# Patient Record
Sex: FEMALE | Race: BLACK OR AFRICAN AMERICAN | Employment: FULL TIME | URBAN - METROPOLITAN AREA
[De-identification: names, ages, dates, MRNs, and addresses within clinical notes are randomized per-mention and may not be internally consistent; named-entity substitution may affect disease eponyms.]

---

## 2018-09-06 ENCOUNTER — OP HISTORICAL/CONVERTED ENCOUNTER (OUTPATIENT)
Dept: OTHER | Age: 43
End: 2018-09-06

## 2018-09-21 ENCOUNTER — OP HISTORICAL/CONVERTED ENCOUNTER (OUTPATIENT)
Dept: OTHER | Age: 43
End: 2018-09-21

## 2018-09-26 ENCOUNTER — OP HISTORICAL/CONVERTED ENCOUNTER (OUTPATIENT)
Dept: OTHER | Age: 43
End: 2018-09-26

## 2019-01-10 ENCOUNTER — ED HISTORICAL/CONVERTED ENCOUNTER (OUTPATIENT)
Dept: OTHER | Age: 44
End: 2019-01-10

## 2019-06-15 ENCOUNTER — ED HISTORICAL/CONVERTED ENCOUNTER (OUTPATIENT)
Dept: OTHER | Age: 44
End: 2019-06-15

## 2019-10-15 ENCOUNTER — ED HISTORICAL/CONVERTED ENCOUNTER (OUTPATIENT)
Dept: OTHER | Age: 44
End: 2019-10-15

## 2019-12-10 ENCOUNTER — ED HISTORICAL/CONVERTED ENCOUNTER (OUTPATIENT)
Dept: OTHER | Age: 44
End: 2019-12-10

## 2020-01-01 ENCOUNTER — OFFICE VISIT (OUTPATIENT)
Dept: SURGERY | Age: 45
End: 2020-01-01
Payer: MEDICAID

## 2020-01-01 VITALS
DIASTOLIC BLOOD PRESSURE: 86 MMHG | WEIGHT: 293 LBS | BODY MASS INDEX: 48.82 KG/M2 | SYSTOLIC BLOOD PRESSURE: 145 MMHG | OXYGEN SATURATION: 96 % | HEIGHT: 65 IN | HEART RATE: 84 BPM | TEMPERATURE: 97.3 F

## 2020-01-01 DIAGNOSIS — I83.899 BLEEDING FROM VARICOSE VEIN: Primary | ICD-10-CM

## 2020-01-01 PROCEDURE — 99213 OFFICE O/P EST LOW 20 MIN: CPT | Performed by: SURGERY

## 2020-01-14 ENCOUNTER — ED HISTORICAL/CONVERTED ENCOUNTER (OUTPATIENT)
Dept: OTHER | Age: 45
End: 2020-01-14

## 2020-02-27 ENCOUNTER — ED HISTORICAL/CONVERTED ENCOUNTER (OUTPATIENT)
Dept: OTHER | Age: 45
End: 2020-02-27

## 2020-03-18 ENCOUNTER — OP HISTORICAL/CONVERTED ENCOUNTER (OUTPATIENT)
Dept: OTHER | Age: 45
End: 2020-03-18

## 2020-09-25 ENCOUNTER — HOSPITAL ENCOUNTER (OUTPATIENT)
Dept: ULTRASOUND IMAGING | Age: 45
Discharge: HOME | End: 2020-09-25
Admitting: FAMILY MEDICINE
Payer: MEDICAID

## 2020-09-25 DIAGNOSIS — E66.8 CONSTITUTIONAL OBESITY: ICD-10-CM

## 2020-09-25 DIAGNOSIS — K70.9 ALCOHOL LIVER DAMAGE (HCC): ICD-10-CM

## 2020-09-25 DIAGNOSIS — R30.0 DYSURIA: ICD-10-CM

## 2020-09-25 PROCEDURE — 76700 US EXAM ABDOM COMPLETE: CPT

## 2020-09-30 ENCOUNTER — TRANSCRIBE ORDER (OUTPATIENT)
Dept: SCHEDULING | Age: 45
End: 2020-09-30

## 2020-09-30 ENCOUNTER — HOSPITAL ENCOUNTER (EMERGENCY)
Age: 45
Discharge: HOME | End: 2020-09-30
Attending: EMERGENCY MEDICINE
Payer: MEDICAID

## 2020-09-30 ENCOUNTER — HOSPITAL ENCOUNTER (OUTPATIENT)
Dept: NON INVASIVE DIAGNOSTICS | Age: 45
Discharge: HOME | End: 2020-09-30
Attending: EMERGENCY MEDICINE
Payer: MEDICAID

## 2020-09-30 VITALS
RESPIRATION RATE: 16 BRPM | HEIGHT: 66 IN | BODY MASS INDEX: 47.09 KG/M2 | DIASTOLIC BLOOD PRESSURE: 86 MMHG | SYSTOLIC BLOOD PRESSURE: 141 MMHG | HEART RATE: 75 BPM | TEMPERATURE: 97.9 F | OXYGEN SATURATION: 100 % | WEIGHT: 293 LBS

## 2020-09-30 DIAGNOSIS — R60.9 SWELLING: ICD-10-CM

## 2020-09-30 DIAGNOSIS — R60.9 SWELLING: Primary | ICD-10-CM

## 2020-09-30 DIAGNOSIS — M79.89 LEG SWELLING: Primary | ICD-10-CM

## 2020-09-30 PROCEDURE — 99282 EMERGENCY DEPT VISIT SF MDM: CPT

## 2020-09-30 PROCEDURE — 93970 EXTREMITY STUDY: CPT

## 2020-09-30 RX ORDER — FUROSEMIDE 20 MG/1
40 TABLET ORAL 2 TIMES DAILY
COMMUNITY

## 2020-09-30 RX ORDER — OMEPRAZOLE 10 MG/1
10 CAPSULE, DELAYED RELEASE ORAL DAILY
COMMUNITY
End: 2020-10-12 | Stop reason: SDUPTHER

## 2020-09-30 RX ORDER — FERROUS SULFATE 325(65) MG
325 TABLET, DELAYED RELEASE (ENTERIC COATED) ORAL
COMMUNITY

## 2020-09-30 NOTE — ED TRIAGE NOTES
Pt states her furosemide is as needed, and that she has not been taking this medication until she noted her bilateral leg swelling 1 week ago. As the furosemide seemed to not be working she went to see NP at [facility] and was referred to ER for MD fernández and US of bilateral legs to r/o DVT.

## 2020-09-30 NOTE — ED PROVIDER NOTES
EMERGENCY DEPARTMENT HISTORY AND PHYSICAL EXAM 
 
 
Date: 9/30/2020 Patient Name: Randa Kelly History of Presenting Illness Chief Complaint Patient presents with  Leg Pain  
  bilateral legs, patient went to [address] [facility] and NP referred her to ER for US to r/o DVTs History Provided By: patient HPI: Randa Kelly, 54 y.o. female presents to the ED with leg swelling bilaterally  Which is chroniic but worse over past few days. Sent by pmd for possible dvt. Denies  Cp or dyspnea. There are no other complaints, changes, or physical findings at this time. PCP: Jose Lorenz MD 
 
Current Outpatient Medications Medication Sig Dispense Refill  omeprazole (PRILOSEC) 10 mg capsule Take 10 mg by mouth daily.  furosemide (LASIX) 20 mg tablet Take  by mouth daily.  cyanocobalamin (VITAMIN B12) 500 mcg tablet Take  by mouth daily.  ferrous sulfate (IRON) 325 mg (65 mg iron) EC tablet Take 325 mg by mouth three (3) times daily (with meals). Past History Past Medical History: 
Past Medical History:  
Diagnosis Date  GERD (gastroesophageal reflux disease) Past Surgical History: 
History reviewed. No pertinent surgical history. Family History: 
History reviewed. No pertinent family history. Social History: 
Social History Tobacco Use  Smoking status: Never Smoker  Smokeless tobacco: Never Used Substance Use Topics  Alcohol use: Yes Comment: occasionally  Drug use: Never Allergies: 
No Known Allergies Review of Systems Review of Systems General: no weakness orsensory deficits Head: no headache Eyes: no visual disturbance Pharynx: no sore throat Neck: no swelling or pain 
Lungs: no shortness of breath or coughing Heart: no chest pain Abdomen: no pain, nausea vomiting or diarrhea Extremities: as  above Neuro: no weakness or deficits Skin: no rash Physical Exam  
 
Physical Exam 
 
Vitals: 09/30/20 1607 BP: (!) 141/86 Pulse: 75 Resp: 16 Temp: 97.9 °F (36.6 °C) SpO2: 100% Weight: 158.8 kg (350 lb) Height: 5' 6\" (1.676 m) General: NAD HEENT: NC/AT, blake, eomi, pharynx clear Neck: supple, no adenopathy LUNGS: non labored respirations, no tachypnea or wheezing Heart:  RRR, no thrill Abdomen: non tender, no rebound, no guarding, no organomegaly EXT: no clubbing, cyanosis , marked bilatr le swelling and varicosities, chronic, no eryytyhema or tenderness Neuro: aaox3, frances, 5/5, sensation  intact to light touch, cn 2-12 wnl Skin: warm,  dry, Vasc: 2+ pulses Psych: no si,hi or psychosis Diagnostic Study Results Medical Decision Making and ED Course I am the first provider for this patient. I reviewed the vital signs, available nursing notes, past medical history, past surgical history, family history and social history. Vital Signs-Reviewed the patient's vital signs. Patient Vitals for the past 12 hrs: 
 Temp Pulse Resp BP SpO2  
09/30/20 1607 97.9 °F (36.6 °C) 75 16 (!) 141/86 100 % Records Reviewed: old records Differentail Diagnosis: DVT/CHF/cellulitis Provider Notes (Medical Decision Making): [facility] Since we do not have ultrasound capabilities patient was sent to main ED for ultrasound of lower extremities. ED Course:  
 
Initial assessment performed. The patients presenting problems have been discussed, and they are in agreement with the care plan formulated and outlined with them. I have encouraged them to ask questions as they arise throughout their visit. Procedures Disposition Discharged DISCHARGE PLAN: 
1. Current Discharge Medication List  
  
CONTINUE these medications which have NOT CHANGED Details  
omeprazole (PRILOSEC) 10 mg capsule Take 10 mg by mouth daily. furosemide (LASIX) 20 mg tablet Take  by mouth daily. cyanocobalamin (VITAMIN B12) 500 mcg tablet Take  by mouth daily. ferrous sulfate (IRON) 325 mg (65 mg iron) EC tablet Take 325 mg by mouth three (3) times daily (with meals). 2.  
Follow-up Information Follow up With Specialties Details Why Contact Info [facility] vascular lab  Today 3. Return to ED if worse Diagnosis Clinical Impression: 1. Leg swelling Attestations: 
 
Shannan Manley MD 
 
Please note that this dictation was completed with Tailwind, the computer voice recognition software. Quite often unanticipated grammatical, syntax, homophones, and other interpretive errors are inadvertently transcribed by the computer software. Please disregard these errors. Please excuse any errors that have escaped final proofreading. Thank you.

## 2020-10-11 ENCOUNTER — HOSPITAL ENCOUNTER (EMERGENCY)
Age: 45
Discharge: HOME | End: 2020-10-11
Attending: EMERGENCY MEDICINE
Payer: MEDICAID

## 2020-10-11 VITALS
SYSTOLIC BLOOD PRESSURE: 137 MMHG | BODY MASS INDEX: 47.09 KG/M2 | WEIGHT: 293 LBS | DIASTOLIC BLOOD PRESSURE: 91 MMHG | TEMPERATURE: 98.8 F | HEART RATE: 83 BPM | RESPIRATION RATE: 16 BRPM | HEIGHT: 66 IN | OXYGEN SATURATION: 100 %

## 2020-10-11 DIAGNOSIS — I83.899 BLEEDING FROM VARICOSE VEIN: Primary | ICD-10-CM

## 2020-10-11 PROCEDURE — 99282 EMERGENCY DEPT VISIT SF MDM: CPT

## 2020-10-11 NOTE — ED PROVIDER NOTES
EMERGENCY DEPARTMENT HISTORY AND PHYSICAL EXAM 
 
 
Date: 10/11/2020 Patient Name: Charla Alegre History of Presenting Illness Chief Complaint Patient presents with  Varicose Veins  
  bleeding upon getting oob this am on the right lateral side History Provided By: Patient HPI: Charla Alegre, 54 y.o. female with a past medical history significant obesity and varicose veins presents to the ED with cc of bleeding from RLE varicosity which started this am but has stopped. She has an appointment with her vascular surgeon in a few days. There are no other complaints, changes, or physical findings at this time. PCP: Gerda, MD Jose 
 
No current facility-administered medications on file prior to encounter. Current Outpatient Medications on File Prior to Encounter Medication Sig Dispense Refill  omeprazole (PRILOSEC) 10 mg capsule Take 10 mg by mouth daily.  furosemide (LASIX) 20 mg tablet Take  by mouth daily.  cyanocobalamin (VITAMIN B12) 500 mcg tablet Take  by mouth daily.  ferrous sulfate (IRON) 325 mg (65 mg iron) EC tablet Take 325 mg by mouth three (3) times daily (with meals). Past History Past Medical History: 
Past Medical History:  
Diagnosis Date  GERD (gastroesophageal reflux disease) Past Surgical History: 
History reviewed. No pertinent surgical history. Family History: 
History reviewed. No pertinent family history. Social History: 
Social History Tobacco Use  Smoking status: Never Smoker  Smokeless tobacco: Never Used Substance Use Topics  Alcohol use: Yes Comment: occasionally  Drug use: Never Allergies: 
No Known Allergies Review of Systems Review of Systems Constitutional: Negative. HENT: Negative. Eyes: Negative. Respiratory: Negative. Cardiovascular: Negative. Gastrointestinal: Negative. Musculoskeletal: Negative. Skin:  
     Varicose veins of legs Neurological: Negative. All other systems reviewed and are negative. Physical Exam  
 
Physical Exam 
Vitals signs and nursing note reviewed. Constitutional:   
   Appearance: Normal appearance. She is obese. HENT:  
   Head: Normocephalic and atraumatic. Eyes:  
   Pupils: Pupils are equal, round, and reactive to light. Neck: Musculoskeletal: Normal range of motion and neck supple. Cardiovascular:  
   Rate and Rhythm: Normal rate and regular rhythm. Pulses: Normal pulses. Heart sounds: Normal heart sounds. Pulmonary:  
   Effort: Pulmonary effort is normal.  
Skin: 
   Comments: Prominent varicosities on legs. Band aid over RLE varicosity with a fresh clot. No active bleeding. Neurological:  
   Mental Status: She is alert. Diagnostic Study Results Labs - No results found for this or any previous visit (from the past 12 hour(s)). Medical Decision Making I am the first provider for this patient. I reviewed the vital signs, available nursing notes, past medical history, past surgical history, family history and social history. Vital Signs-Reviewed the patient's vital signs. Patient Vitals for the past 12 hrs: 
 Temp Pulse Resp BP SpO2  
10/11/20 0649 98.8 °F (37.1 °C) 83 16 (!) 137/91 100 % Records Reviewed: The patient presents with  
 
 
Provider Notes (Medical Decision Making): MDM Number of Diagnoses or Management Options Risk of Complications, Morbidity, and/or Mortality Presenting problems: minimal 
Management options: minimal 
 
  
 
 
 
ED Course:  
Initial assessment performed. The patients presenting problems have been discussed, and they are in agreement with the care plan formulated and outlined with them. I have encouraged them to ask questions as they arise throughout their visit. PROCEDURES Procedures PLAN: 
1. Current Discharge Medication List  
  
 
2. Follow-up Information None Return to ED if worse Diagnosis Clinical Impression: 1. Bleeding from varicose vein

## 2020-10-12 ENCOUNTER — OFFICE VISIT (OUTPATIENT)
Dept: SURGERY | Age: 45
End: 2020-10-12
Payer: MEDICAID

## 2020-10-12 VITALS
HEART RATE: 102 BPM | DIASTOLIC BLOOD PRESSURE: 93 MMHG | WEIGHT: 293 LBS | OXYGEN SATURATION: 98 % | SYSTOLIC BLOOD PRESSURE: 141 MMHG | BODY MASS INDEX: 47.09 KG/M2 | HEIGHT: 66 IN | TEMPERATURE: 97.3 F

## 2020-10-12 DIAGNOSIS — I83.012 VENOUS STASIS ULCER OF RIGHT CALF WITH VARICOSE VEINS, UNSPECIFIED ULCER STAGE (HCC): Primary | ICD-10-CM

## 2020-10-12 DIAGNOSIS — E66.01 OBESITY, MORBID (HCC): ICD-10-CM

## 2020-10-12 DIAGNOSIS — L97.219 VENOUS STASIS ULCER OF RIGHT CALF WITH VARICOSE VEINS, UNSPECIFIED ULCER STAGE (HCC): Primary | ICD-10-CM

## 2020-10-12 PROCEDURE — 29581 APPL MULTLAYER CMPRN SYS LEG: CPT | Performed by: SURGERY

## 2020-10-12 PROCEDURE — 99213 OFFICE O/P EST LOW 20 MIN: CPT | Performed by: SURGERY

## 2020-10-12 RX ORDER — OMEPRAZOLE 20 MG/1
20 CAPSULE, DELAYED RELEASE ORAL DAILY
COMMUNITY
Start: 2020-09-30

## 2020-10-12 RX ORDER — POTASSIUM CHLORIDE 750 MG/1
20 TABLET, FILM COATED, EXTENDED RELEASE ORAL AS NEEDED
COMMUNITY
Start: 2020-09-30

## 2020-10-13 ENCOUNTER — TELEPHONE (OUTPATIENT)
Dept: SURGERY | Age: 45
End: 2020-10-13

## 2020-10-13 PROBLEM — I83.009 VENOUS STASIS ULCER (HCC): Status: ACTIVE | Noted: 2020-10-13

## 2020-10-13 PROBLEM — E66.01 OBESITY, MORBID (HCC): Status: ACTIVE | Noted: 2020-10-13

## 2020-10-13 PROBLEM — L97.909 VENOUS STASIS ULCER (HCC): Status: ACTIVE | Noted: 2020-10-13

## 2020-10-13 NOTE — PROGRESS NOTES
VASCULAR FOLLOW UP Subjective: CHIEF COMPLAINTS: 
Bleeding from the varicose vein. PRESENTATION OF ILLNESS: 
 
 has known history of extensive varicose vein bilaterally. Patient is currently awaiting for custom. Compression stocking to right. Apparently yesterday or the varicose vein on the right popliteal fossa has ruptured has a significant bleeding went to the emergency room and pressure dressing was applied patient sent to my office for further evaluation. Patient still has a pressure wraps around right knee. Past Medical History:  
Diagnosis Date  GERD (gastroesophageal reflux disease) Past Surgical History:  
Procedure Laterality Date  HX GASTRIC BYPASS Family History Problem Relation Age of Onset  Diabetes Mother  Diabetes Father  Heart Attack Father Social History Tobacco Use  Smoking status: Never Smoker  Smokeless tobacco: Never Used Substance Use Topics  Alcohol use: Not Currently Comment: Occasionally Prior to Admission medications Medication Sig Start Date End Date Taking? Authorizing Provider  
potassium chloride SR (KLOR-CON 10) 10 mEq tablet  9/30/20  Yes Provider, Historical  
omeprazole (PRILOSEC) 20 mg capsule  9/30/20  Yes Provider, Historical  
furosemide (LASIX) 20 mg tablet Take  by mouth daily. Yes Other, MD Jose  
cyanocobalamin (VITAMIN B12) 500 mcg tablet Take  by mouth daily. Yes Other, MD Jose  
ferrous sulfate (IRON) 325 mg (65 mg iron) EC tablet Take 325 mg by mouth three (3) times daily (with meals). Yes Other, MD Jose  
 
No Known Allergies Review of Systems: I reviewed the rest of organ systems personally and they were negative signed by Dr. Luis F White Objective:  
 
Visit Vitals BP (!) 141/93 (BP 1 Location: Right arm, BP Patient Position: Sitting) Pulse (!) 102 Temp 97.3 °F (36.3 °C) (Temporal) Ht 5' 6\" (1.676 m) Wt 327 lb (148.3 kg) SpO2 98% BMI 52.78 kg/m² VITAL SIGNS REVIEWED. Physical Exam: 
Patient is well-nourished pleasant in conversation is appropriate. Head and neck examination atraumatic, normocephalic. Gaze appropriate. Conversation appropriate. Neck examination shows supple. No mass. No obvious carotid bruit. Chest examination shows lungs are clear bilaterally well-expanded, no crackles or wheezes. Cardiovascular system regular rate, no obvious murmur. Skin warm to touch  and moist, no skin lesions. Abdomen is soft ,not tender or distended bowel sounds present. No palpable mass. Neurological examinations, no focal neuro deficits moving all 4 extremities. Cranial nerves intact. Sensation is intact as well. Hematologic: No obvious bruise or swelling or obvious lymphadenopathy. Psychosocial: Appropriate. Has good effect. Musculoskeletal system: No muscle wasting, appropriate movements upper and lower extremity. Vascular examination: I removed the dressing from the right leg. Patient has a large varicose vein behind the popliteal fossa and has open wounds with hemorrhage. I placed a 3-0 Vicryl sutures for hemostasis. This procedure is done under sterile technique. In the right leg was wrapped with a multilayer compression wraps. Data Review:  
No visits with results within 1 Month(s) from this visit. Latest known visit with results is: No results found for any previous visit. Assessment:  
 
Problem List Items Addressed This Visit Integumentary Venous stasis ulcer ([address] - Primary Other Obesity, morbid ([address] Plan:  
 
Patient will need a close follow-up on this wound. I instructed patient to remove the compression wraps at this time I will reevaluate this morning again for 5 days later. Meanwhile I will put prophylactic oral antibiotics including clindamycin. Patient will need eventual compression stocking for varicose vein and age related complication.   I will reevaluate her again next week.  
 
 
 
Beata Alonso MD

## 2020-10-14 RX ORDER — CEPHALEXIN 500 MG/1
500 CAPSULE ORAL 4 TIMES DAILY
Qty: 40 CAP | Refills: 0 | Status: SHIPPED | OUTPATIENT
Start: 2020-10-14 | End: 2020-10-24

## 2020-10-14 NOTE — TELEPHONE ENCOUNTER
PATIENT CALLED AGAIN [facility] HAS NOT RECEIVED A PRESCRIPTION FOR ANTIBIOTICS.  [address] [phone]

## 2020-10-15 ENCOUNTER — TELEPHONE (OUTPATIENT)
Dept: SURGERY | Age: 45
End: 2020-10-15

## 2020-10-15 NOTE — TELEPHONE ENCOUNTER
Spoke with patient she states she's not bleeding she thinks there is old blood on the dressing, she wants to come in to see Dr. Fabio De Anda before Monday, appointment made for 09:00 tomorrow. Instructed her to hold pressure if she is actively bleeding if it didn't stop she would have to go to ED.

## 2020-10-15 NOTE — TELEPHONE ENCOUNTER
Spoke with Dr. Cheryl Mcfarland, patient may remove dressing, cover with dry dressing if she thinks it is necessary, I told her about RX written for Keflex she stated the pharmacy received the one Dr. Cheryl Mcfarland sent in so she didn't need to . She also stated she was going to try to leave dressing on until Monday.

## 2020-10-15 NOTE — TELEPHONE ENCOUNTER
Patient called again stated she is bleeding thru bandage. Would like you to call her and let her know what she should do.

## 2020-10-15 NOTE — TELEPHONE ENCOUNTER
Pt has an appt on Monday 10-19. She wants to know can she unwrap her leg because its too tight.  Please call pt with instructions

## 2020-10-19 ENCOUNTER — OFFICE VISIT (OUTPATIENT)
Dept: SURGERY | Age: 45
End: 2020-10-19
Payer: MEDICAID

## 2020-10-19 VITALS
HEART RATE: 82 BPM | BODY MASS INDEX: 47.09 KG/M2 | TEMPERATURE: 97.7 F | WEIGHT: 293 LBS | DIASTOLIC BLOOD PRESSURE: 89 MMHG | OXYGEN SATURATION: 98 % | SYSTOLIC BLOOD PRESSURE: 128 MMHG | HEIGHT: 66 IN

## 2020-10-19 DIAGNOSIS — L97.219 VENOUS STASIS ULCER OF RIGHT CALF WITH VARICOSE VEINS, UNSPECIFIED ULCER STAGE (HCC): Primary | ICD-10-CM

## 2020-10-19 DIAGNOSIS — I83.012 VENOUS STASIS ULCER OF RIGHT CALF WITH VARICOSE VEINS, UNSPECIFIED ULCER STAGE (HCC): Primary | ICD-10-CM

## 2020-10-19 PROCEDURE — 99213 OFFICE O/P EST LOW 20 MIN: CPT | Performed by: SURGERY

## 2020-10-19 NOTE — PROGRESS NOTES
VASCULAR FOLLOW UP Subjective: CHIEF COMPLAINTS: 
Bleeding varicose vein. PRESENTATION OF ILLNESS: 
Ms. Tapia is here today follow-up. She had bleeding varicose vein at the right popliteal fossa that was suture-ligated last week. She has currently a compression wrap around it. She is also started on antibiotics. Past Medical History:  
Diagnosis Date  GERD (gastroesophageal reflux disease) Past Surgical History:  
Procedure Laterality Date  HX GASTRIC BYPASS Family History Problem Relation Age of Onset  Diabetes Mother  Diabetes Father  Heart Attack Father Social History Tobacco Use  Smoking status: Never Smoker  Smokeless tobacco: Never Used Substance Use Topics  Alcohol use: Not Currently Comment: Occasionally Prior to Admission medications Medication Sig Start Date End Date Taking? Authorizing Provider  
cephALEXin (KEFLEX) 500 mg capsule Take 1 Cap by mouth four (4) times daily for 10 days. 10/14/20 10/24/20 Yes Twin Spencer MD  
potassium chloride SR (KLOR-CON 10) 10 mEq tablet  9/30/20  Yes Provider, Historical  
omeprazole (PRILOSEC) 20 mg capsule  9/30/20  Yes Provider, Historical  
furosemide (LASIX) 20 mg tablet Take  by mouth daily. Yes Gerda, MD Jose  
cyanocobalamin (VITAMIN B12) 500 mcg tablet Take  by mouth daily. Yes Gerda, MD Jose  
ferrous sulfate (IRON) 325 mg (65 mg iron) EC tablet Take 325 mg by mouth three (3) times daily (with meals). Yes Other, MD Jose  
 
No Known Allergies Review of Systems: I reviewed the rest of organ systems personally and they were negative signed by Dr. Zain Chand Objective:  
 
Visit Vitals /89 (BP 1 Location: Right arm, BP Patient Position: Sitting) Pulse 82 Temp 97.7 °F (36.5 °C) (Temporal) Ht 5' 6\" (1.676 m) Wt 332 lb (150.6 kg) SpO2 98% BMI 53.59 kg/m² VITAL SIGNS REVIEWED. Physical Exam: Patient is well-nourished pleasant in conversation is appropriate. Head and neck examination atraumatic, normocephalic. Gaze appropriate. Conversation appropriate. Neck examination shows supple. No mass. No obvious carotid bruit. Chest examination shows lungs are clear bilaterally well-expanded, no crackles or wheezes. Cardiovascular system regular rate, no obvious murmur. Skin warm to touch  and moist, no skin lesions. Abdomen is soft ,not tender or distended bowel sounds present. No palpable mass. Neurological examinations, no focal neuro deficits moving all 4 extremities. Cranial nerves intact. Sensation is intact as well. Hematologic: No obvious bruise or swelling or obvious lymphadenopathy. Psychosocial: Appropriate. Has good effect. Musculoskeletal system: No muscle wasting, appropriate movements upper and lower extremity. Vascular examination: When I remove the dressing artery there is still quite a bit of blood clots in oozing so I redressed with a compression dressing. Data Review:  
No visits with results within 1 Month(s) from this visit. Latest known visit with results is: No results found for any previous visit. Assessment:  
 
Problem List Items Addressed This Visit Integumentary Venous stasis ulcer ([address] - Primary Plan:  
 
Patient will need excision of this bleeding varicose vein. So I advised her to go to the emergency room tomorrow have her admitted to the hospital to undergo urgent varicose vein removal bleeding site in the OR.  
 
 
 
Jose Doyle MD

## 2020-10-20 ENCOUNTER — HOSPITAL ENCOUNTER (OUTPATIENT)
Age: 45
Setting detail: OBSERVATION
Discharge: HOME | End: 2020-10-21
Attending: EMERGENCY MEDICINE | Admitting: RADIOLOGY
Payer: MEDICAID

## 2020-10-20 DIAGNOSIS — I83.899 BLEEDING FROM VARICOSE VEIN: ICD-10-CM

## 2020-10-20 DIAGNOSIS — I83.891 BLEEDING FROM VARICOSE VEINS OF LOWER EXTREMITY, RIGHT: ICD-10-CM

## 2020-10-20 DIAGNOSIS — M79.604 PAIN OF RIGHT LOWER EXTREMITY: Primary | ICD-10-CM

## 2020-10-20 DIAGNOSIS — L97.219 VENOUS STASIS ULCER OF RIGHT CALF WITH VARICOSE VEINS, UNSPECIFIED ULCER STAGE (HCC): ICD-10-CM

## 2020-10-20 DIAGNOSIS — I83.012 VENOUS STASIS ULCER OF RIGHT CALF WITH VARICOSE VEINS, UNSPECIFIED ULCER STAGE (HCC): ICD-10-CM

## 2020-10-20 DIAGNOSIS — E66.01 OBESITY, MORBID (HCC): ICD-10-CM

## 2020-10-20 LAB
ANION GAP SERPL CALC-SCNC: 8 MMOL/L (ref 5–15)
BASOPHILS # BLD: 0 K/UL (ref 0–0.1)
BASOPHILS NFR BLD: 0 % (ref 0–1)
BUN SERPL-MCNC: 6 MG/DL (ref 6–20)
BUN/CREAT SERPL: 12 (ref 12–20)
CA-I BLD-MCNC: 9.6 MG/DL (ref 8.5–10.1)
CHLORIDE SERPL-SCNC: 104 MMOL/L (ref 97–108)
CO2 SERPL-SCNC: 28 MMOL/L (ref 21–32)
CREAT SERPL-MCNC: 0.51 MG/DL (ref 0.55–1.02)
DIFFERENTIAL METHOD BLD: ABNORMAL
EOSINOPHIL # BLD: 0.1 K/UL (ref 0–0.4)
EOSINOPHIL NFR BLD: 1 % (ref 0–7)
ERYTHROCYTE [DISTWIDTH] IN BLOOD BY AUTOMATED COUNT: 20.3 % (ref 11.5–14.5)
GLUCOSE SERPL-MCNC: 89 MG/DL (ref 65–100)
HCT VFR BLD AUTO: 34.4 % (ref 35–47)
HGB BLD-MCNC: 11.2 G/DL (ref 11.5–16)
IMM GRANULOCYTES # BLD AUTO: 0 K/UL (ref 0–0.04)
IMM GRANULOCYTES NFR BLD AUTO: 0 % (ref 0–0.5)
INR PPP: 1 (ref 0.9–1.1)
LYMPHOCYTES # BLD: 1.5 K/UL (ref 0.8–3.5)
LYMPHOCYTES NFR BLD: 24 % (ref 12–49)
MCH RBC QN AUTO: 30.4 PG (ref 26–34)
MCHC RBC AUTO-ENTMCNC: 32.6 G/DL (ref 30–36.5)
MCV RBC AUTO: 93.5 FL (ref 80–99)
MONOCYTES # BLD: 0.4 K/UL (ref 0–1)
MONOCYTES NFR BLD: 7 % (ref 5–13)
MRSA DNA SPEC QL NAA+PROBE: NOT DETECTED
NEUTS SEG # BLD: 4.3 K/UL (ref 1.8–8)
NEUTS SEG NFR BLD: 68 % (ref 32–75)
PLATELET # BLD AUTO: 221 K/UL (ref 150–400)
PMV BLD AUTO: 10.7 FL (ref 8.9–12.9)
POTASSIUM SERPL-SCNC: 3.6 MMOL/L (ref 3.5–5.1)
PROTHROMBIN TIME: 13.7 SEC (ref 11.9–14.7)
RBC # BLD AUTO: 3.68 M/UL (ref 3.8–5.2)
SODIUM SERPL-SCNC: 140 MMOL/L (ref 136–145)
WBC # BLD AUTO: 6.3 K/UL (ref 3.6–11)

## 2020-10-20 PROCEDURE — 74011000258 HC RX REV CODE- 258: Performed by: SURGERY

## 2020-10-20 PROCEDURE — 96376 TX/PRO/DX INJ SAME DRUG ADON: CPT

## 2020-10-20 PROCEDURE — 85025 COMPLETE CBC W/AUTO DIFF WBC: CPT

## 2020-10-20 PROCEDURE — 74011250636 HC RX REV CODE- 250/636: Performed by: SURGERY

## 2020-10-20 PROCEDURE — 99282 EMERGENCY DEPT VISIT SF MDM: CPT

## 2020-10-20 PROCEDURE — 80048 BASIC METABOLIC PNL TOTAL CA: CPT

## 2020-10-20 PROCEDURE — 74011250637 HC RX REV CODE- 250/637: Performed by: SURGERY

## 2020-10-20 PROCEDURE — 87641 MR-STAPH DNA AMP PROBE: CPT

## 2020-10-20 PROCEDURE — 99218 HC RM OBSERVATION: CPT

## 2020-10-20 PROCEDURE — 85610 PROTHROMBIN TIME: CPT

## 2020-10-20 PROCEDURE — 96374 THER/PROPH/DIAG INJ IV PUSH: CPT

## 2020-10-20 RX ORDER — SODIUM CHLORIDE 0.9 % (FLUSH) 0.9 %
5-40 SYRINGE (ML) INJECTION AS NEEDED
Status: DISCONTINUED | OUTPATIENT
Start: 2020-10-20 | End: 2020-10-21 | Stop reason: HOSPADM

## 2020-10-20 RX ORDER — PANTOPRAZOLE SODIUM 20 MG/1
20 TABLET, DELAYED RELEASE ORAL DAILY
Status: DISCONTINUED | OUTPATIENT
Start: 2020-10-21 | End: 2020-10-21 | Stop reason: HOSPADM

## 2020-10-20 RX ORDER — SODIUM CHLORIDE 0.9 % (FLUSH) 0.9 %
5-40 SYRINGE (ML) INJECTION EVERY 8 HOURS
Status: DISCONTINUED | OUTPATIENT
Start: 2020-10-20 | End: 2020-10-21 | Stop reason: HOSPADM

## 2020-10-20 RX ORDER — POTASSIUM CHLORIDE 750 MG/1
10 TABLET, FILM COATED, EXTENDED RELEASE ORAL DAILY
Status: DISCONTINUED | OUTPATIENT
Start: 2020-10-21 | End: 2020-10-21 | Stop reason: HOSPADM

## 2020-10-20 RX ORDER — OXYCODONE AND ACETAMINOPHEN 10; 325 MG/1; MG/1
1 TABLET ORAL
Status: DISCONTINUED | OUTPATIENT
Start: 2020-10-20 | End: 2020-10-21 | Stop reason: HOSPADM

## 2020-10-20 RX ADMIN — Medication 10 ML: at 16:40

## 2020-10-20 RX ADMIN — PIPERACILLIN AND TAZOBACTAM 3.38 G: 3; .375 INJECTION, POWDER, LYOPHILIZED, FOR SOLUTION INTRAVENOUS at 22:56

## 2020-10-20 RX ADMIN — Medication 10 ML: at 23:00

## 2020-10-20 RX ADMIN — OXYCODONE HYDROCHLORIDE AND ACETAMINOPHEN 1 TABLET: 10; 325 TABLET ORAL at 16:39

## 2020-10-20 RX ADMIN — PIPERACILLIN AND TAZOBACTAM 3.38 G: 3; .375 INJECTION, POWDER, LYOPHILIZED, FOR SOLUTION INTRAVENOUS at 14:33

## 2020-10-20 RX ADMIN — OXYCODONE HYDROCHLORIDE AND ACETAMINOPHEN 1 TABLET: 10; 325 TABLET ORAL at 22:56

## 2020-10-20 NOTE — PROGRESS NOTES
Initial skin assessment complete. Skin dry and intact with exception to the right lower lateral leg where the burst vericose vein is present. Pressure dressing in place which was placed by Dr Florin Caputo.

## 2020-10-20 NOTE — ED NOTES
TRANSFER - OUT REPORT: 
 
Verbal report given to jose r on Patt Riis  being transferred to  for routine progression of care Report consisted of patients Situation, Background, Assessment and  
Recommendations(SBAR). Information from the following report(s) SBAR was reviewed with the receiving nurse. Lines:  
Peripheral IV 10/20/20 Right Antecubital (Active) Site Assessment Clean, dry, & intact 10/20/20 1431 Phlebitis Assessment 0 10/20/20 1431 Infiltration Assessment 0 10/20/20 1431 Dressing Status Clean, dry, & intact 10/20/20 1431 Dressing Type Transparent 10/20/20 1431 Alcohol Cap Used Yes 10/20/20 1431 Opportunity for questions and clarification was provided. Patient transported with: 
 [place]

## 2020-10-20 NOTE — ED TRIAGE NOTES
Pt sent by Dr. Karyn Gallagher due to ruptured varicose vein. Pt denies pain. Leg is bandaged. Bleeding is controlled.

## 2020-10-20 NOTE — PROGRESS NOTES
Bedside and Verbal shift change report given to Tia Simmonds, rn (oncoming nurse) by Jeffrey Parks RN (offgoing nurse). Report included the following information SBAR.

## 2020-10-20 NOTE — ED PROVIDER NOTES
EMERGENCY DEPARTMENT HISTORY AND PHYSICAL EXAM 
 
 
Date: 10/20/2020 Patient Name: Pierre Bolivar History of Presenting Illness Chief Complaint Patient presents with  Varicose Veins  
  vericose veins burst in right leg sent by dr. Karyn Gallagher History Provided By: Patient HPI: Pierre Bolivar, 54 y.o. female presents to the ED with cc of Chief Complaint Patient presents with  Varicose Veins  
  vericose veins burst in right leg sent by dr. Karyn Gallagher Signed Pt sent by Dr. Karyn Gallagher due to ruptured varicose vein. Pt denies pain. Leg is bandaged. Bleeding is controlled. Patient complaining of pain in the right leg until was seen by Dr. Charli Carvajal advised to come to the hospital for admission as she need surgical phlebectomy There are no other complaints, changes, or physical findings at this time. PCP: Glendy Henry No current facility-administered medications on file prior to encounter. Current Outpatient Medications on File Prior to Encounter Medication Sig Dispense Refill  cephALEXin (KEFLEX) 500 mg capsule Take 1 Cap by mouth four (4) times daily for 10 days. 40 Cap 0  
 potassium chloride SR (KLOR-CON 10) 10 mEq tablet  omeprazole (PRILOSEC) 20 mg capsule  furosemide (LASIX) 20 mg tablet Take  by mouth daily.  cyanocobalamin (VITAMIN B12) 500 mcg tablet Take  by mouth daily.  ferrous sulfate (IRON) 325 mg (65 mg iron) EC tablet Take 325 mg by mouth three (3) times daily (with meals). Past History Past Medical History: 
Past Medical History:  
Diagnosis Date  GERD (gastroesophageal reflux disease) Past Surgical History: 
Past Surgical History:  
Procedure Laterality Date  HX GASTRIC BYPASS Family History: 
Family History Problem Relation Age of Onset  Diabetes Mother  Diabetes Father  Heart Attack Father Social History: 
Social History Tobacco Use  Smoking status: Never Smoker  Smokeless tobacco: Never Used Substance Use Topics  Alcohol use: Not Currently Comment: Occasionally  Drug use: Never Allergies: 
No Known Allergies Review of Systems Review of Systems Constitutional: Negative. HENT: Negative for congestion, facial swelling, rhinorrhea and sore throat. Eyes: Negative. Negative for photophobia and pain. Respiratory: Negative for cough, shortness of breath and wheezing. Cardiovascular: Negative. Negative for chest pain. Gastrointestinal: Negative for abdominal distention and abdominal pain. Genitourinary: Negative. Musculoskeletal: Negative. Allergic/Immunologic: Negative for immunocompromised state. Neurological: Negative. Negative for syncope and weakness. Hematological: Negative. Psychiatric/Behavioral: Negative. Physical Exam  
Physical Exam 
Vitals signs and nursing note reviewed. Constitutional:   
   Appearance: Normal appearance. HENT:  
   Head: Normocephalic and atraumatic. Mouth/Throat:  
   Pharynx: Oropharynx is clear. Eyes:  
   Extraocular Movements: Extraocular movements intact. Pupils: Pupils are equal, round, and reactive to light. Neck: Musculoskeletal: Normal range of motion and neck supple. Cardiovascular:  
   Rate and Rhythm: Normal rate and regular rhythm. Pulses: Normal pulses. Heart sounds: Normal heart sounds. Comments: Chest wall tenderness mild Pulmonary:  
   Breath sounds: Normal breath sounds. Abdominal:  
   General: Abdomen is flat. Bowel sounds are normal.  
   Palpations: Abdomen is soft. Musculoskeletal: Normal range of motion. Right hip: She exhibits tenderness and swelling. Skin: 
   General: Skin is warm and dry. Comments: Angie Sic in the right thigh area bleeding is controlled Neurological:  
   General: No focal deficit present. Mental Status: She is alert and oriented to person, place, and time.   
Psychiatric:     
 Mood and Affect: Mood is anxious. Behavior: Behavior normal.  
 
 
 
Diagnostic Study Results Labs - Recent Results (from the past 12 hour(s)) MRSA SCREEN - PCR (NASAL) Collection Time: 10/20/20  1:30 PM  
Result Value Ref Range MRSA by PCR, Nasal Not Detected Not Detected CBC WITH AUTOMATED DIFF Collection Time: 10/20/20  2:20 PM  
Result Value Ref Range WBC 6.3 3.6 - 11.0 K/uL  
 RBC 3.68 (L) 3.80 - 5.20 M/uL  
 HGB 11.2 (L) 11.5 - 16.0 g/dL HCT 34.4 (L) 35.0 - 47.0 % MCV 93.5 80.0 - 99.0 FL  
 MCH 30.4 26.0 - 34.0 PG  
 MCHC 32.6 30.0 - 36.5 g/dL RDW 20.3 (H) 11.5 - 14.5 % PLATELET 183 896 - 856 K/uL MPV 10.7 8.9 - 12.9 FL  
 NEUTROPHILS 68 32 - 75 % LYMPHOCYTES 24 12 - 49 % MONOCYTES 7 5 - 13 % EOSINOPHILS 1 0 - 7 % BASOPHILS 0 0 - 1 % IMMATURE GRANULOCYTES 0 0.0 - 0.5 % ABS. NEUTROPHILS 4.3 1.8 - 8.0 K/UL  
 ABS. LYMPHOCYTES 1.5 0.8 - 3.5 K/UL  
 ABS. MONOCYTES 0.4 0.0 - 1.0 K/UL  
 ABS. EOSINOPHILS 0.1 0.0 - 0.4 K/UL  
 ABS. BASOPHILS 0.0 0.0 - 0.1 K/UL  
 ABS. IMM. GRANS. 0.0 0.00 - 0.04 K/UL  
 DF AUTOMATED METABOLIC PANEL, BASIC Collection Time: 10/20/20  2:20 PM  
Result Value Ref Range Sodium 140 136 - 145 mmol/L Potassium 3.6 3.5 - 5.1 mmol/L Chloride 104 97 - 108 mmol/L  
 CO2 28 21 - 32 mmol/L Anion gap 8 5 - 15 mmol/L Glucose 89 65 - 100 mg/dL BUN 6 6 - 20 mg/dL Creatinine 0.51 (L) 0.55 - 1.02 mg/dL BUN/Creatinine ratio 12 12 - 20 GFR est AA >60 >60 ml/min/1.73m2 GFR est non-AA >60 >60 ml/min/1.73m2 Calcium 9.6 8.5 - 10.1 mg/dL PROTHROMBIN TIME + INR Collection Time: 10/20/20  2:20 PM  
Result Value Ref Range Prothrombin time 13.7 11.9 - 14.7 sec INR 1.0 0.9 - 1.1 Labs reviewed by me Radiologic Studies - No orders to display CT Results  (Last 48 hours) None CXR Results  (Last 48 hours) None Medical Decision Making I am the first provider for this patient. I reviewed the vital signs, available nursing notes, past medical history, past surgical history, family history and social history. RADIOLOGY report and LABS reviewed by me Vital Signs-Reviewed the patient's vital signs. Patient Vitals for the past 12 hrs: 
 Temp Pulse Resp BP SpO2  
10/20/20 1242 98.2 °F (36.8 °C) 85 17 112/77 100 % EKG interpretation: (Preliminary) Records Reviewed: Nurse's note. Provider Notes (Medical Decision Making): 
 
Patient presents with DIFF DX : Varicose vein bleeding, venous stasis, DVT 
 
 
 
ED Course:  
Initial assessment performed. The patients presenting problems have been discussed, and they are in agreement with the care plan formulated and outlined with them. I have encouraged them to ask questions as they arise throughout their visit. TREATMENT RESPONSE -Francine Vail MD 
 
 
Disposition: 
Admitted Diagnostic tests were reviewed and questions answered. Diagnosis, care plan and treatment options were discussed. The patient understand instructions and will follow up as directed. Condition stable Admitting Provider: 
Madeline Anderson MD  
 
Consulting Provider: No ref. provider found DISCHARGE PLAN: 
1. Current Discharge Medication List  
  
 
2. Follow-up Information None 3. Return to ED if worse Diagnosis Clinical Impression: ICD-10-CM ICD-9-CM 1. Pain of right lower extremity  M79.604 729.5 2. Bleeding from varicose veins of lower extremity, right  I83.891 454.8   
  459.0 Attestations: 
 
Darshana Vail MD 
 
Please note that this dictation was completed with [employer], the computer voice recognition software. Quite often unanticipated grammatical, syntax, homophones, and other interpretive errors are inadvertently transcribed by the computer software. Please disregard these errors. Please excuse any errors that have escaped final proofreading. Thank you.

## 2020-10-21 ENCOUNTER — ANESTHESIA (OUTPATIENT)
Dept: SURGERY | Age: 45
End: 2020-10-21
Payer: MEDICAID

## 2020-10-21 ENCOUNTER — ANESTHESIA EVENT (OUTPATIENT)
Dept: SURGERY | Age: 45
End: 2020-10-21
Payer: MEDICAID

## 2020-10-21 VITALS
SYSTOLIC BLOOD PRESSURE: 128 MMHG | RESPIRATION RATE: 18 BRPM | HEART RATE: 72 BPM | HEIGHT: 65 IN | BODY MASS INDEX: 48.82 KG/M2 | WEIGHT: 293 LBS | TEMPERATURE: 98.6 F | OXYGEN SATURATION: 98 % | DIASTOLIC BLOOD PRESSURE: 92 MMHG

## 2020-10-21 DIAGNOSIS — M79.606 PAIN OF LOWER EXTREMITY, UNSPECIFIED LATERALITY: Primary | ICD-10-CM

## 2020-10-21 PROCEDURE — 77030008684 HC TU ET CUF COVD -B: Performed by: NURSE ANESTHETIST, CERTIFIED REGISTERED

## 2020-10-21 PROCEDURE — 74011000250 HC RX REV CODE- 250: Performed by: SURGERY

## 2020-10-21 PROCEDURE — 76210000063 HC OR PH I REC FIRST 0.5 HR: Performed by: SURGERY

## 2020-10-21 PROCEDURE — 74011250636 HC RX REV CODE- 250/636: Performed by: NURSE ANESTHETIST, CERTIFIED REGISTERED

## 2020-10-21 PROCEDURE — 74011250636 HC RX REV CODE- 250/636: Performed by: SURGERY

## 2020-10-21 PROCEDURE — 77030026438 HC STYL ET INTUB CARD -A: Performed by: NURSE ANESTHETIST, CERTIFIED REGISTERED

## 2020-10-21 PROCEDURE — 88304 TISSUE EXAM BY PATHOLOGIST: CPT

## 2020-10-21 PROCEDURE — 2709999900 HC NON-CHARGEABLE SUPPLY: Performed by: SURGERY

## 2020-10-21 PROCEDURE — 77030031139 HC SUT VCRL2 J&J -A: Performed by: SURGERY

## 2020-10-21 PROCEDURE — 99218 HC RM OBSERVATION: CPT

## 2020-10-21 PROCEDURE — 74011000258 HC RX REV CODE- 258: Performed by: SURGERY

## 2020-10-21 PROCEDURE — 77030002935 HC SUT MCRYL J&J -C: Performed by: SURGERY

## 2020-10-21 PROCEDURE — 74011000250 HC RX REV CODE- 250: Performed by: NURSE ANESTHETIST, CERTIFIED REGISTERED

## 2020-10-21 PROCEDURE — 76010000149 HC OR TIME 1 TO 1.5 HR: Performed by: SURGERY

## 2020-10-21 PROCEDURE — 88305 TISSUE EXAM BY PATHOLOGIST: CPT

## 2020-10-21 PROCEDURE — 96376 TX/PRO/DX INJ SAME DRUG ADON: CPT

## 2020-10-21 PROCEDURE — 74011250637 HC RX REV CODE- 250/637: Performed by: SURGERY

## 2020-10-21 PROCEDURE — 77030019908 HC STETH ESOPH SIMS -A: Performed by: NURSE ANESTHETIST, CERTIFIED REGISTERED

## 2020-10-21 PROCEDURE — 76060000033 HC ANESTHESIA 1 TO 1.5 HR: Performed by: SURGERY

## 2020-10-21 PROCEDURE — 74011250636 HC RX REV CODE- 250/636: Performed by: ANESTHESIOLOGY

## 2020-10-21 RX ORDER — SODIUM CHLORIDE 0.9 % (FLUSH) 0.9 %
5-40 SYRINGE (ML) INJECTION EVERY 8 HOURS
Status: CANCELLED | OUTPATIENT
Start: 2020-10-21

## 2020-10-21 RX ORDER — DEXMEDETOMIDINE HYDROCHLORIDE 100 UG/ML
INJECTION, SOLUTION INTRAVENOUS AS NEEDED
Status: DISCONTINUED | OUTPATIENT
Start: 2020-10-21 | End: 2020-10-21 | Stop reason: HOSPADM

## 2020-10-21 RX ORDER — SODIUM CHLORIDE 0.9 % (FLUSH) 0.9 %
5-40 SYRINGE (ML) INJECTION AS NEEDED
Status: CANCELLED | OUTPATIENT
Start: 2020-10-21

## 2020-10-21 RX ORDER — MIDAZOLAM HYDROCHLORIDE 1 MG/ML
0.5 INJECTION, SOLUTION INTRAMUSCULAR; INTRAVENOUS
Status: DISCONTINUED | OUTPATIENT
Start: 2020-10-21 | End: 2020-10-21 | Stop reason: HOSPADM

## 2020-10-21 RX ORDER — FENTANYL CITRATE 50 UG/ML
INJECTION, SOLUTION INTRAMUSCULAR; INTRAVENOUS AS NEEDED
Status: DISCONTINUED | OUTPATIENT
Start: 2020-10-21 | End: 2020-10-21 | Stop reason: HOSPADM

## 2020-10-21 RX ORDER — LIDOCAINE HYDROCHLORIDE 10 MG/ML
INJECTION INFILTRATION; PERINEURAL AS NEEDED
Status: DISCONTINUED | OUTPATIENT
Start: 2020-10-21 | End: 2020-10-21 | Stop reason: HOSPADM

## 2020-10-21 RX ORDER — SODIUM CHLORIDE, SODIUM LACTATE, POTASSIUM CHLORIDE, CALCIUM CHLORIDE 600; 310; 30; 20 MG/100ML; MG/100ML; MG/100ML; MG/100ML
INJECTION, SOLUTION INTRAVENOUS
Status: DISCONTINUED | OUTPATIENT
Start: 2020-10-21 | End: 2020-10-21

## 2020-10-21 RX ORDER — PROPOFOL 10 MG/ML
INJECTION, EMULSION INTRAVENOUS AS NEEDED
Status: DISCONTINUED | OUTPATIENT
Start: 2020-10-21 | End: 2020-10-21 | Stop reason: HOSPADM

## 2020-10-21 RX ORDER — LIDOCAINE HYDROCHLORIDE 10 MG/ML
0.1 INJECTION, SOLUTION EPIDURAL; INFILTRATION; INTRACAUDAL; PERINEURAL AS NEEDED
Status: CANCELLED | OUTPATIENT
Start: 2020-10-21

## 2020-10-21 RX ORDER — OXYCODONE AND ACETAMINOPHEN 10; 325 MG/1; MG/1
1 TABLET ORAL
Qty: 28 TAB | Refills: 0 | Status: SHIPPED | OUTPATIENT
Start: 2020-10-21 | End: 2020-10-28

## 2020-10-21 RX ORDER — SODIUM CHLORIDE 0.9 % (FLUSH) 0.9 %
5-40 SYRINGE (ML) INJECTION EVERY 8 HOURS
Status: DISCONTINUED | OUTPATIENT
Start: 2020-10-21 | End: 2020-10-21 | Stop reason: HOSPADM

## 2020-10-21 RX ORDER — SODIUM CHLORIDE 0.9 % (FLUSH) 0.9 %
5-40 SYRINGE (ML) INJECTION AS NEEDED
Status: DISCONTINUED | OUTPATIENT
Start: 2020-10-21 | End: 2020-10-21 | Stop reason: HOSPADM

## 2020-10-21 RX ORDER — ONDANSETRON 2 MG/ML
4 INJECTION INTRAMUSCULAR; INTRAVENOUS AS NEEDED
Status: DISCONTINUED | OUTPATIENT
Start: 2020-10-21 | End: 2020-10-21 | Stop reason: HOSPADM

## 2020-10-21 RX ORDER — DIPHENHYDRAMINE HYDROCHLORIDE 50 MG/ML
12.5 INJECTION, SOLUTION INTRAMUSCULAR; INTRAVENOUS AS NEEDED
Status: DISCONTINUED | OUTPATIENT
Start: 2020-10-21 | End: 2020-10-21 | Stop reason: HOSPADM

## 2020-10-21 RX ORDER — FENTANYL CITRATE 50 UG/ML
50 INJECTION, SOLUTION INTRAMUSCULAR; INTRAVENOUS AS NEEDED
Status: CANCELLED | OUTPATIENT
Start: 2020-10-21

## 2020-10-21 RX ORDER — MORPHINE SULFATE 10 MG/ML
2 INJECTION, SOLUTION INTRAMUSCULAR; INTRAVENOUS
Status: DISCONTINUED | OUTPATIENT
Start: 2020-10-21 | End: 2020-10-21 | Stop reason: HOSPADM

## 2020-10-21 RX ORDER — SODIUM CHLORIDE, SODIUM LACTATE, POTASSIUM CHLORIDE, CALCIUM CHLORIDE 600; 310; 30; 20 MG/100ML; MG/100ML; MG/100ML; MG/100ML
INJECTION, SOLUTION INTRAVENOUS
Status: DISCONTINUED | OUTPATIENT
Start: 2020-10-21 | End: 2020-10-21 | Stop reason: HOSPADM

## 2020-10-21 RX ORDER — NORETHINDRONE AND ETHINYL ESTRADIOL 0.5-0.035
5 KIT ORAL AS NEEDED
Status: DISCONTINUED | OUTPATIENT
Start: 2020-10-21 | End: 2020-10-21 | Stop reason: HOSPADM

## 2020-10-21 RX ORDER — OXYCODONE AND ACETAMINOPHEN 10; 325 MG/1; MG/1
1 TABLET ORAL
Qty: 28 TAB | Refills: 0 | OUTPATIENT
Start: 2020-10-21 | End: 2020-10-26 | Stop reason: SDUPTHER

## 2020-10-21 RX ORDER — ENOXAPARIN SODIUM 100 MG/ML
40 INJECTION SUBCUTANEOUS DAILY
Status: CANCELLED | OUTPATIENT
Start: 2020-10-21

## 2020-10-21 RX ORDER — MIDAZOLAM HYDROCHLORIDE 1 MG/ML
1 INJECTION, SOLUTION INTRAMUSCULAR; INTRAVENOUS AS NEEDED
Status: CANCELLED | OUTPATIENT
Start: 2020-10-21

## 2020-10-21 RX ORDER — ONDANSETRON 2 MG/ML
INJECTION INTRAMUSCULAR; INTRAVENOUS AS NEEDED
Status: DISCONTINUED | OUTPATIENT
Start: 2020-10-21 | End: 2020-10-21 | Stop reason: HOSPADM

## 2020-10-21 RX ORDER — PROPOFOL 10 MG/ML
INJECTION, EMULSION INTRAVENOUS
Status: DISCONTINUED | OUTPATIENT
Start: 2020-10-21 | End: 2020-10-21 | Stop reason: HOSPADM

## 2020-10-21 RX ORDER — FENTANYL CITRATE 50 UG/ML
50 INJECTION, SOLUTION INTRAMUSCULAR; INTRAVENOUS
Status: DISCONTINUED | OUTPATIENT
Start: 2020-10-21 | End: 2020-10-21 | Stop reason: HOSPADM

## 2020-10-21 RX ORDER — MIDAZOLAM HYDROCHLORIDE 1 MG/ML
INJECTION, SOLUTION INTRAMUSCULAR; INTRAVENOUS AS NEEDED
Status: DISCONTINUED | OUTPATIENT
Start: 2020-10-21 | End: 2020-10-21 | Stop reason: HOSPADM

## 2020-10-21 RX ORDER — ONDANSETRON 2 MG/ML
4 INJECTION INTRAMUSCULAR; INTRAVENOUS
Status: CANCELLED | OUTPATIENT
Start: 2020-10-21

## 2020-10-21 RX ORDER — SODIUM CHLORIDE 9 MG/ML
INJECTION, SOLUTION INTRAVENOUS
Status: DISCONTINUED | OUTPATIENT
Start: 2020-10-21 | End: 2020-10-21 | Stop reason: HOSPADM

## 2020-10-21 RX ADMIN — DEXMEDETOMIDINE HYDROCHLORIDE 10 MCG: 100 INJECTION, SOLUTION INTRAVENOUS at 08:32

## 2020-10-21 RX ADMIN — POTASSIUM CHLORIDE 10 MEQ: 750 TABLET, FILM COATED, EXTENDED RELEASE ORAL at 11:28

## 2020-10-21 RX ADMIN — FENTANYL CITRATE 50 MCG: 50 INJECTION, SOLUTION INTRAMUSCULAR; INTRAVENOUS at 08:44

## 2020-10-21 RX ADMIN — PIPERACILLIN AND TAZOBACTAM 3.38 G: 3; .375 INJECTION, POWDER, LYOPHILIZED, FOR SOLUTION INTRAVENOUS at 06:59

## 2020-10-21 RX ADMIN — DEXMEDETOMIDINE HYDROCHLORIDE 10 MCG: 100 INJECTION, SOLUTION INTRAVENOUS at 08:26

## 2020-10-21 RX ADMIN — DEXMEDETOMIDINE HYDROCHLORIDE 10 MCG: 100 INJECTION, SOLUTION INTRAVENOUS at 08:45

## 2020-10-21 RX ADMIN — PANTOPRAZOLE SODIUM 20 MG: 20 TABLET, DELAYED RELEASE ORAL at 11:28

## 2020-10-21 RX ADMIN — FENTANYL CITRATE 50 MCG: 50 INJECTION, SOLUTION INTRAMUSCULAR; INTRAVENOUS at 08:51

## 2020-10-21 RX ADMIN — PROPOFOL 100 MCG/KG/MIN: 10 INJECTION, EMULSION INTRAVENOUS at 08:43

## 2020-10-21 RX ADMIN — MIDAZOLAM HYDROCHLORIDE 2 MG: 2 INJECTION, SOLUTION INTRAMUSCULAR; INTRAVENOUS at 08:40

## 2020-10-21 RX ADMIN — PROPOFOL 50 MG: 10 INJECTION, EMULSION INTRAVENOUS at 08:50

## 2020-10-21 RX ADMIN — FENTANYL CITRATE 50 MCG: 50 INJECTION INTRAMUSCULAR; INTRAVENOUS at 09:47

## 2020-10-21 RX ADMIN — SODIUM CHLORIDE, POTASSIUM CHLORIDE, SODIUM LACTATE AND CALCIUM CHLORIDE: 600; 310; 30; 20 INJECTION, SOLUTION INTRAVENOUS at 08:26

## 2020-10-21 RX ADMIN — FENTANYL CITRATE 50 MCG: 50 INJECTION INTRAMUSCULAR; INTRAVENOUS at 09:42

## 2020-10-21 RX ADMIN — ONDANSETRON 4 MG: 2 INJECTION INTRAMUSCULAR; INTRAVENOUS at 08:52

## 2020-10-21 RX ADMIN — MIDAZOLAM HYDROCHLORIDE 2 MG: 2 INJECTION, SOLUTION INTRAMUSCULAR; INTRAVENOUS at 08:26

## 2020-10-21 RX ADMIN — Medication 10 ML: at 06:59

## 2020-10-21 RX ADMIN — Medication 10 ML: at 10:00

## 2020-10-21 NOTE — H&P
History and Physical 
 
Patient: Charla Alegre  MRN: [id number]  SSN: [id number] YOB: 1975  Age: 54 y.o. Sex: female Chief complaint: Bleeding from the right leg. History of present illness: Ms. Surya De Los Santos is a very pleasant 50-year-old woman with a bleeding episodes from the right popliteal fossa about 3 weeks ago. She went to the emergency room. She had a pressure dressing was applied and sent to me for evaluation. Patient had traumatized with a fall and had a skin tear along with a varicose vein. Since then she has been bleeding. This was suture-ligated at the office with a pressure dressing. I will patient still having problem with bleeding when the pressure dressings off. Patient has a larger size varicose vein behind the popliteal fossa and bilateral extremity. Varicose veins are quite extensive. Patient was approved prescribed with compression stocking and she is not able to get it yet. Past Medical History:  
Diagnosis Date  GERD (gastroesophageal reflux disease)  Morbid obesity ([address] Past Surgical History:  
Procedure Laterality Date  HX GASTRIC BYPASS No Known Allergies Current Facility-Administered Medications Medication Dose Route Frequency Provider Last Rate Last Dose  sodium chloride (NS) flush 5-40 mL  5-40 mL IntraVENous Q8H Chano Spencer MD   10 mL at 10/20/20 2300  
 sodium chloride (NS) flush 5-40 mL  5-40 mL IntraVENous PRN Chano Spencer MD   10 mL at 10/20/20 1640  
 oxyCODONE-acetaminophen (PERCOCET 10)  mg per tablet 1 Tab  1 Tab Oral Q6H PRN Chano Spencer MD   1 Tab at 10/20/20 2256  potassium chloride SR (KLOR-CON 10) tablet 10 mEq  10 mEq Oral DAILY Chano Spencer MD      
 pantoprazole (PROTONIX) tablet 20 mg  20 mg Oral DAILY Chano Spencer MD      
 piperacillin-tazobactam (ZOSYN) 3.375 g in 0.9% sodium chloride (MBP/ADV) 100 mL MBP  3.375 g IntraVENous Q8H Chano Spencer MD 25 mL/hr at 10/20/20 2256 3.375 g at 10/20/20 2256 Physical Examination Visit Vitals /74 Pulse 93 Temp 97.7 °F (36.5 °C) Resp 18 Ht 5' 5\" (1.651 m) Wt 148.3 kg (327 lb) SpO2 100% Breastfeeding No  
BMI 54.42 kg/m² Gen: Well developed, well nourished 54 y.o. female in no acute distress Head: normocephalic, atraumatic Mouth: Clear, no overt lesions, oral mucosa pink and moist 
Neck: supple, no masses, no adenopathy or carotid bruits, trachea midline Resp: clear to auscultation bilaterally, no wheeze, rhonchi or rales, excursions normal and symmetrical 
Cardio: Regular rate and rhythm, no murmurs, clicks, gallops or rubs, no edema or varicosities Breasts: Examined in both the supine and upright positions. There was no supraclavicular, infraclavicular, or axillary lympadenopathy. There were no dominant masses, no skin changes, no asymmetry identified. Abdomen: soft, nontender, nondistended, normoactive bowel sounds, no hernias, no hepatosplenomegaly Extremeties: warm, well-perfused, no tenderness or swelling, normal gait/station Neuro: sensation and strength grossly intact and symmetrical 
Psych: alert and oriented to person, place and time Labs: Reviewed. Imagings: None available. Impression: Bleeding large varicose vein right leg. Plan: Patient was directed to emergency room admission, patient will require surgical excision and phlebectomy of this bleeding varicose vein. Patient was discussed about the surgical plan for bleeding varicose vein. Surgical risk benefits complication explained to the patient as well.

## 2020-10-21 NOTE — PROGRESS NOTES
Post op skin assessment performed by this Rn with Digna Dunlap Rn. Surgical incision noted on the right popliteal fossa covered with pressure dressing, kerlix and ace wrap. Multiple  visible varicose veins on the left lower leg noted. No other open areas seen.

## 2020-10-21 NOTE — ROUTINE PROCESS
Bedside shift change report given to Robert Dixon (oncoming nurse) by Vera Chamberlain (offgoing nurse). Report included the following information SBAR, Kardex and Intake/Output.

## 2020-10-21 NOTE — ANESTHESIA PREPROCEDURE EVALUATION
Relevant Problems No relevant active problems Anesthetic History Review of Systems / Medical History Patient summary reviewed, nursing notes reviewed and pertinent labs reviewed Pulmonary Within defined limits Neuro/Psych Within defined limits Cardiovascular Within defined limits GI/Hepatic/Renal 
  
GERD Endo/Other Morbid obesity and anemia (Hb/Hct 11.2/34. 4) Other Findings Comments: \"Bleeding from varicose vain\" Hx of difficult I/v. \"sometimes feet get swollen. Physical Exam 
 
Airway Mallampati: III 
TM Distance: 4 - 6 cm Neck ROM: normal range of motion Cardiovascular Rhythm: regular Rate: normal 
 
 
 
 Dental 
 
 
  
Pulmonary Decreased breath sounds Abdominal 
 
 
 
 Other Findings Anesthetic Plan ASA: 3 Anesthesia type: MAC and general - backup Induction: Intravenous Anesthetic plan and risks discussed with: Patient 
 
 
GUILHERME MILLER REQUESTED MAC/MODERATE SEDATION.

## 2020-10-21 NOTE — ANESTHESIA POSTPROCEDURE EVALUATION
Procedure(s): 
Right Leg Phlebectomy. general 
 
Anesthesia Post Evaluation Patient location during evaluation: PACU Patient participation: complete - patient participated Level of consciousness: awake and awake and alert Pain management: adequate Airway patency: patent Anesthetic complications: no 
Cardiovascular status: hemodynamically stable Respiratory status: spontaneous ventilation and unassisted Hydration status: euvolemic Post anesthesia nausea and vomiting:  controlled Final Post Anesthesia Temperature Assessment:  Normothermia (36.0-37.5 degrees C) INITIAL Post-op Vital signs:  
Vitals Value Taken Time /60 10/21/2020  9:45 AM  
Temp 35.6 °C (96.1 °F) 10/21/2020  9:43 AM  
Pulse 65 10/21/2020  9:45 AM  
Resp 18 10/21/2020  9:45 AM  
SpO2 96 % 10/21/2020  9:45 AM

## 2020-10-21 NOTE — PROGRESS NOTES
Reminded Dr. Jeremy Griggs not to forget prescription for this patient via tiger text-patient information and pharmacy provided for reference later when he is able.

## 2020-10-21 NOTE — PROGRESS NOTES
Called patient pharmacy to double check patient prescription. As per pharmacist on duty-prescription not yet showing in their system. Called Dr. Ace Mishra informing him that patient prescription didn't go through the pharmacy. He informed this nurse that he will take care of it later when he is done in the OR. Communicated to the patient.

## 2020-10-21 NOTE — PROGRESS NOTES
Removed patient IV access x 2 at the right upper extremity-no complications. No telemetry noted, no gates catheter. Discharge instructions given to patient-questions addressed properly. Discharge to home self care. Patient tolerated regular diet with no nausea or vomiting, urinated without problem, able to ambulate in the room without difficulty. Wheeled down to ground entrance safely, transported via 
private car.

## 2020-10-22 NOTE — OP NOTES
This is operative report on Porter Watt, patient's YOB: 1975. Date of surgery: October 22, 2020. Surgeon: Dr. Reggie Jameson. Preop diagnosis: Bleeding large varicose vein right popliteal fossa area. Post op diagnosis: Same as above. Procedure: 
Phlebectomy with excision and  stripping of the bleeding varicose vein right popliteal fossa. Anesthesia: Moderate sedation. EBL: 75 cc. Complication: None. Assistant: None Indication for surgery: Ms. Rasta Velásquez currently hospitalized for hemorrhage from the large varicose vein behind the right popliteal fossa. Patient is scheduled for surgical exploration of this area, and removing bleeding varicose vein. We talked about rationale for the surgery, risk benefits complication. Description of procedure: Patient was brought to the operating table comfortable supine position, followed by patient is appropriate positioned left lateral decubitus position, anesthesia was initiated, followed by right popliteal fossa area was prepped using Betadine solutions and sterile drapes applied usual fashion. Patient has a open wound about 1 cm there was a bleeding from the varicose vein. Skin was partially closed nearby bleeding varicose vein with open area of the vein with hemorrhage. Manual compression was applied for hemostasis skin was a partially excised around the exposed varicose vein and skin was elevated underneath varicose vein was dissected out and stripped end of the vein was ligated with 3-0 Vicryl sutures. A good segment of varicose vein was removed. Once hemostasis obtained wound was irrigated closed with a 3-0 Vicryl sutures and the skin was closed with 4-0 Monocryl sutures. Appropriate sterile dressing was applied. Patient tolerated procedure well. Patient was transferred to recovery area in stable conditions.

## 2020-10-26 ENCOUNTER — OFFICE VISIT (OUTPATIENT)
Dept: SURGERY | Age: 45
End: 2020-10-26
Payer: MEDICAID

## 2020-10-26 VITALS
SYSTOLIC BLOOD PRESSURE: 128 MMHG | HEART RATE: 81 BPM | WEIGHT: 293 LBS | BODY MASS INDEX: 48.82 KG/M2 | DIASTOLIC BLOOD PRESSURE: 80 MMHG | HEIGHT: 65 IN | OXYGEN SATURATION: 94 % | TEMPERATURE: 96.8 F

## 2020-10-26 DIAGNOSIS — Z09 POSTOP CHECK: Primary | ICD-10-CM

## 2020-10-26 PROCEDURE — 99024 POSTOP FOLLOW-UP VISIT: CPT | Performed by: SURGERY

## 2020-10-26 NOTE — PROGRESS NOTES
VASCULAR FOLLOW UP Subjective: CHIEF COMPLAINTS: 
Postop follow-up. PRESENTATION OF ILLNESS: 
 
Ms. Laurita Rinaldi is here today she had right popliteal fossa bleeding varicose vein phlebectomy. She is doing well. She has a pressure wrap applied. She has not remove the dressing. Past Medical History:  
Diagnosis Date  GERD (gastroesophageal reflux disease)  Morbid obesity ([address] Past Surgical History:  
Procedure Laterality Date  HX GASTRIC BYPASS Family History Problem Relation Age of Onset  Diabetes Mother  Diabetes Father  Heart Attack Father Social History Tobacco Use  Smoking status: Never Smoker  Smokeless tobacco: Never Used Substance Use Topics  Alcohol use: Not Currently Comment: Occasionally Prior to Admission medications Medication Sig Start Date End Date Taking? Authorizing Provider  
oxyCODONE-acetaminophen (PERCOCET 10)  mg per tablet Take 1 Tab by mouth every six (6) hours as needed for Pain for up to 7 days. Max Daily Amount: 4 Tabs. 10/21/20 10/28/20  Tj, Paddy Moralez MD  
potassium chloride SR (KLOR-CON 10) 10 mEq tablet  9/30/20   Provider, Historical  
omeprazole (PRILOSEC) 20 mg capsule  9/30/20   Provider, Historical  
furosemide (LASIX) 20 mg tablet Take  by mouth daily. Jose Lorenz MD  
cyanocobalamin (VITAMIN B12) 500 mcg tablet Take  by mouth daily. Jose Lorenz MD  
ferrous sulfate (IRON) 325 mg (65 mg iron) EC tablet Take 325 mg by mouth three (3) times daily (with meals). Jose Lorenz MD  
 
No Known Allergies Review of Systems: I reviewed the rest of organ systems personally and they were negative signed by Dr. Jasper Larsen Objective:  
 
Visit Vitals /80 (BP 1 Location: Right arm, BP Patient Position: Sitting) Pulse 81 Temp 96.8 °F (36 °C) (Temporal) Ht 5' 5\" (1.651 m) Wt 328 lb (148.8 kg) SpO2 94% BMI 54.58 kg/m² VITAL SIGNS REVIEWED. Physical Exam: Patient is well-nourished pleasant in conversation is appropriate. Head and neck examination atraumatic, normocephalic. Gaze appropriate. Conversation appropriate. Neck examination shows supple. No mass. No obvious carotid bruit. Chest examination shows lungs are clear bilaterally well-expanded, no crackles or wheezes. Cardiovascular system regular rate, no obvious murmur. Skin warm to touch  and moist, no skin lesions. Abdomen is soft ,not tender or distended bowel sounds present. No palpable mass. Neurological examinations, no focal neuro deficits moving all 4 extremities. Cranial nerves intact. Sensation is intact as well. Hematologic: No obvious bruise or swelling or obvious lymphadenopathy. Psychosocial: Appropriate. Has good effect. Musculoskeletal system: No muscle wasting, appropriate movements upper and lower extremity. Vascular examination: Remove the dressing wounds are very clean and dry. I covered area 2 x 2 gauze and tape. Data Review:  
Admission on 10/20/2020, Discharged on 10/21/2020 Component Date Value Ref Range Status  MRSA by PCR, Nasal 10/20/2020 Not Detected  Not Detected   Final  
 WBC 10/20/2020 6.3  3.6 - 11.0 K/uL Final  
 RBC 10/20/2020 3.68* 3.80 - 5.20 M/uL Final  
 HGB 10/20/2020 11.2* 11.5 - 16.0 g/dL Final  
 HCT 10/20/2020 34.4* 35.0 - 47.0 % Final  
 MCV 10/20/2020 93.5  80.0 - 99.0 FL Final  
 MCH 10/20/2020 30.4  26.0 - 34.0 PG Final  
 MCHC 10/20/2020 32.6  30.0 - 36.5 g/dL Final  
 RDW 10/20/2020 20.3* 11.5 - 14.5 % Final  
 PLATELET 48/42/6143 830  150 - 400 K/uL Final  
 MPV 10/20/2020 10.7  8.9 - 12.9 FL Final  
 NEUTROPHILS 10/20/2020 68  32 - 75 % Final  
 LYMPHOCYTES 10/20/2020 24  12 - 49 % Final  
 MONOCYTES 10/20/2020 7  5 - 13 % Final  
 EOSINOPHILS 10/20/2020 1  0 - 7 % Final  
 BASOPHILS 10/20/2020 0  0 - 1 % Final  
 IMMATURE GRANULOCYTES 10/20/2020 0  0.0 - 0.5 % Final  
  ABS. NEUTROPHILS 10/20/2020 4.3  1.8 - 8.0 K/UL Final  
 ABS. LYMPHOCYTES 10/20/2020 1.5  0.8 - 3.5 K/UL Final  
 ABS. MONOCYTES 10/20/2020 0.4  0.0 - 1.0 K/UL Final  
 ABS. EOSINOPHILS 10/20/2020 0.1  0.0 - 0.4 K/UL Final  
 ABS. BASOPHILS 10/20/2020 0.0  0.0 - 0.1 K/UL Final  
 ABS. IMM. GRANS. 10/20/2020 0.0  0.00 - 0.04 K/UL Final  
 DF 10/20/2020 AUTOMATED    Final  
 Sodium 10/20/2020 140  136 - 145 mmol/L Final  
 Potassium 10/20/2020 3.6  3.5 - 5.1 mmol/L Final  
 Chloride 10/20/2020 104  97 - 108 mmol/L Final  
 CO2 10/20/2020 28  21 - 32 mmol/L Final  
 Anion gap 10/20/2020 8  5 - 15 mmol/L Final  
 Glucose 10/20/2020 89  65 - 100 mg/dL Final  
 BUN 10/20/2020 6  6 - 20 mg/dL Final  
 Creatinine 10/20/2020 0.51* 0.55 - 1.02 mg/dL Final  
 BUN/Creatinine ratio 10/20/2020 12  12 - 20   Final  
 GFR est AA 10/20/2020 >60  >60 ml/min/1.73m2 Final  
 GFR est non-AA 10/20/2020 >60  >60 ml/min/1.73m2 Final  
 Calcium 10/20/2020 9.6  8.5 - 10.1 mg/dL Final  
 Prothrombin time 10/20/2020 13.7  11.9 - 14.7 sec Final  
 INR 10/20/2020 1.0  0.9 - 1.1   Final  
  
 
Assessment:  
 
Problem List Items Addressed This Visit Other Postop check - Primary Plan:  
 
Patient supposed to get a custom-made compression stocking tomorrow. I did tell her that we will do stocking every day. I will recheck her wound again in 1 month follow-up.  
 
 
 
Peggy Cordero MD

## 2020-11-23 NOTE — PROGRESS NOTES
VASCULAR FOLLOW UP Subjective: CHIEF COMPLAINTS: 
Bleeding varicose vein. PRESENTATION OF ILLNESS: 
 
Ms. Anika Garcia had ligation of the bleeding varicose vein at the right popliteal fossa. She also got new compression stocking from the [facility]. Patient is otherwise doing well. No more bleeding episode. Past Medical History:  
Diagnosis Date  GERD (gastroesophageal reflux disease)  Morbid obesity ([address] Past Surgical History:  
Procedure Laterality Date  HX GASTRIC BYPASS Family History Problem Relation Age of Onset  Diabetes Mother  Diabetes Father  Heart Attack Father Social History Tobacco Use  Smoking status: Never Smoker  Smokeless tobacco: Never Used Substance Use Topics  Alcohol use: Not Currently Comment: Occasionally Prior to Admission medications Medication Sig Start Date End Date Taking? Authorizing Provider  
potassium chloride SR (KLOR-CON 10) 10 mEq tablet  9/30/20   Provider, Historical  
omeprazole (PRILOSEC) 20 mg capsule  9/30/20   Provider, Historical  
furosemide (LASIX) 20 mg tablet Take  by mouth daily. Other, MD Jose  
cyanocobalamin (VITAMIN B12) 500 mcg tablet Take  by mouth daily. Jose Lorenz MD  
ferrous sulfate (IRON) 325 mg (65 mg iron) EC tablet Take 325 mg by mouth three (3) times daily (with meals). Jose Lorenz MD  
 
No Known Allergies Review of Systems: I reviewed the rest of organ systems personally and they were negative signed by Dr. Jovan Talavera Objective:  
 
Visit Vitals BP (!) 145/86 (BP 1 Location: Left arm, BP Patient Position: Sitting) Pulse 84 Temp 97.3 °F (36.3 °C) (Temporal) Ht 5' 5\" (1.651 m) Wt 342 lb (155.1 kg) SpO2 96% BMI 56.91 kg/m² VITAL SIGNS REVIEWED. Physical Exam: 
Patient is well-nourished pleasant in conversation is appropriate. Head and neck examination atraumatic, normocephalic. Gaze appropriate. Conversation appropriate. Neck examination shows supple. No mass. No obvious carotid bruit. Chest examination shows lungs are clear bilaterally well-expanded, no crackles or wheezes. Cardiovascular system regular rate, no obvious murmur. Skin warm to touch  and moist, no skin lesions. Abdomen is soft ,not tender or distended bowel sounds present. No palpable mass. Neurological examinations, no focal neuro deficits moving all 4 extremities. Cranial nerves intact. Sensation is intact as well. Hematologic: No obvious bruise or swelling or obvious lymphadenopathy. Psychosocial: Appropriate. Has good effect. Musculoskeletal system: No muscle wasting, appropriate movements upper and lower extremity. Vascular examination: I examined the right popliteal fossa area wounds are completely healed and closed. Patient has currently CEAP C3 classification. Data Review:  
No visits with results within 1 Month(s) from this visit. Latest known visit with results is:  
Admission on 10/20/2020, Discharged on 10/21/2020 Component Date Value Ref Range Status  MRSA by PCR, Nasal 10/20/2020 Not Detected  Not Detected   Final  
 WBC 10/20/2020 6.3  3.6 - 11.0 K/uL Final  
 RBC 10/20/2020 3.68* 3.80 - 5.20 M/uL Final  
 HGB 10/20/2020 11.2* 11.5 - 16.0 g/dL Final  
 HCT 10/20/2020 34.4* 35.0 - 47.0 % Final  
 MCV 10/20/2020 93.5  80.0 - 99.0 FL Final  
 MCH 10/20/2020 30.4  26.0 - 34.0 PG Final  
 MCHC 10/20/2020 32.6  30.0 - 36.5 g/dL Final  
 RDW 10/20/2020 20.3* 11.5 - 14.5 % Final  
 PLATELET 41/28/1169 617  150 - 400 K/uL Final  
 MPV 10/20/2020 10.7  8.9 - 12.9 FL Final  
 NEUTROPHILS 10/20/2020 68  32 - 75 % Final  
 LYMPHOCYTES 10/20/2020 24  12 - 49 % Final  
 MONOCYTES 10/20/2020 7  5 - 13 % Final  
 EOSINOPHILS 10/20/2020 1  0 - 7 % Final  
 BASOPHILS 10/20/2020 0  0 - 1 % Final  
 IMMATURE GRANULOCYTES 10/20/2020 0  0.0 - 0.5 % Final  
 ABS.  NEUTROPHILS 10/20/2020 4.3  1.8 - 8.0 K/UL Final  
  ABS. LYMPHOCYTES 10/20/2020 1.5  0.8 - 3.5 K/UL Final  
 ABS. MONOCYTES 10/20/2020 0.4  0.0 - 1.0 K/UL Final  
 ABS. EOSINOPHILS 10/20/2020 0.1  0.0 - 0.4 K/UL Final  
 ABS. BASOPHILS 10/20/2020 0.0  0.0 - 0.1 K/UL Final  
 ABS. IMM. GRANS. 10/20/2020 0.0  0.00 - 0.04 K/UL Final  
 DF 10/20/2020 AUTOMATED    Final  
 Sodium 10/20/2020 140  136 - 145 mmol/L Final  
 Potassium 10/20/2020 3.6  3.5 - 5.1 mmol/L Final  
 Chloride 10/20/2020 104  97 - 108 mmol/L Final  
 CO2 10/20/2020 28  21 - 32 mmol/L Final  
 Anion gap 10/20/2020 8  5 - 15 mmol/L Final  
 Glucose 10/20/2020 89  65 - 100 mg/dL Final  
 BUN 10/20/2020 6  6 - 20 mg/dL Final  
 Creatinine 10/20/2020 0.51* 0.55 - 1.02 mg/dL Final  
 BUN/Creatinine ratio 10/20/2020 12  12 - 20   Final  
 GFR est AA 10/20/2020 >60  >60 ml/min/1.73m2 Final  
 GFR est non-AA 10/20/2020 >60  >60 ml/min/1.73m2 Final  
 Calcium 10/20/2020 9.6  8.5 - 10.1 mg/dL Final  
 Prothrombin time 10/20/2020 13.7  11.9 - 14.7 sec Final  
 INR 10/20/2020 1.0  0.9 - 1.1   Final  
  
 
Assessment:  
 
Problem List Items Addressed This Visit Circulatory Bleeding from varicose vein - Primary Plan:  
 
I have advised the patient that she needs to have compression stocking we measured current stocking is a bit short from the popliteal fossa area she needs to have probably 2 inches longer size. Otherwise I would like to see her again in another follow-up for venous vascular examination in 6 months.  
 
 
 
Erlin Dia MD

## 2021-01-01 ENCOUNTER — ANESTHESIA EVENT (OUTPATIENT)
Dept: ENDOSCOPY | Age: 46
DRG: 280 | End: 2021-01-01
Payer: MEDICAID

## 2021-01-01 ENCOUNTER — APPOINTMENT (OUTPATIENT)
Dept: GENERAL RADIOLOGY | Age: 46
DRG: 280 | End: 2021-01-01
Attending: INTERNAL MEDICINE
Payer: MEDICAID

## 2021-01-01 ENCOUNTER — ANESTHESIA (OUTPATIENT)
Dept: ENDOSCOPY | Age: 46
DRG: 280 | End: 2021-01-01
Payer: MEDICAID

## 2021-01-01 ENCOUNTER — APPOINTMENT (OUTPATIENT)
Dept: ENDOSCOPY | Age: 46
DRG: 280 | End: 2021-01-01
Attending: INTERNAL MEDICINE
Payer: MEDICAID

## 2021-01-01 ENCOUNTER — HOSPITAL ENCOUNTER (INPATIENT)
Age: 46
LOS: 14 days | DRG: 280 | End: 2021-11-08
Attending: STUDENT IN AN ORGANIZED HEALTH CARE EDUCATION/TRAINING PROGRAM | Admitting: HOSPITALIST
Payer: MEDICAID

## 2021-01-01 ENCOUNTER — APPOINTMENT (OUTPATIENT)
Dept: GENERAL RADIOLOGY | Age: 46
DRG: 280 | End: 2021-01-01
Attending: RADIOLOGY
Payer: MEDICAID

## 2021-01-01 ENCOUNTER — APPOINTMENT (OUTPATIENT)
Dept: INTERVENTIONAL RADIOLOGY/VASCULAR | Age: 46
DRG: 280 | End: 2021-01-01
Attending: INTERNAL MEDICINE
Payer: MEDICAID

## 2021-01-01 ENCOUNTER — APPOINTMENT (OUTPATIENT)
Dept: CT IMAGING | Age: 46
DRG: 280 | End: 2021-01-01
Attending: HOSPITALIST
Payer: MEDICAID

## 2021-01-01 ENCOUNTER — APPOINTMENT (OUTPATIENT)
Dept: ULTRASOUND IMAGING | Age: 46
DRG: 280 | End: 2021-01-01
Attending: NURSE PRACTITIONER
Payer: MEDICAID

## 2021-01-01 ENCOUNTER — ANESTHESIA (OUTPATIENT)
Dept: CARDIOLOGY UNIT | Age: 46
DRG: 280 | End: 2021-01-01
Payer: MEDICAID

## 2021-01-01 ENCOUNTER — HOSPITAL ENCOUNTER (EMERGENCY)
Age: 46
Discharge: HOME | DRG: 280 | End: 2021-10-22
Payer: MEDICAID

## 2021-01-01 ENCOUNTER — OFFICE VISIT (OUTPATIENT)
Dept: SURGERY | Age: 46
End: 2021-01-01
Payer: MEDICAID

## 2021-01-01 ENCOUNTER — APPOINTMENT (OUTPATIENT)
Dept: GENERAL RADIOLOGY | Age: 46
DRG: 280 | End: 2021-01-01
Attending: STUDENT IN AN ORGANIZED HEALTH CARE EDUCATION/TRAINING PROGRAM
Payer: MEDICAID

## 2021-01-01 ENCOUNTER — HOSPITAL ENCOUNTER (EMERGENCY)
Age: 46
Discharge: HOME | End: 2021-08-19
Attending: EMERGENCY MEDICINE
Payer: MEDICAID

## 2021-01-01 ENCOUNTER — ANESTHESIA EVENT (OUTPATIENT)
Dept: CARDIOLOGY UNIT | Age: 46
DRG: 280 | End: 2021-01-01
Payer: MEDICAID

## 2021-01-01 VITALS
BODY MASS INDEX: 47.09 KG/M2 | TEMPERATURE: 97.6 F | HEIGHT: 66 IN | OXYGEN SATURATION: 99 % | RESPIRATION RATE: 18 BRPM | WEIGHT: 293 LBS | DIASTOLIC BLOOD PRESSURE: 55 MMHG | SYSTOLIC BLOOD PRESSURE: 101 MMHG | HEART RATE: 84 BPM

## 2021-01-01 VITALS
TEMPERATURE: 97.7 F | DIASTOLIC BLOOD PRESSURE: 71 MMHG | RESPIRATION RATE: 18 BRPM | OXYGEN SATURATION: 98 % | HEIGHT: 66 IN | WEIGHT: 293 LBS | BODY MASS INDEX: 47.09 KG/M2 | HEART RATE: 92 BPM | SYSTOLIC BLOOD PRESSURE: 120 MMHG

## 2021-01-01 VITALS
SYSTOLIC BLOOD PRESSURE: 56 MMHG | OXYGEN SATURATION: 92 % | WEIGHT: 293 LBS | DIASTOLIC BLOOD PRESSURE: 32 MMHG | HEART RATE: 82 BPM | HEIGHT: 66 IN | RESPIRATION RATE: 32 BRPM | BODY MASS INDEX: 47.09 KG/M2 | TEMPERATURE: 101.1 F

## 2021-01-01 VITALS
BODY MASS INDEX: 54.33 KG/M2 | WEIGHT: 293 LBS | DIASTOLIC BLOOD PRESSURE: 88 MMHG | HEART RATE: 83 BPM | TEMPERATURE: 96.4 F | OXYGEN SATURATION: 96 % | RESPIRATION RATE: 18 BRPM | SYSTOLIC BLOOD PRESSURE: 125 MMHG

## 2021-01-01 DIAGNOSIS — R17 SERUM TOTAL BILIRUBIN ELEVATED: ICD-10-CM

## 2021-01-01 DIAGNOSIS — K70.30 ALCOHOLIC CIRRHOSIS OF LIVER WITHOUT ASCITES (HCC): ICD-10-CM

## 2021-01-01 DIAGNOSIS — K70.31 ALCOHOLIC CIRRHOSIS OF LIVER WITH ASCITES (HCC): Primary | ICD-10-CM

## 2021-01-01 DIAGNOSIS — E80.6 HYPERBILIRUBINEMIA: Primary | ICD-10-CM

## 2021-01-01 DIAGNOSIS — R17 JAUNDICE: ICD-10-CM

## 2021-01-01 DIAGNOSIS — I83.892 BLEEDING FROM VARICOSE VEINS OF LEFT LOWER EXTREMITY: Primary | ICD-10-CM

## 2021-01-01 DIAGNOSIS — I83.899 BLEEDING FROM VARICOSE VEIN: Primary | ICD-10-CM

## 2021-01-01 LAB
ABO + RH BLD: NORMAL
ALBUMIN SERPL-MCNC: 0.8 G/DL (ref 3.5–5)
ALBUMIN SERPL-MCNC: 0.8 G/DL (ref 3.5–5)
ALBUMIN SERPL-MCNC: 1 G/DL (ref 3.5–5)
ALBUMIN SERPL-MCNC: 1.1 G/DL (ref 3.5–5)
ALBUMIN SERPL-MCNC: 1.1 G/DL (ref 3.5–5)
ALBUMIN SERPL-MCNC: 1.3 G/DL (ref 3.5–5)
ALBUMIN SERPL-MCNC: 1.3 G/DL (ref 3.5–5)
ALBUMIN SERPL-MCNC: 1.4 G/DL (ref 3.5–5)
ALBUMIN SERPL-MCNC: 1.4 G/DL (ref 3.5–5)
ALBUMIN SERPL-MCNC: 1.6 G/DL (ref 3.5–5)
ALBUMIN SERPL-MCNC: 1.7 G/DL (ref 3.5–5)
ALBUMIN SERPL-MCNC: 1.8 G/DL (ref 3.5–5)
ALBUMIN SERPL-MCNC: 2 G/DL (ref 3.5–5)
ALBUMIN SERPL-MCNC: 2.3 G/DL (ref 3.5–5)
ALBUMIN SERPL-MCNC: 2.5 G/DL (ref 3.5–5)
ALBUMIN/GLOB SERPL: 0.3 {RATIO} (ref 1.1–2.2)
ALBUMIN/GLOB SERPL: 0.4 {RATIO} (ref 1.1–2.2)
ALBUMIN/GLOB SERPL: 0.4 {RATIO} (ref 1.1–2.2)
ALBUMIN/GLOB SERPL: 0.5 {RATIO} (ref 1.1–2.2)
ALBUMIN/GLOB SERPL: 0.6 {RATIO} (ref 1.1–2.2)
ALP SERPL-CCNC: 102 U/L (ref 45–117)
ALP SERPL-CCNC: 53 U/L (ref 45–117)
ALP SERPL-CCNC: 60 U/L (ref 45–117)
ALP SERPL-CCNC: 76 U/L (ref 45–117)
ALP SERPL-CCNC: 77 U/L (ref 45–117)
ALP SERPL-CCNC: 77 U/L (ref 45–117)
ALP SERPL-CCNC: 80 U/L (ref 45–117)
ALP SERPL-CCNC: 81 U/L (ref 45–117)
ALP SERPL-CCNC: 82 U/L (ref 45–117)
ALP SERPL-CCNC: 83 U/L (ref 45–117)
ALP SERPL-CCNC: 85 U/L (ref 45–117)
ALP SERPL-CCNC: 89 U/L (ref 45–117)
ALT SERPL-CCNC: 117 U/L (ref 12–78)
ALT SERPL-CCNC: 118 U/L (ref 12–78)
ALT SERPL-CCNC: 120 U/L (ref 12–78)
ALT SERPL-CCNC: 138 U/L (ref 12–78)
ALT SERPL-CCNC: 138 U/L (ref 12–78)
ALT SERPL-CCNC: 53 U/L (ref 12–78)
ALT SERPL-CCNC: 55 U/L (ref 12–78)
ALT SERPL-CCNC: 61 U/L (ref 12–78)
ALT SERPL-CCNC: 63 U/L (ref 12–78)
ALT SERPL-CCNC: 64 U/L (ref 12–78)
ALT SERPL-CCNC: 66 U/L (ref 12–78)
ALT SERPL-CCNC: 76 U/L (ref 12–78)
AMMONIA PLAS-SCNC: 100 UMOL/L
AMMONIA PLAS-SCNC: 118 UMOL/L
AMMONIA PLAS-SCNC: 43 UMOL/L
AMMONIA PLAS-SCNC: 59 UMOL/L
AMMONIA PLAS-SCNC: 60 UMOL/L
AMMONIA PLAS-SCNC: 65 UMOL/L
AMMONIA PLAS-SCNC: 73 UMOL/L
AMMONIA PLAS-SCNC: 73 UMOL/L
AMMONIA PLAS-SCNC: 90 UMOL/L
AMMONIA PLAS-SCNC: 94 UMOL/L
ANION GAP SERPL CALC-SCNC: 10 MMOL/L (ref 5–15)
ANION GAP SERPL CALC-SCNC: 10 MMOL/L (ref 5–15)
ANION GAP SERPL CALC-SCNC: 11 MMOL/L (ref 5–15)
ANION GAP SERPL CALC-SCNC: 11 MMOL/L (ref 5–15)
ANION GAP SERPL CALC-SCNC: 12 MMOL/L (ref 5–15)
ANION GAP SERPL CALC-SCNC: 13 MMOL/L (ref 5–15)
ANION GAP SERPL CALC-SCNC: 14 MMOL/L (ref 5–15)
ANION GAP SERPL CALC-SCNC: 15 MMOL/L (ref 5–15)
ANION GAP SERPL CALC-SCNC: 16 MMOL/L (ref 5–15)
ANION GAP SERPL CALC-SCNC: 17 MMOL/L (ref 5–15)
ANION GAP SERPL CALC-SCNC: 9 MMOL/L (ref 5–15)
ANION GAP SERPL CALC-SCNC: 9 MMOL/L (ref 5–15)
APPEARANCE UR: ABNORMAL
APPEARANCE UR: ABNORMAL
APTT PPP: 74 SEC (ref 21.2–34.1)
ARTERIAL PATENCY WRIST A: ABNORMAL
ARTERIAL PATENCY WRIST A: POSITIVE
AST SERPL W P-5'-P-CCNC: 120 U/L (ref 15–37)
AST SERPL W P-5'-P-CCNC: 123 U/L (ref 15–37)
AST SERPL W P-5'-P-CCNC: 125 U/L (ref 15–37)
AST SERPL W P-5'-P-CCNC: 151 U/L (ref 15–37)
AST SERPL W P-5'-P-CCNC: 152 U/L (ref 15–37)
AST SERPL W P-5'-P-CCNC: 176 U/L (ref 15–37)
AST SERPL W P-5'-P-CCNC: 191 U/L (ref 15–37)
AST SERPL W P-5'-P-CCNC: 250 U/L (ref 15–37)
AST SERPL W P-5'-P-CCNC: 383 U/L (ref 15–37)
AST SERPL W P-5'-P-CCNC: 386 U/L (ref 15–37)
AST SERPL W P-5'-P-CCNC: 454 U/L (ref 15–37)
AST SERPL W P-5'-P-CCNC: 468 U/L (ref 15–37)
ATRIAL RATE: 87 BPM
BACTERIA SPEC CULT: ABNORMAL
BACTERIA SPEC CULT: NORMAL
BACTERIA URNS QL MICRO: ABNORMAL /HPF
BACTERIA URNS QL MICRO: NEGATIVE /HPF
BASE DEFICIT BLDA-SCNC: 10 MMOL/L (ref 0–2)
BASE DEFICIT BLDA-SCNC: 10.7 MMOL/L (ref 0–2)
BASE DEFICIT BLDA-SCNC: 12.4 MMOL/L (ref 0–2)
BASE DEFICIT BLDA-SCNC: 13.2 MMOL/L (ref 0–2)
BASE DEFICIT BLDA-SCNC: 7.6 MMOL/L (ref 0–2)
BASE DEFICIT BLDA-SCNC: 7.8 MMOL/L (ref 0–2)
BASE DEFICIT BLDA-SCNC: 8.2 MMOL/L (ref 0–2)
BASE DEFICIT BLDA-SCNC: 9.4 MMOL/L (ref 0–2)
BASOPHILS # BLD: 0 K/UL (ref 0–0.1)
BASOPHILS # BLD: 0.1 K/UL (ref 0–0.1)
BASOPHILS NFR BLD: 0 % (ref 0–1)
BASOPHILS NFR BLD: 1 % (ref 0–1)
BDY SITE: ABNORMAL
BILIRUB DIRECT SERPL-MCNC: 14.9 MG/DL (ref 0–0.2)
BILIRUB DIRECT SERPL-MCNC: 14.9 MG/DL (ref 0–0.2)
BILIRUB DIRECT SERPL-MCNC: 17.9 MG/DL (ref 0–0.2)
BILIRUB DIRECT SERPL-MCNC: 18.4 MG/DL (ref 0–0.2)
BILIRUB DIRECT SERPL-MCNC: 19.2 MG/DL (ref 0–0.2)
BILIRUB DIRECT SERPL-MCNC: 21.4 MG/DL (ref 0–0.2)
BILIRUB DIRECT SERPL-MCNC: 22.7 MG/DL (ref 0–0.2)
BILIRUB DIRECT SERPL-MCNC: 24.9 MG/DL (ref 0–0.2)
BILIRUB DIRECT SERPL-MCNC: 26.2 MG/DL (ref 0–0.2)
BILIRUB DIRECT SERPL-MCNC: >16 MG/DL (ref 0–0.2)
BILIRUB SERPL-MCNC: 16.7 MG/DL (ref 0.2–1)
BILIRUB SERPL-MCNC: 17.6 MG/DL (ref 0.2–1)
BILIRUB SERPL-MCNC: 18.7 MG/DL (ref 0.2–1)
BILIRUB SERPL-MCNC: 21.4 MG/DL (ref 0.2–1)
BILIRUB SERPL-MCNC: 22.7 MG/DL (ref 0.2–1)
BILIRUB SERPL-MCNC: 23.1 MG/DL (ref 0.2–1)
BILIRUB SERPL-MCNC: 23.6 MG/DL (ref 0.2–1)
BILIRUB SERPL-MCNC: 26.1 MG/DL (ref 0.2–1)
BILIRUB SERPL-MCNC: 27.8 MG/DL (ref 0.2–1)
BILIRUB SERPL-MCNC: 28.2 MG/DL (ref 0.2–1)
BILIRUB SERPL-MCNC: 29.3 MG/DL (ref 0.2–1)
BILIRUB SERPL-MCNC: 30.3 MG/DL (ref 0.2–1)
BILIRUB UR QL: ABNORMAL
BILIRUB UR QL: ABNORMAL
BLD PROD TYP BPU: NORMAL
BLOOD GROUP ANTIBODIES SERPL: NEGATIVE
BPU ID: NORMAL
BUN SERPL-MCNC: 10 MG/DL (ref 6–20)
BUN SERPL-MCNC: 22 MG/DL (ref 6–20)
BUN SERPL-MCNC: 30 MG/DL (ref 6–20)
BUN SERPL-MCNC: 31 MG/DL (ref 6–20)
BUN SERPL-MCNC: 34 MG/DL (ref 6–20)
BUN SERPL-MCNC: 36 MG/DL (ref 6–20)
BUN SERPL-MCNC: 44 MG/DL (ref 6–20)
BUN SERPL-MCNC: 46 MG/DL (ref 6–20)
BUN SERPL-MCNC: 59 MG/DL (ref 6–20)
BUN SERPL-MCNC: 64 MG/DL (ref 6–20)
BUN SERPL-MCNC: 65 MG/DL (ref 6–20)
BUN SERPL-MCNC: 66 MG/DL (ref 6–20)
BUN SERPL-MCNC: 67 MG/DL (ref 6–20)
BUN SERPL-MCNC: 68 MG/DL (ref 6–20)
BUN SERPL-MCNC: 74 MG/DL (ref 6–20)
BUN/CREAT SERPL: 11 (ref 12–20)
BUN/CREAT SERPL: 12 (ref 12–20)
BUN/CREAT SERPL: 13 (ref 12–20)
BUN/CREAT SERPL: 14 (ref 12–20)
BUN/CREAT SERPL: 14 (ref 12–20)
BUN/CREAT SERPL: 15 (ref 12–20)
BUN/CREAT SERPL: 17 (ref 12–20)
BUN/CREAT SERPL: 17 (ref 12–20)
BUN/CREAT SERPL: 18 (ref 12–20)
BUN/CREAT SERPL: 23 (ref 12–20)
BUN/CREAT SERPL: 24 (ref 12–20)
BUN/CREAT SERPL: 26 (ref 12–20)
CA-I BLD-MCNC: 5 MG/DL (ref 8.5–10.1)
CA-I BLD-MCNC: 5.3 MG/DL (ref 8.5–10.1)
CA-I BLD-MCNC: 5.4 MG/DL (ref 8.5–10.1)
CA-I BLD-MCNC: 6 MG/DL (ref 8.5–10.1)
CA-I BLD-MCNC: 6.3 MG/DL (ref 8.5–10.1)
CA-I BLD-MCNC: 6.7 MG/DL (ref 8.5–10.1)
CA-I BLD-MCNC: 7.6 MG/DL (ref 8.5–10.1)
CA-I BLD-MCNC: 7.8 MG/DL (ref 8.5–10.1)
CA-I BLD-MCNC: 8.4 MG/DL (ref 8.5–10.1)
CA-I BLD-MCNC: 8.8 MG/DL (ref 8.5–10.1)
CA-I BLD-MCNC: 9 MG/DL (ref 8.5–10.1)
CA-I BLD-MCNC: 9 MG/DL (ref 8.5–10.1)
CA-I BLD-MCNC: 9.1 MG/DL (ref 8.5–10.1)
CA-I BLD-MCNC: 9.1 MG/DL (ref 8.5–10.1)
CA-I BLD-MCNC: <5 MG/DL (ref 8.5–10.1)
CALCULATED P AXIS, ECG09: 17 DEGREES
CALCULATED R AXIS, ECG10: 2 DEGREES
CALCULATED T AXIS, ECG11: 24 DEGREES
CHLORIDE SERPL-SCNC: 100 MMOL/L (ref 97–108)
CHLORIDE SERPL-SCNC: 101 MMOL/L (ref 97–108)
CHLORIDE SERPL-SCNC: 101 MMOL/L (ref 97–108)
CHLORIDE SERPL-SCNC: 103 MMOL/L (ref 97–108)
CHLORIDE SERPL-SCNC: 104 MMOL/L (ref 97–108)
CHLORIDE SERPL-SCNC: 106 MMOL/L (ref 97–108)
CHLORIDE SERPL-SCNC: 106 MMOL/L (ref 97–108)
CHLORIDE SERPL-SCNC: 108 MMOL/L (ref 97–108)
CHLORIDE SERPL-SCNC: 109 MMOL/L (ref 97–108)
CHLORIDE SERPL-SCNC: 110 MMOL/L (ref 97–108)
CHLORIDE SERPL-SCNC: 111 MMOL/L (ref 97–108)
CHLORIDE SERPL-SCNC: 111 MMOL/L (ref 97–108)
CHLORIDE SERPL-SCNC: 99 MMOL/L (ref 97–108)
CHLORIDE SERPL-SCNC: 99 MMOL/L (ref 97–108)
CHLORIDE UR-SCNC: 25 MMOL/L
CK SERPL-CCNC: 37 NG/ML (ref 26–192)
CK SERPL-CCNC: 45 U/L (ref 26–192)
CO2 SERPL-SCNC: 13 MMOL/L (ref 21–32)
CO2 SERPL-SCNC: 14 MMOL/L (ref 21–32)
CO2 SERPL-SCNC: 14 MMOL/L (ref 21–32)
CO2 SERPL-SCNC: 15 MMOL/L (ref 21–32)
CO2 SERPL-SCNC: 16 MMOL/L (ref 21–32)
CO2 SERPL-SCNC: 17 MMOL/L (ref 21–32)
CO2 SERPL-SCNC: 18 MMOL/L (ref 21–32)
CO2 SERPL-SCNC: 18 MMOL/L (ref 21–32)
CO2 SERPL-SCNC: 19 MMOL/L (ref 21–32)
CO2 SERPL-SCNC: 21 MMOL/L (ref 21–32)
CO2 SERPL-SCNC: 21 MMOL/L (ref 21–32)
CO2 SERPL-SCNC: 22 MMOL/L (ref 21–32)
CO2 SERPL-SCNC: 23 MMOL/L (ref 21–32)
CO2 SERPL-SCNC: 25 MMOL/L (ref 21–32)
CO2 SERPL-SCNC: 28 MMOL/L (ref 21–32)
COLONY COUNT,CNT: ABNORMAL
COLOR UR: ABNORMAL
COLOR UR: ABNORMAL
COVID-19 RAPID TEST, COVR: NOT DETECTED
CREAT SERPL-MCNC: 0.71 MG/DL (ref 0.55–1.02)
CREAT SERPL-MCNC: 1.3 MG/DL (ref 0.55–1.02)
CREAT SERPL-MCNC: 1.32 MG/DL (ref 0.55–1.02)
CREAT SERPL-MCNC: 1.33 MG/DL (ref 0.55–1.02)
CREAT SERPL-MCNC: 1.49 MG/DL (ref 0.55–1.02)
CREAT SERPL-MCNC: 1.7 MG/DL (ref 0.55–1.02)
CREAT SERPL-MCNC: 2.69 MG/DL (ref 0.55–1.02)
CREAT SERPL-MCNC: 3.83 MG/DL (ref 0.55–1.02)
CREAT SERPL-MCNC: 4.05 MG/DL (ref 0.55–1.02)
CREAT SERPL-MCNC: 4.72 MG/DL (ref 0.55–1.02)
CREAT SERPL-MCNC: 5.06 MG/DL (ref 0.55–1.02)
CREAT SERPL-MCNC: 5.37 MG/DL (ref 0.55–1.02)
CREAT SERPL-MCNC: 5.43 MG/DL (ref 0.55–1.02)
CREAT SERPL-MCNC: 5.58 MG/DL (ref 0.55–1.02)
CREAT SERPL-MCNC: 5.67 MG/DL (ref 0.55–1.02)
CREAT SERPL-MCNC: 5.7 MG/DL (ref 0.55–1.02)
CREAT SERPL-MCNC: 6.23 MG/DL (ref 0.55–1.02)
CREAT UR-MCNC: 177 MG/DL
CREAT UR-MCNC: 179 MG/DL
CROSSMATCH RESULT,%XM: NORMAL
DIAGNOSIS, 93000: NORMAL
DIFFERENTIAL METHOD BLD: ABNORMAL
EOSINOPHIL # BLD: 0 K/UL (ref 0–0.4)
EOSINOPHIL # BLD: 0.1 K/UL (ref 0–0.4)
EOSINOPHIL # BLD: 0.2 K/UL (ref 0–0.4)
EOSINOPHIL # BLD: 0.3 K/UL (ref 0–0.4)
EOSINOPHIL # BLD: 0.3 K/UL (ref 0–0.4)
EOSINOPHIL # BLD: 0.4 K/UL (ref 0–0.4)
EOSINOPHIL # BLD: 0.4 K/UL (ref 0–0.4)
EOSINOPHIL NFR BLD: 0 % (ref 0–7)
EOSINOPHIL NFR BLD: 1 % (ref 0–7)
EOSINOPHIL NFR BLD: 2 % (ref 0–7)
EPAP/CPAP/PEEP, PAPEEP: 5
ERYTHROCYTE [DISTWIDTH] IN BLOOD BY AUTOMATED COUNT: 19.5 % (ref 11.5–14.5)
ERYTHROCYTE [DISTWIDTH] IN BLOOD BY AUTOMATED COUNT: 19.6 % (ref 11.5–14.5)
ERYTHROCYTE [DISTWIDTH] IN BLOOD BY AUTOMATED COUNT: 20.1 % (ref 11.5–14.5)
ERYTHROCYTE [DISTWIDTH] IN BLOOD BY AUTOMATED COUNT: 20.1 % (ref 11.5–14.5)
ERYTHROCYTE [DISTWIDTH] IN BLOOD BY AUTOMATED COUNT: 20.3 % (ref 11.5–14.5)
ERYTHROCYTE [DISTWIDTH] IN BLOOD BY AUTOMATED COUNT: 20.3 % (ref 11.5–14.5)
ERYTHROCYTE [DISTWIDTH] IN BLOOD BY AUTOMATED COUNT: 20.5 % (ref 11.5–14.5)
ERYTHROCYTE [DISTWIDTH] IN BLOOD BY AUTOMATED COUNT: 20.6 % (ref 11.5–14.5)
ERYTHROCYTE [DISTWIDTH] IN BLOOD BY AUTOMATED COUNT: 20.9 % (ref 11.5–14.5)
ERYTHROCYTE [DISTWIDTH] IN BLOOD BY AUTOMATED COUNT: 21.3 % (ref 11.5–14.5)
ERYTHROCYTE [DISTWIDTH] IN BLOOD BY AUTOMATED COUNT: 21.3 % (ref 11.5–14.5)
ERYTHROCYTE [DISTWIDTH] IN BLOOD BY AUTOMATED COUNT: 21.5 % (ref 11.5–14.5)
ERYTHROCYTE [DISTWIDTH] IN BLOOD BY AUTOMATED COUNT: 21.8 % (ref 11.5–14.5)
ERYTHROCYTE [DISTWIDTH] IN BLOOD BY AUTOMATED COUNT: 21.8 % (ref 11.5–14.5)
ERYTHROCYTE [DISTWIDTH] IN BLOOD BY AUTOMATED COUNT: 21.9 % (ref 11.5–14.5)
ERYTHROCYTE [DISTWIDTH] IN BLOOD BY AUTOMATED COUNT: 22.1 % (ref 11.5–14.5)
ERYTHROCYTE [DISTWIDTH] IN BLOOD BY AUTOMATED COUNT: 22.5 % (ref 11.5–14.5)
FIO2 ON VENT: 30 %
FIO2 ON VENT: 35 %
FIO2 ON VENT: 40 %
FIO2 ON VENT: 50 %
GAS FLOW.O2 SETTING OXYMISER: 12 L/MIN
GLOBULIN SER CALC-MCNC: 2.3 G/DL (ref 2–4)
GLOBULIN SER CALC-MCNC: 2.8 G/DL (ref 2–4)
GLOBULIN SER CALC-MCNC: 2.9 G/DL (ref 2–4)
GLOBULIN SER CALC-MCNC: 3 G/DL (ref 2–4)
GLOBULIN SER CALC-MCNC: 3.2 G/DL (ref 2–4)
GLOBULIN SER CALC-MCNC: 3.2 G/DL (ref 2–4)
GLOBULIN SER CALC-MCNC: 3.5 G/DL (ref 2–4)
GLOBULIN SER CALC-MCNC: 3.7 G/DL (ref 2–4)
GLOBULIN SER CALC-MCNC: 3.8 G/DL (ref 2–4)
GLOBULIN SER CALC-MCNC: 3.9 G/DL (ref 2–4)
GLOBULIN SER CALC-MCNC: 4 G/DL (ref 2–4)
GLOBULIN SER CALC-MCNC: 4.4 G/DL (ref 2–4)
GLUCOSE BLD STRIP.AUTO-MCNC: 103 MG/DL (ref 65–117)
GLUCOSE BLD STRIP.AUTO-MCNC: 104 MG/DL (ref 65–117)
GLUCOSE BLD STRIP.AUTO-MCNC: 110 MG/DL (ref 65–117)
GLUCOSE BLD STRIP.AUTO-MCNC: 113 MG/DL (ref 65–117)
GLUCOSE BLD STRIP.AUTO-MCNC: 114 MG/DL (ref 65–117)
GLUCOSE BLD STRIP.AUTO-MCNC: 86 MG/DL (ref 65–117)
GLUCOSE BLD STRIP.AUTO-MCNC: 86 MG/DL (ref 65–117)
GLUCOSE BLD STRIP.AUTO-MCNC: 96 MG/DL (ref 65–117)
GLUCOSE SERPL-MCNC: 101 MG/DL (ref 65–100)
GLUCOSE SERPL-MCNC: 101 MG/DL (ref 65–100)
GLUCOSE SERPL-MCNC: 108 MG/DL (ref 65–100)
GLUCOSE SERPL-MCNC: 112 MG/DL (ref 65–100)
GLUCOSE SERPL-MCNC: 112 MG/DL (ref 65–100)
GLUCOSE SERPL-MCNC: 116 MG/DL (ref 65–100)
GLUCOSE SERPL-MCNC: 134 MG/DL (ref 65–100)
GLUCOSE SERPL-MCNC: 135 MG/DL (ref 65–100)
GLUCOSE SERPL-MCNC: 55 MG/DL (ref 65–100)
GLUCOSE SERPL-MCNC: 76 MG/DL (ref 65–100)
GLUCOSE SERPL-MCNC: 79 MG/DL (ref 65–100)
GLUCOSE SERPL-MCNC: 82 MG/DL (ref 65–100)
GLUCOSE SERPL-MCNC: 85 MG/DL (ref 65–100)
GLUCOSE SERPL-MCNC: 88 MG/DL (ref 65–100)
GLUCOSE SERPL-MCNC: 90 MG/DL (ref 65–100)
GLUCOSE UR STRIP.AUTO-MCNC: 50 MG/DL
GLUCOSE UR STRIP.AUTO-MCNC: NEGATIVE MG/DL
GRAM STN SPEC: NORMAL
HCO3 BLDA-SCNC: 14 MMOL/L (ref 22–26)
HCO3 BLDA-SCNC: 15 MMOL/L (ref 22–26)
HCO3 BLDA-SCNC: 16 MMOL/L (ref 22–26)
HCO3 BLDA-SCNC: 17 MMOL/L (ref 22–26)
HCO3 BLDA-SCNC: 17 MMOL/L (ref 22–26)
HCO3 BLDA-SCNC: 18 MMOL/L (ref 22–26)
HCT VFR BLD AUTO: 20.5 % (ref 35–47)
HCT VFR BLD AUTO: 20.8 % (ref 35–47)
HCT VFR BLD AUTO: 28.1 % (ref 35–47)
HCT VFR BLD AUTO: 28.3 % (ref 35–47)
HCT VFR BLD AUTO: 28.7 % (ref 35–47)
HCT VFR BLD AUTO: 28.7 % (ref 35–47)
HCT VFR BLD AUTO: 29 % (ref 35–47)
HCT VFR BLD AUTO: 29.2 % (ref 35–47)
HCT VFR BLD AUTO: 29.3 % (ref 35–47)
HCT VFR BLD AUTO: 29.5 % (ref 35–47)
HCT VFR BLD AUTO: 30 % (ref 35–47)
HCT VFR BLD AUTO: 30.2 % (ref 35–47)
HCT VFR BLD AUTO: 30.3 % (ref 35–47)
HCT VFR BLD AUTO: 30.4 % (ref 35–47)
HCT VFR BLD AUTO: 31.2 % (ref 35–47)
HCT VFR BLD AUTO: 31.5 % (ref 35–47)
HCT VFR BLD AUTO: 33.3 % (ref 35–47)
HCT VFR BLD AUTO: 33.3 % (ref 35–47)
HGB BLD-MCNC: 10 G/DL (ref 11.5–16)
HGB BLD-MCNC: 10.1 G/DL (ref 11.5–16)
HGB BLD-MCNC: 10.1 G/DL (ref 11.5–16)
HGB BLD-MCNC: 10.2 G/DL (ref 11.5–16)
HGB BLD-MCNC: 10.3 G/DL (ref 11.5–16)
HGB BLD-MCNC: 10.3 G/DL (ref 11.5–16)
HGB BLD-MCNC: 10.5 G/DL (ref 11.5–16)
HGB BLD-MCNC: 10.6 G/DL (ref 11.5–16)
HGB BLD-MCNC: 10.6 G/DL (ref 11.5–16)
HGB BLD-MCNC: 10.7 G/DL (ref 11.5–16)
HGB BLD-MCNC: 10.8 G/DL (ref 11.5–16)
HGB BLD-MCNC: 11.2 G/DL (ref 11.5–16)
HGB BLD-MCNC: 11.5 G/DL (ref 11.5–16)
HGB BLD-MCNC: 11.8 G/DL (ref 11.5–16)
HGB BLD-MCNC: 7.1 G/DL (ref 11.5–16)
HGB BLD-MCNC: 7.4 G/DL (ref 11.5–16)
HGB BLD-MCNC: 9.7 G/DL (ref 11.5–16)
HGB BLD-MCNC: 9.9 G/DL (ref 11.5–16)
HGB UR QL STRIP: ABNORMAL
HGB UR QL STRIP: NEGATIVE
HYALINE CASTS URNS QL MICRO: ABNORMAL /LPF (ref 0–5)
IMM GRANULOCYTES # BLD AUTO: 0 K/UL
IMM GRANULOCYTES # BLD AUTO: 0.1 K/UL (ref 0–0.04)
IMM GRANULOCYTES # BLD AUTO: 0.2 K/UL (ref 0–0.04)
IMM GRANULOCYTES NFR BLD AUTO: 0 %
IMM GRANULOCYTES NFR BLD AUTO: 1 % (ref 0–0.5)
IMM GRANULOCYTES NFR BLD AUTO: 2 % (ref 0–0.5)
INR PPP: 1.3 (ref 0.9–1.1)
INR PPP: 1.4 (ref 0.9–1.1)
INR PPP: 1.5 (ref 0.9–1.1)
INR PPP: 1.8 (ref 0.9–1.1)
INR PPP: 2.1 (ref 0.9–1.1)
INR PPP: 2.3 (ref 0.9–1.1)
KETONES UR QL STRIP.AUTO: 5 MG/DL
KETONES UR QL STRIP.AUTO: NEGATIVE MG/DL
LACTATE SERPL-SCNC: 2.8 MMOL/L (ref 0.4–2)
LDH SERPL L TO P-CCNC: 284 U/L (ref 81–246)
LEUKOCYTE ESTERASE UR QL STRIP.AUTO: ABNORMAL
LEUKOCYTE ESTERASE UR QL STRIP.AUTO: NEGATIVE
LYMPHOCYTES # BLD: 0.8 K/UL (ref 0.8–3.5)
LYMPHOCYTES # BLD: 0.9 K/UL (ref 0.8–3.5)
LYMPHOCYTES # BLD: 1 K/UL (ref 0.8–3.5)
LYMPHOCYTES # BLD: 1 K/UL (ref 0.8–3.5)
LYMPHOCYTES # BLD: 1.1 K/UL (ref 0.8–3.5)
LYMPHOCYTES # BLD: 1.1 K/UL (ref 0.8–3.5)
LYMPHOCYTES # BLD: 1.2 K/UL (ref 0.8–3.5)
LYMPHOCYTES # BLD: 2.1 K/UL (ref 0.8–3.5)
LYMPHOCYTES # BLD: 2.2 K/UL (ref 0.8–3.5)
LYMPHOCYTES # BLD: 2.7 K/UL (ref 0.8–3.5)
LYMPHOCYTES # BLD: 3.1 K/UL (ref 0.8–3.5)
LYMPHOCYTES # BLD: 3.3 K/UL (ref 0.8–3.5)
LYMPHOCYTES # BLD: 3.7 K/UL (ref 0.8–3.5)
LYMPHOCYTES NFR BLD: 10 % (ref 12–49)
LYMPHOCYTES NFR BLD: 11 % (ref 12–49)
LYMPHOCYTES NFR BLD: 13 % (ref 12–49)
LYMPHOCYTES NFR BLD: 14 % (ref 12–49)
LYMPHOCYTES NFR BLD: 3 % (ref 12–49)
LYMPHOCYTES NFR BLD: 5 % (ref 12–49)
LYMPHOCYTES NFR BLD: 6 % (ref 12–49)
LYMPHOCYTES NFR BLD: 7 % (ref 12–49)
LYMPHOCYTES NFR BLD: 8 % (ref 12–49)
LYMPHOCYTES NFR BLD: 9 % (ref 12–49)
MAGNESIUM SERPL-MCNC: 1.5 MG/DL (ref 1.6–2.4)
MAGNESIUM SERPL-MCNC: 1.6 MG/DL (ref 1.6–2.4)
MAGNESIUM SERPL-MCNC: 1.7 MG/DL (ref 1.6–2.4)
MAGNESIUM SERPL-MCNC: 2.2 MG/DL (ref 1.6–2.4)
MCH RBC QN AUTO: 31.2 PG (ref 26–34)
MCH RBC QN AUTO: 31.2 PG (ref 26–34)
MCH RBC QN AUTO: 31.3 PG (ref 26–34)
MCH RBC QN AUTO: 31.3 PG (ref 26–34)
MCH RBC QN AUTO: 31.4 PG (ref 26–34)
MCH RBC QN AUTO: 31.5 PG (ref 26–34)
MCH RBC QN AUTO: 31.7 PG (ref 26–34)
MCH RBC QN AUTO: 31.8 PG (ref 26–34)
MCH RBC QN AUTO: 32.4 PG (ref 26–34)
MCH RBC QN AUTO: 32.9 PG (ref 26–34)
MCH RBC QN AUTO: 35.3 PG (ref 26–34)
MCH RBC QN AUTO: 35.9 PG (ref 26–34)
MCH RBC QN AUTO: 36.1 PG (ref 26–34)
MCH RBC QN AUTO: 36.1 PG (ref 26–34)
MCH RBC QN AUTO: 36.6 PG (ref 26–34)
MCH RBC QN AUTO: 36.7 PG (ref 26–34)
MCH RBC QN AUTO: 36.7 PG (ref 26–34)
MCHC RBC AUTO-ENTMCNC: 34 G/DL (ref 30–36.5)
MCHC RBC AUTO-ENTMCNC: 34.5 G/DL (ref 30–36.5)
MCHC RBC AUTO-ENTMCNC: 34.6 G/DL (ref 30–36.5)
MCHC RBC AUTO-ENTMCNC: 34.6 G/DL (ref 30–36.5)
MCHC RBC AUTO-ENTMCNC: 34.8 G/DL (ref 30–36.5)
MCHC RBC AUTO-ENTMCNC: 34.9 G/DL (ref 30–36.5)
MCHC RBC AUTO-ENTMCNC: 34.9 G/DL (ref 30–36.5)
MCHC RBC AUTO-ENTMCNC: 35.2 G/DL (ref 30–36.5)
MCHC RBC AUTO-ENTMCNC: 35.3 G/DL (ref 30–36.5)
MCHC RBC AUTO-ENTMCNC: 35.4 G/DL (ref 30–36.5)
MCHC RBC AUTO-ENTMCNC: 35.6 G/DL (ref 30–36.5)
MCHC RBC AUTO-ENTMCNC: 35.6 G/DL (ref 30–36.5)
MCHC RBC AUTO-ENTMCNC: 35.7 G/DL (ref 30–36.5)
MCV RBC AUTO: 101.3 FL (ref 80–99)
MCV RBC AUTO: 102.1 FL (ref 80–99)
MCV RBC AUTO: 102.2 FL (ref 80–99)
MCV RBC AUTO: 103.3 FL (ref 80–99)
MCV RBC AUTO: 103.9 FL (ref 80–99)
MCV RBC AUTO: 104.4 FL (ref 80–99)
MCV RBC AUTO: 105.8 FL (ref 80–99)
MCV RBC AUTO: 88.8 FL (ref 80–99)
MCV RBC AUTO: 89.1 FL (ref 80–99)
MCV RBC AUTO: 89.4 FL (ref 80–99)
MCV RBC AUTO: 89.9 FL (ref 80–99)
MCV RBC AUTO: 90.2 FL (ref 80–99)
MCV RBC AUTO: 90.9 FL (ref 80–99)
MCV RBC AUTO: 90.9 FL (ref 80–99)
MCV RBC AUTO: 91.3 FL (ref 80–99)
MCV RBC AUTO: 92 FL (ref 80–99)
MCV RBC AUTO: 94.9 FL (ref 80–99)
METAMYELOCYTES NFR BLD MANUAL: 1 %
MONOCYTES # BLD: 0.6 K/UL (ref 0–1)
MONOCYTES # BLD: 0.8 K/UL (ref 0–1)
MONOCYTES # BLD: 0.8 K/UL (ref 0–1)
MONOCYTES # BLD: 1.1 K/UL (ref 0–1)
MONOCYTES # BLD: 1.1 K/UL (ref 0–1)
MONOCYTES # BLD: 1.3 K/UL (ref 0–1)
MONOCYTES # BLD: 1.3 K/UL (ref 0–1)
MONOCYTES # BLD: 1.4 K/UL (ref 0–1)
MONOCYTES # BLD: 1.5 K/UL (ref 0–1)
MONOCYTES # BLD: 1.5 K/UL (ref 0–1)
MONOCYTES # BLD: 1.8 K/UL (ref 0–1)
MONOCYTES # BLD: 1.8 K/UL (ref 0–1)
MONOCYTES # BLD: 2 K/UL (ref 0–1)
MONOCYTES # BLD: 2.9 K/UL (ref 0–1)
MONOCYTES NFR BLD: 12 % (ref 5–13)
MONOCYTES NFR BLD: 13 % (ref 5–13)
MONOCYTES NFR BLD: 15 % (ref 5–13)
MONOCYTES NFR BLD: 16 % (ref 5–13)
MONOCYTES NFR BLD: 18 % (ref 5–13)
MONOCYTES NFR BLD: 18 % (ref 5–13)
MONOCYTES NFR BLD: 4 % (ref 5–13)
MONOCYTES NFR BLD: 5 % (ref 5–13)
MONOCYTES NFR BLD: 6 % (ref 5–13)
MONOCYTES NFR BLD: 7 % (ref 5–13)
MONOCYTES NFR BLD: 8 % (ref 5–13)
MRSA DNA SPEC QL NAA+PROBE: NOT DETECTED
MUCOUS THREADS URNS QL MICRO: ABNORMAL /LPF
MYELOCYTES NFR BLD MANUAL: 1 %
NEUTS BAND NFR BLD MANUAL: 2 % (ref 0–6)
NEUTS BAND NFR BLD MANUAL: 7 % (ref 0–6)
NEUTS SEG # BLD: 10.7 K/UL (ref 1.8–8)
NEUTS SEG # BLD: 16.5 K/UL (ref 1.8–8)
NEUTS SEG # BLD: 20 K/UL (ref 1.8–8)
NEUTS SEG # BLD: 28 K/UL (ref 1.8–8)
NEUTS SEG # BLD: 28.2 K/UL (ref 1.8–8)
NEUTS SEG # BLD: 32.1 K/UL (ref 1.8–8)
NEUTS SEG # BLD: 37 K/UL (ref 1.8–8)
NEUTS SEG # BLD: 37.9 K/UL (ref 1.8–8)
NEUTS SEG # BLD: 39.7 K/UL (ref 1.8–8)
NEUTS SEG # BLD: 4.5 K/UL (ref 1.8–8)
NEUTS SEG # BLD: 5.2 K/UL (ref 1.8–8)
NEUTS SEG # BLD: 5.8 K/UL (ref 1.8–8)
NEUTS SEG # BLD: 5.8 K/UL (ref 1.8–8)
NEUTS SEG # BLD: 6.1 K/UL (ref 1.8–8)
NEUTS SEG # BLD: 7.2 K/UL (ref 1.8–8)
NEUTS SEG # BLD: 8.4 K/UL (ref 1.8–8)
NEUTS SEG NFR BLD: 67 % (ref 32–75)
NEUTS SEG NFR BLD: 69 % (ref 32–75)
NEUTS SEG NFR BLD: 70 % (ref 32–75)
NEUTS SEG NFR BLD: 71 % (ref 32–75)
NEUTS SEG NFR BLD: 72 % (ref 32–75)
NEUTS SEG NFR BLD: 75 % (ref 32–75)
NEUTS SEG NFR BLD: 77 % (ref 32–75)
NEUTS SEG NFR BLD: 79 % (ref 32–75)
NEUTS SEG NFR BLD: 84 % (ref 32–75)
NEUTS SEG NFR BLD: 84 % (ref 32–75)
NEUTS SEG NFR BLD: 85 % (ref 32–75)
NEUTS SEG NFR BLD: 85 % (ref 32–75)
NEUTS SEG NFR BLD: 86 % (ref 32–75)
NEUTS SEG NFR BLD: 88 % (ref 32–75)
NEUTS SEG NFR BLD: 88 % (ref 32–75)
NEUTS SEG NFR BLD: 90 % (ref 32–75)
NITRITE UR QL STRIP.AUTO: NEGATIVE
NITRITE UR QL STRIP.AUTO: NEGATIVE
NRBC # BLD: 0 K/UL (ref 0–0.01)
NRBC # BLD: 0.02 K/UL (ref 0–0.01)
NRBC # BLD: 0.03 K/UL (ref 0–0.01)
NRBC # BLD: 0.04 K/UL (ref 0–0.01)
NRBC # BLD: 0.04 K/UL (ref 0–0.01)
NRBC # BLD: 0.05 K/UL (ref 0–0.01)
NRBC # BLD: 0.06 K/UL (ref 0–0.01)
NRBC # BLD: 0.07 K/UL (ref 0–0.01)
NRBC # BLD: 0.08 K/UL (ref 0–0.01)
NRBC # BLD: 0.15 K/UL (ref 0–0.01)
NRBC # BLD: 0.44 K/UL (ref 0–0.01)
NRBC BLD-RTO: 0 PER 100 WBC
NRBC BLD-RTO: 0.1 PER 100 WBC
NRBC BLD-RTO: 0.2 PER 100 WBC
NRBC BLD-RTO: 0.4 PER 100 WBC
NRBC BLD-RTO: 0.5 PER 100 WBC
NRBC BLD-RTO: 1.2 PER 100 WBC
P-R INTERVAL, ECG05: 158 MS
PCO2 BLDA: 23 MMHG (ref 35–45)
PCO2 BLDA: 23 MMHG (ref 35–45)
PCO2 BLDA: 24 MMHG (ref 35–45)
PCO2 BLDA: 24 MMHG (ref 35–45)
PCO2 BLDA: 26 MMHG (ref 35–45)
PCO2 BLDA: 26 MMHG (ref 35–45)
PCO2 BLDA: 27 MMHG (ref 35–45)
PCO2 BLDA: 28 MMHG (ref 35–45)
PERFORMED BY, TECHID: NORMAL
PH BLDA: 7.28 [PH] (ref 7.35–7.45)
PH BLDA: 7.31 [PH] (ref 7.35–7.45)
PH BLDA: 7.32 [PH] (ref 7.35–7.45)
PH BLDA: 7.36 [PH] (ref 7.35–7.45)
PH BLDA: 7.37 [PH] (ref 7.35–7.45)
PH BLDA: 7.38 [PH] (ref 7.35–7.45)
PH BLDA: 7.39 [PH] (ref 7.35–7.45)
PH BLDA: 7.42 [PH] (ref 7.35–7.45)
PH UR STRIP: 5 [PH] (ref 5–8)
PH UR STRIP: 5 [PH] (ref 5–8)
PHOSPHATE SERPL-MCNC: 4.2 MG/DL (ref 2.6–4.7)
PHOSPHATE SERPL-MCNC: 5.1 MG/DL (ref 2.6–4.7)
PHOSPHATE SERPL-MCNC: 5.3 MG/DL (ref 2.6–4.7)
PHOSPHATE SERPL-MCNC: 5.7 MG/DL (ref 2.6–4.7)
PHOSPHATE SERPL-MCNC: 5.8 MG/DL (ref 2.6–4.7)
PHOSPHATE SERPL-MCNC: 6.3 MG/DL (ref 2.6–4.7)
PHOSPHATE SERPL-MCNC: 6.6 MG/DL (ref 2.6–4.7)
PHOSPHATE SERPL-MCNC: 6.9 MG/DL (ref 2.6–4.7)
PHOSPHATE SERPL-MCNC: 7.7 MG/DL (ref 2.6–4.7)
PHOSPHATE SERPL-MCNC: 8.4 MG/DL (ref 2.6–4.7)
PLATELET # BLD AUTO: 105 K/UL (ref 150–400)
PLATELET # BLD AUTO: 110 K/UL (ref 150–400)
PLATELET # BLD AUTO: 119 K/UL (ref 150–400)
PLATELET # BLD AUTO: 133 K/UL (ref 150–400)
PLATELET # BLD AUTO: 145 K/UL (ref 150–400)
PLATELET # BLD AUTO: 147 K/UL (ref 150–400)
PLATELET # BLD AUTO: 157 K/UL (ref 150–400)
PLATELET # BLD AUTO: 189 K/UL (ref 150–400)
PLATELET # BLD AUTO: 19 K/UL (ref 150–400)
PLATELET # BLD AUTO: 191 K/UL (ref 150–400)
PLATELET # BLD AUTO: 21 K/UL (ref 150–400)
PLATELET # BLD AUTO: 21 K/UL (ref 150–400)
PLATELET # BLD AUTO: 26 K/UL (ref 150–400)
PLATELET # BLD AUTO: 29 K/UL (ref 150–400)
PLATELET # BLD AUTO: 32 K/UL (ref 150–400)
PLATELET # BLD AUTO: 77 K/UL (ref 150–400)
PLATELET # BLD AUTO: 95 K/UL (ref 150–400)
PLATELET COMMENTS,PCOM: ABNORMAL
PMV BLD AUTO: 11.3 FL (ref 8.9–12.9)
PMV BLD AUTO: 11.5 FL (ref 8.9–12.9)
PMV BLD AUTO: 11.6 FL (ref 8.9–12.9)
PMV BLD AUTO: 11.8 FL (ref 8.9–12.9)
PMV BLD AUTO: 11.8 FL (ref 8.9–12.9)
PMV BLD AUTO: 11.9 FL (ref 8.9–12.9)
PMV BLD AUTO: 12.2 FL (ref 8.9–12.9)
PMV BLD AUTO: 12.3 FL (ref 8.9–12.9)
PMV BLD AUTO: 12.6 FL (ref 8.9–12.9)
PMV BLD AUTO: 12.6 FL (ref 8.9–12.9)
PMV BLD AUTO: 13.4 FL (ref 8.9–12.9)
PO2 BLDA: 121 MMHG (ref 75–100)
PO2 BLDA: 167 MMHG (ref 75–100)
PO2 BLDA: 64 MMHG (ref 75–100)
PO2 BLDA: 70 MMHG (ref 75–100)
PO2 BLDA: 77 MMHG (ref 75–100)
PO2 BLDA: 78 MMHG (ref 75–100)
PO2 BLDA: 86 MMHG (ref 75–100)
PO2 BLDA: 96 MMHG (ref 75–100)
POTASSIUM SERPL-SCNC: 3.1 MMOL/L (ref 3.5–5.1)
POTASSIUM SERPL-SCNC: 3.4 MMOL/L (ref 3.5–5.1)
POTASSIUM SERPL-SCNC: 3.5 MMOL/L (ref 3.5–5.1)
POTASSIUM SERPL-SCNC: 3.5 MMOL/L (ref 3.5–5.1)
POTASSIUM SERPL-SCNC: 3.6 MMOL/L (ref 3.5–5.1)
POTASSIUM SERPL-SCNC: 3.6 MMOL/L (ref 3.5–5.1)
POTASSIUM SERPL-SCNC: 3.7 MMOL/L (ref 3.5–5.1)
POTASSIUM SERPL-SCNC: 3.7 MMOL/L (ref 3.5–5.1)
POTASSIUM SERPL-SCNC: 3.8 MMOL/L (ref 3.5–5.1)
POTASSIUM SERPL-SCNC: 3.9 MMOL/L (ref 3.5–5.1)
POTASSIUM SERPL-SCNC: 4.4 MMOL/L (ref 3.5–5.1)
POTASSIUM SERPL-SCNC: 4.7 MMOL/L (ref 3.5–5.1)
POTASSIUM SERPL-SCNC: 4.7 MMOL/L (ref 3.5–5.1)
POTASSIUM SERPL-SCNC: 6 MMOL/L (ref 3.5–5.1)
PRESSURE SUPPORT SETTING VENT: 15 CM[H2O]
PRESSURE SUPPORT SETTING VENT: 45 CM[H2O]
PROCALCITONIN SERPL-MCNC: 23.86 NG/ML
PROCALCITONIN SERPL-MCNC: 8.9 NG/ML
PROMYELOCYTES NFR BLD MANUAL: 3 %
PROT SERPL-MCNC: 3.6 G/DL (ref 6.4–8.2)
PROT SERPL-MCNC: 3.9 G/DL (ref 6.4–8.2)
PROT SERPL-MCNC: 4.1 G/DL (ref 6.4–8.2)
PROT SERPL-MCNC: 4.5 G/DL (ref 6.4–8.2)
PROT SERPL-MCNC: 4.6 G/DL (ref 6.4–8.2)
PROT SERPL-MCNC: 4.8 G/DL (ref 6.4–8.2)
PROT SERPL-MCNC: 5.5 G/DL (ref 6.4–8.2)
PROT SERPL-MCNC: 5.8 G/DL (ref 6.4–8.2)
PROT SERPL-MCNC: 5.8 G/DL (ref 6.4–8.2)
PROT SERPL-MCNC: 6 G/DL (ref 6.4–8.2)
PROT SERPL-MCNC: 6.4 G/DL (ref 6.4–8.2)
PROT SERPL-MCNC: 6.4 G/DL (ref 6.4–8.2)
PROT UR STRIP-MCNC: 100 MG/DL
PROT UR STRIP-MCNC: 30 MG/DL
PROT UR-MCNC: 146 MG/DL (ref 0–11.9)
PROT/CREAT UR-RTO: 0.8
PROTHROMBIN TIME: 15.6 SEC (ref 11.9–14.7)
PROTHROMBIN TIME: 15.9 SEC (ref 11.9–14.7)
PROTHROMBIN TIME: 16.3 SEC (ref 11.9–14.7)
PROTHROMBIN TIME: 16.5 SEC (ref 11.9–14.7)
PROTHROMBIN TIME: 17.5 SEC (ref 11.9–14.7)
PROTHROMBIN TIME: 20.5 SEC (ref 11.9–14.7)
PROTHROMBIN TIME: 22.8 SEC (ref 11.9–14.7)
PROTHROMBIN TIME: 24.4 SEC (ref 11.9–14.7)
Q-T INTERVAL, ECG07: 384 MS
QRS DURATION, ECG06: 82 MS
QTC CALCULATION (BEZET), ECG08: 462 MS
RBC # BLD AUTO: 2.16 M/UL (ref 3.8–5.2)
RBC # BLD AUTO: 2.75 M/UL (ref 3.8–5.2)
RBC # BLD AUTO: 2.77 M/UL (ref 3.8–5.2)
RBC # BLD AUTO: 2.79 M/UL (ref 3.8–5.2)
RBC # BLD AUTO: 2.86 M/UL (ref 3.8–5.2)
RBC # BLD AUTO: 2.95 M/UL (ref 3.8–5.2)
RBC # BLD AUTO: 2.99 M/UL (ref 3.8–5.2)
RBC # BLD AUTO: 3.05 M/UL (ref 3.8–5.2)
RBC # BLD AUTO: 3.09 M/UL (ref 3.8–5.2)
RBC # BLD AUTO: 3.12 M/UL (ref 3.8–5.2)
RBC # BLD AUTO: 3.29 M/UL (ref 3.8–5.2)
RBC # BLD AUTO: 3.3 M/UL (ref 3.8–5.2)
RBC # BLD AUTO: 3.3 M/UL (ref 3.8–5.2)
RBC # BLD AUTO: 3.33 M/UL (ref 3.8–5.2)
RBC # BLD AUTO: 3.36 M/UL (ref 3.8–5.2)
RBC # BLD AUTO: 3.69 M/UL (ref 3.8–5.2)
RBC # BLD AUTO: 3.75 M/UL (ref 3.8–5.2)
RBC #/AREA URNS HPF: ABNORMAL /HPF (ref 0–5)
RBC #/AREA URNS HPF: ABNORMAL /HPF (ref 0–5)
RBC MORPH BLD: ABNORMAL
SAO2 % BLD: 100 %
SAO2 % BLD: 90 %
SAO2 % BLD: 92 %
SAO2 % BLD: 95 %
SAO2 % BLD: 95 %
SAO2 % BLD: 96 %
SAO2 % BLD: 98 %
SAO2 % BLD: 99 %
SAO2% DEVICE SAO2% SENSOR NAME: ABNORMAL
SERVICE CMNT-IMP: ABNORMAL
SODIUM SERPL-SCNC: 133 MMOL/L (ref 136–145)
SODIUM SERPL-SCNC: 134 MMOL/L (ref 136–145)
SODIUM SERPL-SCNC: 135 MMOL/L (ref 136–145)
SODIUM SERPL-SCNC: 136 MMOL/L (ref 136–145)
SODIUM SERPL-SCNC: 137 MMOL/L (ref 136–145)
SODIUM SERPL-SCNC: 137 MMOL/L (ref 136–145)
SODIUM SERPL-SCNC: 139 MMOL/L (ref 136–145)
SODIUM SERPL-SCNC: 139 MMOL/L (ref 136–145)
SODIUM SERPL-SCNC: 140 MMOL/L (ref 136–145)
SODIUM SERPL-SCNC: 140 MMOL/L (ref 136–145)
SODIUM UR-SCNC: 21 MMOL/L
SP GR UR REFRACTOMETRY: 1.02 (ref 1–1.03)
SP GR UR REFRACTOMETRY: >1.03 (ref 1–1.03)
SPECIAL REQUESTS,SREQ: ABNORMAL
SPECIAL REQUESTS,SREQ: NORMAL
SPECIMEN EXP DATE BLD: NORMAL
SPECIMEN SITE: ABNORMAL
SPECIMEN SOURCE: NORMAL
STATUS OF UNIT,%ST: NORMAL
THERAPEUTIC RANGE,PTTT: ABNORMAL SEC (ref 82–109)
TRANSFUSION STATUS PATIENT QL: NORMAL
TROPONIN-HIGH SENSITIVITY: 7 NG/L (ref 0–51)
UA: UC IF INDICATED,UAUC: ABNORMAL
UA: UC IF INDICATED,UAUC: ABNORMAL
UNIT DIVISION, %UDIV: 0
UNIT TAG COMMENT,%CM: NORMAL
UROBILINOGEN UR QL STRIP.AUTO: 4 EU/DL (ref 0.1–1)
UROBILINOGEN UR QL STRIP.AUTO: 4 EU/DL (ref 0.1–1)
VENTILATION MODE VENT: ABNORMAL
VENTRICULAR RATE, ECG03: 87 BPM
VT SETTING VENT: 550 ML
WBC # BLD AUTO: 10.5 K/UL (ref 3.6–11)
WBC # BLD AUTO: 12.7 K/UL (ref 3.6–11)
WBC # BLD AUTO: 18.7 K/UL (ref 3.6–11)
WBC # BLD AUTO: 23.7 K/UL (ref 3.6–11)
WBC # BLD AUTO: 33.2 K/UL (ref 3.6–11)
WBC # BLD AUTO: 33.3 K/UL (ref 3.6–11)
WBC # BLD AUTO: 37.3 K/UL (ref 3.6–11)
WBC # BLD AUTO: 38.1 K/UL (ref 3.6–11)
WBC # BLD AUTO: 41.1 K/UL (ref 3.6–11)
WBC # BLD AUTO: 44.1 K/UL (ref 3.6–11)
WBC # BLD AUTO: 44.6 K/UL (ref 3.6–11)
WBC # BLD AUTO: 6.2 K/UL (ref 3.6–11)
WBC # BLD AUTO: 7.8 K/UL (ref 3.6–11)
WBC # BLD AUTO: 8.4 K/UL (ref 3.6–11)
WBC # BLD AUTO: 8.4 K/UL (ref 3.6–11)
WBC # BLD AUTO: 8.5 K/UL (ref 3.6–11)
WBC # BLD AUTO: 9.4 K/UL (ref 3.6–11)
WBC URNS QL MICRO: >100 /HPF (ref 0–4)
WBC URNS QL MICRO: ABNORMAL /HPF (ref 0–4)

## 2021-01-01 PROCEDURE — P9016 RBC LEUKOCYTES REDUCED: HCPCS

## 2021-01-01 PROCEDURE — 85025 COMPLETE CBC W/AUTO DIFF WBC: CPT

## 2021-01-01 PROCEDURE — 74011250636 HC RX REV CODE- 250/636: Performed by: PHYSICIAN ASSISTANT

## 2021-01-01 PROCEDURE — 74011250636 HC RX REV CODE- 250/636: Performed by: HOSPITALIST

## 2021-01-01 PROCEDURE — 74011000250 HC RX REV CODE- 250: Performed by: INTERNAL MEDICINE

## 2021-01-01 PROCEDURE — 74011000258 HC RX REV CODE- 258: Performed by: HOSPITALIST

## 2021-01-01 PROCEDURE — 36415 COLL VENOUS BLD VENIPUNCTURE: CPT

## 2021-01-01 PROCEDURE — 82803 BLOOD GASES ANY COMBINATION: CPT

## 2021-01-01 PROCEDURE — 80069 RENAL FUNCTION PANEL: CPT

## 2021-01-01 PROCEDURE — 74011250636 HC RX REV CODE- 250/636: Performed by: INTERNAL MEDICINE

## 2021-01-01 PROCEDURE — 74011000250 HC RX REV CODE- 250: Performed by: PHYSICIAN ASSISTANT

## 2021-01-01 PROCEDURE — 36600 WITHDRAWAL OF ARTERIAL BLOOD: CPT

## 2021-01-01 PROCEDURE — 74011250637 HC RX REV CODE- 250/637: Performed by: HOSPITALIST

## 2021-01-01 PROCEDURE — P9059 PLASMA, FRZ BETWEEN 8-24HOUR: HCPCS

## 2021-01-01 PROCEDURE — 30233R1 TRANSFUSION OF NONAUTOLOGOUS PLATELETS INTO PERIPHERAL VEIN, PERCUTANEOUS APPROACH: ICD-10-PCS | Performed by: INTERNAL MEDICINE

## 2021-01-01 PROCEDURE — P9073 PLATELETS PHERESIS PATH REDU: HCPCS

## 2021-01-01 PROCEDURE — 71045 X-RAY EXAM CHEST 1 VIEW: CPT

## 2021-01-01 PROCEDURE — 94003 VENT MGMT INPAT SUBQ DAY: CPT

## 2021-01-01 PROCEDURE — 0W3P8ZZ CONTROL BLEEDING IN GASTROINTESTINAL TRACT, VIA NATURAL OR ARTIFICIAL OPENING ENDOSCOPIC: ICD-10-PCS | Performed by: INTERNAL MEDICINE

## 2021-01-01 PROCEDURE — 99213 OFFICE O/P EST LOW 20 MIN: CPT | Performed by: SURGERY

## 2021-01-01 PROCEDURE — 82140 ASSAY OF AMMONIA: CPT

## 2021-01-01 PROCEDURE — 87186 SC STD MICRODIL/AGAR DIL: CPT

## 2021-01-01 PROCEDURE — 65270000029 HC RM PRIVATE

## 2021-01-01 PROCEDURE — 82436 ASSAY OF URINE CHLORIDE: CPT

## 2021-01-01 PROCEDURE — 76705 ECHO EXAM OF ABDOMEN: CPT

## 2021-01-01 PROCEDURE — 76060000033 HC ANESTHESIA 1 TO 1.5 HR: Performed by: INTERNAL MEDICINE

## 2021-01-01 PROCEDURE — 77010033678 HC OXYGEN DAILY

## 2021-01-01 PROCEDURE — 80048 BASIC METABOLIC PNL TOTAL CA: CPT

## 2021-01-01 PROCEDURE — 30233N1 TRANSFUSION OF NONAUTOLOGOUS RED BLOOD CELLS INTO PERIPHERAL VEIN, PERCUTANEOUS APPROACH: ICD-10-PCS | Performed by: INTERNAL MEDICINE

## 2021-01-01 PROCEDURE — 74011250637 HC RX REV CODE- 250/637: Performed by: INTERNAL MEDICINE

## 2021-01-01 PROCEDURE — 85018 HEMOGLOBIN: CPT

## 2021-01-01 PROCEDURE — 85610 PROTHROMBIN TIME: CPT

## 2021-01-01 PROCEDURE — 36430 TRANSFUSION BLD/BLD COMPNT: CPT

## 2021-01-01 PROCEDURE — P9047 ALBUMIN (HUMAN), 25%, 50ML: HCPCS | Performed by: INTERNAL MEDICINE

## 2021-01-01 PROCEDURE — 76040000007: Performed by: INTERNAL MEDICINE

## 2021-01-01 PROCEDURE — 65610000006 HC RM INTENSIVE CARE

## 2021-01-01 PROCEDURE — 74011250637 HC RX REV CODE- 250/637: Performed by: NURSE PRACTITIONER

## 2021-01-01 PROCEDURE — 87641 MR-STAPH DNA AMP PROBE: CPT

## 2021-01-01 PROCEDURE — 74011250636 HC RX REV CODE- 250/636: Performed by: ANESTHESIOLOGY

## 2021-01-01 PROCEDURE — 86900 BLOOD TYPING SEROLOGIC ABO: CPT

## 2021-01-01 PROCEDURE — 97530 THERAPEUTIC ACTIVITIES: CPT

## 2021-01-01 PROCEDURE — 74011000258 HC RX REV CODE- 258: Performed by: INTERNAL MEDICINE

## 2021-01-01 PROCEDURE — 84145 PROCALCITONIN (PCT): CPT

## 2021-01-01 PROCEDURE — 80076 HEPATIC FUNCTION PANEL: CPT

## 2021-01-01 PROCEDURE — 83615 LACTATE (LD) (LDH) ENZYME: CPT

## 2021-01-01 PROCEDURE — 87086 URINE CULTURE/COLONY COUNT: CPT

## 2021-01-01 PROCEDURE — 85027 COMPLETE CBC AUTOMATED: CPT

## 2021-01-01 PROCEDURE — 74011250636 HC RX REV CODE- 250/636: Performed by: NURSE ANESTHETIST, CERTIFIED REGISTERED

## 2021-01-01 PROCEDURE — 83605 ASSAY OF LACTIC ACID: CPT

## 2021-01-01 PROCEDURE — 77030014179 HC APPL CLP RESOL BSC -C: Performed by: INTERNAL MEDICINE

## 2021-01-01 PROCEDURE — 82570 ASSAY OF URINE CREATININE: CPT

## 2021-01-01 PROCEDURE — 93005 ELECTROCARDIOGRAM TRACING: CPT

## 2021-01-01 PROCEDURE — 94002 VENT MGMT INPAT INIT DAY: CPT

## 2021-01-01 PROCEDURE — 82962 GLUCOSE BLOOD TEST: CPT

## 2021-01-01 PROCEDURE — 76060000032 HC ANESTHESIA 0.5 TO 1 HR: Performed by: INTERNAL MEDICINE

## 2021-01-01 PROCEDURE — 83735 ASSAY OF MAGNESIUM: CPT

## 2021-01-01 PROCEDURE — 80053 COMPREHEN METABOLIC PANEL: CPT

## 2021-01-01 PROCEDURE — 74011000250 HC RX REV CODE- 250

## 2021-01-01 PROCEDURE — 02H633Z INSERTION OF INFUSION DEVICE INTO RIGHT ATRIUM, PERCUTANEOUS APPROACH: ICD-10-PCS | Performed by: ANESTHESIOLOGY

## 2021-01-01 PROCEDURE — 5A09357 ASSISTANCE WITH RESPIRATORY VENTILATION, LESS THAN 24 CONSECUTIVE HOURS, CONTINUOUS POSITIVE AIRWAY PRESSURE: ICD-10-PCS | Performed by: INTERNAL MEDICINE

## 2021-01-01 PROCEDURE — P9047 ALBUMIN (HUMAN), 25%, 50ML: HCPCS | Performed by: STUDENT IN AN ORGANIZED HEALTH CARE EDUCATION/TRAINING PROGRAM

## 2021-01-01 PROCEDURE — 74011000258 HC RX REV CODE- 258: Performed by: NURSE ANESTHETIST, CERTIFIED REGISTERED

## 2021-01-01 PROCEDURE — 74011636637 HC RX REV CODE- 636/637: Performed by: PHYSICIAN ASSISTANT

## 2021-01-01 PROCEDURE — 74011000250 HC RX REV CODE- 250: Performed by: NURSE ANESTHETIST, CERTIFIED REGISTERED

## 2021-01-01 PROCEDURE — 77030013992 HC SNR POLYP ENDOSC BSC -B: Performed by: INTERNAL MEDICINE

## 2021-01-01 PROCEDURE — 74176 CT ABD & PELVIS W/O CONTRAST: CPT

## 2021-01-01 PROCEDURE — 81001 URINALYSIS AUTO W/SCOPE: CPT

## 2021-01-01 PROCEDURE — 99282 EMERGENCY DEPT VISIT SF MDM: CPT

## 2021-01-01 PROCEDURE — 87635 SARS-COV-2 COVID-19 AMP PRB: CPT

## 2021-01-01 PROCEDURE — 85730 THROMBOPLASTIN TIME PARTIAL: CPT

## 2021-01-01 PROCEDURE — 96365 THER/PROPH/DIAG IV INF INIT: CPT

## 2021-01-01 PROCEDURE — 87070 CULTURE OTHR SPECIMN AEROBIC: CPT

## 2021-01-01 PROCEDURE — 76937 US GUIDE VASCULAR ACCESS: CPT

## 2021-01-01 PROCEDURE — 84156 ASSAY OF PROTEIN URINE: CPT

## 2021-01-01 PROCEDURE — 82248 BILIRUBIN DIRECT: CPT

## 2021-01-01 PROCEDURE — 30233K1 TRANSFUSION OF NONAUTOLOGOUS FROZEN PLASMA INTO PERIPHERAL VEIN, PERCUTANEOUS APPROACH: ICD-10-PCS | Performed by: INTERNAL MEDICINE

## 2021-01-01 PROCEDURE — 99285 EMERGENCY DEPT VISIT HI MDM: CPT

## 2021-01-01 PROCEDURE — 0DJ08ZZ INSPECTION OF UPPER INTESTINAL TRACT, VIA NATURAL OR ARTIFICIAL OPENING ENDOSCOPIC: ICD-10-PCS | Performed by: INTERNAL MEDICINE

## 2021-01-01 PROCEDURE — 99284 EMERGENCY DEPT VISIT MOD MDM: CPT

## 2021-01-01 PROCEDURE — 76040000008: Performed by: INTERNAL MEDICINE

## 2021-01-01 PROCEDURE — 77030041709 HC NDL SCLER BSC -C: Performed by: INTERNAL MEDICINE

## 2021-01-01 PROCEDURE — 74011000250 HC RX REV CODE- 250: Performed by: ANESTHESIOLOGY

## 2021-01-01 PROCEDURE — C9113 INJ PANTOPRAZOLE SODIUM, VIA: HCPCS | Performed by: PHYSICIAN ASSISTANT

## 2021-01-01 PROCEDURE — 82550 ASSAY OF CK (CPK): CPT

## 2021-01-01 PROCEDURE — 84300 ASSAY OF URINE SODIUM: CPT

## 2021-01-01 PROCEDURE — 74011250636 HC RX REV CODE- 250/636

## 2021-01-01 PROCEDURE — 77030010936 HC CLP LIG BSC -C: Performed by: INTERNAL MEDICINE

## 2021-01-01 PROCEDURE — 86923 COMPATIBILITY TEST ELECTRIC: CPT

## 2021-01-01 PROCEDURE — C1769 GUIDE WIRE: HCPCS

## 2021-01-01 PROCEDURE — 74011250636 HC RX REV CODE- 250/636: Performed by: STUDENT IN AN ORGANIZED HEALTH CARE EDUCATION/TRAINING PROGRAM

## 2021-01-01 PROCEDURE — 90945 DIALYSIS ONE EVALUATION: CPT

## 2021-01-01 PROCEDURE — 96361 HYDRATE IV INFUSION ADD-ON: CPT

## 2021-01-01 PROCEDURE — 2709999900 HC NON-CHARGEABLE SUPPLY: Performed by: INTERNAL MEDICINE

## 2021-01-01 PROCEDURE — 97161 PT EVAL LOW COMPLEX 20 MIN: CPT

## 2021-01-01 PROCEDURE — 87077 CULTURE AEROBIC IDENTIFY: CPT

## 2021-01-01 PROCEDURE — 5A1D90Z PERFORMANCE OF URINARY FILTRATION, CONTINUOUS, GREATER THAN 18 HOURS PER DAY: ICD-10-PCS | Performed by: INTERNAL MEDICINE

## 2021-01-01 PROCEDURE — 84484 ASSAY OF TROPONIN QUANT: CPT

## 2021-01-01 PROCEDURE — 87040 BLOOD CULTURE FOR BACTERIA: CPT

## 2021-01-01 PROCEDURE — 96366 THER/PROPH/DIAG IV INF ADDON: CPT

## 2021-01-01 RX ORDER — SODIUM CHLORIDE 9 MG/ML
250 INJECTION, SOLUTION INTRAVENOUS AS NEEDED
Status: DISCONTINUED | OUTPATIENT
Start: 2021-01-01 | End: 2021-11-09 | Stop reason: HOSPADM

## 2021-01-01 RX ORDER — DEXTROMETHORPHAN/PSEUDOEPHED 2.5-7.5/.8
DROPS ORAL AS NEEDED
Status: DISCONTINUED | OUTPATIENT
Start: 2021-01-01 | End: 2021-11-09 | Stop reason: HOSPADM

## 2021-01-01 RX ORDER — CALCIUM GLUCONATE 20 MG/ML
2 INJECTION, SOLUTION INTRAVENOUS ONCE
Status: COMPLETED | OUTPATIENT
Start: 2021-01-01 | End: 2021-01-01

## 2021-01-01 RX ORDER — SODIUM BICARBONATE 1 MEQ/ML
SYRINGE (ML) INTRAVENOUS
Status: COMPLETED
Start: 2021-01-01 | End: 2021-01-01

## 2021-01-01 RX ORDER — SODIUM CHLORIDE 9 MG/ML
50 INJECTION, SOLUTION INTRAVENOUS CONTINUOUS
Status: DISCONTINUED | OUTPATIENT
Start: 2021-01-01 | End: 2021-01-01

## 2021-01-01 RX ORDER — POTASSIUM CHLORIDE 7.45 MG/ML
10 INJECTION INTRAVENOUS
Status: DISCONTINUED | OUTPATIENT
Start: 2021-01-01 | End: 2021-01-01

## 2021-01-01 RX ORDER — SODIUM BICARBONATE 1 MEQ/ML
100 SYRINGE (ML) INTRAVENOUS ONCE
Status: COMPLETED | OUTPATIENT
Start: 2021-01-01 | End: 2021-01-01

## 2021-01-01 RX ORDER — ROCURONIUM BROMIDE 10 MG/ML
INJECTION, SOLUTION INTRAVENOUS AS NEEDED
Status: DISCONTINUED | OUTPATIENT
Start: 2021-01-01 | End: 2021-01-01 | Stop reason: HOSPADM

## 2021-01-01 RX ORDER — MIDAZOLAM IN 0.9 % SOD.CHLORID 1 MG/ML
0-10 PLASTIC BAG, INJECTION (ML) INTRAVENOUS
Status: DISCONTINUED | OUTPATIENT
Start: 2021-01-01 | End: 2021-11-09 | Stop reason: HOSPADM

## 2021-01-01 RX ORDER — HYDROXYZINE PAMOATE 25 MG/1
25 CAPSULE ORAL 2 TIMES DAILY
Status: DISCONTINUED | OUTPATIENT
Start: 2021-01-01 | End: 2021-11-09 | Stop reason: HOSPADM

## 2021-01-01 RX ORDER — ROCURONIUM BROMIDE 10 MG/ML
INJECTION, SOLUTION INTRAVENOUS
Status: COMPLETED
Start: 2021-01-01 | End: 2021-01-01

## 2021-01-01 RX ORDER — MAGNESIUM SULFATE HEPTAHYDRATE 40 MG/ML
2 INJECTION, SOLUTION INTRAVENOUS ONCE
Status: COMPLETED | OUTPATIENT
Start: 2021-01-01 | End: 2021-01-01

## 2021-01-01 RX ORDER — ETOMIDATE 2 MG/ML
INJECTION INTRAVENOUS
Status: DISCONTINUED
Start: 2021-01-01 | End: 2021-01-01 | Stop reason: WASHOUT

## 2021-01-01 RX ORDER — SODIUM CHLORIDE 9 MG/ML
75 INJECTION, SOLUTION INTRAVENOUS AS NEEDED
Status: DISCONTINUED | OUTPATIENT
Start: 2021-01-01 | End: 2021-11-09 | Stop reason: HOSPADM

## 2021-01-01 RX ORDER — ACETAMINOPHEN 650 MG/1
650 SUPPOSITORY RECTAL
Status: DISCONTINUED | OUTPATIENT
Start: 2021-01-01 | End: 2021-11-09 | Stop reason: HOSPADM

## 2021-01-01 RX ORDER — ALBUMIN HUMAN 250 G/1000ML
50 SOLUTION INTRAVENOUS 3 TIMES DAILY
Status: DISCONTINUED | OUTPATIENT
Start: 2021-01-01 | End: 2021-11-09 | Stop reason: HOSPADM

## 2021-01-01 RX ORDER — PROPOFOL 10 MG/ML
INJECTION, EMULSION INTRAVENOUS AS NEEDED
Status: DISCONTINUED | OUTPATIENT
Start: 2021-01-01 | End: 2021-01-01 | Stop reason: HOSPADM

## 2021-01-01 RX ORDER — FUROSEMIDE 10 MG/ML
40 INJECTION INTRAMUSCULAR; INTRAVENOUS ONCE
Status: COMPLETED | OUTPATIENT
Start: 2021-01-01 | End: 2021-01-01

## 2021-01-01 RX ORDER — SODIUM CHLORIDE 9 MG/ML
250 INJECTION, SOLUTION INTRAVENOUS AS NEEDED
Status: DISCONTINUED | OUTPATIENT
Start: 2021-01-01 | End: 2021-01-01 | Stop reason: SDUPTHER

## 2021-01-01 RX ORDER — FENTANYL CITRATE 50 UG/ML
INJECTION, SOLUTION INTRAMUSCULAR; INTRAVENOUS AS NEEDED
Status: DISCONTINUED | OUTPATIENT
Start: 2021-01-01 | End: 2021-01-01 | Stop reason: HOSPADM

## 2021-01-01 RX ORDER — SODIUM CHLORIDE 9 MG/ML
INJECTION, SOLUTION INTRAVENOUS
Status: DISCONTINUED | OUTPATIENT
Start: 2021-01-01 | End: 2021-01-01 | Stop reason: HOSPADM

## 2021-01-01 RX ORDER — ASPIRIN 325 MG/1
100 TABLET, FILM COATED ORAL DAILY
Status: DISCONTINUED | OUTPATIENT
Start: 2021-01-01 | End: 2021-11-09 | Stop reason: HOSPADM

## 2021-01-01 RX ORDER — SODIUM CHLORIDE 9 MG/ML
100 INJECTION, SOLUTION INTRAVENOUS CONTINUOUS
Status: DISCONTINUED | OUTPATIENT
Start: 2021-01-01 | End: 2021-01-01

## 2021-01-01 RX ORDER — POTASSIUM CHLORIDE 7.45 MG/ML
10 INJECTION INTRAVENOUS
Status: COMPLETED | OUTPATIENT
Start: 2021-01-01 | End: 2021-01-01

## 2021-01-01 RX ORDER — SODIUM CHLORIDE 0.9 % (FLUSH) 0.9 %
5-40 SYRINGE (ML) INJECTION AS NEEDED
Status: DISCONTINUED | OUTPATIENT
Start: 2021-01-01 | End: 2021-11-09 | Stop reason: HOSPADM

## 2021-01-01 RX ORDER — LACTULOSE 10 G/15ML
20 SOLUTION ORAL; RECTAL 2 TIMES DAILY
Qty: 480 ML | Refills: 0 | Status: SHIPPED | OUTPATIENT
Start: 2021-01-01 | End: 2021-01-01

## 2021-01-01 RX ORDER — PROPOFOL 10 MG/ML
INJECTION, EMULSION INTRAVENOUS
Status: COMPLETED
Start: 2021-01-01 | End: 2021-01-01

## 2021-01-01 RX ORDER — ALBUMIN HUMAN 250 G/1000ML
12.5 SOLUTION INTRAVENOUS ONCE
Status: COMPLETED | OUTPATIENT
Start: 2021-01-01 | End: 2021-01-01

## 2021-01-01 RX ORDER — SODIUM CHLORIDE 9 MG/ML
250 INJECTION, SOLUTION INTRAVENOUS AS NEEDED
Status: DISCONTINUED | OUTPATIENT
Start: 2021-01-01 | End: 2021-01-01

## 2021-01-01 RX ORDER — SUCCINYLCHOLINE CHLORIDE 20 MG/ML
INJECTION INTRAMUSCULAR; INTRAVENOUS
Status: COMPLETED
Start: 2021-01-01 | End: 2021-01-01

## 2021-01-01 RX ORDER — POTASSIUM CHLORIDE 750 MG/1
40 TABLET, FILM COATED, EXTENDED RELEASE ORAL
Status: COMPLETED | OUTPATIENT
Start: 2021-01-01 | End: 2021-01-01

## 2021-01-01 RX ORDER — SPIRONOLACTONE 25 MG/1
25 TABLET ORAL 2 TIMES DAILY
Status: DISCONTINUED | OUTPATIENT
Start: 2021-01-01 | End: 2021-11-09 | Stop reason: HOSPADM

## 2021-01-01 RX ORDER — CALCIUM GLUCONATE 20 MG/ML
2 INJECTION, SOLUTION INTRAVENOUS ONCE
Status: DISCONTINUED | OUTPATIENT
Start: 2021-01-01 | End: 2021-01-01

## 2021-01-01 RX ORDER — EPINEPHRINE 0.1 MG/ML
INJECTION INTRACARDIAC; INTRAVENOUS AS NEEDED
Status: DISCONTINUED | OUTPATIENT
Start: 2021-01-01 | End: 2021-11-09 | Stop reason: HOSPADM

## 2021-01-01 RX ORDER — SODIUM BICARBONATE 1 MEQ/ML
200 SYRINGE (ML) INTRAVENOUS ONCE
Status: COMPLETED | OUTPATIENT
Start: 2021-01-01 | End: 2021-01-01

## 2021-01-01 RX ORDER — VECURONIUM BROMIDE FOR INJECTION 1 MG/ML
INJECTION, POWDER, LYOPHILIZED, FOR SOLUTION INTRAVENOUS
Status: COMPLETED
Start: 2021-01-01 | End: 2021-01-01

## 2021-01-01 RX ORDER — SODIUM BICARBONATE 1 MEQ/ML
SYRINGE (ML) INTRAVENOUS
Status: DISCONTINUED
Start: 2021-01-01 | End: 2021-11-09 | Stop reason: HOSPADM

## 2021-01-01 RX ORDER — ACETAMINOPHEN 325 MG/1
650 TABLET ORAL
Status: DISCONTINUED | OUTPATIENT
Start: 2021-01-01 | End: 2021-11-09 | Stop reason: HOSPADM

## 2021-01-01 RX ORDER — SODIUM BICARBONATE 1 MEQ/ML
100 SYRINGE (ML) INTRAVENOUS
Status: COMPLETED | OUTPATIENT
Start: 2021-01-01 | End: 2021-01-01

## 2021-01-01 RX ORDER — SODIUM POLYSTYRENE SULFONATE 15 G/60ML
30 SUSPENSION ORAL; RECTAL
Status: DISCONTINUED | OUTPATIENT
Start: 2021-01-01 | End: 2021-01-01

## 2021-01-01 RX ORDER — MAGNESIUM SULFATE 1 G/100ML
1 INJECTION INTRAVENOUS ONCE
Status: COMPLETED | OUTPATIENT
Start: 2021-01-01 | End: 2021-01-01

## 2021-01-01 RX ORDER — ONDANSETRON 4 MG/1
4 TABLET, ORALLY DISINTEGRATING ORAL
Status: DISCONTINUED | OUTPATIENT
Start: 2021-01-01 | End: 2021-11-09 | Stop reason: HOSPADM

## 2021-01-01 RX ORDER — NOREPINEPHRINE BIT/0.9 % NACL 8 MG/250ML
.5-12 INFUSION BOTTLE (ML) INTRAVENOUS
Status: DISCONTINUED | OUTPATIENT
Start: 2021-01-01 | End: 2021-01-01

## 2021-01-01 RX ORDER — CALCIUM GLUCONATE 20 MG/ML
1 INJECTION, SOLUTION INTRAVENOUS ONCE
Status: COMPLETED | OUTPATIENT
Start: 2021-01-01 | End: 2021-01-01

## 2021-01-01 RX ORDER — ALBUMIN HUMAN 250 G/1000ML
25 SOLUTION INTRAVENOUS ONCE
Status: COMPLETED | OUTPATIENT
Start: 2021-01-01 | End: 2021-01-01

## 2021-01-01 RX ORDER — DEXTROSE MONOHYDRATE AND SODIUM CHLORIDE 5; .9 G/100ML; G/100ML
125 INJECTION, SOLUTION INTRAVENOUS CONTINUOUS
Status: DISPENSED | OUTPATIENT
Start: 2021-01-01 | End: 2021-01-01

## 2021-01-01 RX ORDER — LANOLIN ALCOHOL/MO/W.PET/CERES
1000 CREAM (GRAM) TOPICAL DAILY
Status: DISCONTINUED | OUTPATIENT
Start: 2021-01-01 | End: 2021-01-01

## 2021-01-01 RX ORDER — TRAZODONE HYDROCHLORIDE 50 MG/1
50 TABLET ORAL
Status: DISCONTINUED | OUTPATIENT
Start: 2021-01-01 | End: 2021-11-09 | Stop reason: HOSPADM

## 2021-01-01 RX ORDER — ONDANSETRON 2 MG/ML
4 INJECTION INTRAMUSCULAR; INTRAVENOUS
Status: DISCONTINUED | OUTPATIENT
Start: 2021-01-01 | End: 2021-11-09 | Stop reason: HOSPADM

## 2021-01-01 RX ORDER — SODIUM BICARBONATE 650 MG/1
1300 TABLET ORAL 3 TIMES DAILY
Status: DISCONTINUED | OUTPATIENT
Start: 2021-01-01 | End: 2021-11-09 | Stop reason: HOSPADM

## 2021-01-01 RX ORDER — SODIUM CHLORIDE 0.9 % (FLUSH) 0.9 %
5-40 SYRINGE (ML) INJECTION EVERY 8 HOURS
Status: DISCONTINUED | OUTPATIENT
Start: 2021-01-01 | End: 2021-11-09 | Stop reason: HOSPADM

## 2021-01-01 RX ORDER — EPINEPHRINE 0.1 MG/ML
INJECTION INTRACARDIAC; INTRAVENOUS
Status: DISPENSED
Start: 2021-01-01 | End: 2021-01-01

## 2021-01-01 RX ORDER — MIDAZOLAM HYDROCHLORIDE 1 MG/ML
INJECTION, SOLUTION INTRAMUSCULAR; INTRAVENOUS
Status: DISCONTINUED
Start: 2021-01-01 | End: 2021-01-01 | Stop reason: WASHOUT

## 2021-01-01 RX ORDER — FAMOTIDINE 10 MG/1
10 TABLET ORAL 2 TIMES DAILY
Status: DISCONTINUED | OUTPATIENT
Start: 2021-01-01 | End: 2021-01-01

## 2021-01-01 RX ORDER — SODIUM CHLORIDE, SODIUM LACTATE, POTASSIUM CHLORIDE, CALCIUM CHLORIDE 600; 310; 30; 20 MG/100ML; MG/100ML; MG/100ML; MG/100ML
INJECTION, SOLUTION INTRAVENOUS
Status: DISCONTINUED | OUTPATIENT
Start: 2021-01-01 | End: 2021-01-01 | Stop reason: HOSPADM

## 2021-01-01 RX ORDER — DEXTROSE MONOHYDRATE AND SODIUM CHLORIDE 5; .9 G/100ML; G/100ML
125 INJECTION, SOLUTION INTRAVENOUS CONTINUOUS
Status: DISCONTINUED | OUTPATIENT
Start: 2021-01-01 | End: 2021-01-01

## 2021-01-01 RX ORDER — MIDODRINE HYDROCHLORIDE 5 MG/1
5 TABLET ORAL 2 TIMES DAILY WITH MEALS
Status: DISCONTINUED | OUTPATIENT
Start: 2021-01-01 | End: 2021-01-01

## 2021-01-01 RX ORDER — ZOLPIDEM TARTRATE 5 MG/1
5 TABLET ORAL ONCE
Status: COMPLETED | OUTPATIENT
Start: 2021-01-01 | End: 2021-01-01

## 2021-01-01 RX ORDER — ZOLPIDEM TARTRATE 5 MG/1
5 TABLET ORAL
Status: COMPLETED | OUTPATIENT
Start: 2021-01-01 | End: 2021-01-01

## 2021-01-01 RX ORDER — NOREPINEPHRINE BITARTRATE/D5W 8 MG/250ML
.5-12 PLASTIC BAG, INJECTION (ML) INTRAVENOUS
Status: DISCONTINUED | OUTPATIENT
Start: 2021-01-01 | End: 2021-01-01 | Stop reason: SDUPTHER

## 2021-01-01 RX ORDER — MIDODRINE HYDROCHLORIDE 5 MG/1
10 TABLET ORAL 3 TIMES DAILY
Status: DISCONTINUED | OUTPATIENT
Start: 2021-01-01 | End: 2021-11-09 | Stop reason: HOSPADM

## 2021-01-01 RX ORDER — SODIUM BICARBONATE 1 MEQ/ML
100 SYRINGE (ML) INTRAVENOUS ONCE
Status: DISCONTINUED | OUTPATIENT
Start: 2021-01-01 | End: 2021-01-01

## 2021-01-01 RX ORDER — PROPOFOL 10 MG/ML
0-50 INJECTION, EMULSION INTRAVENOUS
Status: DISCONTINUED | OUTPATIENT
Start: 2021-01-01 | End: 2021-11-09 | Stop reason: HOSPADM

## 2021-01-01 RX ADMIN — Medication 10 ML: at 06:50

## 2021-01-01 RX ADMIN — Medication 70 MCG/MIN: at 22:05

## 2021-01-01 RX ADMIN — PROPOFOL 25 MCG/KG/MIN: 10 INJECTION, EMULSION INTRAVENOUS at 17:43

## 2021-01-01 RX ADMIN — PROPOFOL 35 MCG/KG/MIN: 10 INJECTION, EMULSION INTRAVENOUS at 13:41

## 2021-01-01 RX ADMIN — PROPOFOL 60 MG: 10 INJECTION, EMULSION INTRAVENOUS at 14:19

## 2021-01-01 RX ADMIN — VASOPRESSIN 0.01 UNITS/MIN: 20 INJECTION INTRAVENOUS at 16:28

## 2021-01-01 RX ADMIN — Medication 4 MCG/MIN: at 14:00

## 2021-01-01 RX ADMIN — DEXMEDETOMIDINE HYDROCHLORIDE 0.4 MCG/KG/HR: 100 INJECTION, SOLUTION, CONCENTRATE INTRAVENOUS at 16:15

## 2021-01-01 RX ADMIN — Medication 10 ML: at 13:12

## 2021-01-01 RX ADMIN — DEXMEDETOMIDINE HYDROCHLORIDE 1.4 MCG/KG/HR: 100 INJECTION, SOLUTION, CONCENTRATE INTRAVENOUS at 20:28

## 2021-01-01 RX ADMIN — DEXMEDETOMIDINE HYDROCHLORIDE 0.4 MCG/KG/HR: 100 INJECTION, SOLUTION, CONCENTRATE INTRAVENOUS at 23:53

## 2021-01-01 RX ADMIN — Medication 70 MCG/MIN: at 11:51

## 2021-01-01 RX ADMIN — SODIUM BICARBONATE: 84 INJECTION, SOLUTION INTRAVENOUS at 11:35

## 2021-01-01 RX ADMIN — VASOPRESSIN 0.01 UNITS/MIN: 20 INJECTION INTRAVENOUS at 15:08

## 2021-01-01 RX ADMIN — PROPOFOL 30 MG: 10 INJECTION, EMULSION INTRAVENOUS at 14:38

## 2021-01-01 RX ADMIN — Medication 10 ML: at 14:00

## 2021-01-01 RX ADMIN — PROPOFOL 35 MCG/KG/MIN: 10 INJECTION, EMULSION INTRAVENOUS at 08:33

## 2021-01-01 RX ADMIN — DEXMEDETOMIDINE HYDROCHLORIDE 1.4 MCG/KG/HR: 100 INJECTION, SOLUTION, CONCENTRATE INTRAVENOUS at 06:10

## 2021-01-01 RX ADMIN — ALBUMIN (HUMAN) 12.5 G: 0.25 INJECTION, SOLUTION INTRAVENOUS at 19:46

## 2021-01-01 RX ADMIN — FENTANYL CITRATE 50 MCG: 50 INJECTION, SOLUTION INTRAMUSCULAR; INTRAVENOUS at 14:11

## 2021-01-01 RX ADMIN — MINERAL OIL, PETROLATUM: 425; 568 OINTMENT OPHTHALMIC at 10:06

## 2021-01-01 RX ADMIN — SODIUM BICARBONATE: 84 INJECTION, SOLUTION INTRAVENOUS at 08:03

## 2021-01-01 RX ADMIN — FAMOTIDINE 10 MG: 10 TABLET ORAL at 10:12

## 2021-01-01 RX ADMIN — Medication 60 MCG/MIN: at 12:38

## 2021-01-01 RX ADMIN — Medication 10 ML: at 21:32

## 2021-01-01 RX ADMIN — LACTULOSE 30 ML: 20 SOLUTION ORAL at 21:35

## 2021-01-01 RX ADMIN — NOREPINEPHRINE BITARTRATE 55 MCG/MIN: 1 INJECTION, SOLUTION, CONCENTRATE INTRAVENOUS at 23:22

## 2021-01-01 RX ADMIN — Medication 10 ML: at 21:21

## 2021-01-01 RX ADMIN — PHENYLEPHRINE HYDROCHLORIDE 300 MCG/MIN: 10 INJECTION INTRAVENOUS at 21:00

## 2021-01-01 RX ADMIN — PROPOFOL 30 MG: 10 INJECTION, EMULSION INTRAVENOUS at 14:27

## 2021-01-01 RX ADMIN — ONDANSETRON 4 MG: 2 INJECTION INTRAMUSCULAR; INTRAVENOUS at 22:10

## 2021-01-01 RX ADMIN — DEXTROSE AND SODIUM CHLORIDE 125 ML/HR: 5; 900 INJECTION, SOLUTION INTRAVENOUS at 07:33

## 2021-01-01 RX ADMIN — SODIUM BICARBONATE 100 MEQ: 84 INJECTION, SOLUTION INTRAVENOUS at 13:55

## 2021-01-01 RX ADMIN — PROPOFOL 50 MCG/KG/MIN: 10 INJECTION, EMULSION INTRAVENOUS at 00:50

## 2021-01-01 RX ADMIN — Medication 10 ML: at 06:43

## 2021-01-01 RX ADMIN — PROPOFOL 30 MG: 10 INJECTION, EMULSION INTRAVENOUS at 14:23

## 2021-01-01 RX ADMIN — SODIUM CHLORIDE 40 MG: 9 INJECTION INTRAMUSCULAR; INTRAVENOUS; SUBCUTANEOUS at 00:29

## 2021-01-01 RX ADMIN — SODIUM CHLORIDE 25 MCG/HR: 9 INJECTION, SOLUTION INTRAVENOUS at 13:43

## 2021-01-01 RX ADMIN — Medication 1 TABLET: at 10:48

## 2021-01-01 RX ADMIN — PROPOFOL 25 MCG/KG/MIN: 10 INJECTION, EMULSION INTRAVENOUS at 09:15

## 2021-01-01 RX ADMIN — PROPOFOL 30 MCG/KG/MIN: 10 INJECTION, EMULSION INTRAVENOUS at 09:45

## 2021-01-01 RX ADMIN — LACTULOSE 30 ML: 20 SOLUTION ORAL at 11:07

## 2021-01-01 RX ADMIN — SODIUM CHLORIDE 25 MCG/HR: 9 INJECTION, SOLUTION INTRAVENOUS at 03:57

## 2021-01-01 RX ADMIN — PROPOFOL 25 MCG/KG/MIN: 10 INJECTION, EMULSION INTRAVENOUS at 23:54

## 2021-01-01 RX ADMIN — PIPERACILLIN AND TAZOBACTAM 3.38 G: 3; .375 INJECTION, POWDER, LYOPHILIZED, FOR SOLUTION INTRAVENOUS at 16:37

## 2021-01-01 RX ADMIN — Medication 1 TABLET: at 08:07

## 2021-01-01 RX ADMIN — SODIUM CHLORIDE 500 ML: 9 INJECTION, SOLUTION INTRAVENOUS at 16:50

## 2021-01-01 RX ADMIN — PIPERACILLIN AND TAZOBACTAM 3.38 G: 3; .375 INJECTION, POWDER, LYOPHILIZED, FOR SOLUTION INTRAVENOUS at 03:57

## 2021-01-01 RX ADMIN — SODIUM BICARBONATE 100 MEQ: 84 INJECTION, SOLUTION INTRAVENOUS at 13:25

## 2021-01-01 RX ADMIN — FAMOTIDINE 10 MG: 10 TABLET ORAL at 11:08

## 2021-01-01 RX ADMIN — Medication 60 MCG/MIN: at 15:04

## 2021-01-01 RX ADMIN — Medication 75 MCG/MIN: at 18:24

## 2021-01-01 RX ADMIN — SODIUM BICARBONATE: 84 INJECTION, SOLUTION INTRAVENOUS at 10:54

## 2021-01-01 RX ADMIN — NOREPINEPHRINE BITARTRATE 60 MCG/MIN: 1 INJECTION, SOLUTION, CONCENTRATE INTRAVENOUS at 05:02

## 2021-01-01 RX ADMIN — Medication 70 MCG/MIN: at 02:00

## 2021-01-01 RX ADMIN — Medication 10 ML: at 22:00

## 2021-01-01 RX ADMIN — Medication 60 MCG/MIN: at 16:41

## 2021-01-01 RX ADMIN — ALBUMIN (HUMAN) 12.5 G: 0.25 INJECTION, SOLUTION INTRAVENOUS at 15:44

## 2021-01-01 RX ADMIN — Medication 10 ML: at 10:12

## 2021-01-01 RX ADMIN — NOREPINEPHRINE BITARTRATE 65 MCG/MIN: 1 INJECTION, SOLUTION, CONCENTRATE INTRAVENOUS at 15:14

## 2021-01-01 RX ADMIN — Medication 60 MCG/MIN: at 10:27

## 2021-01-01 RX ADMIN — CALCIUM GLUCONATE 1000 MG: 20 INJECTION, SOLUTION INTRAVENOUS at 13:24

## 2021-01-01 RX ADMIN — Medication 24 MCG/MIN: at 08:33

## 2021-01-01 RX ADMIN — Medication 5 ML: at 06:30

## 2021-01-01 RX ADMIN — HYDROXYZINE PAMOATE 25 MG: 25 CAPSULE ORAL at 22:58

## 2021-01-01 RX ADMIN — Medication 60 MCG/MIN: at 21:00

## 2021-01-01 RX ADMIN — Medication 10 ML: at 06:11

## 2021-01-01 RX ADMIN — DEXMEDETOMIDINE HYDROCHLORIDE 1.4 MCG/KG/HR: 100 INJECTION, SOLUTION, CONCENTRATE INTRAVENOUS at 01:22

## 2021-01-01 RX ADMIN — NOREPINEPHRINE BITARTRATE 50 MCG/MIN: 1 INJECTION, SOLUTION, CONCENTRATE INTRAVENOUS at 05:25

## 2021-01-01 RX ADMIN — SODIUM CHLORIDE 25 MCG/HR: 9 INJECTION, SOLUTION INTRAVENOUS at 01:41

## 2021-01-01 RX ADMIN — PROPOFOL 25 MCG/KG/MIN: 10 INJECTION, EMULSION INTRAVENOUS at 21:34

## 2021-01-01 RX ADMIN — Medication 10 ML: at 05:41

## 2021-01-01 RX ADMIN — VASOPRESSIN 0.02 UNITS/MIN: 20 INJECTION INTRAVENOUS at 09:13

## 2021-01-01 RX ADMIN — PHENYLEPHRINE HYDROCHLORIDE 20 MCG/MIN: 10 INJECTION INTRAVENOUS at 08:19

## 2021-01-01 RX ADMIN — PHENYLEPHRINE HYDROCHLORIDE 80 MCG/MIN: 10 INJECTION INTRAVENOUS at 16:30

## 2021-01-01 RX ADMIN — NOREPINEPHRINE BITARTRATE 65 MCG/MIN: 1 INJECTION, SOLUTION, CONCENTRATE INTRAVENOUS at 13:15

## 2021-01-01 RX ADMIN — ALBUMIN (HUMAN) 25 G: 0.25 INJECTION, SOLUTION INTRAVENOUS at 23:00

## 2021-01-01 RX ADMIN — SODIUM CHLORIDE 40 MG: 9 INJECTION INTRAMUSCULAR; INTRAVENOUS; SUBCUTANEOUS at 21:19

## 2021-01-01 RX ADMIN — CALCIUM GLUCONATE 2 G: 20 INJECTION, SOLUTION INTRAVENOUS at 09:16

## 2021-01-01 RX ADMIN — PROPOFOL 20 MCG/KG/MIN: 10 INJECTION, EMULSION INTRAVENOUS at 10:13

## 2021-01-01 RX ADMIN — ALBUMIN (HUMAN) 25 G: 0.25 INJECTION, SOLUTION INTRAVENOUS at 11:31

## 2021-01-01 RX ADMIN — PROPOFOL 25 MCG/KG/MIN: 10 INJECTION, EMULSION INTRAVENOUS at 16:40

## 2021-01-01 RX ADMIN — PIPERACILLIN AND TAZOBACTAM 3.38 G: 3; .375 INJECTION, POWDER, LYOPHILIZED, FOR SOLUTION INTRAVENOUS at 03:43

## 2021-01-01 RX ADMIN — FUROSEMIDE 40 MG: 10 INJECTION INTRAMUSCULAR; INTRAVENOUS at 15:47

## 2021-01-01 RX ADMIN — DEXMEDETOMIDINE HYDROCHLORIDE 0.8 MCG/KG/HR: 100 INJECTION, SOLUTION, CONCENTRATE INTRAVENOUS at 20:05

## 2021-01-01 RX ADMIN — SODIUM CHLORIDE 40 MG: 9 INJECTION INTRAMUSCULAR; INTRAVENOUS; SUBCUTANEOUS at 10:13

## 2021-01-01 RX ADMIN — PIPERACILLIN AND TAZOBACTAM 3.38 G: 3; .375 INJECTION, POWDER, LYOPHILIZED, FOR SOLUTION INTRAVENOUS at 03:49

## 2021-01-01 RX ADMIN — DEXMEDETOMIDINE HYDROCHLORIDE 0.4 MCG/KG/HR: 100 INJECTION, SOLUTION, CONCENTRATE INTRAVENOUS at 22:45

## 2021-01-01 RX ADMIN — SODIUM BICARBONATE: 84 INJECTION, SOLUTION INTRAVENOUS at 11:30

## 2021-01-01 RX ADMIN — VASOPRESSIN 0.04 UNITS/MIN: 20 INJECTION INTRAVENOUS at 03:54

## 2021-01-01 RX ADMIN — CALCIUM GLUCONATE 2 G: 20 INJECTION, SOLUTION INTRAVENOUS at 16:14

## 2021-01-01 RX ADMIN — VECURONIUM BROMIDE 10 MG: 1 INJECTION, POWDER, LYOPHILIZED, FOR SOLUTION INTRAVENOUS at 18:00

## 2021-01-01 RX ADMIN — SODIUM CHLORIDE 25 MCG/HR: 9 INJECTION, SOLUTION INTRAVENOUS at 09:48

## 2021-01-01 RX ADMIN — TRAZODONE HYDROCHLORIDE 50 MG: 50 TABLET ORAL at 00:21

## 2021-01-01 RX ADMIN — SODIUM CHLORIDE 1000 ML/HR: 9 INJECTION, SOLUTION INTRAVENOUS at 22:35

## 2021-01-01 RX ADMIN — Medication 10 ML: at 22:11

## 2021-01-01 RX ADMIN — PIPERACILLIN AND TAZOBACTAM 3.38 G: 3; .375 INJECTION, POWDER, LYOPHILIZED, FOR SOLUTION INTRAVENOUS at 17:43

## 2021-01-01 RX ADMIN — Medication 100 MEQ: at 12:51

## 2021-01-01 RX ADMIN — PHENYLEPHRINE HYDROCHLORIDE 100 MCG/MIN: 10 INJECTION INTRAVENOUS at 09:52

## 2021-01-01 RX ADMIN — VASOPRESSIN 0.04 UNITS/MIN: 20 INJECTION INTRAVENOUS at 21:00

## 2021-01-01 RX ADMIN — CALCIUM GLUCONATE 2 G: 20 INJECTION, SOLUTION INTRAVENOUS at 11:58

## 2021-01-01 RX ADMIN — Medication 10 ML: at 01:50

## 2021-01-01 RX ADMIN — PHENYLEPHRINE HYDROCHLORIDE 200 MCG/MIN: 10 INJECTION INTRAVENOUS at 20:15

## 2021-01-01 RX ADMIN — THIAMINE HCL TAB 100 MG 100 MG: 100 TAB at 10:12

## 2021-01-01 RX ADMIN — SODIUM CHLORIDE 25 MCG/HR: 9 INJECTION, SOLUTION INTRAVENOUS at 00:07

## 2021-01-01 RX ADMIN — PROPOFOL 25 MCG/KG/MIN: 10 INJECTION, EMULSION INTRAVENOUS at 20:32

## 2021-01-01 RX ADMIN — SODIUM CHLORIDE 25 MCG/HR: 9 INJECTION, SOLUTION INTRAVENOUS at 17:34

## 2021-01-01 RX ADMIN — Medication 10 ML: at 15:15

## 2021-01-01 RX ADMIN — NOREPINEPHRINE BITARTRATE 45 MCG/MIN: 1 INJECTION, SOLUTION, CONCENTRATE INTRAVENOUS at 12:15

## 2021-01-01 RX ADMIN — SODIUM CHLORIDE 250 ML: 9 INJECTION, SOLUTION INTRAVENOUS at 10:13

## 2021-01-01 RX ADMIN — SUCCINYLCHOLINE CHLORIDE: 20 INJECTION, SOLUTION INTRAMUSCULAR; INTRAVENOUS at 15:00

## 2021-01-01 RX ADMIN — PROPOFOL 30 MG: 10 INJECTION, EMULSION INTRAVENOUS at 14:15

## 2021-01-01 RX ADMIN — LACTULOSE 30 ML: 20 SOLUTION ORAL at 23:05

## 2021-01-01 RX ADMIN — PIPERACILLIN AND TAZOBACTAM 3.38 G: 3; .375 INJECTION, POWDER, LYOPHILIZED, FOR SOLUTION INTRAVENOUS at 15:05

## 2021-01-01 RX ADMIN — LACTULOSE 30 ML: 20 SOLUTION ORAL at 10:48

## 2021-01-01 RX ADMIN — PROPOFOL 30 MCG/KG/MIN: 10 INJECTION, EMULSION INTRAVENOUS at 01:46

## 2021-01-01 RX ADMIN — Medication 75 MCG/MIN: at 16:25

## 2021-01-01 RX ADMIN — PROPOFOL 20 MCG/KG/MIN: 10 INJECTION, EMULSION INTRAVENOUS at 23:52

## 2021-01-01 RX ADMIN — LACTULOSE 30 ML: 20 SOLUTION ORAL at 21:45

## 2021-01-01 RX ADMIN — Medication 10 ML: at 07:08

## 2021-01-01 RX ADMIN — SODIUM CHLORIDE, POTASSIUM CHLORIDE, SODIUM LACTATE AND CALCIUM CHLORIDE: 600; 310; 30; 20 INJECTION, SOLUTION INTRAVENOUS at 13:28

## 2021-01-01 RX ADMIN — PROPOFOL 50 MG: 10 INJECTION, EMULSION INTRAVENOUS at 14:06

## 2021-01-01 RX ADMIN — Medication 1 TABLET: at 09:00

## 2021-01-01 RX ADMIN — Medication 60 MCG/MIN: at 03:59

## 2021-01-01 RX ADMIN — DEXMEDETOMIDINE HYDROCHLORIDE 0.8 MCG/KG/HR: 100 INJECTION, SOLUTION, CONCENTRATE INTRAVENOUS at 16:53

## 2021-01-01 RX ADMIN — SODIUM CHLORIDE 25 MCG/HR: 9 INJECTION, SOLUTION INTRAVENOUS at 07:26

## 2021-01-01 RX ADMIN — Medication 10 ML: at 16:38

## 2021-01-01 RX ADMIN — SPIRONOLACTONE 25 MG: 25 TABLET ORAL at 21:45

## 2021-01-01 RX ADMIN — DEXMEDETOMIDINE HYDROCHLORIDE 1.4 MCG/KG/HR: 100 INJECTION, SOLUTION, CONCENTRATE INTRAVENOUS at 22:38

## 2021-01-01 RX ADMIN — NOREPINEPHRINE BITARTRATE 55 MCG/MIN: 1 INJECTION, SOLUTION, CONCENTRATE INTRAVENOUS at 08:26

## 2021-01-01 RX ADMIN — SODIUM CHLORIDE 25 MCG/HR: 9 INJECTION, SOLUTION INTRAVENOUS at 13:14

## 2021-01-01 RX ADMIN — ZOLPIDEM TARTRATE 5 MG: 5 TABLET ORAL at 00:25

## 2021-01-01 RX ADMIN — Medication 10 ML: at 21:29

## 2021-01-01 RX ADMIN — PIPERACILLIN AND TAZOBACTAM 3.38 G: 3; .375 INJECTION, POWDER, LYOPHILIZED, FOR SOLUTION INTRAVENOUS at 17:09

## 2021-01-01 RX ADMIN — PROPOFOL 100 MG: 10 INJECTION, EMULSION INTRAVENOUS at 14:11

## 2021-01-01 RX ADMIN — MIDODRINE HYDROCHLORIDE 10 MG: 5 TABLET ORAL at 10:12

## 2021-01-01 RX ADMIN — LACTULOSE 30 ML: 20 SOLUTION ORAL at 08:07

## 2021-01-01 RX ADMIN — PROPOFOL 25 MCG/KG/MIN: 10 INJECTION, EMULSION INTRAVENOUS at 19:45

## 2021-01-01 RX ADMIN — PIPERACILLIN AND TAZOBACTAM 3.38 G: 3; .375 INJECTION, POWDER, LYOPHILIZED, FOR SOLUTION INTRAVENOUS at 16:26

## 2021-01-01 RX ADMIN — PROPOFOL 40 MCG/KG/MIN: 10 INJECTION, EMULSION INTRAVENOUS at 03:48

## 2021-01-01 RX ADMIN — DEXMEDETOMIDINE HYDROCHLORIDE 0.8 MCG/KG/HR: 100 INJECTION, SOLUTION, CONCENTRATE INTRAVENOUS at 02:54

## 2021-01-01 RX ADMIN — Medication 1 TABLET: at 11:08

## 2021-01-01 RX ADMIN — SODIUM CHLORIDE: 9 INJECTION, SOLUTION INTRAVENOUS at 13:59

## 2021-01-01 RX ADMIN — THIAMINE HCL TAB 100 MG 100 MG: 100 TAB at 11:08

## 2021-01-01 RX ADMIN — VASOPRESSIN 0.03 UNITS/MIN: 20 INJECTION INTRAVENOUS at 18:31

## 2021-01-01 RX ADMIN — DEXMEDETOMIDINE HYDROCHLORIDE 0.4 MCG/KG/HR: 100 INJECTION, SOLUTION, CONCENTRATE INTRAVENOUS at 05:55

## 2021-01-01 RX ADMIN — PROPOFOL 25 MCG/KG/MIN: 10 INJECTION, EMULSION INTRAVENOUS at 21:28

## 2021-01-01 RX ADMIN — TRAZODONE HYDROCHLORIDE 50 MG: 50 TABLET ORAL at 22:57

## 2021-01-01 RX ADMIN — LACTULOSE 30 ML: 20 SOLUTION ORAL at 10:50

## 2021-01-01 RX ADMIN — PIPERACILLIN AND TAZOBACTAM 3.38 G: 3; .375 INJECTION, POWDER, LYOPHILIZED, FOR SOLUTION INTRAVENOUS at 17:00

## 2021-01-01 RX ADMIN — PROPOFOL 50 MG: 10 INJECTION, EMULSION INTRAVENOUS at 14:05

## 2021-01-01 RX ADMIN — VASOPRESSIN 0.01 UNITS/MIN: 20 INJECTION INTRAVENOUS at 03:45

## 2021-01-01 RX ADMIN — Medication 10 ML: at 05:55

## 2021-01-01 RX ADMIN — MAGNESIUM SULFATE HEPTAHYDRATE 1 G: 1 INJECTION, SOLUTION INTRAVENOUS at 13:24

## 2021-01-01 RX ADMIN — HYDROXYZINE PAMOATE 25 MG: 25 CAPSULE ORAL at 21:35

## 2021-01-01 RX ADMIN — Medication 10 ML: at 15:55

## 2021-01-01 RX ADMIN — HYDROXYZINE PAMOATE 25 MG: 25 CAPSULE ORAL at 22:57

## 2021-01-01 RX ADMIN — PROPOFOL 30 MCG/KG/MIN: 10 INJECTION, EMULSION INTRAVENOUS at 16:45

## 2021-01-01 RX ADMIN — SODIUM BICARBONATE 100 MEQ: 84 INJECTION, SOLUTION INTRAVENOUS at 15:55

## 2021-01-01 RX ADMIN — MIDODRINE HYDROCHLORIDE 5 MG: 5 TABLET ORAL at 11:33

## 2021-01-01 RX ADMIN — PHENYLEPHRINE HYDROCHLORIDE 100 MCG/MIN: 10 INJECTION INTRAVENOUS at 05:02

## 2021-01-01 RX ADMIN — Medication 10 ML: at 23:50

## 2021-01-01 RX ADMIN — NOREPINEPHRINE BITARTRATE 65 MCG/MIN: 1 INJECTION, SOLUTION, CONCENTRATE INTRAVENOUS at 08:47

## 2021-01-01 RX ADMIN — PROPOFOL 35 MCG/KG/MIN: 10 INJECTION, EMULSION INTRAVENOUS at 02:09

## 2021-01-01 RX ADMIN — PHENYLEPHRINE HYDROCHLORIDE 100 MCG/MIN: 10 INJECTION INTRAVENOUS at 14:00

## 2021-01-01 RX ADMIN — PHENYLEPHRINE HYDROCHLORIDE 200 MCG/MIN: 10 INJECTION INTRAVENOUS at 11:55

## 2021-01-01 RX ADMIN — SODIUM BICARBONATE: 84 INJECTION, SOLUTION INTRAVENOUS at 15:53

## 2021-01-01 RX ADMIN — DEXTROSE AND SODIUM CHLORIDE 125 ML/HR: 5; 900 INJECTION, SOLUTION INTRAVENOUS at 23:22

## 2021-01-01 RX ADMIN — PHENYLEPHRINE HYDROCHLORIDE 300 MCG: 10 INJECTION INTRAVENOUS at 14:05

## 2021-01-01 RX ADMIN — NOREPINEPHRINE BITARTRATE 65 MCG/MIN: 1 INJECTION, SOLUTION, CONCENTRATE INTRAVENOUS at 23:32

## 2021-01-01 RX ADMIN — THIAMINE HCL TAB 100 MG 100 MG: 100 TAB at 08:46

## 2021-01-01 RX ADMIN — DEXMEDETOMIDINE HYDROCHLORIDE 0.8 MCG/KG/HR: 100 INJECTION, SOLUTION, CONCENTRATE INTRAVENOUS at 05:37

## 2021-01-01 RX ADMIN — DEXMEDETOMIDINE HYDROCHLORIDE 0.7 MCG/KG/HR: 100 INJECTION, SOLUTION, CONCENTRATE INTRAVENOUS at 10:06

## 2021-01-01 RX ADMIN — FAMOTIDINE 10 MG: 10 TABLET ORAL at 21:34

## 2021-01-01 RX ADMIN — PROPOFOL 50 MCG/KG/MIN: 10 INJECTION, EMULSION INTRAVENOUS at 22:13

## 2021-01-01 RX ADMIN — PROPOFOL 30 MG: 10 INJECTION, EMULSION INTRAVENOUS at 14:45

## 2021-01-01 RX ADMIN — SODIUM BICARBONATE: 84 INJECTION, SOLUTION INTRAVENOUS at 07:16

## 2021-01-01 RX ADMIN — PROPOFOL 30 MG: 10 INJECTION, EMULSION INTRAVENOUS at 14:42

## 2021-01-01 RX ADMIN — Medication 10 ML: at 13:34

## 2021-01-01 RX ADMIN — HYDROXYZINE PAMOATE 25 MG: 25 CAPSULE ORAL at 11:08

## 2021-01-01 RX ADMIN — PROPOFOL 35 MCG/KG/MIN: 10 INJECTION, EMULSION INTRAVENOUS at 21:47

## 2021-01-01 RX ADMIN — ACETAMINOPHEN 650 MG: 650 SUPPOSITORY RECTAL at 00:03

## 2021-01-01 RX ADMIN — Medication 10 ML: at 13:16

## 2021-01-01 RX ADMIN — TRAZODONE HYDROCHLORIDE 50 MG: 50 TABLET ORAL at 22:58

## 2021-01-01 RX ADMIN — POTASSIUM CHLORIDE 10 MEQ: 7.46 INJECTION, SOLUTION INTRAVENOUS at 13:15

## 2021-01-01 RX ADMIN — NOREPINEPHRINE BITARTRATE 55 MCG/MIN: 1 INJECTION, SOLUTION, CONCENTRATE INTRAVENOUS at 00:09

## 2021-01-01 RX ADMIN — DEXMEDETOMIDINE HYDROCHLORIDE 0.8 MCG/KG/HR: 100 INJECTION, SOLUTION, CONCENTRATE INTRAVENOUS at 02:13

## 2021-01-01 RX ADMIN — PIPERACILLIN AND TAZOBACTAM 3.38 G: 3; .375 INJECTION, POWDER, LYOPHILIZED, FOR SOLUTION INTRAVENOUS at 16:31

## 2021-01-01 RX ADMIN — SODIUM CHLORIDE 25 MCG/HR: 9 INJECTION, SOLUTION INTRAVENOUS at 18:00

## 2021-01-01 RX ADMIN — SODIUM BICARBONATE 100 MEQ: 84 INJECTION, SOLUTION INTRAVENOUS at 12:51

## 2021-01-01 RX ADMIN — DEXMEDETOMIDINE HYDROCHLORIDE 0.4 MCG/KG/HR: 100 INJECTION, SOLUTION, CONCENTRATE INTRAVENOUS at 07:34

## 2021-01-01 RX ADMIN — PROPOFOL 35 MCG/KG/MIN: 10 INJECTION, EMULSION INTRAVENOUS at 13:15

## 2021-01-01 RX ADMIN — THIAMINE HYDROCHLORIDE: 100 INJECTION, SOLUTION INTRAMUSCULAR; INTRAVENOUS at 20:57

## 2021-01-01 RX ADMIN — PROPOFOL 20 MCG/KG/MIN: 10 INJECTION, EMULSION INTRAVENOUS at 13:14

## 2021-01-01 RX ADMIN — SODIUM CHLORIDE 1000 ML/HR: 9 INJECTION, SOLUTION INTRAVENOUS at 05:34

## 2021-01-01 RX ADMIN — MIDODRINE HYDROCHLORIDE 5 MG: 5 TABLET ORAL at 16:38

## 2021-01-01 RX ADMIN — Medication 70 MCG/MIN: at 21:34

## 2021-01-01 RX ADMIN — Medication 10 ML: at 05:10

## 2021-01-01 RX ADMIN — DEXMEDETOMIDINE HYDROCHLORIDE 0.8 MCG/KG/HR: 100 INJECTION, SOLUTION, CONCENTRATE INTRAVENOUS at 00:09

## 2021-01-01 RX ADMIN — DEXMEDETOMIDINE HYDROCHLORIDE 0.8 MCG/KG/HR: 100 INJECTION, SOLUTION, CONCENTRATE INTRAVENOUS at 09:35

## 2021-01-01 RX ADMIN — Medication 10 ML: at 06:23

## 2021-01-01 RX ADMIN — MAGNESIUM SULFATE HEPTAHYDRATE 2 G: 40 INJECTION, SOLUTION INTRAVENOUS at 10:35

## 2021-01-01 RX ADMIN — NOREPINEPHRINE BITARTRATE 55 MCG/MIN: 1 INJECTION, SOLUTION, CONCENTRATE INTRAVENOUS at 18:09

## 2021-01-01 RX ADMIN — PROPOFOL 30 MG: 10 INJECTION, EMULSION INTRAVENOUS at 14:19

## 2021-01-01 RX ADMIN — ACETAMINOPHEN 650 MG: 650 SUPPOSITORY RECTAL at 10:38

## 2021-01-01 RX ADMIN — PIPERACILLIN AND TAZOBACTAM 3.38 G: 3; .375 INJECTION, POWDER, LYOPHILIZED, FOR SOLUTION INTRAVENOUS at 05:02

## 2021-01-01 RX ADMIN — Medication 60 MCG/MIN: at 08:32

## 2021-01-01 RX ADMIN — MIDODRINE HYDROCHLORIDE 10 MG: 5 TABLET ORAL at 11:10

## 2021-01-01 RX ADMIN — HYDROXYZINE PAMOATE 25 MG: 25 CAPSULE ORAL at 16:22

## 2021-01-01 RX ADMIN — LACTULOSE 30 ML: 20 SOLUTION ORAL at 22:58

## 2021-01-01 RX ADMIN — SODIUM CHLORIDE 40 MG: 9 INJECTION INTRAMUSCULAR; INTRAVENOUS; SUBCUTANEOUS at 09:34

## 2021-01-01 RX ADMIN — PROPOFOL 30 MG: 10 INJECTION, EMULSION INTRAVENOUS at 14:34

## 2021-01-01 RX ADMIN — NOREPINEPHRINE BITARTRATE 60 MCG/MIN: 1 INJECTION, SOLUTION, CONCENTRATE INTRAVENOUS at 13:26

## 2021-01-01 RX ADMIN — PROPOFOL 15 MCG/KG/MIN: 10 INJECTION, EMULSION INTRAVENOUS at 13:03

## 2021-01-01 RX ADMIN — DEXMEDETOMIDINE HYDROCHLORIDE 0.4 MCG/KG/HR: 100 INJECTION, SOLUTION, CONCENTRATE INTRAVENOUS at 17:33

## 2021-01-01 RX ADMIN — LACTULOSE 30 ML: 20 SOLUTION ORAL at 10:12

## 2021-01-01 RX ADMIN — PHENYLEPHRINE HYDROCHLORIDE 200 MCG/MIN: 10 INJECTION INTRAVENOUS at 14:33

## 2021-01-01 RX ADMIN — NOREPINEPHRINE BITARTRATE 45 MCG/MIN: 1 INJECTION, SOLUTION, CONCENTRATE INTRAVENOUS at 06:40

## 2021-01-01 RX ADMIN — LACTULOSE 30 ML: 20 SOLUTION ORAL at 22:57

## 2021-01-01 RX ADMIN — Medication 10 ML: at 21:49

## 2021-01-01 RX ADMIN — SODIUM CHLORIDE 1000 ML/HR: 9 INJECTION, SOLUTION INTRAVENOUS at 00:30

## 2021-01-01 RX ADMIN — NOREPINEPHRINE BITARTRATE 4 MCG/MIN: 1 INJECTION, SOLUTION, CONCENTRATE INTRAVENOUS at 14:11

## 2021-01-01 RX ADMIN — SODIUM BICARBONATE: 84 INJECTION, SOLUTION INTRAVENOUS at 02:25

## 2021-01-01 RX ADMIN — DEXMEDETOMIDINE HYDROCHLORIDE 0.8 MCG/KG/HR: 100 INJECTION, SOLUTION, CONCENTRATE INTRAVENOUS at 13:02

## 2021-01-01 RX ADMIN — NOREPINEPHRINE BITARTRATE 65 MCG/MIN: 1 INJECTION, SOLUTION, CONCENTRATE INTRAVENOUS at 10:10

## 2021-01-01 RX ADMIN — NOREPINEPHRINE BITARTRATE 65 MCG/MIN: 1 INJECTION, SOLUTION, CONCENTRATE INTRAVENOUS at 04:22

## 2021-01-01 RX ADMIN — DEXTROSE AND SODIUM CHLORIDE 125 ML/HR: 5; 900 INJECTION, SOLUTION INTRAVENOUS at 23:46

## 2021-01-01 RX ADMIN — LACTULOSE 30 ML: 20 SOLUTION ORAL at 21:15

## 2021-01-01 RX ADMIN — SPIRONOLACTONE 25 MG: 25 TABLET ORAL at 10:48

## 2021-01-01 RX ADMIN — SODIUM CHLORIDE 1000 ML/HR: 9 INJECTION, SOLUTION INTRAVENOUS at 21:00

## 2021-01-01 RX ADMIN — DEXMEDETOMIDINE HYDROCHLORIDE 1.4 MCG/KG/HR: 100 INJECTION, SOLUTION, CONCENTRATE INTRAVENOUS at 03:48

## 2021-01-01 RX ADMIN — SODIUM BICARBONATE: 84 INJECTION, SOLUTION INTRAVENOUS at 13:07

## 2021-01-01 RX ADMIN — NOREPINEPHRINE BITARTRATE 60 MCG/MIN: 1 INJECTION, SOLUTION, CONCENTRATE INTRAVENOUS at 14:18

## 2021-01-01 RX ADMIN — SODIUM CHLORIDE 1000 ML/HR: 9 INJECTION, SOLUTION INTRAVENOUS at 04:33

## 2021-01-01 RX ADMIN — NOREPINEPHRINE BITARTRATE 60 MCG/MIN: 1 INJECTION, SOLUTION, CONCENTRATE INTRAVENOUS at 08:36

## 2021-01-01 RX ADMIN — DEXMEDETOMIDINE HYDROCHLORIDE 0.4 MCG/KG/HR: 100 INJECTION, SOLUTION, CONCENTRATE INTRAVENOUS at 07:16

## 2021-01-01 RX ADMIN — PROPOFOL 50 MCG/KG/MIN: 10 INJECTION, EMULSION INTRAVENOUS at 16:37

## 2021-01-01 RX ADMIN — PIPERACILLIN AND TAZOBACTAM 3.38 G: 3; .375 INJECTION, POWDER, LYOPHILIZED, FOR SOLUTION INTRAVENOUS at 03:48

## 2021-01-01 RX ADMIN — NOREPINEPHRINE BITARTRATE 60 MCG/MIN: 1 INJECTION, SOLUTION, CONCENTRATE INTRAVENOUS at 23:52

## 2021-01-01 RX ADMIN — SODIUM BICARBONATE: 84 INJECTION, SOLUTION INTRAVENOUS at 21:14

## 2021-01-01 RX ADMIN — PROPOFOL 25 MCG/KG/MIN: 10 INJECTION, EMULSION INTRAVENOUS at 14:45

## 2021-01-01 RX ADMIN — SODIUM CHLORIDE 100 ML/HR: 9 INJECTION, SOLUTION INTRAVENOUS at 17:00

## 2021-01-01 RX ADMIN — POTASSIUM CHLORIDE 40 MEQ: 750 TABLET, FILM COATED, EXTENDED RELEASE ORAL at 15:18

## 2021-01-01 RX ADMIN — ROCURONIUM BROMIDE 50 MG: 50 INJECTION, SOLUTION INTRAVENOUS at 14:11

## 2021-01-01 RX ADMIN — SODIUM CHLORIDE 1000 ML/HR: 9 INJECTION, SOLUTION INTRAVENOUS at 02:31

## 2021-01-01 RX ADMIN — SODIUM CHLORIDE 1000 ML/HR: 9 INJECTION, SOLUTION INTRAVENOUS at 23:32

## 2021-01-01 RX ADMIN — SPIRONOLACTONE 25 MG: 25 TABLET ORAL at 08:46

## 2021-01-01 RX ADMIN — THIAMINE HYDROCHLORIDE: 100 INJECTION, SOLUTION INTRAMUSCULAR; INTRAVENOUS at 21:12

## 2021-01-01 RX ADMIN — Medication 75 MCG/MIN: at 15:45

## 2021-01-01 RX ADMIN — PROPOFOL 25 MCG/KG/MIN: 10 INJECTION, EMULSION INTRAVENOUS at 13:58

## 2021-01-01 RX ADMIN — PHENYLEPHRINE HYDROCHLORIDE 200 MCG: 10 INJECTION INTRAVENOUS at 14:07

## 2021-01-01 RX ADMIN — FAMOTIDINE 10 MG: 10 TABLET ORAL at 22:57

## 2021-01-01 RX ADMIN — THIAMINE HCL TAB 100 MG 100 MG: 100 TAB at 09:00

## 2021-01-01 RX ADMIN — Medication 10 ML: at 21:16

## 2021-01-01 RX ADMIN — SODIUM CHLORIDE 500 ML: 9 INJECTION, SOLUTION INTRAVENOUS at 09:30

## 2021-01-01 RX ADMIN — SODIUM CHLORIDE 40 MG: 9 INJECTION INTRAMUSCULAR; INTRAVENOUS; SUBCUTANEOUS at 09:21

## 2021-01-01 RX ADMIN — THIAMINE HCL TAB 100 MG 100 MG: 100 TAB at 10:48

## 2021-01-01 RX ADMIN — PHENYLEPHRINE HYDROCHLORIDE 200 MCG: 10 INJECTION INTRAVENOUS at 14:10

## 2021-01-01 RX ADMIN — Medication 10 ML: at 23:09

## 2021-01-01 RX ADMIN — PROPOFOL 35 MCG/KG/MIN: 10 INJECTION, EMULSION INTRAVENOUS at 00:26

## 2021-01-01 RX ADMIN — TRAZODONE HYDROCHLORIDE 50 MG: 50 TABLET ORAL at 21:15

## 2021-01-01 RX ADMIN — DEXMEDETOMIDINE HYDROCHLORIDE 0.4 MCG/KG/HR: 100 INJECTION, SOLUTION, CONCENTRATE INTRAVENOUS at 08:32

## 2021-01-01 RX ADMIN — ZOLPIDEM TARTRATE 5 MG: 5 TABLET ORAL at 23:05

## 2021-01-01 RX ADMIN — THIAMINE HYDROCHLORIDE: 100 INJECTION, SOLUTION INTRAMUSCULAR; INTRAVENOUS at 02:45

## 2021-01-01 RX ADMIN — SODIUM BICARBONATE: 84 INJECTION, SOLUTION INTRAVENOUS at 14:36

## 2021-01-01 RX ADMIN — PHENYLEPHRINE HYDROCHLORIDE 300 MCG/MIN: 10 INJECTION INTRAVENOUS at 18:38

## 2021-01-01 RX ADMIN — SODIUM CHLORIDE 1000 ML/HR: 9 INJECTION, SOLUTION INTRAVENOUS at 03:32

## 2021-01-01 RX ADMIN — NOREPINEPHRINE BITARTRATE 60 MCG/MIN: 1 INJECTION, SOLUTION, CONCENTRATE INTRAVENOUS at 19:21

## 2021-01-01 RX ADMIN — PROPOFOL 25 MCG/KG/MIN: 10 INJECTION, EMULSION INTRAVENOUS at 07:34

## 2021-01-01 RX ADMIN — POTASSIUM CHLORIDE 40 MEQ: 750 TABLET, FILM COATED, EXTENDED RELEASE ORAL at 13:14

## 2021-01-01 RX ADMIN — DEXMEDETOMIDINE HYDROCHLORIDE 0.8 MCG/KG/HR: 100 INJECTION, SOLUTION, CONCENTRATE INTRAVENOUS at 06:49

## 2021-01-01 RX ADMIN — Medication 50 MCG/MIN: at 06:11

## 2021-01-01 RX ADMIN — Medication 1 TABLET: at 08:45

## 2021-01-01 RX ADMIN — Medication 10 ML: at 15:05

## 2021-01-01 RX ADMIN — Medication 75 MCG/MIN: at 20:07

## 2021-01-01 RX ADMIN — PROPOFOL 90 MG: 10 INJECTION, EMULSION INTRAVENOUS at 14:25

## 2021-01-01 RX ADMIN — PROPOFOL 30 MG: 10 INJECTION, EMULSION INTRAVENOUS at 14:12

## 2021-01-01 RX ADMIN — Medication 70 MCG/MIN: at 13:41

## 2021-01-01 RX ADMIN — Medication 2 MG/HR: at 10:07

## 2021-01-01 RX ADMIN — PHENYLEPHRINE HYDROCHLORIDE 90 MCG/MIN: 10 INJECTION INTRAVENOUS at 09:38

## 2021-01-01 RX ADMIN — PROPOFOL 20 MCG/KG/MIN: 10 INJECTION, EMULSION INTRAVENOUS at 05:02

## 2021-01-01 RX ADMIN — PIPERACILLIN AND TAZOBACTAM 3.38 G: 3; .375 INJECTION, POWDER, LYOPHILIZED, FOR SOLUTION INTRAVENOUS at 03:54

## 2021-01-01 RX ADMIN — NOREPINEPHRINE BITARTRATE 65 MCG/MIN: 1 INJECTION, SOLUTION, CONCENTRATE INTRAVENOUS at 20:15

## 2021-01-01 RX ADMIN — SPIRONOLACTONE 25 MG: 25 TABLET ORAL at 09:00

## 2021-01-01 RX ADMIN — SPIRONOLACTONE 25 MG: 25 TABLET ORAL at 23:21

## 2021-01-01 RX ADMIN — SPIRONOLACTONE 25 MG: 25 TABLET ORAL at 08:08

## 2021-01-01 RX ADMIN — Medication 60 MCG/MIN: at 18:26

## 2021-01-01 RX ADMIN — Medication 10 ML: at 14:54

## 2021-01-01 RX ADMIN — PROPOFOL 30 MCG/KG/MIN: 10 INJECTION, EMULSION INTRAVENOUS at 16:25

## 2021-01-01 RX ADMIN — PROPOFOL 90 MCG/KG/MIN: 10 INJECTION, EMULSION INTRAVENOUS at 14:26

## 2021-01-01 RX ADMIN — SODIUM CHLORIDE 40 MG: 9 INJECTION INTRAMUSCULAR; INTRAVENOUS; SUBCUTANEOUS at 21:31

## 2021-01-01 RX ADMIN — ACETAMINOPHEN 650 MG: 650 SUPPOSITORY RECTAL at 03:19

## 2021-01-01 RX ADMIN — DEXMEDETOMIDINE HYDROCHLORIDE 0.4 MCG/KG/HR: 100 INJECTION, SOLUTION, CONCENTRATE INTRAVENOUS at 18:11

## 2021-01-01 RX ADMIN — PROPOFOL 25 MCG/KG/MIN: 10 INJECTION, EMULSION INTRAVENOUS at 06:10

## 2021-01-01 RX ADMIN — Medication 10 ML: at 06:27

## 2021-01-01 RX ADMIN — DEXMEDETOMIDINE HYDROCHLORIDE 0.8 MCG/KG/HR: 100 INJECTION, SOLUTION, CONCENTRATE INTRAVENOUS at 22:25

## 2021-01-01 RX ADMIN — PROPOFOL 40 MCG/KG/MIN: 10 INJECTION, EMULSION INTRAVENOUS at 02:13

## 2021-01-01 RX ADMIN — SODIUM BICARBONATE 200 MEQ: 84 INJECTION, SOLUTION INTRAVENOUS at 09:44

## 2021-01-01 RX ADMIN — Medication 15 MCG/MIN: at 00:53

## 2021-01-01 RX ADMIN — POTASSIUM CHLORIDE 10 MEQ: 7.46 INJECTION, SOLUTION INTRAVENOUS at 11:50

## 2021-01-01 RX ADMIN — PHENYLEPHRINE HYDROCHLORIDE 125 MCG/MIN: 10 INJECTION INTRAVENOUS at 23:51

## 2021-01-01 RX ADMIN — FAMOTIDINE 10 MG: 10 TABLET ORAL at 08:32

## 2021-01-01 RX ADMIN — SODIUM CHLORIDE 1000 ML/HR: 9 INJECTION, SOLUTION INTRAVENOUS at 01:31

## 2021-01-01 RX ADMIN — PROPOFOL 10 MCG/KG/MIN: 10 INJECTION, EMULSION INTRAVENOUS at 01:39

## 2021-01-01 RX ADMIN — PIPERACILLIN AND TAZOBACTAM 3.38 G: 3; .375 INJECTION, POWDER, LYOPHILIZED, FOR SOLUTION INTRAVENOUS at 04:03

## 2021-01-01 RX ADMIN — MIDODRINE HYDROCHLORIDE 10 MG: 5 TABLET ORAL at 21:34

## 2021-01-01 RX ADMIN — NOREPINEPHRINE BITARTRATE 30 MCG/MIN: 1 INJECTION, SOLUTION, CONCENTRATE INTRAVENOUS at 22:58

## 2021-01-01 RX ADMIN — SODIUM CHLORIDE 1000 ML/HR: 9 INJECTION, SOLUTION INTRAVENOUS at 20:00

## 2021-01-01 RX ADMIN — PROPOFOL 40 MCG/KG/MIN: 10 INJECTION, EMULSION INTRAVENOUS at 06:10

## 2021-01-01 RX ADMIN — Medication 10 ML: at 05:42

## 2021-01-01 RX ADMIN — HYDROXYZINE PAMOATE 25 MG: 25 CAPSULE ORAL at 10:12

## 2021-01-01 RX ADMIN — SODIUM BICARBONATE: 84 INJECTION, SOLUTION INTRAVENOUS at 02:28

## 2021-01-01 RX ADMIN — PIPERACILLIN AND TAZOBACTAM 3.38 G: 3; .375 INJECTION, POWDER, LYOPHILIZED, FOR SOLUTION INTRAVENOUS at 16:25

## 2021-01-01 RX ADMIN — NOREPINEPHRINE BITARTRATE 65 MCG/MIN: 1 INJECTION, SOLUTION, CONCENTRATE INTRAVENOUS at 21:00

## 2021-01-01 RX ADMIN — Medication 10 ML: at 13:59

## 2021-01-01 RX ADMIN — LACTULOSE 30 ML: 20 SOLUTION ORAL at 08:45

## 2021-01-01 RX ADMIN — VASOPRESSIN 0.03 UNITS/MIN: 20 INJECTION INTRAVENOUS at 13:48

## 2021-01-01 RX ADMIN — PROPOFOL 25 MCG/KG/MIN: 10 INJECTION, EMULSION INTRAVENOUS at 11:34

## 2021-01-01 RX ADMIN — THIAMINE HCL TAB 100 MG 100 MG: 100 TAB at 08:08

## 2021-01-01 RX ADMIN — SPIRONOLACTONE 25 MG: 25 TABLET ORAL at 21:17

## 2021-01-01 RX ADMIN — SODIUM CHLORIDE 500 ML: 9 INJECTION, SOLUTION INTRAVENOUS at 16:07

## 2021-01-01 RX ADMIN — Medication 70 MCG/MIN: at 23:56

## 2021-01-01 RX ADMIN — SODIUM BICARBONATE 100 MEQ: 84 INJECTION, SOLUTION INTRAVENOUS at 12:06

## 2021-01-01 RX ADMIN — PROPOFOL 25 MCG/KG/MIN: 10 INJECTION, EMULSION INTRAVENOUS at 05:36

## 2021-01-01 RX ADMIN — PROPOFOL 30 MG: 10 INJECTION, EMULSION INTRAVENOUS at 14:30

## 2021-01-01 RX ADMIN — Medication 10 ML: at 21:47

## 2021-01-01 RX ADMIN — PROPOFOL 35 MCG/KG/MIN: 10 INJECTION, EMULSION INTRAVENOUS at 13:30

## 2021-01-01 RX ADMIN — HYDROXYZINE PAMOATE 25 MG: 25 CAPSULE ORAL at 10:48

## 2021-01-01 RX ADMIN — PHENYLEPHRINE HYDROCHLORIDE 200 MCG/MIN: 10 INJECTION INTRAVENOUS at 17:37

## 2021-01-01 RX ADMIN — SODIUM BICARBONATE: 84 INJECTION, SOLUTION INTRAVENOUS at 21:13

## 2021-01-01 RX ADMIN — SPIRONOLACTONE 25 MG: 25 TABLET ORAL at 23:05

## 2021-01-01 RX ADMIN — ALBUMIN (HUMAN) 50 G: 0.25 INJECTION, SOLUTION INTRAVENOUS at 16:31

## 2021-01-01 RX ADMIN — SPIRONOLACTONE 25 MG: 25 TABLET ORAL at 22:58

## 2021-01-01 RX ADMIN — SODIUM CHLORIDE 1000 ML/HR: 9 INJECTION, SOLUTION INTRAVENOUS at 19:00

## 2021-01-01 RX ADMIN — POTASSIUM CHLORIDE 10 MEQ: 7.46 INJECTION, SOLUTION INTRAVENOUS at 14:30

## 2021-01-01 RX ADMIN — DEXMEDETOMIDINE HYDROCHLORIDE 1.2 MCG/KG/HR: 100 INJECTION, SOLUTION, CONCENTRATE INTRAVENOUS at 08:33

## 2021-01-01 RX ADMIN — HYDROXYZINE PAMOATE 25 MG: 25 CAPSULE ORAL at 21:15

## 2021-01-01 RX ADMIN — PROPOFOL 25 MCG/KG/MIN: 10 INJECTION, EMULSION INTRAVENOUS at 02:45

## 2021-01-01 RX ADMIN — Medication 10 ML: at 21:51

## 2021-01-01 RX ADMIN — NOREPINEPHRINE BITARTRATE 65 MCG/MIN: 1 INJECTION, SOLUTION, CONCENTRATE INTRAVENOUS at 18:48

## 2021-01-01 RX ADMIN — NOREPINEPHRINE BITARTRATE 55 MCG/MIN: 1 INJECTION, SOLUTION, CONCENTRATE INTRAVENOUS at 03:44

## 2021-08-19 NOTE — ED TRIAGE NOTES
When pt got out of the shower about 20 minutes ago, L leg began to bleed. Pt has varicose veins. Pt states she wears stockings. Dressing in place upon arrival.  Denies pain.

## 2021-08-20 NOTE — ED PROVIDER NOTES
EMERGENCY DEPARTMENT HISTORY AND PHYSICAL EXAM      Date: 8/19/2021  Patient Name: Herminio Hernandez    History of Presenting Illness     Chief Complaint   Patient presents with    Varicose Veins    Leg Injury       History Provided By: Patient    HPI: Herminio Hernandez, 51 y.o. female with a past medical history significant obesity and GERD, and varicose veins presents to the ED with cc of bleeding from a varicose vein of her left lower leg. States had a scab on her lower leg which broke off after getting out of the shower 20 minutes PTA. Dressing in place on arrival. Denies blood thinners. Has a vascular surgeon. There are no other complaints, changes, or physical findings at this time. PCP: Dane Florentino    No current facility-administered medications on file prior to encounter. Current Outpatient Medications on File Prior to Encounter   Medication Sig Dispense Refill    potassium chloride SR (KLOR-CON 10) 10 mEq tablet       omeprazole (PRILOSEC) 20 mg capsule       furosemide (LASIX) 20 mg tablet Take  by mouth daily.  cyanocobalamin (VITAMIN B12) 500 mcg tablet Take  by mouth daily.  ferrous sulfate (IRON) 325 mg (65 mg iron) EC tablet Take 325 mg by mouth three (3) times daily (with meals).          Past History     Past Medical History:  Past Medical History:   Diagnosis Date    GERD (gastroesophageal reflux disease)     Morbid obesity ([address]        Past Surgical History:  Past Surgical History:   Procedure Laterality Date    HX GASTRIC BYPASS         Family History:  Family History   Problem Relation Age of Onset    Diabetes Mother     Diabetes Father     Heart Attack Father        Social History:  Social History     Tobacco Use    Smoking status: Never Smoker    Smokeless tobacco: Never Used   Substance Use Topics    Alcohol use: Not Currently     Comment: Occasionally    Drug use: Never       Allergies:  No Known Allergies      Review of Systems     Review of Systems Constitutional: Negative for chills and fever. HENT: Negative for congestion and sore throat. Eyes: Negative for pain and redness. Respiratory: Negative for cough and shortness of breath. Cardiovascular: Negative for chest pain and leg swelling. Gastrointestinal: Negative for abdominal pain, diarrhea, nausea and vomiting. Genitourinary: Negative for dysuria, frequency, hematuria and urgency. Musculoskeletal: Negative for arthralgias and myalgias. Skin: Negative for pallor and rash. Bleeding from varicose vein left lower leg   Neurological: Negative for dizziness, numbness and headaches. Psychiatric/Behavioral: Negative for agitation and dysphoric mood. Physical Exam     Physical Exam  Vitals and nursing note reviewed. Constitutional:       General: She is not in acute distress. Appearance: Normal appearance. She is obese. She is not ill-appearing or toxic-appearing. HENT:      Head: Normocephalic and atraumatic. Nose: Nose normal.      Mouth/Throat:      Mouth: Mucous membranes are moist.      Pharynx: Oropharynx is clear. Eyes:      Extraocular Movements: Extraocular movements intact. Conjunctiva/sclera: Conjunctivae normal.      Pupils: Pupils are equal, round, and reactive to light. Cardiovascular:      Rate and Rhythm: Normal rate. Pulmonary:      Effort: Pulmonary effort is normal.   Musculoskeletal:         General: Normal range of motion. Skin:     General: Skin is warm and dry. Comments: Multiple varicosities bilateral lower legs  Left lower leg with bleeding from varicose vein   Neurological:      General: No focal deficit present. Mental Status: She is alert and oriented to person, place, and time. Psychiatric:         Mood and Affect: Mood normal.         Behavior: Behavior normal.         Diagnostic Study Results     Labs -   No results found for this or any previous visit (from the past 12 hour(s)).     Radiologic Studies - @lastxrresult@  CT Results  (Last 48 hours)    None        CXR Results  (Last 48 hours)    None            Medical Decision Making   I am the first provider for this patient. I reviewed the vital signs, available nursing notes, past medical history, past surgical history, family history and social history. Vital Signs-Reviewed the patient's vital signs. Patient Vitals for the past 12 hrs:   Temp Pulse Resp BP SpO2   08/19/21 1829 97.7 °F (36.5 °C) 92 18 120/71 98 %       Records Reviewed: Nursing Notes        Provider Notes (Medical Decision Making): Quick clot, followed by surgicel and pressure dressing placed on the patients left lower leg with control of bleeding. Patient instructed to keep dressing on until tomorrow, to hold constant pressure if starts to bleed again, and instructed to follow-up with her vascular surgeon. Patient voices understanding and is agreeable to plan. [facility]         ED Course:   Initial assessment performed. The patients presenting problems have been discussed, and they are in agreement with the care plan formulated and outlined with them. I have encouraged them to ask questions as they arise throughout their visit. PROCEDURES  Procedures         PLAN:  1. Discharge Medication List as of 8/19/2021  9:19 PM        2. Follow-up Information     Follow up With Specialties Details Why Contact Info    Your Vascular Doctor  Schedule an appointment as soon as possible for a visit           Return to ED if worse     Diagnosis     Clinical Impression:   1.  Bleeding from varicose veins of left lower extremity

## 2021-08-20 NOTE — DISCHARGE INSTRUCTIONS
Your bandage on until tomorrow. If you start to bleed again, hold constant pressure for 20 to 30 minutes without looking. Return to the ER at anytime with any worsening, new, or worrisome symptoms.

## 2021-08-20 NOTE — PROGRESS NOTES
Identified pt with two pt identifiers(name and ). Reviewed record in preparation for visit and have obtained necessary documentation. Chief Complaint   Patient presents with    Follow-up     ED Visit for ruptured vein      Vitals:    21 1113   BP: 125/88   Pulse: 83   Resp: 18   Temp: (!) 96.4 °F (35.8 °C)   TempSrc: Temporal   SpO2: 96%   Weight: 336 lb 9.6 oz (152.7 kg)   PainSc:   0 - No pain       Health Maintenance Review: Patient reminded of \"due or due soon\" health maintenance. I have asked the patient to contact his/her primary care provider (PCP) for follow-up on his/her health maintenance. Coordination of Care Questionnaire:  :   1) Have you been to an emergency room, urgent care, or hospitalized since your last visit? If yes, where when, and reason for visit? yes [facility] for ruptured vein in [place]      2. Have seen or consulted any other health care provider since your last visit? If yes, where when, and reason for visit? NO      Patient is accompanied by  I have received verbal consent from Jud Tom to discuss any/all medical information while they are present in the room. ADL Assessment 2021   Feeding yourself No Help Needed   Getting from bed to chair No Help Needed   Getting dressed No Help Needed   Bathing or showering No Help Needed   Walk across the room (includes cane/walker) No Help Needed   Using the telphone No Help Needed   Taking your medications No Help Needed   Preparing meals No Help Needed   Managing money (expenses/bills) No Help Needed   Moderately strenuous housework (laundry) No Help Needed   Shopping for personal items (toiletries/medicines) No Help Needed   Shopping for groceries No Help Needed   Driving No Help Needed   Climbing a flight of stairs No Help Needed   Getting to places beyond walking distances No Help Needed     Abuse Screening Questionnaire 2021   Do you ever feel afraid of your partner?  N   Are you in a relationship with someone who physically or mentally threatens you? N   Is it safe for you to go home? Y     Who is the primary learner? Patient    What is the preferred language for health care of the primary learner? ENGLISH    How does the primary learner prefer to learn new concepts?  LISTENING    Answered By patient    Relationship to Learner SELF

## 2021-08-23 NOTE — PROGRESS NOTES
VASCULAR FOLLOW UP      Subjective:   CHIEF COMPLAINTS:  History of bleeding varicose vein. PRESENTATION OF ILLNESS:    Ms. Rey Lugo is a here today follow-up. She had a microphlebectomy down the right leg. She also has extensive varicose vein bilateral extremity. Past Medical History:   Diagnosis Date    GERD (gastroesophageal reflux disease)     Morbid obesity ([address]       Past Surgical History:   Procedure Laterality Date    HX GASTRIC BYPASS       Family History   Problem Relation Age of Onset    Diabetes Mother     Diabetes Father     Heart Attack Father       Social History     Tobacco Use    Smoking status: Never Smoker    Smokeless tobacco: Never Used   Substance Use Topics    Alcohol use: Not Currently     Comment: Occasionally       Prior to Admission medications    Medication Sig Start Date End Date Taking? Authorizing Provider   potassium chloride SR (KLOR-CON 10) 10 mEq tablet Take 20 mEq by mouth as needed. 9/30/20  Yes Provider, Historical   omeprazole (PRILOSEC) 20 mg capsule Take 20 mg by mouth daily. 9/30/20  Yes Provider, Historical   furosemide (LASIX) 20 mg tablet Take  by mouth daily. Yes Other, MD Jose   cyanocobalamin (VITAMIN B12) 500 mcg tablet Take  by mouth daily. Yes Other, MD Jose   ferrous sulfate (IRON) 325 mg (65 mg iron) EC tablet Take 325 mg by mouth three (3) times daily (with meals). Yes Other, MD Jose     No Known Allergies     Review of Systems:  I reviewed the rest of organ systems personally and they were negative signed by Dr. Loni Palencia    Objective:     Visit Vitals  /88 (BP 1 Location: Left upper arm, BP Patient Position: Sitting, BP Cuff Size: Adult)   Pulse 83   Temp (!) 96.4 °F (35.8 °C) (Temporal)   Resp 18   Wt 336 lb 9.6 oz (152.7 kg)   SpO2 96%   BMI 54.33 kg/m²     VITAL SIGNS REVIEWED. Physical Exam:  Patient is well-nourished pleasant in conversation is appropriate. Head and neck examination atraumatic, normocephalic. Gaze appropriate. Conversation appropriate. Neck examination shows supple. No mass. No obvious carotid bruit. Chest examination shows lungs are clear bilaterally well-expanded, no crackles or wheezes. Cardiovascular system regular rate, no obvious murmur. Skin warm to touch  and moist, no skin lesions. Abdomen is soft ,not tender or distended bowel sounds present. No palpable mass. Neurological examinations, no focal neuro deficits moving all 4 extremities. Cranial nerves intact. Sensation is intact as well. Hematologic: No obvious bruise or swelling or obvious lymphadenopathy. Psychosocial: Appropriate. Has good effect. Musculoskeletal system: No muscle wasting, appropriate movements upper and lower extremity. Vascular examination: Patient is a CEAP C4 classification. With extensive bilateral leg varicose vein. Data Review:   No visits with results within 1 Month(s) from this visit. Latest known visit with results is:   Admission on 10/20/2020, Discharged on 10/21/2020   Component Date Value Ref Range Status    MRSA by PCR, Nasal 10/20/2020 Not Detected  Not Detected   Final    WBC 10/20/2020 6.3  3.6 - 11.0 K/uL Final    RBC 10/20/2020 3.68* 3.80 - 5.20 M/uL Final    HGB 10/20/2020 11.2* 11.5 - 16.0 g/dL Final    HCT 10/20/2020 34.4* 35.0 - 47.0 % Final    MCV 10/20/2020 93.5  80.0 - 99.0 FL Final    MCH 10/20/2020 30.4  26.0 - 34.0 PG Final    MCHC 10/20/2020 32.6  30.0 - 36.5 g/dL Final    RDW 10/20/2020 20.3* 11.5 - 14.5 % Final    PLATELET 52/93/0763 515  150 - 400 K/uL Final    MPV 10/20/2020 10.7  8.9 - 12.9 FL Final    NEUTROPHILS 10/20/2020 68  32 - 75 % Final    LYMPHOCYTES 10/20/2020 24  12 - 49 % Final    MONOCYTES 10/20/2020 7  5 - 13 % Final    EOSINOPHILS 10/20/2020 1  0 - 7 % Final    BASOPHILS 10/20/2020 0  0 - 1 % Final    IMMATURE GRANULOCYTES 10/20/2020 0  0.0 - 0.5 % Final    ABS. NEUTROPHILS 10/20/2020 4.3  1.8 - 8.0 K/UL Final    ABS.  LYMPHOCYTES 10/20/2020 1.5  0.8 - 3.5 K/UL Final    ABS. MONOCYTES 10/20/2020 0.4  0.0 - 1.0 K/UL Final    ABS. EOSINOPHILS 10/20/2020 0.1  0.0 - 0.4 K/UL Final    ABS. BASOPHILS 10/20/2020 0.0  0.0 - 0.1 K/UL Final    ABS. IMM. GRANS. 10/20/2020 0.0  0.00 - 0.04 K/UL Final    DF 10/20/2020 AUTOMATED    Final    Sodium 10/20/2020 140  136 - 145 mmol/L Final    Potassium 10/20/2020 3.6  3.5 - 5.1 mmol/L Final    Chloride 10/20/2020 104  97 - 108 mmol/L Final    CO2 10/20/2020 28  21 - 32 mmol/L Final    Anion gap 10/20/2020 8  5 - 15 mmol/L Final    Glucose 10/20/2020 89  65 - 100 mg/dL Final    BUN 10/20/2020 6  6 - 20 mg/dL Final    Creatinine 10/20/2020 0.51* 0.55 - 1.02 mg/dL Final    BUN/Creatinine ratio 10/20/2020 12  12 - 20   Final    GFR est AA 10/20/2020 >60  >60 ml/min/1.73m2 Final    GFR est non-AA 10/20/2020 >60  >60 ml/min/1.73m2 Final    Calcium 10/20/2020 9.6  8.5 - 10.1 mg/dL Final    Prothrombin time 10/20/2020 13.7  11.9 - 14.7 sec Final    INR 10/20/2020 1.0  0.9 - 1.1   Final        Assessment:     Problem List Items Addressed This Visit        Circulatory    Bleeding from varicose vein - Primary    Relevant Orders    REFERRAL TO CARDIOLOGY              Plan:     Patient will refer to Dr. [facility] for comprehensive venous treatment including EVLT therapy. For symptomatic varicose vein with a history of bleeding varicose vein.         Kristin Crenshaw MD

## 2021-10-22 NOTE — ED TRIAGE NOTES
Reports she has been feeling weak the past 3-4 days. Also c/o R hip pain , states she fell OOB last week.

## 2021-10-22 NOTE — ED PROVIDER NOTES
EMERGENCY DEPARTMENT HISTORY AND PHYSICAL EXAM      Date: 10/22/2021  Patient Name: Lisa Cross    History of Presenting Illness     Chief Complaint   Patient presents with    Fatigue    Hip Pain     right       History Provided By: Patient    HPI: Lisa Cross, 51 y.o. female with a past medical history significant hypertension, hyperlipidemia, obesity and Alcoholic cirrhosis presents to the ED with cc of fatigue and hip pain. Patient has been feeling bad for the last few days. Patient admits to heavy alcohol use. Patient is jaundiced on examination. Patient states she does not follow hepatologist for her cirrhosis. Patient does not remember started. Moderate severity, no known exacerbating or relieving factors, no other associated signs and symptoms    There are no other complaints, changes, or physical findings at this time. PCP: Carlos Alberto John    No current facility-administered medications on file prior to encounter. Current Outpatient Medications on File Prior to Encounter   Medication Sig Dispense Refill    potassium chloride SR (KLOR-CON 10) 10 mEq tablet Take 20 mEq by mouth as needed.  omeprazole (PRILOSEC) 20 mg capsule Take 20 mg by mouth daily.  furosemide (LASIX) 20 mg tablet Take  by mouth daily.  cyanocobalamin (VITAMIN B12) 500 mcg tablet Take  by mouth daily.  ferrous sulfate (IRON) 325 mg (65 mg iron) EC tablet Take 325 mg by mouth three (3) times daily (with meals).          Past History     Past Medical History:  Past Medical History:   Diagnosis Date    GERD (gastroesophageal reflux disease)     Morbid obesity ([address]        Past Surgical History:  Past Surgical History:   Procedure Laterality Date    HX GASTRIC BYPASS         Family History:  Family History   Problem Relation Age of Onset    Diabetes Mother     Diabetes Father     Heart Attack Father        Social History:  Social History     Tobacco Use    Smoking status: Never Smoker    Smokeless tobacco: Never Used   Vaping Use    Vaping Use: Never used   Substance Use Topics    Alcohol use: Not Currently     Comment: Occasionally    Drug use: Never       Allergies:  No Known Allergies      Review of Systems     Review of Systems   Constitutional: Positive for activity change, appetite change and fatigue. Negative for chills and fever. HENT: Negative for congestion, sinus pressure and trouble swallowing. Eyes: Negative for photophobia and pain. Respiratory: Negative for cough and shortness of breath. Cardiovascular: Negative for chest pain and leg swelling. Gastrointestinal: Negative for abdominal pain, diarrhea, nausea and vomiting. Endocrine: Negative for polydipsia, polyphagia and polyuria. Genitourinary: Negative for decreased urine volume, difficulty urinating, dysuria, hematuria and urgency. Musculoskeletal: Negative for back pain, gait problem, myalgias and neck pain. Skin: Positive for color change. Negative for pallor and rash. Allergic/Immunologic: Negative for environmental allergies and food allergies. Neurological: Negative for dizziness, facial asymmetry, speech difficulty, numbness and headaches. Hematological: Negative for adenopathy. Does not bruise/bleed easily. Psychiatric/Behavioral: Negative for agitation, self-injury and suicidal ideas. The patient is not nervous/anxious. Physical Exam   Course she has a blood pressure  Physical Exam  Vitals and nursing note reviewed. Constitutional:       Appearance: Normal appearance. HENT:      Head: Atraumatic. Right Ear: Tympanic membrane and external ear normal.      Left Ear: Tympanic membrane and external ear normal.      Nose: Nose normal.      Mouth/Throat:      Mouth: Mucous membranes are moist.   Eyes:      General: Scleral icterus present. Extraocular Movements: Extraocular movements intact. Pupils: Pupils are equal, round, and reactive to light.    Cardiovascular:      Rate and Rhythm: Normal rate and regular rhythm. Pulses: Normal pulses. Heart sounds: Normal heart sounds. Pulmonary:      Breath sounds: Normal breath sounds. Abdominal:      General: Abdomen is flat. Palpations: Abdomen is soft. Musculoskeletal:         General: Normal range of motion. Cervical back: Normal range of motion and neck supple. Skin:     General: Skin is warm and dry. Capillary Refill: Capillary refill takes less than 2 seconds. Coloration: Skin is jaundiced. Neurological:      General: No focal deficit present. Mental Status: She is alert and oriented to person, place, and time. Mental status is at baseline. Psychiatric:         Mood and Affect: Mood normal.         Behavior: Behavior normal.         Lab and Diagnostic Study Results     Labs -     No results found for this or any previous visit (from the past 12 hour(s)). Radiologic Studies -   @lastxrresult@  CT Results  (Last 48 hours)    None        CXR Results  (Last 48 hours)    None            Medical Decision Making   - I am the first provider for this patient. - I reviewed the vital signs, available nursing notes, past medical history, past surgical history, family history and social history. - Initial assessment performed. The patients presenting problems have been discussed, and they are in agreement with the care plan formulated and outlined with them. I have encouraged them to ask questions as they arise throughout their visit. Vital Signs-Reviewed the patient's vital signs. No data found. Records Reviewed: Nursing Notes and Old Medical Records          ED Course:          Provider Notes (Medical Decision Making):   Patient presents with hip pain and fatigue. Differential diagnosis include cholecystitis liver failure, cirrhosis pancreatic cancer.   Case discussed with GI DrLavinia As long as Patient clotting factors normal patient can be discharged home with follow-up with hepatitis clinic  MDM       Procedures   Medical Decision Makingedical Decision Making  Performed by: Jelani Larios NP  PROCEDURES:  Procedures       Disposition   Disposition: DC- Adult Discharges: All of the diagnostic tests were reviewed and questions answered. Diagnosis, care plan and treatment options were discussed. The patient understands the instructions and will follow up as directed. The patients results have been reviewed with them. They have been counseled regarding their diagnosis. The patient verbally convey understanding and agreement of the signs, symptoms, diagnosis, treatment and prognosis and additionally agrees to follow up as recommended with their PCP in 24 - 48 hours. They also agree with the care-plan and convey that all of their questions have been answered. I have also put together some discharge instructions for them that include: 1) educational information regarding their diagnosis, 2) how to care for their diagnosis at home, as well a 3) list of reasons why they would want to return to the ED prior to their follow-up appointment, should their condition change. Discharged    DISCHARGE PLAN:  1. Current Discharge Medication List      CONTINUE these medications which have NOT CHANGED    Details   potassium chloride SR (KLOR-CON 10) 10 mEq tablet Take 20 mEq by mouth as needed. omeprazole (PRILOSEC) 20 mg capsule Take 20 mg by mouth daily. furosemide (LASIX) 20 mg tablet Take  by mouth daily. cyanocobalamin (VITAMIN B12) 500 mcg tablet Take  by mouth daily. ferrous sulfate (IRON) 325 mg (65 mg iron) EC tablet Take 325 mg by mouth three (3) times daily (with meals).            2.   Follow-up Information     Follow up With Specialties Details Why [address], [facility] [place]   [address] [place] [address]  [phone]      [facility] [place] [address] [place] [id number]  [phone]        3. Return to ED if worse   4. Discharge Medication List as of 10/22/2021  6:24 PM      START taking these medications    Details   lactulose (CHRONULAC) 10 gram/15 mL solution Take 30 mL by mouth two (2) times a day for 8 days. , Normal, Disp-480 mL, R-0         CONTINUE these medications which have NOT CHANGED    Details   potassium chloride SR (KLOR-CON 10) 10 mEq tablet Take 20 mEq by mouth as needed., Historical Med      omeprazole (PRILOSEC) 20 mg capsule Take 20 mg by mouth daily. , Historical Med      furosemide (LASIX) 20 mg tablet Take  by mouth daily. , Historical Med      cyanocobalamin (VITAMIN B12) 500 mcg tablet Take  by mouth daily. , Historical Med      ferrous sulfate (IRON) 325 mg (65 mg iron) EC tablet Take 325 mg by mouth three (3) times daily (with meals). , Historical Med               Diagnosis     Clinical Impression:   1. Alcoholic cirrhosis of liver with ascites ([address]    2. Serum total bilirubin elevated    3. Jaundice        Attestations:    Leola Lopez NP    Please note that this dictation was completed with [employer], the [employer] voice recognition software. Quite often unanticipated grammatical, syntax, homophones, and other interpretive errors are inadvertently transcribed by the computer software. Please disregard these errors. Please excuse any errors that have escaped final proofreading. Thank you.

## 2021-10-25 PROBLEM — E80.6 HYPERBILIRUBINEMIA: Status: ACTIVE | Noted: 2021-01-01

## 2021-10-25 PROBLEM — K74.60 CIRRHOSIS OF LIVER (HCC): Status: ACTIVE | Noted: 2021-01-01

## 2021-10-25 NOTE — ED NOTES
Unable to pull labs off ultrasound IV placed by provider. Arterial stick for labs; unable to obtain purple or blue top. Provider notified BP 89/43. Order obtained for 500 mL bolus. Pending albumin infusion from pharmacy for low blood pressure. Once BP higher, will attempt to obtain rest of labs that are ordered. 1825  Purple and blue top obtained and sent to lab. 1934  Lab called regarding hematology and clotting labs.  mL bolus finishing at this time then albumin infusion to be given to help with lower BP. Bedside and Verbal shift change report given to Denisse Whitley (oncoming nurse) by Jv Joshi (offgoing nurse). Report included the following information SBAR, Kardex, ED Summary, MAR, Recent Results and Cardiac Rhythm NSR.

## 2021-10-25 NOTE — ED PROVIDER NOTES
EMERGENCY DEPARTMENT HISTORY AND PHYSICAL EXAM      Date: 10/25/2021  Patient Name: Deya Wu    History of Presenting Illness     Chief Complaint   Patient presents with    Fatigue    Jaundice       HPI: Deya Wu, 51 y.o. female with history of alcoholic cirrhosis and pancreatic cancer presenting for worsening shortness of breath on exertion. She was seen here 2 days ago and was found to have a bilirubin of 16. At that time, alcoholic hepatitis was noted and ultrasound was confirmatory. Ammonia was 43, the patient was given lactulose. INR was 1.3. She was discharged home for follow-up after discussion with GI, they recommended discharge. For the past few days, the patient has had worsening shortness of breath on exertion and has persistent jaundice. Denies any fevers or chills. No chest pain. No palpitations. No lower extremity swelling. No orthopnea. PCP: Nneka Pathak    Current Facility-Administered Medications   Medication Dose Route Frequency Provider Last Rate Last Admin    albumin human 25% (BUMINATE) solution 12.5 g  12.5 g IntraVENous Rony Dupont MD         Current Outpatient Medications   Medication Sig Dispense Refill    lactulose (CHRONULAC) 10 gram/15 mL solution Take 30 mL by mouth two (2) times a day for 8 days. 480 mL 0    potassium chloride SR (KLOR-CON 10) 10 mEq tablet Take 20 mEq by mouth as needed.  omeprazole (PRILOSEC) 20 mg capsule Take 20 mg by mouth daily.  furosemide (LASIX) 20 mg tablet Take  by mouth daily.  cyanocobalamin (VITAMIN B12) 500 mcg tablet Take  by mouth daily.  ferrous sulfate (IRON) 325 mg (65 mg iron) EC tablet Take 325 mg by mouth three (3) times daily (with meals).          Medical History   I reviewed the medical, surgical, family, and social history, as well as allergies:    Past Medical History:  Past Medical History:   Diagnosis Date    [address]     GERD (gastroesophageal reflux disease)     Morbid obesity ([address]        Past Surgical History:  Past Surgical History:   Procedure Laterality Date    HX GASTRIC BYPASS         Family History:  Family History   Problem Relation Age of Onset    Diabetes Mother     Diabetes Father     Heart Attack Father        Social History:  Social History     Tobacco Use    Smoking status: Never Smoker    Smokeless tobacco: Never Used   Vaping Use    Vaping Use: Never used   Substance Use Topics    Alcohol use: Not Currently     Comment: Occasionally    Drug use: Never       Allergies:  No Known Allergies    Review of Systems     Review of Systems   Constitutional: Positive for fatigue. Negative for chills and fever. HENT: Negative for congestion, rhinorrhea and sore throat. Eyes: Icteric sclera   Respiratory: Positive for shortness of breath. Negative for cough. Cardiovascular: Negative for chest pain and leg swelling. Gastrointestinal: Negative for abdominal pain and vomiting. Endocrine: Negative. Genitourinary: Negative for dysuria and hematuria. Musculoskeletal: Negative for back pain and myalgias. Skin: Negative for rash and wound. Allergic/Immunologic: Negative. Neurological: Negative for light-headedness and numbness. Hematological: Negative. Psychiatric/Behavioral: Negative for agitation and confusion. Physical Exam and Vital Signs   Vital Signs - Reviewed the patient's vital signs.     Patient Vitals for the past 12 hrs:   Temp Pulse Resp BP SpO2   10/25/21 1901  93 20 90/76 98 %   10/25/21 1525  88 18 103/64 100 %   10/25/21 1306 98 °F (36.7 °C) 91 18 117/70 100 %       Physical Exam:    GENERAL: awake, alert, cooperative, not in distress  HEENT:  * Pupils equal, EOMI  * Head atraumatic  * Icteric sclera  CV:  * regular rhythm  * warm and perfused extremities bilaterally  PULMONARY: Good air movement, no wheezes or crackles  ABDOMEN: soft, not distended, no guarding, not tenderness to palpation  : No suprapubic tenderness  EXTREMITIES/BACK: warm and perfused, no tenderness, no edema  SKIN: no rashes or signs of trauma  NEURO:  * Speech clear  * Moves U&LE to command      Medical Decision Making and ED Course   - I am the first and primary provider for this patient and am the primary provider of record. - I reviewed the vital signs, available nursing notes, past medical history, past surgical history, family history and social history. - Initial assessment performed. The patients presenting problems have been discussed, and the staff are in agreement with the care plan formulated and outlined with them. I have encouraged them to ask questions as they arise throughout their visit. - Available medical records, nursing notes, old EKGs, and EMS run sheets (if patient was EMS transported) were reviewed    MDM:   Patient is a 51 y.o. female presenting for shortness of breath and jaundice. Vitals reveal no abnormalities and physical exam reveals jaundice. Based on the history, physical exam, risk factors, and vitals signs, differential includes: Hepatitis, hyperammonemia, liver failure, elevated INR, CHF, fluid overload, pulmonary edema, pleural effusion, pneumonia.       Results     Labs:  Recent Results (from the past 12 hour(s))   TROPONIN-HIGH SENSITIVITY    Collection Time: 10/25/21  4:45 PM   Result Value Ref Range    Troponin-High Sensitivity 7 0 - 51 ng/L   METABOLIC PANEL, COMPREHENSIVE    Collection Time: 10/25/21  5:09 PM   Result Value Ref Range    Sodium 133 (L) 136 - 145 mmol/L    Potassium 3.6 3.5 - 5.1 mmol/L    Chloride 99 97 - 108 mmol/L    CO2 25 21 - 32 mmol/L    Anion gap 9 5 - 15 mmol/L    Glucose 90 65 - 100 mg/dL    BUN 22 (H) 6 - 20 mg/dL    Creatinine 1.33 (H) 0.55 - 1.02 mg/dL    BUN/Creatinine ratio 17 12 - 20      GFR est AA 52 (L) >60 ml/min/1.73m2    GFR est non-AA 43 (L) >60 ml/min/1.73m2    Calcium 9.0 8.5 - 10.1 mg/dL    Bilirubin, total 26.1 (H) 0.2 - 1.0 mg/dL    AST (SGOT) 120 (H) 15 - 37 U/L    ALT (SGPT) 63 12 - 78 U/L    Alk. phosphatase 82 45 - 117 U/L    Protein, total 6.0 (L) 6.4 - 8.2 g/dL    Albumin 2.3 (L) 3.5 - 5.0 g/dL    Globulin 3.7 2.0 - 4.0 g/dL    A-G Ratio 0.6 (L) 1.1 - 2.2     CK W/ REFLX CKMB    Collection Time: 10/25/21  5:09 PM   Result Value Ref Range    CK 37.0 26 - 192 ng/mL   AMMONIA    Collection Time: 10/25/21  5:09 PM   Result Value Ref Range    Ammonia 73 (H) <32 umol/L       Radiologic Studies:  CT Results  (Last 48 hours)    None        CXR Results  (Last 48 hours)               10/25/21 1329  XR CHEST PORT Final result    Impression:  Similar exam to prior without findings of definite acute cardiopulmonary   abnormality. Narrative:  Examination: XR CHEST PORT        History: chest pain       Comparison: Chest radiograph 9/6/2018       FINDINGS:       Single frontal portable view of the chest. Mild elevation of the right   hemidiaphragm, similar to prior. No definite focal airspace consolidation,   pneumothorax, or significant pleural effusion is evident. The cardiac silhouette   is prominent, unchanged. Mediastinal contours and osseous structures appear   unchanged. Medications ordered:  Medications   albumin human 25% (BUMINATE) solution 12.5 g (has no administration in time range)   sodium chloride 0.9 % bolus infusion 500 mL (500 mL IntraVENous New Bag 10/25/21 1650)        ED Course     ED Course:     ED Course as of Oct 25 1923   Mon Oct 25, 2021   1346 Chest x-ray negative, no concern for pulmonary edema, pleural effusion, pneumothorax, or pneumonia. [SS]      ED Course User Index  [SS] Alma Somers MD       Reassessment / Disposition / Discussion:    Patient mildly hypotensive. Will give 500cc NS to avoid fluid overload and albumin. Will sign out to incoming provider pending labs. Will need admission for GI consult and likely steroid treatment.     Final Disposition     Disposition: Condition stable    ADMISSION: After completion of ED workup and discussion of results and diagnoses with the patient, patient was admitted to the hospital. All the patient's questions were answered. Case was discussed with the receiving team.      Diagnosis     Clinical Impression:   1. Hyperbilirubinemia    2. Alcoholic cirrhosis of liver without ascites (HCC)        Attestations:    Mayte Conroy MD    Please note that this dictation was completed with [employer], the computer voice recognition software. Quite often unanticipated grammatical, syntax, homophones, and other interpretive errors are inadvertently transcribed by the computer software. Please disregard these errors. Please excuse any errors that have escaped final proofreading. Thank you.

## 2021-10-25 NOTE — ED TRIAGE NOTES
Pt here last wk and was supposed to be admitted but went home and was trying to follow up with liver specialist, unable to get appointment prior to Feb. Pt weak, very jaundice, appears SOB with ambulation.

## 2021-10-26 NOTE — ED NOTES
Pt moved to hospital bed and given lunchbox. Denies pain currently and no other complaints at this time.   at bedside

## 2021-10-26 NOTE — PROGRESS NOTES
Reason for Admission:  Yellowing, generalized weakness                   RUR Score:          16%           Plan for utilizing home health:    Politely declined. PCP: First and Last name:  Aubree Barclay     Name of Practice:    Are you a current patient: Yes/No: Yes   Approximate date of last visit: 2 years ago   Can you participate in a virtual visit with your PCP:                     Current Advanced Directive/Advance Care Plan: Full Code      Healthcare Decision Maker:   Click here to complete [address] including selection of the Healthcare Decision Maker Relationship (ie \"Primary\")                             Transition of Care Plan:                      CM met with patient at bedside to complete discharge planning assessment. Patient lives with her  and their children in a home with 2 steps to enter and egress. She is independent and uses a cane occasionally to ambulate. She declined home health services at this time. Discharge disposition: Home     CM team will continue to follow.

## 2021-10-26 NOTE — H&P
History and Physical              Subjective :   Chief Complaint : Yellow discoloration generalized, generalized weakness. Source of information : Patient, previous records. History of present illness:   79-year-old morbidly obese female with history of cirrhosis liver with elevated bilirubin found on lab work recently. She was recommended for admission but she wanted to follow-up patient in left. She returned today for admission as she did not get an appointment outpatient. On 22nd she had a bilirubin of 16.7 and today it is 26.1 with a direct bilirubin more than 16. On recent presentation to the ED she had CT of the abdomen suggested severe diffuse hepatic steatosis and cholelithiasis with no cholecystitis. Denies any pain. Admits nausea and decreased appetite but no vomiting. Denies any fever or chills. She just feels weak and tired but is generalized. Laboratory data with elevated bilirubin with no liver enzyme elevation. Past Medical History:   Diagnosis Date    Cirrhosis ([address]     GERD (gastroesophageal reflux disease)     Morbid obesity (HCC)      Past Surgical History:   Procedure Laterality Date    HX GASTRIC BYPASS       Family History   Problem Relation Age of Onset    Diabetes Mother     Diabetes Father     Heart Attack Father       Social history: No smoking. Drink alcohol heavily, states 1 week ago she quit. No drugs. Lives at home with spouse. Prior to Admission medications    Medication Sig Start Date End Date Taking? Authorizing Provider   lactulose (CHRONULAC) 10 gram/15 mL solution Take 30 mL by mouth two (2) times a day for 8 days. 10/22/21 10/30/21  Tata Gayle NP   potassium chloride SR (KLOR-CON 10) 10 mEq tablet Take 20 mEq by mouth as needed. 9/30/20   Provider, Historical   omeprazole (PRILOSEC) 20 mg capsule Take 20 mg by mouth daily. 9/30/20   Provider, Historical   furosemide (LASIX) 20 mg tablet Take 40 mg by mouth two (2) times a day.     Other, MD Jose   CYANOCOBALAMIN, VITAMIN B-12, PO Take 1,000 mcg by mouth daily. Jose Lorenz MD   ferrous sulfate (IRON) 325 mg (65 mg iron) EC tablet Take 325 mg by mouth three (3) times daily (with meals). Jose Lorenz MD   Home medications reviewed but states she is did not follow with PCP for long time and not taking any medications. Allergies: No known medication allergies. Review of Systems:  Constitutional: Appetite is is poor. Admits some fatigue. Eye: No recent visual disturbances, no discharge, no double vision. Ear/nose/mouth/throat : No hearing disturbance, no ear pain, no nasal congestion, no sore throat, no trouble swallowing. Respiratory : No trouble breathing, no cough, ++ shortness of breath with exertion, no hemoptysis, no wheezing. Cardiovascular : No chest pain, no palpitation, no orthopnea,  no peripheral edema. Gastrointestinal : No nausea, no vomiting,  No abdominal pain. Genitourinary : No dysuria, no hematuria,  No incontinence. Lymphatics : No swollen glands -Neck, axillary, inguinal.  Endocrine : No excessive thirst, No polyuria. Immunologic :  No seasonal allergies. Musculoskeletal : No joint swelling, No pain, No effusion. Integumentary : No rash, No pruritus, No ecchymosis. Hematology : No petechiae, No easy bruising,  No tendency to bleed easy. Neurology : Denies change in mental status,  No headache,  No numbness or tingling. Psychiatric : No mood swings, No anxiety, No depression.     Vitals:     Patient Vitals for the past 12 hrs:   Temp Pulse Resp BP SpO2   10/25/21 2011    109/69    10/25/21 1953  90 19 98/62 99 %   10/25/21 1901 98.1 °F (36.7 °C) 93 20 90/76 98 %   10/25/21 1815  91 16 105/62 99 %   10/25/21 1800 97.9 °F (36.6 °C) 88 20 (!) 90/44 99 %   10/25/21 1750  91  (!) 89/43    10/25/21 1525  88 18 103/64 100 %   10/25/21 1306 98 °F (36.7 °C) 91 18 117/70 100 %       Physical Exam:   General : Morbidly obese, no acute distress noted  HEENT : PERRLA, alert discoloration of conjunctiva. Dry oral mucosa, atraumatic normocephalic, Normal ear and nose. Neck : Supple, no JVD, no masses noted, no carotid bruit. Lungs : Breath sounds with moderate air entry bilaterally, no wheezes or rales, no accessory muscle use. CVS : Rhythm rate regular, S1+, S2+, no murmur or gallop. Abdomen : Soft, nontender, morbidly obese abdomen. .  Extremities : No edema noted,  pedal pulses palpable. Musculoskeletal : no joint swelling or effusion, muscle tone and power appears good  Skin : Dry, poor skin turgor. Alert discoloration generalized including conjunctiva. Lymphatic : No cervical lymphadenopathy. Neurological : Awake, alert, oriented to time place person. Psychiatric : Mood and affect appears appropriate to the situation.        Data Review:   Recent Results (from the past 24 hour(s))   EKG, 12 LEAD, INITIAL    Collection Time: 10/25/21  2:38 PM   Result Value Ref Range    Ventricular Rate 87 BPM    Atrial Rate 87 BPM    P-R Interval 158 ms    QRS Duration 82 ms    Q-T Interval 384 ms    QTC Calculation (Bezet) 462 ms    Calculated P Axis 17 degrees    Calculated R Axis 2 degrees    Calculated T Axis 24 degrees    Diagnosis       Normal sinus rhythm  Minimal voltage criteria for LVH, may be normal variant  Borderline ECG  When compared with ECG of 27-FEB-2020 09:25,  No significant change was found  Confirmed by Mallory Hendrix MD, [facility] ([id number]) on 10/25/2021 9:25:19 PM     TROPONIN-HIGH SENSITIVITY    Collection Time: 10/25/21  4:45 PM   Result Value Ref Range    Troponin-High Sensitivity 7 0 - 51 ng/L   METABOLIC PANEL, COMPREHENSIVE    Collection Time: 10/25/21  5:09 PM   Result Value Ref Range    Sodium 133 (L) 136 - 145 mmol/L    Potassium 3.6 3.5 - 5.1 mmol/L    Chloride 99 97 - 108 mmol/L    CO2 25 21 - 32 mmol/L    Anion gap 9 5 - 15 mmol/L    Glucose 90 65 - 100 mg/dL    BUN 22 (H) 6 - 20 mg/dL    Creatinine 1.33 (H) 0.55 - 1.02 mg/dL BUN/Creatinine ratio 17 12 - 20      GFR est AA 52 (L) >60 ml/min/1.73m2    GFR est non-AA 43 (L) >60 ml/min/1.73m2    Calcium 9.0 8.5 - 10.1 mg/dL    Bilirubin, total 26.1 (H) 0.2 - 1.0 mg/dL    AST (SGOT) 120 (H) 15 - 37 U/L    ALT (SGPT) 63 12 - 78 U/L    Alk. phosphatase 82 45 - 117 U/L    Protein, total 6.0 (L) 6.4 - 8.2 g/dL    Albumin 2.3 (L) 3.5 - 5.0 g/dL    Globulin 3.7 2.0 - 4.0 g/dL    A-G Ratio 0.6 (L) 1.1 - 2.2     CK W/ REFLX CKMB    Collection Time: 10/25/21  5:09 PM   Result Value Ref Range    CK 37.0 26 - 192 ng/mL   AMMONIA    Collection Time: 10/25/21  5:09 PM   Result Value Ref Range    Ammonia 73 (H) <32 umol/L   PROTHROMBIN TIME + INR    Collection Time: 10/25/21  6:30 PM   Result Value Ref Range    Prothrombin time 15.6 (H) 11.9 - 14.7 sec    INR 1.3 (H) 0.9 - 1.1     CBC WITH AUTOMATED DIFF    Collection Time: 10/25/21  6:30 PM   Result Value Ref Range    WBC 7.8 3.6 - 11.0 K/uL    RBC 3.05 (L) 3.80 - 5.20 M/uL    HGB 11.2 (L) 11.5 - 16.0 g/dL    HCT 31.5 (L) 35.0 - 47.0 %    .3 (H) 80.0 - 99.0 FL    MCH 36.7 (H) 26.0 - 34.0 PG    MCHC 35.6 30.0 - 36.5 g/dL    RDW 21.8 (H) 11.5 - 14.5 %    PLATELET 95 (L) 449 - 400 K/uL    MPV 12.6 8.9 - 12.9 FL    NRBC 0.0 0.0  WBC    ABSOLUTE NRBC 0.00 0.00 - 0.01 K/uL    NEUTROPHILS 67 32 - 75 %    LYMPHOCYTES 13 12 - 49 %    MONOCYTES 18 (H) 5 - 13 %    EOSINOPHILS 0 0 - 7 %    BASOPHILS 0 0 - 1 %    IMMATURE GRANULOCYTES 2 (H) 0 - 0.5 %    ABS. NEUTROPHILS 5.2 1.8 - 8.0 K/UL    ABS. LYMPHOCYTES 1.0 0.8 - 3.5 K/UL    ABS. MONOCYTES 1.4 (H) 0.0 - 1.0 K/UL    ABS. EOSINOPHILS 0.0 0.0 - 0.4 K/UL    ABS. BASOPHILS 0.0 0.0 - 0.1 K/UL    ABS. IMM.  GRANS. 0.1 (H) 0.00 - 0.04 K/UL    DF AUTOMATED     LD    Collection Time: 10/25/21  8:15 PM   Result Value Ref Range     (H) 81 - 246 U/L   BILIRUBIN, DIRECT    Collection Time: 10/25/21  8:15 PM   Result Value Ref Range    Bilirubin, direct >16.0 (H) 0.0 - 0.2 mg/dL       Radiologic Studies : CXR Results  (Last 48 hours)               10/25/21 1329  XR CHEST PORT Final result    Impression:  Similar exam to prior without findings of definite acute cardiopulmonary   abnormality. Narrative:  Examination: XR CHEST PORT        History: chest pain       Comparison: Chest radiograph 9/6/2018       FINDINGS:       Single frontal portable view of the chest. Mild elevation of the right   hemidiaphragm, similar to prior. No definite focal airspace consolidation,   pneumothorax, or significant pleural effusion is evident. The cardiac silhouette   is prominent, unchanged. Mediastinal contours and osseous structures appear   unchanged. Assessment and Plan :     Hyperbilirubinemia: Seems secondary to liver failure. Ammonia elevated but she does not have any sequelae from it. I do not see any spider angiomata. Not sure about varices. Will consult gastroenterology and will follow with recommendations. Hepatocellular steatosis per CT report: Looks like secondary to obesity and from the alcohol. Seems advanced and worsening. Thrombocytopenia secondary to cirrhosis liver    Morbid obesity    Admitted to medical floor, full CODE STATUS, home medications reviewed but states that she is not taking any. No anticoagulation for DVT prophylaxis due to risk of bleeding. CC : Maykel Fish  Signed By: Kenya Arreaga MD     October 25, 2021      This dictation was done by dragon, computer voice recognition software. Often unanticipated grammatical, syntax, [place] phones and other interpretive errors are inadvertently transcribed. Please excuse errors that have escaped final proofreading.

## 2021-10-26 NOTE — PROGRESS NOTES
Hospitalist Progress Note    Subjective:   Daily Progress Note: 10/26/2021 12:46 PM    Hospital Course:     Patient is a 48-year-old female with a PMH significant for morbid obesity, history of cirrhosis of the liver and recent elevated bilirubin. She came to the ER on 10/22 with elevated bilirubin of 16.7 declined admission at that time but returns to the emergency room on 1025 with complaints of weakness and lethargy. Significant labs bilirubin 26.1 and direct bili greater than 16, ammonia is 73, platelet count 95. CT suggestive of diffuse hepatic steatosis and cholelithiasis with no cholecystectomy. Previous US RP revealed findings suggestive for portal hypertension and cirrhosis, noted thickening gallbladder wall and cholecystitis, gallbladder sludge and probable gallstones. Admitted for further management of cholelithiasis, abdominal pain, nausea, liver failure with elevated ammonia and thrombocytopenia. GI consulted. Subjective:  Examined patient at the bedside. She is sitting on the edge of the bed pleasant and conversant. She states she feels weak and tired all the time.     Current Facility-Administered Medications   Medication Dose Route Frequency    influenza vaccine 2021-22 (6 mos+)(PF) (FLUARIX/FLULAVAL/FLUZONE QUAD) injection 0.5 mL  1 Each IntraMUSCular PRIOR TO DISCHARGE    lactulose (CHRONULAC) 10 gram/15 mL solution 30 mL  20 g Oral BID    sodium chloride (NS) flush 5-40 mL  5-40 mL IntraVENous Q8H    sodium chloride (NS) flush 5-40 mL  5-40 mL IntraVENous PRN    acetaminophen (TYLENOL) tablet 650 mg  650 mg Oral Q6H PRN    Or    acetaminophen (TYLENOL) suppository 650 mg  650 mg Rectal Q6H PRN    ondansetron (ZOFRAN ODT) tablet 4 mg  4 mg Oral Q6H PRN    Or    ondansetron (ZOFRAN) injection 4 mg  4 mg IntraVENous Q6H PRN    thiamine mononitrate (B-1) tablet 100 mg  100 mg Oral DAILY    b complex-vitamin c-folic acid 5mg (FOLBEE PLUS) tablet 1 Tablet  1 Tablet Oral DAILY    dextrose 5% and 0.9% NaCl infusion  125 mL/hr IntraVENous CONTINUOUS    spironolactone (ALDACTONE) tablet 25 mg  25 mg Oral BID        REVIEW OF SYSTEMS    Review of Systems   Constitutional: Positive for malaise/fatigue. HENT: Negative for congestion. Respiratory: Negative for shortness of breath. Cardiovascular: Negative for chest pain. Gastrointestinal: Positive for nausea. Negative for abdominal pain. Neurological: Positive for weakness. Psychiatric/Behavioral: The patient is nervous/anxious. Objective:     Visit Vitals  BP (!) 102/56 (BP 1 Location: Left upper arm)   Pulse 89   Temp 98.4 °F (36.9 °C)   Resp 18   Ht 5' 6\" (1.676 m)   Wt 151.5 kg (334 lb)   SpO2 96%   BMI 53.91 kg/m²      O2 Device: None (Room air)    Temp (24hrs), Av.3 °F (36.8 °C), Min:97.9 °F (36.6 °C), Max:99 °F (37.2 °C)      No intake/output data recorded. No intake/output data recorded. PHYSICAL EXAM:    Physical Exam  Constitutional:       Appearance: She is obese. She is ill-appearing. HENT:      Nose: No congestion. Cardiovascular:      Rate and Rhythm: Normal rate and regular rhythm. Pulmonary:      Effort: No respiratory distress. Abdominal:      General: There is distension. Musculoskeletal:      Right lower leg: Edema present. Left lower leg: Edema present. Skin:     Coloration: Skin is jaundiced. Neurological:      Motor: Weakness present.           Data Review    Recent Results (from the past 24 hour(s))   EKG, 12 LEAD, INITIAL    Collection Time: 10/25/21  2:38 PM   Result Value Ref Range    Ventricular Rate 87 BPM    Atrial Rate 87 BPM    P-R Interval 158 ms    QRS Duration 82 ms    Q-T Interval 384 ms    QTC Calculation (Bezet) 462 ms    Calculated P Axis 17 degrees    Calculated R Axis 2 degrees    Calculated T Axis 24 degrees    Diagnosis       Normal sinus rhythm  Minimal voltage criteria for LVH, may be normal variant  Borderline ECG  When compared with ECG of 2020 09:25,  No significant change was found  Confirmed by Megan Combs MD, [facility] ([id number]) on 10/25/2021 9:25:19 PM     TROPONIN-HIGH SENSITIVITY    Collection Time: 10/25/21  4:45 PM   Result Value Ref Range    Troponin-High Sensitivity 7 0 - 51 ng/L   METABOLIC PANEL, COMPREHENSIVE    Collection Time: 10/25/21  5:09 PM   Result Value Ref Range    Sodium 133 (L) 136 - 145 mmol/L    Potassium 3.6 3.5 - 5.1 mmol/L    Chloride 99 97 - 108 mmol/L    CO2 25 21 - 32 mmol/L    Anion gap 9 5 - 15 mmol/L    Glucose 90 65 - 100 mg/dL    BUN 22 (H) 6 - 20 mg/dL    Creatinine 1.33 (H) 0.55 - 1.02 mg/dL    BUN/Creatinine ratio 17 12 - 20      GFR est AA 52 (L) >60 ml/min/1.73m2    GFR est non-AA 43 (L) >60 ml/min/1.73m2    Calcium 9.0 8.5 - 10.1 mg/dL    Bilirubin, total 26.1 (H) 0.2 - 1.0 mg/dL    AST (SGOT) 120 (H) 15 - 37 U/L    ALT (SGPT) 63 12 - 78 U/L    Alk.  phosphatase 82 45 - 117 U/L    Protein, total 6.0 (L) 6.4 - 8.2 g/dL    Albumin 2.3 (L) 3.5 - 5.0 g/dL    Globulin 3.7 2.0 - 4.0 g/dL    A-G Ratio 0.6 (L) 1.1 - 2.2     CK W/ REFLX CKMB    Collection Time: 10/25/21  5:09 PM   Result Value Ref Range    CK 37.0 26 - 192 ng/mL   AMMONIA    Collection Time: 10/25/21  5:09 PM   Result Value Ref Range    Ammonia 73 (H) <32 umol/L   PROTHROMBIN TIME + INR    Collection Time: 10/25/21  6:30 PM   Result Value Ref Range    Prothrombin time 15.6 (H) 11.9 - 14.7 sec    INR 1.3 (H) 0.9 - 1.1     CBC WITH AUTOMATED DIFF    Collection Time: 10/25/21  6:30 PM   Result Value Ref Range    WBC 7.8 3.6 - 11.0 K/uL    RBC 3.05 (L) 3.80 - 5.20 M/uL    HGB 11.2 (L) 11.5 - 16.0 g/dL    HCT 31.5 (L) 35.0 - 47.0 %    .3 (H) 80.0 - 99.0 FL    MCH 36.7 (H) 26.0 - 34.0 PG    MCHC 35.6 30.0 - 36.5 g/dL    RDW 21.8 (H) 11.5 - 14.5 %    PLATELET 95 (L) 712 - 400 K/uL    MPV 12.6 8.9 - 12.9 FL    NRBC 0.0 0.0  WBC    ABSOLUTE NRBC 0.00 0.00 - 0.01 K/uL    NEUTROPHILS 67 32 - 75 %    LYMPHOCYTES 13 12 - 49 %    MONOCYTES 18 (H) 5 - 13 % EOSINOPHILS 0 0 - 7 %    BASOPHILS 0 0 - 1 %    IMMATURE GRANULOCYTES 2 (H) 0 - 0.5 %    ABS. NEUTROPHILS 5.2 1.8 - 8.0 K/UL    ABS. LYMPHOCYTES 1.0 0.8 - 3.5 K/UL    ABS. MONOCYTES 1.4 (H) 0.0 - 1.0 K/UL    ABS. EOSINOPHILS 0.0 0.0 - 0.4 K/UL    ABS. BASOPHILS 0.0 0.0 - 0.1 K/UL    ABS. IMM. GRANS. 0.1 (H) 0.00 - 0.04 K/UL    DF AUTOMATED     LD    Collection Time: 10/25/21  8:15 PM   Result Value Ref Range     (H) 81 - 246 U/L   BILIRUBIN, DIRECT    Collection Time: 10/25/21  8:15 PM   Result Value Ref Range    Bilirubin, direct >16.0 (H) 0.0 - 0.2 mg/dL       CT ABD PELV WO CONT   Final Result   1. No acute findings. 2. Severe diffuse hepatic steatosis. 3. Cholelithiasis. Dose reduction: All CT scans at this facility are performed using radiation dose   reduction optimization techniques as appropriate to a performed exam, including   the following: Automated exposure control (AEC), adjustment of the MAA and/or   KUB according to the patient's size, or the patient's use of iterative   reconstruction techniques (ASIR). XR CHEST PORT   Final Result   Similar exam to prior without findings of definite acute cardiopulmonary   abnormality.           Active Problems:    Hyperbilirubinemia (10/25/2021)      Cirrhosis of liver ([facility] [address] (10/25/2021)        Assessment/Plan:     Hyperbilirubinemia  Thrombocytopenia  Hepatocellular steatosis  Alcohol abuse  Elevated ammonia  GI consulted  Started lactulose  Continue home medications potassium, Prilosec, and iron supplement  Add spironolactone, hold Lasix    Morbid obesity      DVT Prophylaxis: SCDs  Code Status: Full Code  POA/NOK:    Disposition and discharge barriers:   · GI consult  Care Plan discussed with: Patient and IDR team  _____________________________________________________________________________  Time spent in direct care including coordination of service, review of data and examination: > 35 minutes    ______________________________________________________________________________    Fernanda Dillard NP    This is dictation was done by dragon, computer voice recognition software. Quite often unanticipated grammatical, syntax, homophones and other interpretive errors or inadvertently transcribed by the computer software. Please excuse errors that have escaped final proofreading. Thank you.

## 2021-10-26 NOTE — ED NOTES
Michele Lopez TRANSFER - OUT REPORT:    Verbal report given to ALEX Albright on Dom Stroud  being transferred to [phone] on 4E for routine progression of care       Report consisted of patients Situation, Background, Assessment and   Recommendations(SBAR). Information from the following report(s) SBAR, ED Summary and MAR was reviewed with the receiving nurse. Lines:   Peripheral IV 10/25/21 Right Antecubital (Active)       Peripheral IV 10/25/21 Posterior;Right Hand (Active)        Opportunity for questions and clarification was provided.       Patient transported with:   Registered Nurse

## 2021-10-26 NOTE — PROGRESS NOTES
Bedside and Verbal shift change report given to [facility] (oncoming nurse) by Kenna Leija RN (offgoing nurse). Report included the following information SBAR, Kardex, MAR, Accordion, and Recent Results.

## 2021-10-27 NOTE — PROGRESS NOTES
Vitals stable on Room air. Denies pain. PIV in place. Icteric. Tolerating diet.  Report given to [facility]
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
## 2021-10-27 NOTE — PROGRESS NOTES
Hospitalist Progress Note    Subjective:   Daily Progress Note: 10/27/2021 12:46 PM    Hospital Course:     Patient is a 42-year-old female with a PMH significant for morbid obesity, history of cirrhosis of the liver and recent elevated bilirubin. She came to the ER on 10/22 with elevated bilirubin of 16.7 declined admission at that time but returns to the emergency room on 1025 with complaints of weakness and lethargy. Significant labs bilirubin 26.1 and direct bili greater than 16, ammonia is 73, platelet count 95. CT suggestive of diffuse hepatic steatosis and cholelithiasis with no cholecystectomy. Previous US RP revealed findings suggestive for portal hypertension and cirrhosis, noted thickening gallbladder wall and cholecystitis, gallbladder sludge and probable gallstones. Admitted for further management of cholelithiasis, abdominal pain, nausea, liver failure with elevated ammonia and thrombocytopenia. GI consulted. Subjective: Follow-up examination of patient at the bedside. She is requesting a rolling walker, states she has difficulty ambulating.   Have PT and OT evaluate as well    Current Facility-Administered Medications   Medication Dose Route Frequency    potassium chloride SR (KLOR-CON 10) tablet 40 mEq  40 mEq Oral Q1H    influenza vaccine 2021-22 (6 mos+)(PF) (FLUARIX/FLULAVAL/FLUZONE QUAD) injection 0.5 mL  1 Each IntraMUSCular PRIOR TO DISCHARGE    lactulose (CHRONULAC) 10 gram/15 mL solution 30 mL  20 g Oral BID    sodium chloride (NS) flush 5-40 mL  5-40 mL IntraVENous Q8H    sodium chloride (NS) flush 5-40 mL  5-40 mL IntraVENous PRN    acetaminophen (TYLENOL) tablet 650 mg  650 mg Oral Q6H PRN    Or    acetaminophen (TYLENOL) suppository 650 mg  650 mg Rectal Q6H PRN    ondansetron (ZOFRAN ODT) tablet 4 mg  4 mg Oral Q6H PRN    Or    ondansetron (ZOFRAN) injection 4 mg  4 mg IntraVENous Q6H PRN    thiamine mononitrate (B-1) tablet 100 mg  100 mg Oral DAILY    b complex-vitamin c-folic acid 5mg (FOLBEE PLUS) tablet 1 Tablet  1 Tablet Oral DAILY    spironolactone (ALDACTONE) tablet 25 mg  25 mg Oral BID        REVIEW OF SYSTEMS    Review of Systems   Constitutional: Positive for malaise/fatigue. HENT: Negative for congestion. Respiratory: Negative for shortness of breath. Cardiovascular: Negative for chest pain. Gastrointestinal: Positive for nausea. Negative for abdominal pain. Neurological: Positive for weakness. Psychiatric/Behavioral: The patient is nervous/anxious. Objective:     Visit Vitals  /66 (BP Patient Position: At rest)   Pulse 92   Temp 98.1 °F (36.7 °C)   Resp 16   Ht 5' 6\" (1.676 m)   Wt 151.5 kg (334 lb)   SpO2 100%   BMI 53.91 kg/m²      O2 Device: None (Room air)    Temp (24hrs), Av.1 °F (36.7 °C), Min:97.7 °F (36.5 °C), Max:98.4 °F (36.9 °C)      No intake/output data recorded. No intake/output data recorded. PHYSICAL EXAM:    Physical Exam  Constitutional:       Appearance: She is obese. She is ill-appearing. HENT:      Nose: No congestion. Cardiovascular:      Rate and Rhythm: Normal rate and regular rhythm. Pulmonary:      Effort: No respiratory distress. Abdominal:      General: There is distension. Musculoskeletal:      Right lower leg: Edema present. Left lower leg: Edema present. Skin:     Coloration: Skin is jaundiced. Neurological:      Motor: Weakness present.           Data Review    Recent Results (from the past 24 hour(s))   METABOLIC PANEL, COMPREHENSIVE    Collection Time: 10/26/21  5:16 PM   Result Value Ref Range    Sodium 133 (L) 136 - 145 mmol/L    Potassium 3.6 3.5 - 5.1 mmol/L    Chloride 101 97 - 108 mmol/L    CO2 22 21 - 32 mmol/L    Anion gap 10 5 - 15 mmol/L    Glucose 88 65 - 100 mg/dL    BUN 30 (H) 6 - 20 mg/dL    Creatinine 1.70 (H) 0.55 - 1.02 mg/dL    BUN/Creatinine ratio 18 12 - 20      GFR est AA 39 (L) >60 ml/min/1.73m2    GFR est non-AA 32 (L) >60 ml/min/1.73m2 Calcium 9.1 8.5 - 10.1 mg/dL    Bilirubin, total 28.2 (H) 0.2 - 1.0 mg/dL    AST (SGOT) 123 (H) 15 - 37 U/L    ALT (SGPT) 55 12 - 78 U/L    Alk. phosphatase 81 45 - 117 U/L    Protein, total 6.4 6.4 - 8.2 g/dL    Albumin 2.0 (L) 3.5 - 5.0 g/dL    Globulin 4.4 (H) 2.0 - 4.0 g/dL    A-G Ratio 0.5 (L) 1.1 - 2.2     CBC WITH AUTOMATED DIFF    Collection Time: 10/26/21  5:16 PM   Result Value Ref Range    WBC 8.4 3.6 - 11.0 K/uL    RBC 2.75 (L) 3.80 - 5.20 M/uL    HGB 10.1 (L) 11.5 - 16.0 g/dL    HCT 28.7 (L) 35.0 - 47.0 %    .4 (H) 80.0 - 99.0 FL    MCH 36.7 (H) 26.0 - 34.0 PG    MCHC 35.2 30.0 - 36.5 g/dL    RDW 22.1 (H) 11.5 - 14.5 %    PLATELET 429 (L) 411 - 400 K/uL    MPV 13.4 (H) 8.9 - 12.9 FL    NRBC 0.0 0.0  WBC    ABSOLUTE NRBC 0.00 0.00 - 0.01 K/uL    NEUTROPHILS 69 32 - 75 %    LYMPHOCYTES 11 (L) 12 - 49 %    MONOCYTES 18 (H) 5 - 13 %    EOSINOPHILS 0 0 - 7 %    BASOPHILS 0 0 - 1 %    IMMATURE GRANULOCYTES 2 (H) 0 - 0.5 %    ABS. NEUTROPHILS 5.8 1.8 - 8.0 K/UL    ABS. LYMPHOCYTES 1.0 0.8 - 3.5 K/UL    ABS. MONOCYTES 1.5 (H) 0.0 - 1.0 K/UL    ABS. EOSINOPHILS 0.0 0.0 - 0.4 K/UL    ABS. BASOPHILS 0.0 0.0 - 0.1 K/UL    ABS. IMM.  GRANS. 0.1 (H) 0.00 - 0.04 K/UL    DF AUTOMATED     GLUCOSE, POC    Collection Time: 10/26/21  8:55 PM   Result Value Ref Range    Glucose (POC) 114 65 - 117 mg/dL    Performed by Nominum    GLUCOSE, POC    Collection Time: 10/27/21  7:19 AM   Result Value Ref Range    Glucose (POC) 113 65 - 117 mg/dL    Performed by Ava Jackson    CBC WITH AUTOMATED DIFF    Collection Time: 10/27/21  8:20 AM   Result Value Ref Range    WBC 8.4 3.6 - 11.0 K/uL    RBC 2.77 (L) 3.80 - 5.20 M/uL    HGB 10.0 (L) 11.5 - 16.0 g/dL    HCT 28.3 (L) 35.0 - 47.0 %    .2 (H) 80.0 - 99.0 FL    MCH 36.1 (H) 26.0 - 34.0 PG    MCHC 35.3 30.0 - 36.5 g/dL    RDW 21.5 (H) 11.5 - 14.5 %    PLATELET 609 (L) 473 - 400 K/uL    MPV 12.6 8.9 - 12.9 FL    NRBC 0.0 0.0  WBC    ABSOLUTE NRBC 0. 00 0.00 - 0.01 K/uL    NEUTROPHILS 70 32 - 75 %    LYMPHOCYTES 11 (L) 12 - 49 %    MONOCYTES 16 (H) 5 - 13 %    EOSINOPHILS 1 0 - 7 %    BASOPHILS 0 0 - 1 %    IMMATURE GRANULOCYTES 2 (H) 0 - 0.5 %    ABS. NEUTROPHILS 5.8 1.8 - 8.0 K/UL    ABS. LYMPHOCYTES 0.9 0.8 - 3.5 K/UL    ABS. MONOCYTES 1.4 (H) 0.0 - 1.0 K/UL    ABS. EOSINOPHILS 0.0 0.0 - 0.4 K/UL    ABS. BASOPHILS 0.0 0.0 - 0.1 K/UL    ABS. IMM. GRANS. 0.2 (H) 0.00 - 0.04 K/UL    DF AUTOMATED     METABOLIC PANEL, BASIC    Collection Time: 10/27/21  8:20 AM   Result Value Ref Range    Sodium 137 136 - 145 mmol/L    Potassium 3.1 (L) 3.5 - 5.1 mmol/L    Chloride 104 97 - 108 mmol/L    CO2 23 21 - 32 mmol/L    Anion gap 10 5 - 15 mmol/L    Glucose 101 (H) 65 - 100 mg/dL    BUN 31 (H) 6 - 20 mg/dL    Creatinine 1.32 (H) 0.55 - 1.02 mg/dL    BUN/Creatinine ratio 23 (H) 12 - 20      GFR est AA 52 (L) >60 ml/min/1.73m2    GFR est non-AA 43 (L) >60 ml/min/1.73m2    Calcium 8.8 8.5 - 10.1 mg/dL   AMMONIA    Collection Time: 10/27/21  8:20 AM   Result Value Ref Range    Ammonia 65 (H) <32 umol/L   HEPATIC FUNCTION PANEL    Collection Time: 10/27/21  8:20 AM   Result Value Ref Range    Protein, total 5.5 (L) 6.4 - 8.2 g/dL    Albumin 2.0 (L) 3.5 - 5.0 g/dL    Globulin 3.5 2.0 - 4.0 g/dL    A-G Ratio 0.6 (L) 1.1 - 2.2      Bilirubin, total 27.8 (H) 0.2 - 1.0 mg/dL    Bilirubin, direct 24.9 (H) 0.0 - 0.2 mg/dL    Alk. phosphatase 77 45 - 117 U/L    AST (SGOT) 125 (H) 15 - 37 U/L    ALT (SGPT) 53 12 - 78 U/L       CT ABD PELV WO CONT   Final Result   1. No acute findings. 2. Severe diffuse hepatic steatosis. 3. Cholelithiasis.             Dose reduction: All CT scans at this facility are performed using radiation dose   reduction optimization techniques as appropriate to a performed exam, including   the following: Automated exposure control (AEC), adjustment of the MAA and/or   KUB according to the patient's size, or the patient's use of iterative   reconstruction techniques (ASIR). XR CHEST PORT   Final Result   Similar exam to prior without findings of definite acute cardiopulmonary   abnormality. Active Problems:    Hyperbilirubinemia (10/25/2021)      Cirrhosis of liver ([address] (10/25/2021)        Assessment/Plan:     Hyperbilirubinemia  Thrombocytopenia  Hepatocellular steatosis  Alcohol abuse  Elevated ammonia  GI consulted  Started lactulose  Continue home medications potassium, Prilosec, and iron supplement  Add spironolactone, hold Lasix    Morbid obesity      DVT Prophylaxis: SCDs  Code Status: Full Code  POA/NOK:    Disposition and discharge barriers:   · GI consult  Care Plan discussed with: Patient and IDR team  _____________________________________________________________________________  Time spent in direct care including coordination of service, review of data and examination: > 35 minutes    ______________________________________________________________________________    Manohar Segura NP    This is dictation was done by dragon, computer voice recognition software. Quite often unanticipated grammatical, syntax, homophones and other interpretive errors or inadvertently transcribed by the computer software. Please excuse errors that have escaped final proofreading. Thank you.

## 2021-10-27 NOTE — PROGRESS NOTES
Problem: Falls - Risk of  Goal: *Absence of Falls  Description: Document Nikko Eller Fall Risk and appropriate interventions in the flowsheet.   Outcome: Progressing Towards Goal  Note: Fall Risk Interventions:  Mobility Interventions: Bed/chair exit alarm         Medication Interventions: Bed/chair exit alarm, Teach patient to arise slowly, Patient to call before getting OOB

## 2021-10-27 NOTE — ROUTINE PROCESS
Bedside and Verbal shift change report given to [facility]  (oncoming nurse) by John Faye RN (offgoing nurse). Report included the following information SBAR, Kardex, MAR, Accordion, and Recent Results.

## 2021-10-28 NOTE — PROGRESS NOTES
Progress Note    Patient: Boris Mayo MRN: [id number]  SSN: [id number]    YOB: 1975  Age: 51 y.o.   Sex: female      Admit Date: 10/25/2021    LOS: 3 days     Subjective:   Patient examined, still has jaundice, no bleeding, no nausea vomiting, no severe abdominal pain, want to  go home,  Past Medical History:   Diagnosis Date    Cirrhosis ([facility]     GERD (gastroesophageal reflux disease)     Morbid obesity ([facility]         Current Facility-Administered Medications:     hydrOXYzine pamoate (VISTARIL) capsule 25 mg, 25 mg, Oral, BID, Hany Sánchez NP, 25 mg at 10/28/21 1622    influenza vaccine 2021-22 (6 mos+)(PF) (FLUARIX/FLULAVAL/FLUZONE QUAD) injection 0.5 mL, 1 Each, IntraMUSCular, PRIOR TO DISCHARGE, Jeffrey Arrieta MD    lactulose (CHRONULAC) 10 gram/15 mL solution 30 mL, 20 g, Oral, BID, Hany Sánchez NP, 30 mL at 10/28/21 0845    sodium chloride (NS) flush 5-40 mL, 5-40 mL, IntraVENous, Q8H, Jeffrey Arrieta MD, 10 mL at 10/28/21 1454    sodium chloride (NS) flush 5-40 mL, 5-40 mL, IntraVENous, PRN, Jeffrey Arrieta MD    acetaminophen (TYLENOL) tablet 650 mg, 650 mg, Oral, Q6H PRN **OR** acetaminophen (TYLENOL) suppository 650 mg, 650 mg, Rectal, Q6H PRN, Jeffrey Arrieta MD    ondansetron (ZOFRAN ODT) tablet 4 mg, 4 mg, Oral, Q6H PRN **OR** ondansetron (ZOFRAN) injection 4 mg, 4 mg, IntraVENous, Q6H PRN, Jeffrey Arrieta MD    thiamine mononitrate (B-1) tablet 100 mg, 100 mg, Oral, DAILY, Jeffrey Arrieta MD, 100 mg at 10/28/21 0846    b complex-vitamin c-folic acid 5mg (FOLBEE PLUS) tablet 1 Tablet, 1 Tablet, Oral, DAILY, Jeffrey Arrieta MD, 1 Tablet at 10/28/21 0845    spironolactone (ALDACTONE) tablet 25 mg, 25 mg, Oral, BID, Jeffrey Arrieta MD, 25 mg at 10/28/21 0846    Objective:     Vitals:    10/27/21 1207 10/27/21 2149 10/28/21 0145 10/28/21 0810   BP: 104/66 108/70 123/67 101/63   Pulse: 92 84 88 84 Resp: 16 20 20 20   Temp: 98.1 °F (36.7 °C) 98.2 °F (36.8 °C) 98.4 °F (36.9 °C) 98 °F (36.7 °C)   SpO2: 100%   99%   Weight:       Height:            Intake and Output:  Current Shift: 10/28 0701 - 10/28 1900  In: 120 [P.O.:120]  Out: 1 [Urine:1]  Last three shifts: No intake/output data recorded. Physical Exam:   Physical Exam  Constitutional:       Appearance: She is ill-appearing. HENT:      Mouth/Throat:      Mouth: Mucous membranes are dry. Eyes:      General: Scleral icterus present. Extraocular Movements: Extraocular movements intact. Cardiovascular:      Rate and Rhythm: Normal rate. Pulses: Normal pulses. Abdominal:      General: Abdomen is flat. Bowel sounds are normal. There is distension. Tenderness: There is abdominal tenderness. Musculoskeletal:         General: No tenderness or signs of injury. Cervical back: Normal range of motion. Skin:     Coloration: Skin is jaundiced. Neurological:      General: No focal deficit present. Mental Status: Mental status is at baseline. Psychiatric:         Mood and Affect: Mood normal.          Lab/Data Review:  Recent Results (from the past 24 hour(s))   AMMONIA    Collection Time: 10/28/21  8:00 AM   Result Value Ref Range    Ammonia 60 (H) <32 umol/L   CBC WITH AUTOMATED DIFF    Collection Time: 10/28/21  8:00 AM   Result Value Ref Range    WBC 8.5 3.6 - 11.0 K/uL    RBC 2.79 (L) 3.80 - 5.20 M/uL    HGB 10.2 (L) 11.5 - 16.0 g/dL    HCT 29.0 (L) 35.0 - 47.0 %    .9 (H) 80.0 - 99.0 FL    MCH 36.6 (H) 26.0 - 34.0 PG    MCHC 35.2 30.0 - 36.5 g/dL    RDW 21.8 (H) 11.5 - 14.5 %    PLATELET 516 323 - 209 K/uL    MPV 11.8 8.9 - 12.9 FL    NRBC 0.0 0.0  WBC    ABSOLUTE NRBC 0.00 0.00 - 0.01 K/uL    NEUTROPHILS 71 32 - 75 %    LYMPHOCYTES 11 (L) 12 - 49 %    MONOCYTES 15 (H) 5 - 13 %    EOSINOPHILS 1 0 - 7 %    BASOPHILS 0 0 - 1 %    IMMATURE GRANULOCYTES 2 (H) 0 - 0.5 %    ABS.  NEUTROPHILS 6.1 1.8 - 8.0 K/UL ABS. LYMPHOCYTES 0.9 0.8 - 3.5 K/UL    ABS. MONOCYTES 1.3 (H) 0.0 - 1.0 K/UL    ABS. EOSINOPHILS 0.1 0.0 - 0.4 K/UL    ABS. BASOPHILS 0.0 0.0 - 0.1 K/UL    ABS. IMM. GRANS. 0.1 (H) 0.00 - 0.04 K/UL    DF AUTOMATED     METABOLIC PANEL, BASIC    Collection Time: 10/28/21  8:00 AM   Result Value Ref Range    Sodium 136 136 - 145 mmol/L    Potassium 3.7 3.5 - 5.1 mmol/L    Chloride 104 97 - 108 mmol/L    CO2 21 21 - 32 mmol/L    Anion gap 11 5 - 15 mmol/L    Glucose 82 65 - 100 mg/dL    BUN 34 (H) 6 - 20 mg/dL    Creatinine 1.30 (H) 0.55 - 1.02 mg/dL    BUN/Creatinine ratio 26 (H) 12 - 20      GFR est AA 53 (L) >60 ml/min/1.73m2    GFR est non-AA 44 (L) >60 ml/min/1.73m2    Calcium 8.8 8.5 - 10.1 mg/dL   PROTHROMBIN TIME + INR    Collection Time: 10/28/21  1:00 PM   Result Value Ref Range    Prothrombin time 16.5 (H) 11.9 - 14.7 sec    INR 1.4 (H) 0.9 - 1.1          CT ABD PELV WO CONT   Final Result   1. No acute findings. 2. Severe diffuse hepatic steatosis. 3. Cholelithiasis. Dose reduction: All CT scans at this facility are performed using radiation dose   reduction optimization techniques as appropriate to a performed exam, including   the following: Automated exposure control (AEC), adjustment of the MAA and/or   KUB according to the patient's size, or the patient's use of iterative   reconstruction techniques (ASIR). XR CHEST PORT   Final Result   Similar exam to prior without findings of definite acute cardiopulmonary   abnormality. Assessment:     Active Problems:    Hyperbilirubinemia (10/25/2021)      Cirrhosis of liver ([facility] (10/25/2021)         Alcohol abuse history,  Hepatitis panel pending  Alcohol induced acute hepatitis on chronic hepatitis, cirrhosis  Severe jaundice,  INR 1.4, abnormal renal function,  signs of acute liver failure, moderate,  Plan:   Again.  Spent with patient regarding abstain from alcohol use,  No new liver test has been done today  Given the patient's severe jaundice, may be benefit for the steroid use  monitory Patient's liver function test, renal function, inr closely,if  progressive worsening, may need to transfer liver center  An  EGD be schedule in the morning for the screening of esophageal varices, portal hypertension          The indication and risks discussed with patient         Signed By: Tisa Aschoff, MD     October 28, 2021        Thank you for allowing me to participate in this patients care  Cc Referring Physician   Wander Drake

## 2021-10-28 NOTE — PROGRESS NOTES
Hospitalist Progress Note    Subjective:   Daily Progress Note: 10/28/2021 12:46 PM    Hospital Course:     Patient is a 44-year-old female with a PMH significant for morbid obesity, history of cirrhosis of the liver and recent elevated bilirubin. She came to the ER on 10/22 with elevated bilirubin of 16.7 declined admission at that time but returns to the emergency room on 1025 with complaints of weakness and lethargy. Significant labs bilirubin 26.1 and direct bili greater than 16, ammonia is 73, platelet count 95. CT suggestive of diffuse hepatic steatosis and cholelithiasis with no cholecystectomy. Previous US RP revealed findings suggestive for portal hypertension and cirrhosis, noted thickening gallbladder wall and cholecystitis, gallbladder sludge and probable gallstones. Admitted for further management of cholelithiasis, abdominal pain, nausea, liver failure with elevated ammonia and thrombocytopenia. GI consulted. Subjective: Follow-up examination of patient at the bedside. She is requesting a rolling walker, states she has difficulty ambulating.   Have PT and OT evaluate as well    Current Facility-Administered Medications   Medication Dose Route Frequency    influenza vaccine 2021-22 (6 mos+)(PF) (FLUARIX/FLULAVAL/FLUZONE QUAD) injection 0.5 mL  1 Each IntraMUSCular PRIOR TO DISCHARGE    lactulose (CHRONULAC) 10 gram/15 mL solution 30 mL  20 g Oral BID    sodium chloride (NS) flush 5-40 mL  5-40 mL IntraVENous Q8H    sodium chloride (NS) flush 5-40 mL  5-40 mL IntraVENous PRN    acetaminophen (TYLENOL) tablet 650 mg  650 mg Oral Q6H PRN    Or    acetaminophen (TYLENOL) suppository 650 mg  650 mg Rectal Q6H PRN    ondansetron (ZOFRAN ODT) tablet 4 mg  4 mg Oral Q6H PRN    Or    ondansetron (ZOFRAN) injection 4 mg  4 mg IntraVENous Q6H PRN    thiamine mononitrate (B-1) tablet 100 mg  100 mg Oral DAILY    b complex-vitamin c-folic acid 5mg (FOLBEE PLUS) tablet 1 Tablet  1 Tablet Oral DAILY  spironolactone (ALDACTONE) tablet 25 mg  25 mg Oral BID        REVIEW OF SYSTEMS    Review of Systems   Constitutional: Positive for malaise/fatigue. HENT: Negative for congestion. Respiratory: Negative for shortness of breath. Cardiovascular: Negative for chest pain. Gastrointestinal: Positive for nausea. Negative for abdominal pain. Neurological: Positive for weakness. Psychiatric/Behavioral: The patient is nervous/anxious. Objective:     Visit Vitals  /63 (BP 1 Location: Left upper arm)   Pulse 84   Temp 98 °F (36.7 °C)   Resp 20   Ht 5' 6\" (1.676 m)   Wt 151.5 kg (334 lb)   SpO2 99%   BMI 53.91 kg/m²      O2 Device: None (Room air)    Temp (24hrs), Av.2 °F (36.8 °C), Min:98 °F (36.7 °C), Max:98.4 °F (36.9 °C)      10/28 0701 - 10/28 1900  In: 120 [P.O.:120]  Out: 1 [Urine:1]  No intake/output data recorded. PHYSICAL EXAM:    Physical Exam  Constitutional:       Appearance: She is obese. She is ill-appearing. HENT:      Nose: No congestion. Cardiovascular:      Rate and Rhythm: Normal rate and regular rhythm. Pulmonary:      Effort: No respiratory distress. Abdominal:      General: There is distension. Musculoskeletal:      Right lower leg: Edema present. Left lower leg: Edema present. Skin:     Coloration: Skin is jaundiced. Neurological:      Motor: Weakness present.           Data Review    Recent Results (from the past 24 hour(s))   AMMONIA    Collection Time: 10/28/21  8:00 AM   Result Value Ref Range    Ammonia 60 (H) <32 umol/L   CBC WITH AUTOMATED DIFF    Collection Time: 10/28/21  8:00 AM   Result Value Ref Range    WBC 8.5 3.6 - 11.0 K/uL    RBC 2.79 (L) 3.80 - 5.20 M/uL    HGB 10.2 (L) 11.5 - 16.0 g/dL    HCT 29.0 (L) 35.0 - 47.0 %    .9 (H) 80.0 - 99.0 FL    MCH 36.6 (H) 26.0 - 34.0 PG    MCHC 35.2 30.0 - 36.5 g/dL    RDW 21.8 (H) 11.5 - 14.5 %    PLATELET 564 189 - 368 K/uL    MPV 11.8 8.9 - 12.9 FL    NRBC 0.0 0.0  WBC ABSOLUTE NRBC 0.00 0.00 - 0.01 K/uL    NEUTROPHILS 71 32 - 75 %    LYMPHOCYTES 11 (L) 12 - 49 %    MONOCYTES 15 (H) 5 - 13 %    EOSINOPHILS 1 0 - 7 %    BASOPHILS 0 0 - 1 %    IMMATURE GRANULOCYTES 2 (H) 0 - 0.5 %    ABS. NEUTROPHILS 6.1 1.8 - 8.0 K/UL    ABS. LYMPHOCYTES 0.9 0.8 - 3.5 K/UL    ABS. MONOCYTES 1.3 (H) 0.0 - 1.0 K/UL    ABS. EOSINOPHILS 0.1 0.0 - 0.4 K/UL    ABS. BASOPHILS 0.0 0.0 - 0.1 K/UL    ABS. IMM. GRANS. 0.1 (H) 0.00 - 0.04 K/UL    DF AUTOMATED     METABOLIC PANEL, BASIC    Collection Time: 10/28/21  8:00 AM   Result Value Ref Range    Sodium 136 136 - 145 mmol/L    Potassium 3.7 3.5 - 5.1 mmol/L    Chloride 104 97 - 108 mmol/L    CO2 21 21 - 32 mmol/L    Anion gap 11 5 - 15 mmol/L    Glucose 82 65 - 100 mg/dL    BUN 34 (H) 6 - 20 mg/dL    Creatinine 1.30 (H) 0.55 - 1.02 mg/dL    BUN/Creatinine ratio 26 (H) 12 - 20      GFR est AA 53 (L) >60 ml/min/1.73m2    GFR est non-AA 44 (L) >60 ml/min/1.73m2    Calcium 8.8 8.5 - 10.1 mg/dL       CT ABD PELV WO CONT   Final Result   1. No acute findings. 2. Severe diffuse hepatic steatosis. 3. Cholelithiasis. Dose reduction: All CT scans at this facility are performed using radiation dose   reduction optimization techniques as appropriate to a performed exam, including   the following: Automated exposure control (AEC), adjustment of the MAA and/or   KUB according to the patient's size, or the patient's use of iterative   reconstruction techniques (ASIR). XR CHEST PORT   Final Result   Similar exam to prior without findings of definite acute cardiopulmonary   abnormality.           Active Problems:    Hyperbilirubinemia (10/25/2021)      Cirrhosis of liver ([address] (10/25/2021)        Assessment/Plan:     Hyperbilirubinemia  Thrombocytopenia  Hepatocellular steatosis  Alcohol abuse  Elevated ammonia  Acute on chronic hepatitis cirrhosis  GI consulted  Started lactulose  Continue home medications potassium, Prilosec, and iron supplement  Add spironolactone, hold Lasix  Monitoring ammonia, LFTs and INR  Ammonia improving    Morbid obesity    Alcohol abuse  Counseled concerning cessation    DVT Prophylaxis: SCDs  Code Status: Full Code  POA/NOK:    Disposition and discharge barriers:   · GI consult  · Monitoring liver failure, lab work closely  Care Plan discussed with: Patient and IDR team  _____________________________________________________________________________  Time spent in direct care including coordination of service, review of data and examination: > 35 minutes    ______________________________________________________________________________    Jorge Tran NP    This is dictation was done by dragon, computer voice recognition software. Quite often unanticipated grammatical, syntax, homophones and other interpretive errors or inadvertently transcribed by the computer software. Please excuse errors that have escaped final proofreading. Thank you.

## 2021-10-28 NOTE — PROGRESS NOTES
PHYSICAL THERAPY EVALUATION  Patient: Genie Sanchez (31 y.o. female)  Date: 10/28/2021  Primary Diagnosis: Hyperbilirubinemia [E80.6]  Cirrhosis of liver ([address] [K74.60]        Precautions: falls       ASSESSMENT  This is a 52 y/o female who came to  the [facility] ER on 10/22 with elevated bilirubin of 16.7 ,declined admission at that time . But ,returned to the emergency room on 10/25 with complaint of weakness and lethargy. Significant labs bilirubin 26.1 and direct bili greater than 16, ammonia  73, platelet count 95. CT suggestive of diffuse hepatic steatosis and cholelithiasis with no cholecystectomy. Pt received sitting on the recliner with b/l LE elevated, agreeable for PT eval/tx . Pt is A& O x 4 ,  per pt report , she lives with spouse in a olga story home with 4 steps to enter , HR on bilateral sides ,  was IND with ADL's and mod I for functional transfers/mobility with no AD ( reports , doing furniture walking at home )  prior to admission . Based on the objective data described below, the patient presents with   decreased strength , 3+/5 grossly in  b/l LE , balance deficits , generalized weakness ,  decreased activity tolerance and increased need for assist with functional mobility ( demonstrates intact  static sitting balance , SBA for sit <>stand and SPT , good static  standing balance , is able to ambulate - 100' with no AD , SBA/CGA with slow prisca and decreased step length  b/l Le . Pt SOB post ambulation , practices pursed lip DBex's , felt better. ) . Pt educated on desiree L E HEP in prep for transfers/mobility  with pt demonstrating and verbalizing understanding. Pt would benefit from continued skilled PT services to address current impairments and improve overall IND and safety with  functional transfers/mobility. Recommend d/c to home with HHPT when medically appropriate .      Current Level of Function Impacting Discharge (mobility/balance): Pt requires SBA/CGA for transfers/mobility    Functional Outcome Measure: The patient scored 18 on the AM Pac basic mobility outcome measure which is indicative of medium complexity. Other factors to consider for discharge: PLOf, family support        PLAN :  Recommendations and Planned Interventions: bed mobility training, transfer training, gait training, therapeutic exercises, neuromuscular re-education, patient and family training/education, and therapeutic activities      Frequency/Duration: Patient will be followed by physical therapy:  2-3x/week to address goals. Recommendation for discharge: (in order for the patient to meet his/her long term goals)  Home with [address] [place] [address]    This discharge recommendation:  Has been made in collaboration with the attending provider and/or case management    IF patient discharges home will need the following DME: straight cane         SUBJECTIVE:   Patient stated I can walk , sometimes hold onto the furniture    OBJECTIVE DATA SUMMARY:   HISTORY:    Past Medical History:   Diagnosis Date    Cirrhosis ([facility]     GERD (gastroesophageal reflux disease)     Morbid obesity ([facility]      Past Surgical History:   Procedure Laterality Date    HX GASTRIC BYPASS         Personal factors and/or comorbidities impacting plan of care:     Home Situation  Home Environment: Private residence  # Steps to Enter: 4  Rails to Enter: Yes  Hand Rails : Bilateral  Wheelchair Ramp: No  One/Two Story Residence: One story  Living Alone: No  Support Systems: Spouse/Significant Other  Patient Expects to be Discharged to[de-identified] House  Current DME Used/Available at Home: None    PLOF: Pt IND for ADLS/IADLS, mod I with mobility prior to admission. EXAMINATION/PRESENTATION/DECISION MAKING:   Critical Behavior:  Neurologic State: Alert  Orientation Level: Oriented X4        Hearing:   Auditory  Auditory Impairment: None  Skin:  intact where exposed    Range Of Motion:  AROM: Within functional limits     Strength: Strength: Generally decreased, functional (3+/5 grossly for b/l LE)    Tone & Sensation:   Tone: Normal    Sensation: Intact    Functional Mobility:    Transfers:  Sit to Stand: Stand-by assistance  Stand to Sit: Stand-by assistance  Stand Pivot Transfers: Stand-by assistance       Balance:   Sitting: Intact; Without support  Standing: Impaired; Without support  Standing - Static: Good  Standing - Dynamic : Fair;Constant support  Ambulation/Gait Training:  Distance (ft): 100 Feet (ft)  Assistive Device: Gait belt  Ambulation - Level of Assistance: Stand-by assistance;Contact guard assistance    Base of Support: Widened     Speed/Margareth: Slow  Step Length: Right shortened;Left shortened    Functional Measure:    [facility] AM-PAC 6 Clicks         Basic Mobility Inpatient Short Form  How much difficulty does the patient currently have. .. Unable A Lot A Little None   1. Turning over in bed (including adjusting bedclothes, sheets and blankets)? [] 1   [] 2   [x] 3   [] 4   2. Sitting down on and standing up from a chair with arms ( e.g., wheelchair, bedside commode, etc.)   [] 1   [] 2   [x] 3   [] 4   3. Moving from lying on back to sitting on the side of the bed? [] 1   [] 2   [x] 3   [] 4          How much help from another person does the patient currently need. .. Total A Lot A Little None   4. Moving to and from a bed to a chair (including a wheelchair)? [] 1   [] 2   [x] 3   [] 4   5. Need to walk in hospital room? [] 1   [] 2   [x] 3   [] 4   6. Climbing 3-5 steps with a railing? [] 1   [] 2   [x] 3   [] 4   © 2007, Trustees of [facility], under license to [employer]. All rights reserved     Score:  Initial: 18 Most Recent: X (Date: --10/28/21 )   Interpretation of Tool:  Represents activities that are increasingly more difficult (i.e. Bed mobility, Transfers, Gait).   Score 24 23 22-20 19-15 14-10 9-7 6   Modifier CH CI CJ CK CL CM CN          Physical Therapy Evaluation Charge Determination   History Examination Presentation Decision-Making   MEDIUM  Complexity : 1-2 comorbidities / personal factors will impact the outcome/ POC  MEDIUM Complexity : 3 Standardized tests and measures addressing body structure, function, activity limitation and / or participation in recreation  LOW Complexity : Stable, uncomplicated  Other Functional Measure [facility] 6 medium complexity      Based on the above components, the patient evaluation is determined to be of the following complexity level: LOW     Pain Ratin/10    Activity Tolerance:   Fair and observed SOB with activity  Please refer to the flowsheet for vital signs taken during this treatment. After treatment patient left in no apparent distress:   Sitting in chair and Call bell within reach    COMMUNICATION/EDUCATION:   The patients plan of care was discussed with: Registered nurse. Fall prevention education was provided and the patient/caregiver indicated understanding. and Patient/family agree to work toward stated goals and plan of care. Problem: Mobility Impaired (Adult and Pediatric)  Goal: *Acute Goals and Plan of Care (Insert Text)  Description: Pt will be I with LE HEP in 7 days. Pt will perform bed mobility with mod I in 7 days. Pt will perform transfers with mod I in 7 days. Pt will amb >150 feet with LRAD safely with mod I in 7 days.    Outcome: Not Met       Thank you for this referral.  Fletcher Crigler Talwar   Time Calculation: 23 mins

## 2021-10-29 NOTE — ANESTHESIA PREPROCEDURE EVALUATION
Relevant Problems   GASTROINTESTINAL   (+) Cirrhosis of liver (HCC)      ENDOCRINE   (+) Obesity, morbid (HCC)       Anesthetic History   No history of anesthetic complications            Review of Systems / Medical History  Patient summary reviewed, nursing notes reviewed and pertinent labs reviewed    Pulmonary  Within defined limits                 Neuro/Psych   Within defined limits           Cardiovascular  Within defined limits                Exercise tolerance: >4 METS     GI/Hepatic/Renal     GERD      Liver disease (CIRRHOSIS)    Comments: CHOLELITHIASIS Endo/Other        Morbid obesity    Comments: \"BLEEDING FROM VARICOSE VEINS\"  Hx OF DIFFICULT I/V.    \"SOMETIMES FEET GETS SWOLLEN\" Other Findings   Comments: PT 16.5  INR 1.4         Physical Exam    Airway  Mallampati: III  TM Distance: 4 - 6 cm  Neck ROM: normal range of motion        Cardiovascular    Rhythm: regular  Rate: normal      Pertinent negatives: No murmur   Dental         Pulmonary      Decreased breath sounds           Abdominal  GI exam deferred       Other Findings            Anesthetic Plan    ASA: 3  Anesthesia type: MAC and total IV anesthesia          Induction: Intravenous  Anesthetic plan and risks discussed with: Patient

## 2021-10-29 NOTE — ANESTHESIA POSTPROCEDURE EVALUATION
Procedure(s):  ESOPHAGOGASTRODUODENOSCOPY (EGD). MAC    Anesthesia Post Evaluation        Patient location during evaluation: bedside (Endoscopy suite)  Patient participation: complete - patient cannot participate  Level of consciousness: sleepy but conscious  Pain score: 0  Pain management: adequate  Airway patency: patent  Anesthetic complications: no  Cardiovascular status: acceptable  Respiratory status: acceptable and nasal cannula  Hydration status: acceptable  Comments: This patient remained on the stretcher. The patient was handed off to the endoscopy nursing team.  All questions regarding pre-, intra-, and postoperative care were answered. Post anesthesia nausea and vomiting:  none      INITIAL Post-op Vital signs: No vitals data found for the desired time range.

## 2021-10-29 NOTE — PROCEDURES
Summary of EGD report    Please see the full report in detail at chart reviewprocedureEGD scanned documentations    Esophagus unremarkable, no esophageal varices  Stomach, status post of gastrojejunostomy, large amount of blood clots in stomach, anastomosis looks normal no active bleeding,  Looks patient bleeding from efferent jejunal loop, 6 mL 1/0000 diluted epinephrine injected to stop bleeding,      Recommendation,  Clear liquids  Hold discharge  Monitor hemoglobin closely  Continue PPI

## 2021-10-29 NOTE — PROGRESS NOTES
Hospitalist Progress Note    Subjective:   Daily Progress Note: 10/29/2021 12:46 PM    Hospital Course:     Patient is a 79-year-old female with a PMH significant for morbid obesity, history of cirrhosis of the liver and recent elevated bilirubin. She came to the ER on 10/22 with elevated bilirubin of 16.7 declined admission at that time but returns to the emergency room on 1025 with complaints of weakness and lethargy. Significant labs bilirubin 26.1 and direct bili greater than 16, ammonia is 73, platelet count 95. CT suggestive of diffuse hepatic steatosis and cholelithiasis with no cholecystectomy. Previous US RP revealed findings suggestive for portal hypertension and cirrhosis, noted thickening gallbladder wall and cholecystitis, gallbladder sludge and probable gallstones. Admitted for further management of cholelithiasis, abdominal pain, nausea, liver failure with elevated ammonia and thrombocytopenia. GI consulted. Scheduled for EGD to assess for esophageal varices and portal hypertension. Subjective: Follow-up examination of patient at the bedside. Bilirubin continues to be elevated. She wants to go home will discuss with GI.     Current Facility-Administered Medications   Medication Dose Route Frequency    hydrOXYzine pamoate (VISTARIL) capsule 25 mg  25 mg Oral BID    traZODone (DESYREL) tablet 50 mg  50 mg Oral QHS PRN    influenza vaccine 2021-22 (6 mos+)(PF) (FLUARIX/FLULAVAL/FLUZONE QUAD) injection 0.5 mL  1 Each IntraMUSCular PRIOR TO DISCHARGE    lactulose (CHRONULAC) 10 gram/15 mL solution 30 mL  20 g Oral BID    sodium chloride (NS) flush 5-40 mL  5-40 mL IntraVENous Q8H    sodium chloride (NS) flush 5-40 mL  5-40 mL IntraVENous PRN    acetaminophen (TYLENOL) tablet 650 mg  650 mg Oral Q6H PRN    Or    acetaminophen (TYLENOL) suppository 650 mg  650 mg Rectal Q6H PRN    ondansetron (ZOFRAN ODT) tablet 4 mg  4 mg Oral Q6H PRN    Or    ondansetron (ZOFRAN) injection 4 mg  4 mg IntraVENous Q6H PRN    thiamine mononitrate (B-1) tablet 100 mg  100 mg Oral DAILY    b complex-vitamin c-folic acid 5mg (FOLBEE PLUS) tablet 1 Tablet  1 Tablet Oral DAILY    spironolactone (ALDACTONE) tablet 25 mg  25 mg Oral BID        REVIEW OF SYSTEMS    Review of Systems   Constitutional: Positive for malaise/fatigue. HENT: Negative for congestion. Respiratory: Negative for shortness of breath. Cardiovascular: Negative for chest pain. Gastrointestinal: Positive for nausea. Negative for abdominal pain. Neurological: Positive for weakness. Psychiatric/Behavioral: The patient is nervous/anxious. Objective:     Visit Vitals  BP (!) 83/49 (BP 1 Location: Right upper arm, BP Patient Position: At rest;Sitting)   Pulse (!) 117   Temp 98 °F (36.7 °C)   Resp 20   Ht 5' 6\" (1.676 m)   Wt 151.5 kg (334 lb)   SpO2 100%   BMI 53.91 kg/m²      O2 Device: None (Room air)    Temp (24hrs), Av.2 °F (36.8 °C), Min:98 °F (36.7 °C), Max:98.4 °F (36.9 °C)      No intake/output data recorded. 10/27 1901 - 10/29 0700  In: 120 [P.O.:120]  Out: 1 [Urine:1]    PHYSICAL EXAM:    Physical Exam  Constitutional:       Appearance: She is obese. She is ill-appearing. HENT:      Nose: No congestion. Cardiovascular:      Rate and Rhythm: Normal rate and regular rhythm. Pulmonary:      Effort: No respiratory distress. Abdominal:      General: There is distension. Musculoskeletal:      Right lower leg: Edema present. Left lower leg: Edema present. Skin:     Coloration: Skin is jaundiced. Neurological:      Motor: Weakness present.           Data Review    Recent Results (from the past 24 hour(s))   PROTHROMBIN TIME + INR    Collection Time: 10/28/21  1:00 PM   Result Value Ref Range    Prothrombin time 16.5 (H) 11.9 - 14.7 sec    INR 1.4 (H) 0.9 - 1.1     GLUCOSE, POC    Collection Time: 10/28/21  7:42 PM   Result Value Ref Range    Glucose (POC) 86 65 - 117 mg/dL    Performed by [facility] HEPATIC FUNCTION PANEL    Collection Time: 10/29/21  6:50 AM   Result Value Ref Range    Protein, total 5.8 (L) 6.4 - 8.2 g/dL    Albumin 2.0 (L) 3.5 - 5.0 g/dL    Globulin 3.8 2.0 - 4.0 g/dL    A-G Ratio 0.5 (L) 1.1 - 2.2      Bilirubin, total 30.3 (HH) 0.2 - 1.0 mg/dL    Bilirubin, direct 26.2 (H) 0.0 - 0.2 mg/dL    Alk. phosphatase 80 45 - 117 U/L    AST (SGOT) 152 (H) 15 - 37 U/L    ALT (SGPT) 66 12 - 78 U/L   AMMONIA    Collection Time: 10/29/21  6:50 AM   Result Value Ref Range    Ammonia 59 (H) <32 umol/L   CBC WITH AUTOMATED DIFF    Collection Time: 10/29/21  6:50 AM   Result Value Ref Range    WBC 9.4 3.6 - 11.0 K/uL    RBC 2.99 (L) 3.80 - 5.20 M/uL    HGB 10.8 (L) 11.5 - 16.0 g/dL    HCT 30.3 (L) 35.0 - 47.0 %    .3 (H) 80.0 - 99.0 FL    MCH 36.1 (H) 26.0 - 34.0 PG    MCHC 35.6 30.0 - 36.5 g/dL    RDW 21.3 (H) 11.5 - 14.5 %    PLATELET 179 696 - 178 K/uL    MPV 11.5 8.9 - 12.9 FL    NRBC 0.0 0.0  WBC    ABSOLUTE NRBC 0.00 0.00 - 0.01 K/uL    NEUTROPHILS 75 32 - 75 %    LYMPHOCYTES 10 (L) 12 - 49 %    MONOCYTES 12 5 - 13 %    EOSINOPHILS 1 0 - 7 %    BASOPHILS 0 0 - 1 %    IMMATURE GRANULOCYTES 2 (H) 0 - 0.5 %    ABS. NEUTROPHILS 7.2 1.8 - 8.0 K/UL    ABS. LYMPHOCYTES 0.9 0.8 - 3.5 K/UL    ABS. MONOCYTES 1.1 (H) 0.0 - 1.0 K/UL    ABS. EOSINOPHILS 0.1 0.0 - 0.4 K/UL    ABS. BASOPHILS 0.0 0.0 - 0.1 K/UL    ABS. IMM. GRANS. 0.2 (H) 0.00 - 0.04 K/UL    DF AUTOMATED     METABOLIC PANEL, BASIC    Collection Time: 10/29/21  6:50 AM   Result Value Ref Range    Sodium 136 136 - 145 mmol/L    Potassium 3.8 3.5 - 5.1 mmol/L    Chloride 104 97 - 108 mmol/L    CO2 21 21 - 32 mmol/L    Anion gap 11 5 - 15 mmol/L    Glucose 82 65 - 100 mg/dL    BUN 36 (H) 6 - 20 mg/dL    Creatinine 1.49 (H) 0.55 - 1.02 mg/dL    BUN/Creatinine ratio 24 (H) 12 - 20      GFR est AA 45 (L) >60 ml/min/1.73m2    GFR est non-AA 38 (L) >60 ml/min/1.73m2    Calcium 9.0 8.5 - 10.1 mg/dL       CT ABD PELV WO CONT   Final Result   1. No acute findings. 2. Severe diffuse hepatic steatosis. 3. Cholelithiasis. Dose reduction: All CT scans at this facility are performed using radiation dose   reduction optimization techniques as appropriate to a performed exam, including   the following: Automated exposure control (AEC), adjustment of the MAA and/or   KUB according to the patient's size, or the patient's use of iterative   reconstruction techniques (ASIR). XR CHEST PORT   Final Result   Similar exam to prior without findings of definite acute cardiopulmonary   abnormality. Active Problems:    Hyperbilirubinemia (10/25/2021)      Cirrhosis of liver ([address] (10/25/2021)        Assessment/Plan:     Hyperbilirubinemia  Thrombocytopenia  Hepatocellular steatosis  Alcohol abuse  Elevated ammonia  Acute on chronic hepatitis cirrhosis  GI consulted  Started lactulose  Continue home medications potassium, Prilosec, and iron supplement  Add spironolactone, hold Lasix  Monitoring ammonia, LFTs and INR  Ammonia improving  LFTs continue to be elevated, INR stable today    Morbid obesity    Alcohol abuse  Counseled concerning cessation    DVT Prophylaxis: SCDs  Code Status: Full Code  POA/NOK:    Disposition and discharge barriers:   · GI consultEGD today  · Monitoring liver failure, lab work closely  · PT/OT recommending DME [facility] and home health PT [address] discussed with: Patient and IDR team  _____________________________________________________________________________  Time spent in direct care including coordination of service, review of data and examination: > 35 minutes    ______________________________________________________________________________    Arnulfo Salas NP    This is dictation was done by dragon, computer voice recognition software. Quite often unanticipated grammatical, syntax, homophones and other interpretive errors or inadvertently transcribed by the computer software.   Please excuse errors that have escaped final proofreading. Thank you.

## 2021-10-30 NOTE — PROGRESS NOTES
Hospitalist Progress Note    Subjective:   Daily Progress Note: 10/30/2021 12:46 PM    Hospital Course:     Patient is a 41-year-old female with a PMH significant for morbid obesity, history of cirrhosis of the liver and recent elevated bilirubin. She came to the ER on 10/22 with elevated bilirubin of 16.7 declined admission at that time but returns to the emergency room on 1025 with complaints of weakness and lethargy. Significant labs bilirubin 26.1 and direct bili greater than 16, ammonia is 73, platelet count 95. CT suggestive of diffuse hepatic steatosis and cholelithiasis with no cholecystectomy. Previous US RP revealed findings suggestive for portal hypertension and cirrhosis, noted thickening gallbladder wall and cholecystitis, gallbladder sludge and probable gallstones. Admitted for further management of cholelithiasis, abdominal pain, nausea, liver failure with elevated ammonia and thrombocytopenia. GI consulted. Scheduled for EGD to assess for esophageal varices and portal hypertension. Esophagus revealing no varices, stomach status post gastrojejunostomy revealing large amounts of blood clots in stomach and anastomosis but no active bleeding. There is invariant jejunal loop bleeding large amount. Received 6 injections of epinephrine to stop the bleeding. BUN and creatinine and potassium rising most likely secondary to spironolactone. We will have nephrology consult to make recommendations for discharge purposes. Subjective: Follow-up examination of patient at the bedside. She continues to want to go home. Discussed her kidney function tests and nephrology consult. Cleared for discharge by GI, will have nephrology make recommendations for outpatient diuretic and plan to discharge tomorrow.     Current Facility-Administered Medications   Medication Dose Route Frequency    midodrine (PROAMATINE) tablet 5 mg  5 mg Oral BID WITH MEALS    hydrOXYzine pamoate (VISTARIL) capsule 25 mg  25 mg Oral BID    traZODone (DESYREL) tablet 50 mg  50 mg Oral QHS PRN    influenza vaccine  (6 mos+)(PF) (FLUARIX/FLULAVAL/FLUZONE QUAD) injection 0.5 mL  1 Each IntraMUSCular PRIOR TO DISCHARGE    lactulose (CHRONULAC) 10 gram/15 mL solution 30 mL  20 g Oral BID    sodium chloride (NS) flush 5-40 mL  5-40 mL IntraVENous Q8H    sodium chloride (NS) flush 5-40 mL  5-40 mL IntraVENous PRN    acetaminophen (TYLENOL) tablet 650 mg  650 mg Oral Q6H PRN    Or    acetaminophen (TYLENOL) suppository 650 mg  650 mg Rectal Q6H PRN    ondansetron (ZOFRAN ODT) tablet 4 mg  4 mg Oral Q6H PRN    Or    ondansetron (ZOFRAN) injection 4 mg  4 mg IntraVENous Q6H PRN    thiamine mononitrate (B-1) tablet 100 mg  100 mg Oral DAILY    b complex-vitamin c-folic acid 5mg (FOLBEE PLUS) tablet 1 Tablet  1 Tablet Oral DAILY    [Held by provider] spironolactone (ALDACTONE) tablet 25 mg  25 mg Oral BID        REVIEW OF SYSTEMS    Review of Systems   Constitutional: Positive for malaise/fatigue. HENT: Negative for congestion. Respiratory: Negative for shortness of breath. Cardiovascular: Negative for chest pain. Gastrointestinal: Positive for nausea. Negative for abdominal pain. Neurological: Positive for weakness. Psychiatric/Behavioral: The patient is nervous/anxious. Objective:     Visit Vitals  /64   Pulse 94   Temp 98.3 °F (36.8 °C)   Resp 20   Ht 5' 6\" (1.676 m)   Wt 151.5 kg (334 lb)   SpO2 100%   BMI 53.91 kg/m²      O2 Device: None (Room air)    Temp (24hrs), Av.6 °F (37 °C), Min:98.3 °F (36.8 °C), Max:98.9 °F (37.2 °C)      No intake/output data recorded. 10/28 1901 - 10/30 0700  In: 5 [P.O.:540]  Out: -     PHYSICAL EXAM:    Physical Exam  Constitutional:       Appearance: She is obese. She is ill-appearing. HENT:      Nose: No congestion. Cardiovascular:      Rate and Rhythm: Normal rate and regular rhythm. Pulmonary:      Effort: No respiratory distress.    Abdominal: General: There is distension. Musculoskeletal:      Right lower leg: Edema present. Left lower leg: Edema present. Skin:     Coloration: Skin is jaundiced. Neurological:      Motor: Weakness present. Data Review    Recent Results (from the past 24 hour(s))   CBC WITH AUTOMATED DIFF    Collection Time: 10/30/21  7:25 AM   Result Value Ref Range    WBC 10.5 3.6 - 11.0 K/uL    RBC 2.95 (L) 3.80 - 5.20 M/uL    HGB 10.6 (L) 11.5 - 16.0 g/dL    HCT 31.2 (L) 35.0 - 47.0 %    .8 (H) 80.0 - 99.0 FL    MCH 35.9 (H) 26.0 - 34.0 PG    MCHC 34.0 30.0 - 36.5 g/dL    RDW 21.9 (H) 11.5 - 14.5 %    PLATELET 105 182 - 124 K/uL    MPV 11.9 8.9 - 12.9 FL    NRBC 0.0 0.0  WBC    ABSOLUTE NRBC 0.00 0.00 - 0.01 K/uL    NEUTROPHILS 79 (H) 32 - 75 %    LYMPHOCYTES 9 (L) 12 - 49 %    MONOCYTES 8 5 - 13 %    EOSINOPHILS 1 0 - 7 %    BASOPHILS 1 0 - 1 %    IMMATURE GRANULOCYTES 2 (H) 0 - 0.5 %    ABS. NEUTROPHILS 8.4 (H) 1.8 - 8.0 K/UL    ABS. LYMPHOCYTES 0.9 0.8 - 3.5 K/UL    ABS. MONOCYTES 0.8 0.0 - 1.0 K/UL    ABS. EOSINOPHILS 0.1 0.0 - 0.4 K/UL    ABS. BASOPHILS 0.1 0.0 - 0.1 K/UL    ABS. IMM.  GRANS. 0.2 (H) 0.00 - 0.04 K/UL    DF AUTOMATED     METABOLIC PANEL, BASIC    Collection Time: 10/30/21  7:25 AM   Result Value Ref Range    Sodium 133 (L) 136 - 145 mmol/L    Potassium 6.0 (H) 3.5 - 5.1 mmol/L    Chloride 103 97 - 108 mmol/L    CO2 18 (L) 21 - 32 mmol/L    Anion gap 12 5 - 15 mmol/L    Glucose 76 65 - 100 mg/dL    BUN 46 (H) 6 - 20 mg/dL    Creatinine 2.69 (H) 0.55 - 1.02 mg/dL    BUN/Creatinine ratio 17 12 - 20      GFR est AA 23 (L) >60 ml/min/1.73m2    GFR est non-AA 19 (L) >60 ml/min/1.73m2    Calcium 8.8 8.5 - 10.1 mg/dL   HEPATIC FUNCTION PANEL    Collection Time: 10/30/21  7:25 AM   Result Value Ref Range    Protein, total 5.8 (L) 6.4 - 8.2 g/dL    Albumin 1.8 (L) 3.5 - 5.0 g/dL    Globulin 4.0 2.0 - 4.0 g/dL    A-G Ratio 0.5 (L) 1.1 - 2.2      Bilirubin, total 29.3 (H) 0.2 - 1.0 mg/dL Bilirubin, direct 22.7 (H) 0.0 - 0.2 mg/dL    Alk. phosphatase 77 45 - 117 U/L    AST (SGOT) 191 (H) 15 - 37 U/L    ALT (SGPT) 76 12 - 78 U/L   AMMONIA    Collection Time: 10/30/21  7:25 AM   Result Value Ref Range    Ammonia 73 (H) <32 umol/L   PROTHROMBIN TIME + INR    Collection Time: 10/30/21  7:25 AM   Result Value Ref Range    Prothrombin time 16.3 (H) 11.9 - 14.7 sec    INR 1.3 (H) 0.9 - 1.1         CT ABD PELV WO CONT   Final Result   1. No acute findings. 2. Severe diffuse hepatic steatosis. 3. Cholelithiasis. Dose reduction: All CT scans at this facility are performed using radiation dose   reduction optimization techniques as appropriate to a performed exam, including   the following: Automated exposure control (AEC), adjustment of the MAA and/or   KUB according to the patient's size, or the patient's use of iterative   reconstruction techniques (ASIR). XR CHEST PORT   Final Result   Similar exam to prior without findings of definite acute cardiopulmonary   abnormality.           Active Problems:    Hyperbilirubinemia (10/25/2021)      Cirrhosis of liver ([facility] (10/25/2021)        Assessment/Plan:     Hyperbilirubinemia  Thrombocytopenia  Hepatocellular steatosis  Alcohol abuse  Elevated ammonia  Acute on chronic hepatitis cirrhosis  GI consulted  Started lactulose  Continue home medications potassium, Prilosec, and iron supplement  Stop spironolactone due to elevated [place] creatinine, hold Lasix  Will consult nephrology for discharge recommendations for diuretic  Monitoring ammonia, LFTs and INR  Ammonia improving  LFTs continue to be elevated, INR stable today    Morbid obesity  Status post gastrojejunostomy    Alcohol abuse  Counseled concerning cessation    DVT Prophylaxis: SCDs  Code Status: Full Code  POA/NOK:    Disposition and discharge barriers:   · GI consultcleared for discharge  · Nephrology consulted  · Monitoring liver failure, lab work closely  · PT/OT recommending DME [facility] and home health PT [address] discussed with: Patient and IDR team  _____________________________________________________________________________  Time spent in direct care including coordination of service, review of data and examination: > 35 minutes    ______________________________________________________________________________    Kole Roblero NP    This is dictation was done by dragon, computer voice recognition software. Quite often unanticipated grammatical, syntax, homophones and other interpretive errors or inadvertently transcribed by the computer software. Please excuse errors that have escaped final proofreading. Thank you.

## 2021-10-30 NOTE — PROGRESS NOTES
Progress Note    Patient: Lisa Cross MRN: [id number]  SSN: [id number]    YOB: 1975  Age: 51 y.o.   Sex: female      Admit Date: 10/25/2021    LOS: 5 days     Subjective:   Patient examined, still has jaundice      Yesterday EGD show gastrojejunostomy,bleeding jejunum loop, status post of epinephrine injection,  Today patient has no specific complaint, no bleeding, no nausea vomiting, no severe abdominal pain,  Past Medical History:   Diagnosis Date    Cirrhosis ([facility]     GERD (gastroesophageal reflux disease)     Morbid obesity ([facility]         Current Facility-Administered Medications:     midodrine (PROAMATINE) tablet 5 mg, 5 mg, Oral, BID WITH MEALS, Viktoria Pool, NP, 5 mg at 10/30/21 1133    hydrOXYzine pamoate (VISTARIL) capsule 25 mg, 25 mg, Oral, BID, Viktoria Pool, NP, 25 mg at 10/30/21 1048    traZODone (DESYREL) tablet 50 mg, 50 mg, Oral, QHS PRN, Tosin Banks MD, 50 mg at 10/29/21 2115    influenza vaccine 2021-22 (6 mos+)(PF) (FLUARIX/FLULAVAL/FLUZONE QUAD) injection 0.5 mL, 1 Each, IntraMUSCular, PRIOR TO DISCHARGE, Stefano Bryson MD    lactulose (CHRONULAC) 10 gram/15 mL solution 30 mL, 20 g, Oral, BID, Viktoria Pool, NP, 30 mL at 10/30/21 1050    sodium chloride (NS) flush 5-40 mL, 5-40 mL, IntraVENous, Q8H, Stefano Bryson MD, 10 mL at 10/30/21 0542    sodium chloride (NS) flush 5-40 mL, 5-40 mL, IntraVENous, PRN, Stefano Bryson MD    acetaminophen (TYLENOL) tablet 650 mg, 650 mg, Oral, Q6H PRN **OR** acetaminophen (TYLENOL) suppository 650 mg, 650 mg, Rectal, Q6H PRN, Stefano Bryson MD    ondansetron (ZOFRAN ODT) tablet 4 mg, 4 mg, Oral, Q6H PRN **OR** ondansetron (ZOFRAN) injection 4 mg, 4 mg, IntraVENous, Q6H PRN, Stefano Bryson MD    thiamine mononitrate (B-1) tablet 100 mg, 100 mg, Oral, DAILY, Stefano Bryson MD, 100 mg at 10/30/21 1048    b complex-vitamin c-folic acid 5mg (FOLBEE PLUS) tablet 1 Tablet, 1 Tablet, Oral, DAILY, Urszula Conroy MD, 1 Tablet at 10/30/21 1048    [Held by provider] spironolactone (ALDACTONE) tablet 25 mg, 25 mg, Oral, BID, Urszula Conroy MD, 25 mg at 10/30/21 1048    Objective:     Vitals:    10/29/21 1447 10/29/21 1455 10/29/21 2118 10/30/21 0823   BP: (!) 128/55 (!) 103/43 (!) 103/57 103/64   Pulse: 92 90 83 94   Resp: 24 19 20 20   Temp:   98.9 °F (37.2 °C) 98.3 °F (36.8 °C)   SpO2: 98% 98% 100% 100%   Weight:       Height:            Intake and Output:  Current Shift: No intake/output data recorded. Last three shifts: 10/28 1901 - 10/30 0700  In: 540 [P.O.:540]  Out: -     Physical Exam:   Physical Exam  Constitutional:       Appearance: She is ill-appearing. HENT:      Mouth/Throat:      Mouth: Mucous membranes are dry. Eyes:      General: Scleral icterus present. Extraocular Movements: Extraocular movements intact. Cardiovascular:      Rate and Rhythm: Normal rate. Pulses: Normal pulses. Abdominal:      General: Abdomen is flat. Bowel sounds are normal. There is distension. Tenderness: There is abdominal tenderness. Musculoskeletal:         General: No tenderness or signs of injury. Cervical back: Normal range of motion. Skin:     Coloration: Skin is jaundiced. Neurological:      General: No focal deficit present. Mental Status: Mental status is at baseline.    Psychiatric:         Mood and Affect: Mood normal.          Lab/Data Review:  Recent Results (from the past 24 hour(s))   CBC WITH AUTOMATED DIFF    Collection Time: 10/30/21  7:25 AM   Result Value Ref Range    WBC 10.5 3.6 - 11.0 K/uL    RBC 2.95 (L) 3.80 - 5.20 M/uL    HGB 10.6 (L) 11.5 - 16.0 g/dL    HCT 31.2 (L) 35.0 - 47.0 %    .8 (H) 80.0 - 99.0 FL    MCH 35.9 (H) 26.0 - 34.0 PG    MCHC 34.0 30.0 - 36.5 g/dL    RDW 21.9 (H) 11.5 - 14.5 %    PLATELET 795 979 - 140 K/uL    MPV 11.9 8.9 - 12.9 FL    NRBC 0.0 0.0  WBC    ABSOLUTE NRBC 0.00 0.00 - 0.01 K/uL    NEUTROPHILS 79 (H) 32 - 75 %    LYMPHOCYTES 9 (L) 12 - 49 %    MONOCYTES 8 5 - 13 %    EOSINOPHILS 1 0 - 7 %    BASOPHILS 1 0 - 1 %    IMMATURE GRANULOCYTES 2 (H) 0 - 0.5 %    ABS. NEUTROPHILS 8.4 (H) 1.8 - 8.0 K/UL    ABS. LYMPHOCYTES 0.9 0.8 - 3.5 K/UL    ABS. MONOCYTES 0.8 0.0 - 1.0 K/UL    ABS. EOSINOPHILS 0.1 0.0 - 0.4 K/UL    ABS. BASOPHILS 0.1 0.0 - 0.1 K/UL    ABS. IMM. GRANS. 0.2 (H) 0.00 - 0.04 K/UL    DF AUTOMATED     METABOLIC PANEL, BASIC    Collection Time: 10/30/21  7:25 AM   Result Value Ref Range    Sodium 133 (L) 136 - 145 mmol/L    Potassium 6.0 (H) 3.5 - 5.1 mmol/L    Chloride 103 97 - 108 mmol/L    CO2 18 (L) 21 - 32 mmol/L    Anion gap 12 5 - 15 mmol/L    Glucose 76 65 - 100 mg/dL    BUN 46 (H) 6 - 20 mg/dL    Creatinine 2.69 (H) 0.55 - 1.02 mg/dL    BUN/Creatinine ratio 17 12 - 20      GFR est AA 23 (L) >60 ml/min/1.73m2    GFR est non-AA 19 (L) >60 ml/min/1.73m2    Calcium 8.8 8.5 - 10.1 mg/dL   HEPATIC FUNCTION PANEL    Collection Time: 10/30/21  7:25 AM   Result Value Ref Range    Protein, total 5.8 (L) 6.4 - 8.2 g/dL    Albumin 1.8 (L) 3.5 - 5.0 g/dL    Globulin 4.0 2.0 - 4.0 g/dL    A-G Ratio 0.5 (L) 1.1 - 2.2      Bilirubin, total 29.3 (H) 0.2 - 1.0 mg/dL    Bilirubin, direct 22.7 (H) 0.0 - 0.2 mg/dL    Alk. phosphatase 77 45 - 117 U/L    AST (SGOT) 191 (H) 15 - 37 U/L    ALT (SGPT) 76 12 - 78 U/L   AMMONIA    Collection Time: 10/30/21  7:25 AM   Result Value Ref Range    Ammonia 73 (H) <32 umol/L   PROTHROMBIN TIME + INR    Collection Time: 10/30/21  7:25 AM   Result Value Ref Range    Prothrombin time 16.3 (H) 11.9 - 14.7 sec    INR 1.3 (H) 0.9 - 1.1          CT ABD PELV WO CONT   Final Result   1. No acute findings. 2. Severe diffuse hepatic steatosis. 3. Cholelithiasis.             Dose reduction: All CT scans at this facility are performed using radiation dose   reduction optimization techniques as appropriate to a performed exam, including   the following: Automated exposure control (AEC), adjustment of the MAA and/or   KUB according to the patient's size, or the patient's use of iterative   reconstruction techniques (ASIR). XR CHEST PORT   Final Result   Similar exam to prior without findings of definite acute cardiopulmonary   abnormality. Assessment:     Active Problems:    Hyperbilirubinemia (10/25/2021)      Cirrhosis of liver ([address] (10/25/2021)         Alcohol abuse history,  Not able to get the hepatitis panel in this hospital lab which had been ordered twice  Alcohol induced acute hepatitis on chronic hepatitis, cirrhosis    Severe jaundice,  INR 1.3, abnormal renal function, worsening signs of acute liver failure,   No esophageal varices, no signs of severe portal hypertension, but patient had a gastric bypass, minimal gastric tissue left, with bleeding from jejunum, could be part of signs of portal hypertension  Plan:   Again.  Spent with patient and her  regarding abstain from alcohol use,  Followed by nephrologist regarding the diuretic adjustment  Follow the hemoglobin  Continue lactulose       Pepcid  as ordered         Signed By: Olinda Millard MD     October 30, 2021        Thank you for allowing me to participate in this patients care  Cc Referring Physician   Tamir Duarte

## 2021-10-31 NOTE — PROGRESS NOTES
Patient transferred from [facility] from room 413 and placed on ICU monitor. Patient presented with [id number] IV saline locked. Hypotensive BP: 60's/30's. Dr. Juan Jose Weiner paged. Type and cross sent. Attempt peripheral IV insertion with ultrasound machine- unsuccessful, anesthesia called. MRSA swab sent.  at bedside. Per. Dr. Juan Jose Weiner- Emergency release 1 unit of PRBC's STAT. Skin intact and second nurse skin assessment completed with [facility] RN.

## 2021-10-31 NOTE — PROGRESS NOTES
Hospitalist Progress Note    Subjective:   Daily Progress Note: 10/31/2021 8:12 AM    Hospital Course: Patient is a 48-year-old female with a PMH significant for morbid obesity, history of cirrhosis of the liver and recent elevated bilirubin. She came to the ER on 10/22 with elevated bilirubin of 16.7 declined admission at that time but returns to the emergency room on 1025 with complaints of weakness and lethargy. Significant labs bilirubin 26.1 and direct bili greater than 16, ammonia is 73, platelet count 95. CT suggestive of diffuse hepatic steatosis and cholelithiasis with no cholecystectomy. Previous US RP revealed findings suggestive for portal hypertension and cirrhosis, noted thickening gallbladder wall and cholecystitis, gallbladder sludge and probable gallstones. Admitted for further management of cholelithiasis, abdominal pain, nausea, liver failure with elevated ammonia and thrombocytopenia. GI consulted. Scheduled for EGD to assess for esophageal varices and portal hypertension. Esophagus revealing no varices, stomach status post gastrojejunostomy revealing large amounts of blood clots in stomach and anastomosis but no active bleeding. There is invariant jejunal loop bleeding large amount. Received 6 injections of epinephrine to stop the bleeding. BUN and creatinine and potassium rising most likely secondary to spironolactone. Nephrology consulted. Subjective: Patient says she feels fine. Denies any abdominal pain, nausea, or vomiting.      Current Facility-Administered Medications   Medication Dose Route Frequency    famotidine (PEPCID) tablet 10 mg  10 mg Oral BID    midodrine (PROAMATINE) tablet 10 mg  10 mg Oral TID    hydrOXYzine pamoate (VISTARIL) capsule 25 mg  25 mg Oral BID    traZODone (DESYREL) tablet 50 mg  50 mg Oral QHS PRN    influenza vaccine 2021-22 (6 mos+)(PF) (FLUARIX/FLULAVAL/FLUZONE QUAD) injection 0.5 mL  1 Each IntraMUSCular PRIOR TO DISCHARGE    lactulose (CHRONULAC) 10 gram/15 mL solution 30 mL  20 g Oral BID    sodium chloride (NS) flush 5-40 mL  5-40 mL IntraVENous Q8H    sodium chloride (NS) flush 5-40 mL  5-40 mL IntraVENous PRN    acetaminophen (TYLENOL) tablet 650 mg  650 mg Oral Q6H PRN    Or    acetaminophen (TYLENOL) suppository 650 mg  650 mg Rectal Q6H PRN    ondansetron (ZOFRAN ODT) tablet 4 mg  4 mg Oral Q6H PRN    Or    ondansetron (ZOFRAN) injection 4 mg  4 mg IntraVENous Q6H PRN    thiamine mononitrate (B-1) tablet 100 mg  100 mg Oral DAILY    b complex-vitamin c-folic acid 5mg (FOLBEE PLUS) tablet 1 Tablet  1 Tablet Oral DAILY    [Held by provider] spironolactone (ALDACTONE) tablet 25 mg  25 mg Oral BID        Review of Systems  Constitutional: No fevers, No chills, No sweats, No fatigue, No Weakness  Eyes: No redness  Ears, nose, mouth, throat, and face: No nasal congestion, No sore throat, No voice change  Respiratory: No Shortness of Breath, No cough, No wheezing  Cardiovascular: No chest pain, No palpitations, No extremity edema  Gastrointestinal: No nausea, No vomiting, No diarrhea, No abdominal pain  Genitourinary: No frequency, No dysuria, No hematuria  Integument/breast: No skin lesion(s)   Neurological: No Confusion, No headaches, No dizziness      Objective:     Visit Vitals  /65 (BP 1 Location: Left lower arm)   Pulse 80   Temp 98.4 °F (36.9 °C)   Resp 18   Ht 5' 6\" (1.676 m)   Wt 151.5 kg (334 lb)   SpO2 100%   BMI 53.91 kg/m²      O2 Device: None (Room air)    Temp (24hrs), Av.4 °F (36.9 °C), Min:98.3 °F (36.8 °C), Max:98.4 °F (36.9 °C)      No intake/output data recorded. 10/29 1901 - 10/31 0700  In: 5 [P.O.:540]  Out: -     PHYSICAL EXAM:  Constitutional: No acute distress  Skin: Extremities and face reveal no rashes. HEENT: Sclerae anicteric. Extra-occular muscles are intact. No oral ulcers. The neck is supple and no masses. Cardiovascular: Regular rate and rhythm.    Respiratory:  Clear breath sounds bilaterally with no wheezes, rales, or rhonchi. GI: Abdomen nondistended, soft, and nontender. Normal active bowel sounds. Musculoskeletal: No pitting edema of the lower legs. Able to move all ext  Neurological:  Patient is alert and oriented. Cranial nerves II-XII grossly intact  Psychiatric: Mood appears appropriate       Data Review    No results found for this or any previous visit (from the past 24 hour(s)). CBC:   Lab Results   Component Value Date/Time    WBC 10.5 10/30/2021 07:25 AM    RBC 2.95 (L) 10/30/2021 07:25 AM    HGB 10.6 (L) 10/30/2021 07:25 AM    HCT 31.2 (L) 10/30/2021 07:25 AM    PLATELET 035 73/66/6699 07:25 AM     BMP:   Lab Results   Component Value Date/Time    Glucose 76 10/30/2021 07:25 AM    Sodium 133 (L) 10/30/2021 07:25 AM    Potassium 6.0 (H) 10/30/2021 07:25 AM    Chloride 103 10/30/2021 07:25 AM    CO2 18 (L) 10/30/2021 07:25 AM    BUN 46 (H) 10/30/2021 07:25 AM    Creatinine 2.69 (H) 10/30/2021 07:25 AM    Calcium 8.8 10/30/2021 07:25 AM     Radiology review: CT of abdomen and pelvis    Assessment:   1. Hyperbilirubinemia/hepatocellular steatosis  2. Acute on chronic hepatitis cirrhosis  3. Alcohol abuse with elevated ammonia  4. Thrombocytopenia secondary to #2  5. Acute kidney injury with hyperkalemia  6. Alcohol abuse    Plan:    1.  GI consulted. Patient had an ERCP  on 10/29/2021 and is status post epinephrine injections. Bilirubin is trending down, liver function tests stable. On Pepcid 10 mg twice daily  2. CT of the abdomen pelvis showed severe hepatic steatosis. GI consulted. 3.  Ammonia level elevated. Patient is alert and orient x4. Started on lactulose  4. No signs of active bleeding. Hemoglobin is stable at 10.6. Platelet count 807.  5.  Nephrology consulted. Renal panel pending for today. Spironolactone held  6. Counseled concerning cessation for alcohol    Dispo:  Today or in 24 hours pending clearance from nephrology and morning blood work    CODE STATUS full code     DVT prophylaxis: SCD  Ulcer prophylaxis: [address] discussed with: Patient/Family, Nurse and     Total time spent with patient: 34 minutes.

## 2021-10-31 NOTE — CONSULTS
NAME:  Quincy Moralez   :   1975   MRN:   [id number]     ATTENDING: Manisha Du MD  PCP:  Allison Arevalo    Date/Time:  10/30/2021 9:56 PM      Subjective:   REQUESTING PHYSICIAN: Urvashi Enriquez np  REASON FOR CONSULT:   BRITTANY    History of presenting illness: Patient is a 79-year-old -American female with past medical history of cirrhosis of liver, obesity, presented to the emergency room with complaints of generalized weakness. Labs done this morning showed a high creatinine of 2.69 and a potassium of 6.0. Nephrology consultation is requested for further evaluation and management. Patient seen at bedside, she is alert awake and well oriented, asking if she can go home today. She has some complaints of swelling in her lower extremities and she was taking her diuretics as outpatient. She has complaints of swelling in her lower extremities now, she has some chronic distention of her abdomen, no acute shortness of breath or distress. She admits to increased p.o. fluid consumption, still consuming alcohol. No complaints of any abdominal pain, no voiding difficulties. No history of NSAID use. Review of old labs showed normal renal functions on 10/22        Past Medical History:   Diagnosis Date    Cirrhosis ([address]     GERD (gastroesophageal reflux disease)     Morbid obesity (HCC)       Past Surgical History:   Procedure Laterality Date    HX GASTRIC BYPASS       Social History     Tobacco Use    Smoking status: Never Smoker    Smokeless tobacco: Never Used   Substance Use Topics    Alcohol use: Not Currently     Comment: Occasionally      Family History   Problem Relation Age of Onset    Diabetes Mother     Diabetes Father     Heart Attack Father        No Known Allergies   Prior to Admission medications    Medication Sig Start Date End Date Taking? Authorizing Provider   lactulose (CHRONULAC) 10 gram/15 mL solution Take 30 mL by mouth two (2) times a day for 8 days. 10/22/21 10/30/21  Krystin Collins NP   potassium chloride SR (KLOR-CON 10) 10 mEq tablet Take 20 mEq by mouth as needed. 9/30/20   Provider, Historical   omeprazole (PRILOSEC) 20 mg capsule Take 20 mg by mouth daily. 9/30/20   Provider, Historical   furosemide (LASIX) 20 mg tablet Take 40 mg by mouth two (2) times a day. Jose Lorenz MD   CYANOCOBALAMIN, VITAMIN B-12, PO Take 1,000 mcg by mouth daily. Jose Lorenz MD   ferrous sulfate (IRON) 325 mg (65 mg iron) EC tablet Take 325 mg by mouth three (3) times daily (with meals).     Jose Lorenz MD     Current Facility-Administered Medications   Medication Dose Route Frequency    midodrine (PROAMATINE) tablet 5 mg  5 mg Oral BID WITH MEALS    famotidine (PEPCID) tablet 10 mg  10 mg Oral BID    hydrOXYzine pamoate (VISTARIL) capsule 25 mg  25 mg Oral BID    traZODone (DESYREL) tablet 50 mg  50 mg Oral QHS PRN    influenza vaccine 2021-22 (6 mos+)(PF) (FLUARIX/FLULAVAL/FLUZONE QUAD) injection 0.5 mL  1 Each IntraMUSCular PRIOR TO DISCHARGE    lactulose (CHRONULAC) 10 gram/15 mL solution 30 mL  20 g Oral BID    sodium chloride (NS) flush 5-40 mL  5-40 mL IntraVENous Q8H    sodium chloride (NS) flush 5-40 mL  5-40 mL IntraVENous PRN    acetaminophen (TYLENOL) tablet 650 mg  650 mg Oral Q6H PRN    Or    acetaminophen (TYLENOL) suppository 650 mg  650 mg Rectal Q6H PRN    ondansetron (ZOFRAN ODT) tablet 4 mg  4 mg Oral Q6H PRN    Or    ondansetron (ZOFRAN) injection 4 mg  4 mg IntraVENous Q6H PRN    thiamine mononitrate (B-1) tablet 100 mg  100 mg Oral DAILY    b complex-vitamin c-folic acid 5mg (FOLBEE PLUS) tablet 1 Tablet  1 Tablet Oral DAILY    [Held by provider] spironolactone (ALDACTONE) tablet 25 mg  25 mg Oral BID       REVIEW OF SYSTEMS:     Complaints as mentioned in the history of presenting illness, no other significant complaints noted on complete review of systems    Objective:   VITALS:    Visit Vitals  /64   Pulse 94   Temp 98.3 °F (36.8 °C)   Resp 20   Ht 5' 6\" (1.676 m)   Wt 151.5 kg (334 lb)   SpO2 100%   BMI 53.91 kg/m²     Temp (24hrs), Av.3 °F (36.8 °C), Min:98.3 °F (36.8 °C), Max:98.3 °F (36.8 °C)      PHYSICAL EXAM:   General: alert awake well-oriented, no acute distress. HEENT: EOMI, no Icterus, no Pallor, pupils reactive, mucosa moist, normal inspection of ears and nose,   Neck: Neck is supple, No JVD, no thyromegaly  Lungs: breathsounds normal, no respiratory distress on inspection, no rhonchi, no rales,  CVS: heart sounds normal,  no murmurs, no rubs. GI: soft, distended, nontender, normal BS, no palpable organomegaly  Extremeties: no cyanosis, 1-2+ bilateral lower extremity edema present  Neuro: Alert, awake, oriented x3, speech is normal, moving all extremeties well. Skin: normal skin turgor, no skin rashes   Musculoskeletal: no acute joint swellings    LAB DATA REVIEWED:    Recent Results (from the past 24 hour(s))   CBC WITH AUTOMATED DIFF    Collection Time: 10/30/21  7:25 AM   Result Value Ref Range    WBC 10.5 3.6 - 11.0 K/uL    RBC 2.95 (L) 3.80 - 5.20 M/uL    HGB 10.6 (L) 11.5 - 16.0 g/dL    HCT 31.2 (L) 35.0 - 47.0 %    .8 (H) 80.0 - 99.0 FL    MCH 35.9 (H) 26.0 - 34.0 PG    MCHC 34.0 30.0 - 36.5 g/dL    RDW 21.9 (H) 11.5 - 14.5 %    PLATELET 469 966 - 956 K/uL    MPV 11.9 8.9 - 12.9 FL    NRBC 0.0 0.0  WBC    ABSOLUTE NRBC 0.00 0.00 - 0.01 K/uL    NEUTROPHILS 79 (H) 32 - 75 %    LYMPHOCYTES 9 (L) 12 - 49 %    MONOCYTES 8 5 - 13 %    EOSINOPHILS 1 0 - 7 %    BASOPHILS 1 0 - 1 %    IMMATURE GRANULOCYTES 2 (H) 0 - 0.5 %    ABS. NEUTROPHILS 8.4 (H) 1.8 - 8.0 K/UL    ABS. LYMPHOCYTES 0.9 0.8 - 3.5 K/UL    ABS. MONOCYTES 0.8 0.0 - 1.0 K/UL    ABS. EOSINOPHILS 0.1 0.0 - 0.4 K/UL    ABS. BASOPHILS 0.1 0.0 - 0.1 K/UL    ABS. IMM.  GRANS. 0.2 (H) 0.00 - 0.04 K/UL    DF AUTOMATED     METABOLIC PANEL, BASIC    Collection Time: 10/30/21  7:25 AM   Result Value Ref Range    Sodium 133 (L) 136 - 145 mmol/L Potassium 6.0 (H) 3.5 - 5.1 mmol/L    Chloride 103 97 - 108 mmol/L    CO2 18 (L) 21 - 32 mmol/L    Anion gap 12 5 - 15 mmol/L    Glucose 76 65 - 100 mg/dL    BUN 46 (H) 6 - 20 mg/dL    Creatinine 2.69 (H) 0.55 - 1.02 mg/dL    BUN/Creatinine ratio 17 12 - 20      GFR est AA 23 (L) >60 ml/min/1.73m2    GFR est non-AA 19 (L) >60 ml/min/1.73m2    Calcium 8.8 8.5 - 10.1 mg/dL   HEPATIC FUNCTION PANEL    Collection Time: 10/30/21  7:25 AM   Result Value Ref Range    Protein, total 5.8 (L) 6.4 - 8.2 g/dL    Albumin 1.8 (L) 3.5 - 5.0 g/dL    Globulin 4.0 2.0 - 4.0 g/dL    A-G Ratio 0.5 (L) 1.1 - 2.2      Bilirubin, total 29.3 (H) 0.2 - 1.0 mg/dL    Bilirubin, direct 22.7 (H) 0.0 - 0.2 mg/dL    Alk. phosphatase 77 45 - 117 U/L    AST (SGOT) 191 (H) 15 - 37 U/L    ALT (SGPT) 76 12 - 78 U/L   AMMONIA    Collection Time: 10/30/21  7:25 AM   Result Value Ref Range    Ammonia 73 (H) <32 umol/L   PROTHROMBIN TIME + INR    Collection Time: 10/30/21  7:25 AM   Result Value Ref Range    Prothrombin time 16.3 (H) 11.9 - 14.7 sec    INR 1.3 (H) 0.9 - 1.1         Assessment and plan   1. Acute Kidney Injury on CKD: probably prerenal azotemia secondary to hypotension/ use of Lasix   Recommend to check urinalysis,urine random electrolytes, creatinine and protein. Check CPK levels to rule out rhabdomyolysis   Will get an ultrasound of kidneys and bladder to rule out obstruction if no improvement in renal functions in next 24 hours  will continue to monitor renal functions and adjust management as needed   no history of chronic kidney disease    2. Hyperbilirubinemia/chronic liver disease/suspected cirrhosis  Alcohol dependence:   Acute liver injury on chronic liver disease, probably alcohol-related  Continue to monitor with abstinence from alcohol  GI following the patient    3.  Hypotension: Probably related to liver disease   increase the dose of midodrine to 10 mg p.o. 3 times daily   We will give 1 dose of IV albumin for hemodynamic support    will resume diuretics in next 24 to 48 hours for lower extremity edema   Will continue to monitor and blood pressures    4. Lower extremity edema: probably related to chronic liver disease and obesity   Will avoid any IV hydration at this time   will resume diuretics in next 24 hours based on renal functions  advised to maintain p.o. fluid restriction of 3581-9005 ml per day and and salt restriction,  Will monitor fluid status clinically and adjust diuretics as needed.      Hyperkalemia: High potassium of 6.0 is probably a lab error with hemolysis  Repeat potassium levels    Metabolic acidosis:Secondary to acute kidney injury   CO2 level is 18  Will continue to monitor Co2 levels    Anemia: Stable hemoglobin  No complaints of any GI bleed  Continue to monitor H&H    Thank you for the consultation      Signed: Loyda Anand MD

## 2021-10-31 NOTE — PROGRESS NOTES
Patient had 3X bright red bloody stool. Iron Hernandez made aware order for  Stat H&H placed.   Patient made aware of new orders

## 2021-10-31 NOTE — PROGRESS NOTES
OT consult acknowledged. Patient received sitting in recliner at bedside, 2 visitors present. Patient requested therapist come back at another time . Will attempt evaluation next visit.

## 2021-10-31 NOTE — PROGRESS NOTES
Spoke with Dr. Hoa Kenny as per Efren AARON. Informed Dr. Hoa Kenny that patient was having large BM's with bright red blood clots, also that we were awaiting h&h results, stated to call him back with results and make NPO. Order placed for NPO status.

## 2021-11-01 NOTE — ANESTHESIA PROCEDURE NOTES
Central Line Placement    Start time: 11/1/2021 6:45 PM  End time: 11/1/2021 7:00 PM  Performed by: Ame Bowman MD  Authorized by: Ame Bowman MD     Indications: vascular access  Preanesthetic Checklist: patient identified, risks and benefits discussed, anesthesia consent, site marked, patient being monitored and timeout performed    Timeout Time: 18:50 EDT       Pre-procedure: All elements of maximal sterile barrier technique followed? Yes    2% Chlorhexidine for cutaneous antisepsis, Hand hygiene performed prior to catheter insertion and Ultrasound guidance    Sterile Ultrasound Technique followed?: Yes            Procedure:   Prep:  Chlorhexidine  Location: internal jugular  Orientation:  Left  Patient position:  Trendelenburg  Catheter type:  Triple lumen  Catheter size:  7 Fr  Catheter length:  20 cm  Number of attempts:  1  Successful placement: Yes      Assessment:   Post-procedure:  Catheter secured, sterile dressing applied and sterile dressing with CHG applied  Assessment:  Blood return through all ports, free fluid flow, guidewire removal verified and other  Insertion:  Uncomplicated  Patient tolerance:  Patient tolerated the procedure well with no immediate complications  Sterile precautions utilized. Site prepped and patient draped. Patient skin localized with 2% lidocaine as needed. Ultrasound used and access achieved with Angiocath over hollow needle. Central line transduction achieved with extension tubing and column was allowed to fall. Wire inserted with no presence of ectopy during insertion. Skin nick was made with knife blade at insertion site and dilated without complication. Central line catheter was advanced over wire. Wire removed without kink. Lines draw back with ease and flush with ease. Needleless connectors applied to each line. CVC sutured to skin, biopatch applied, and sterile dressing applied. RN placed orders for stat chest x-ray to verify placement.  Patient vital signs stable and no change in patient condition post placement.

## 2021-11-01 NOTE — PROGRESS NOTES
Hospitalist Progress Note               Daily Progress Note: 11/1/2021      Subjective:   Hospital course to date: Patient is a 28-year-old female with a PMH significant for morbid obesity, history of cirrhosis of the liver and recent elevated bilirubin.  She came to the ER on 10/22 with elevated bilirubin of 16.7 declined admission at that time but returns to the emergency room on 1025 with complaints of weakness and lethargy.  Significant labs bilirubin 26.1 and direct bili greater than 16, ammonia is 73, platelet count 95. CT suggestive of diffuse hepatic steatosis and cholelithiasis with no cholecystectomy.  Previous US RP revealed findings suggestive for portal hypertension and cirrhosis, noted thickening gallbladder wall and cholecystitis, gallbladder sludge and probable gallstones.  Admitted for further management of cholelithiasis, abdominal pain, nausea, liver failure with elevated ammonia and thrombocytopenia.  GI consulted.  Scheduled for EGD to assess for esophageal varices and portal hypertension.   EGD demonstrated no evidence for varices. There was a large amount of blood clots noted in the stomach. Patient is status post Billroth II anastomosis. Efferent loop showed some small bowel bleeding and erosions. This was treated with epinephrine injections       Patient with worsening renal function starting on 10/30. Creatinine was up to 4.0 yesterday. She has several large bloody stools on 10/31, became hypotensive and was transferred to the ICU. Hemoglobin dropped to 7.4 from 10.6 today previous. After transfer to ICU blood pressure dropped into the 55C systolic. She was given a total of 3 units of blood. She was started on IV octreotide    -------     Patient is seen today for follow-up. She continues to have bloody stools. She is on IV octreotide    Liver chemistries today show a bilirubin of 21.4. Creatinine is 4.7 with a BUN of 65, potassium 4.7.       Hemoglobin this morning is 7.1.  Blood pressure is 81 systolic    No urine output overnight but no Arana in place    Problem List:  Problem List as of 11/1/2021 Date Reviewed: 10/25/2021        Codes Class Noted - Resolved    Hyperbilirubinemia ICD-10-CM: E80.6  ICD-9-CM: 782.4  10/25/2021 - Present        Cirrhosis of liver ([facility] ICD-10-CM: K74.60  ICD-9-CM: 571.5  10/25/2021 - Present        Postop check ICD-10-CM: V66  ICD-9-CM: V67.00  10/26/2020 - Present        Bleeding from varicose vein ICD-10-CM: I83.899  ICD-9-CM: 454.8  10/20/2020 - Present        Obesity, morbid ([facility].) ICD-10-CM: E66.01  ICD-9-CM: 278.01  10/13/2020 - Present        Venous stasis ulcer ([facility].) ICD-10-CM: I83.009, L97.909  ICD-9-CM: 454.0  10/13/2020 - Present              Medications reviewed  Current Facility-Administered Medications   Medication Dose Route Frequency    pantoprazole (PROTONIX) 40 mg in 0.9% sodium chloride 10 mL injection  40 mg IntraVENous Q12H    0.9% sodium chloride infusion  100 mL/hr IntraVENous CONTINUOUS    octreotide (SANDOSTATIN) 500 mcg in 0.9% sodium chloride 500 mL infusion  25 mcg/hr IntraVENous CONTINUOUS    0.9% sodium chloride infusion 250 mL  250 mL IntraVENous PRN    0.9% sodium chloride infusion 250 mL  250 mL IntraVENous PRN    famotidine (PEPCID) tablet 10 mg  10 mg Oral BID    midodrine (PROAMATINE) tablet 10 mg  10 mg Oral TID    hydrOXYzine pamoate (VISTARIL) capsule 25 mg  25 mg Oral BID    traZODone (DESYREL) tablet 50 mg  50 mg Oral QHS PRN    influenza vaccine 2021-22 (6 mos+)(PF) (FLUARIX/FLULAVAL/FLUZONE QUAD) injection 0.5 mL  1 Each IntraMUSCular PRIOR TO DISCHARGE    lactulose (CHRONULAC) 10 gram/15 mL solution 30 mL  20 g Oral BID    sodium chloride (NS) flush 5-40 mL  5-40 mL IntraVENous Q8H    sodium chloride (NS) flush 5-40 mL  5-40 mL IntraVENous PRN    acetaminophen (TYLENOL) tablet 650 mg  650 mg Oral Q6H PRN    Or    acetaminophen (TYLENOL) suppository 650 mg  650 mg Rectal Q6H PRN    ondansetron (ZOFRAN ODT) tablet 4 mg  4 mg Oral Q6H PRN    Or    ondansetron (ZOFRAN) injection 4 mg  4 mg IntraVENous Q6H PRN    thiamine mononitrate (B-1) tablet 100 mg  100 mg Oral DAILY    b complex-vitamin c-folic acid 5mg (FOLBEE PLUS) tablet 1 Tablet  1 Tablet Oral DAILY    [Held by provider] spironolactone (ALDACTONE) tablet 25 mg  25 mg Oral BID       Review of Systems:   A comprehensive review of systems was negative except for that written in the HPI. Objective:   Physical Exam:     Visit Vitals  BP (!) 92/29 (BP 1 Location: Right upper arm, BP Patient Position: At rest)   Pulse 70   Temp 97.5 °F (36.4 °C)   Resp 20   Ht 5' 6\" (1.676 m)   Wt 156.4 kg (344 lb 12.8 oz)   SpO2 99%   BMI 55.65 kg/m²      O2 Device: None (Room air)    Temp (24hrs), Av.7 °F (36.5 °C), Min:97.4 °F (36.3 °C), Max:98.4 °F (36.9 °C)    No intake/output data recorded. 10/30 1901 -  0700  In: 2720 [P.O.:400; I.V.:1000]  Out: 2     General:   Awake and alert   Lungs:   Clear to auscultation bilaterally. Chest wall:  No tenderness or deformity. Heart:  Regular rate and rhythm, S1, S2 normal, no murmur, click, rub or gallop. Abdomen:   Soft, non-tender. Bowel sounds normal. No masses,  No organomegaly. Extremities: Extremities normal, atraumatic, no cyanosis or edema. Pulses: 2+ and symmetric all extremities. Skin: Skin color, texture, turgor normal. No rashes or lesions   Neurologic: CNII-XII intact.   No gross focal deficits         Data Review:       Recent Days:  Recent Labs     10/31/21  1530 10/30/21  0725   WBC  --  10.5   HGB 7.4* 10.6*   HCT 20.8* 31.2*   PLT  --  189     Recent Labs     21  0620 10/31/21  0858 10/30/21  0725    136 133*   K 4.7 4.7 6.0*   * 106 103   CO2 16* 17* 18*   GLU 82 79 76   BUN 65* 59* 46*   CREA 4.72* 4.05* 2.69*   CA 7.6* 8.4* 8.8   PHOS 5.3* 4.2  --    ALB 1.3*  1.3* 1.6*  1.7* 1.8*   TBILI 21.4* 23.1* 29.3*   ALT 61 64 76   INR  --   --  1.3*     No results for input(s): PH, PCO2, PO2, HCO3, FIO2 in the last 72 hours. 24 Hour Results:  Recent Results (from the past 24 hour(s))   RENAL FUNCTION PANEL    Collection Time: 10/31/21  8:58 AM   Result Value Ref Range    Sodium 136 136 - 145 mmol/L    Potassium 4.7 3.5 - 5.1 mmol/L    Chloride 106 97 - 108 mmol/L    CO2 17 (L) 21 - 32 mmol/L    Anion gap 13 5 - 15 mmol/L    Glucose 79 65 - 100 mg/dL    BUN 59 (H) 6 - 20 mg/dL    Creatinine 4.05 (H) 0.55 - 1.02 mg/dL    BUN/Creatinine ratio 15 12 - 20      GFR est AA 14 (L) >60 ml/min/1.73m2    GFR est non-AA 12 (L) >60 ml/min/1.73m2    Calcium 8.4 (L) 8.5 - 10.1 mg/dL    Phosphorus 4.2 2.6 - 4.7 mg/dL    Albumin 1.7 (L) 3.5 - 5.0 g/dL   CK    Collection Time: 10/31/21  8:58 AM   Result Value Ref Range    CK 45 26 - 192 U/L   HEPATIC FUNCTION PANEL    Collection Time: 10/31/21  8:58 AM   Result Value Ref Range    Protein, total 4.8 (L) 6.4 - 8.2 g/dL    Albumin 1.6 (L) 3.5 - 5.0 g/dL    Globulin 3.2 2.0 - 4.0 g/dL    A-G Ratio 0.5 (L) 1.1 - 2.2      Bilirubin, total 23.1 (H) 0.2 - 1.0 mg/dL    Bilirubin, direct 19.2 (H) 0.0 - 0.2 mg/dL    Alk.  phosphatase 60 45 - 117 U/L    AST (SGOT) 176 (H) 15 - 37 U/L    ALT (SGPT) 64 12 - 78 U/L   HGB & HCT    Collection Time: 10/31/21  3:30 PM   Result Value Ref Range    HGB 7.4 (L) 11.5 - 16.0 g/dL    HCT 20.8 (L) 35.0 - 47.0 %   MRSA SCREEN - PCR (NASAL)    Collection Time: 10/31/21  6:00 PM   Result Value Ref Range    MRSA by PCR, Nasal Not Detected Not Detected     TYPE & SCREEN    Collection Time: 10/31/21  6:40 PM   Result Value Ref Range    Crossmatch Expiration 11/03/2021,2359     ABO/Rh(D) Colman Sicard Positive     Antibody screen Negative     Unit number [id number]     Blood component type  LR     Unit division 00     Status of unit Issued     UNIT TAG COMMENT Emergency Release     TRANSFUSION STATUS Ok to transfuse     Crossmatch result Emergency Release     Unit number [id number]     Blood component type  LR     Unit division 00     Status of unit [address] to transfuse     Crossmatch result Compatible     Unit number [id number]     Blood component type RC LR     Unit division 00     Status of unit [address] to transfuse     Crossmatch result Compatible    HEPATIC FUNCTION PANEL    Collection Time: 11/01/21  6:20 AM   Result Value Ref Range    Protein, total 3.6 (L) 6.4 - 8.2 g/dL    Albumin 1.3 (L) 3.5 - 5.0 g/dL    Globulin 2.3 2.0 - 4.0 g/dL    A-G Ratio 0.6 (L) 1.1 - 2.2      Bilirubin, total 21.4 (H) 0.2 - 1.0 mg/dL    Bilirubin, direct 18.4 (H) 0.0 - 0.2 mg/dL    Alk. phosphatase 53 45 - 117 U/L    AST (SGOT) 151 (H) 15 - 37 U/L    ALT (SGPT) 61 12 - 78 U/L   RENAL FUNCTION PANEL    Collection Time: 11/01/21  6:20 AM   Result Value Ref Range    Sodium 139 136 - 145 mmol/L    Potassium 4.7 3.5 - 5.1 mmol/L    Chloride 110 (H) 97 - 108 mmol/L    CO2 16 (L) 21 - 32 mmol/L    Anion gap 13 5 - 15 mmol/L    Glucose 82 65 - 100 mg/dL    BUN 65 (H) 6 - 20 mg/dL    Creatinine 4.72 (H) 0.55 - 1.02 mg/dL    BUN/Creatinine ratio 14 12 - 20      GFR est AA 12 (L) >60 ml/min/1.73m2    GFR est non-AA 10 (L) >60 ml/min/1.73m2    Calcium 7.6 (L) 8.5 - 10.1 mg/dL    Phosphorus 5.3 (H) 2.6 - 4.7 mg/dL    Albumin 1.3 (L) 3.5 - 5.0 g/dL       CT ABD PELV WO CONT   Final Result   1. No acute findings. 2. Severe diffuse hepatic steatosis. 3. Cholelithiasis. Dose reduction: All CT scans at this facility are performed using radiation dose   reduction optimization techniques as appropriate to a performed exam, including   the following: Automated exposure control (AEC), adjustment of the MAA and/or   KUB according to the patient's size, or the patient's use of iterative   reconstruction techniques (ASIR). XR CHEST PORT   Final Result   Similar exam to prior without findings of definite acute cardiopulmonary   abnormality.            Assessment:  Acute upper gastrointestinal bleeding, appears due to jejunal bleeding. Status post EGD with epinephrine injections. Patient with recurrent bleeding on 10/31    Hypovolemic shock due to above    Acute blood loss anemia    End-stage alcoholic cirrhosis with hepatic failure; MELD-Na  score is 34 with estimated 90-day mortality of 65%    Acute kidney injury, probably ATN due to hypotension. Likely component of hepatorenal syndrome    Alcohol abuse; patient was still drinking about 9 beers daily just prior to admission    Nonanion gap metabolic acidosis, likely due to BRITTANY      Plan:  Transfuse another 3 units of packed red cells  I tried to contact Dr. Shonna Ocampo regarding repeat endoscopy. We will keep trying to reach him  Keep n.p.o. Continue IV octreotide  Place Arana catheter    I had a very aracely discussion with patient regarding her very poor prognosis. At this time she wishes to remain full code      Care Plan discussed with: Patient/Family    Disposition: Continued ICU care    I personally spent   45   minutes of critical care time. This is time spent at this critically ill patient's bedside actively involved in patient care as well as the coordination of care and discussions with the patient's family. This does not include any procedural time which has been billed separately. Total time spent with patient: 30 minutes.     Lola Dee MD

## 2021-11-01 NOTE — ROUTINE PROCESS
Bedside and Verbal shift change report given to Venice Waller RN (oncoming nurse) by Simona Lomax RN (offgoing nurse). Report included the following information SBAR, Kardex, Intake/Output, Recent Results and Dual Neuro Assessment, cardiac rhythm.

## 2021-11-01 NOTE — PROGRESS NOTES
Renal Progress Note    Patient: Genie Sanchez MRN: [id number]  SSN: [id number]    YOB: 1975  Age: 51 y.o. Sex: female      Admit Date: 10/25/2021    LOS: 7 days     Subjective:   Patient seen in ICU. Alert and awake, no acute distress. LGI bleed +  Hb dropped to 7.1 today, receving pRBC tx   Her creatinine has increased to 4.7 today  She denies any voiding difficulties, lower extremity edema present        Current Facility-Administered Medications   Medication Dose Route Frequency    NOREPINephrine (LEVOPHED) 8 mg in 0.9% NS 250ml infusion  0.5-120 mcg/min IntraVENous TITRATE    succinylcholine (ANECTINE) 20 mg/mL injection        propofol (DIPRIVAN) 10 mg/mL infusion  0-50 mcg/kg/min IntraVENous TITRATE    0.9% sodium chloride infusion  75 mL/hr IntraVENous PRN    EPINEPHrine (ADRENALIN) 0.1 mg/mL syringe    PRN    simethicone (MYLICON) 77EN/5.3UK oral drops    PRN    0.9% sodium chloride infusion 250 mL  250 mL IntraVENous PRN    piperacillin-tazobactam (ZOSYN) 3.375 g in 0.9% sodium chloride (MBP/ADV) 100 mL MBP  3.375 g IntraVENous Q12H    dexmedeTOMidine (PRECEDEX) 400 mcg in 0.9% sodium chloride (MBP/ADV) 100 mL MBP  0.1-1.5 mcg/kg/hr IntraVENous TITRATE    0.9% sodium chloride 1,000 mL with mvi (adult no. 4 with vit K) 10 mL, thiamine 662 mg, folic acid 1 mg infusion   IntraVENous Q24H    vecuronium (NORCURON) 10 mg injection        pantoprazole (PROTONIX) 40 mg in 0.9% sodium chloride 10 mL injection  40 mg IntraVENous Q12H    octreotide (SANDOSTATIN) 500 mcg in 0.9% sodium chloride 500 mL infusion  25 mcg/hr IntraVENous CONTINUOUS    0.9% sodium chloride infusion 250 mL  250 mL IntraVENous PRN    famotidine (PEPCID) tablet 10 mg  10 mg Oral BID    midodrine (PROAMATINE) tablet 10 mg  10 mg Oral TID    hydrOXYzine pamoate (VISTARIL) capsule 25 mg  25 mg Oral BID    traZODone (DESYREL) tablet 50 mg  50 mg Oral QHS PRN    influenza vaccine 2021-22 (6 mos+)(PF) (FLUARIX/FLULAVAL/FLUZONE QUAD) injection 0.5 mL  1 Each IntraMUSCular PRIOR TO DISCHARGE    lactulose (CHRONULAC) 10 gram/15 mL solution 30 mL  20 g Oral BID    sodium chloride (NS) flush 5-40 mL  5-40 mL IntraVENous Q8H    sodium chloride (NS) flush 5-40 mL  5-40 mL IntraVENous PRN    acetaminophen (TYLENOL) tablet 650 mg  650 mg Oral Q6H PRN    Or    acetaminophen (TYLENOL) suppository 650 mg  650 mg Rectal Q6H PRN    ondansetron (ZOFRAN ODT) tablet 4 mg  4 mg Oral Q6H PRN    Or    ondansetron (ZOFRAN) injection 4 mg  4 mg IntraVENous Q6H PRN    [Held by provider] thiamine mononitrate (B-1) tablet 100 mg  100 mg Oral DAILY    [Held by provider] b complex-vitamin c-folic acid 5mg (FOLBEE PLUS) tablet 1 Tablet  1 Tablet Oral DAILY    [Held by provider] spironolactone (ALDACTONE) tablet 25 mg  25 mg Oral BID        Vitals:    11/01/21 1611 11/01/21 1612 11/01/21 1626 11/01/21 1700   BP: (!) 84/55  (!) 95/51    Pulse: 89 88 88 91   Resp: 26 27 25 25   Temp: 97.3 °F (36.3 °C)  97.2 °F (36.2 °C)    SpO2: 100% 100% 100% 100%   Weight:       Height:         Objective:   General: alert awake well-oriented, no acute distress. HEENT: EOMI, Icterus+, no Pallor, pupils reactive, mucosa moist,  Neck: Neck is supple, No JVD, no thyromegaly  Lungs: breathsounds normal,  no rhonchi, no rales,  CVS: heart sounds normal,  no murmurs, no rubs. GI: soft, distended, nontender, normal BS,   Extremeties: no cyanosis, 1-2+ bilateral lower extremity edema present  Neuro: Alert, awake, oriented x3, speech is normal, moving all extremeties well. Skin: normal skin turgor, no skin rashes      Intake and Output:  Current Shift: No intake/output data recorded. Last three shifts: 10/31 0701 - 11/01 1900  In: 5560.9 [P.O.:400;  I.V.:1700]  Out: 2       Lab/Data Review:  Recent Labs     11/01/21  1730 11/01/21  0620 10/31/21  1530 10/30/21  0725 10/30/21  0725   WBC 18.7* 12.7*  --   --  10.5   HGB 10.6* 7.1* 7.4*   < > 10.6*   HCT 30.4* 20.5* 20.8*   < > 31.2*   * 145*  --   --  189    < > = values in this interval not displayed. Recent Labs     11/01/21  0620 10/31/21  0858 10/30/21  0725    136 133*   K 4.7 4.7 6.0*   * 106 103   CO2 16* 17* 18*   GLU 82 79 76   BUN 65* 59* 46*   CREA 4.72* 4.05* 2.69*   CA 7.6* 8.4* 8.8   PHOS 5.3* 4.2  --    ALB 1.3*  1.3* 1.6*  1.7* 1.8*   TBILI 21.4* 23.1* 29.3*   ALT 61 64 76   INR  --   --  1.3*     Recent Labs     11/01/21  1550   PH 7.31*   PCO2 23*   PO2 121*   HCO3 14*   FIO2 50.0     Recent Results (from the past 24 hour(s))   CBC WITH AUTOMATED DIFF    Collection Time: 11/01/21  6:20 AM   Result Value Ref Range    WBC 12.7 (H) 3.6 - 11.0 K/uL    RBC 2.16 (L) 3.80 - 5.20 M/uL    HGB 7.1 (L) 11.5 - 16.0 g/dL    HCT 20.5 (L) 35.0 - 47.0 %    MCV 94.9 80.0 - 99.0 FL    MCH 32.9 26.0 - 34.0 PG    MCHC 34.6 30.0 - 36.5 g/dL    RDW 22.5 (H) 11.5 - 14.5 %    PLATELET 501 (L) 426 - 400 K/uL    MPV 12.2 8.9 - 12.9 FL    NRBC 0.0 0.0  WBC    ABSOLUTE NRBC 0.00 0.00 - 0.01 K/uL    NEUTROPHILS 77 (H) 32 - 75 %    BAND NEUTROPHILS 7 (H) 0 - 6 %    LYMPHOCYTES 9 (L) 12 - 49 %    MONOCYTES 5 5 - 13 %    EOSINOPHILS 2 0 - 7 %    BASOPHILS 0 0 - 1 %    IMMATURE GRANULOCYTES 0 %    ABS. NEUTROPHILS 10.7 (H) 1.8 - 8.0 K/UL    ABS. LYMPHOCYTES 1.1 0.8 - 3.5 K/UL    ABS. MONOCYTES 0.6 0.0 - 1.0 K/UL    ABS. EOSINOPHILS 0.3 0.0 - 0.4 K/UL    ABS. BASOPHILS 0.0 0.0 - 0.1 K/UL    ABS. IMM.  GRANS. 0.0 K/UL    DF Manual      RBC COMMENTS Anisocytosis  2+        RBC COMMENTS Macrocytosis  1+        RBC COMMENTS Hypochromia  1+        RBC COMMENTS Monique cells  1+        RBC COMMENTS Teardrop cells  1+       HEPATIC FUNCTION PANEL    Collection Time: 11/01/21  6:20 AM   Result Value Ref Range    Protein, total 3.6 (L) 6.4 - 8.2 g/dL    Albumin 1.3 (L) 3.5 - 5.0 g/dL    Globulin 2.3 2.0 - 4.0 g/dL    A-G Ratio 0.6 (L) 1.1 - 2.2      Bilirubin, total 21.4 (H) 0.2 - 1.0 mg/dL    Bilirubin, direct 18. 4 (H) 0.0 - 0.2 mg/dL    Alk. phosphatase 53 45 - 117 U/L    AST (SGOT) 151 (H) 15 - 37 U/L    ALT (SGPT) 61 12 - 78 U/L   RENAL FUNCTION PANEL    Collection Time: 11/01/21  6:20 AM   Result Value Ref Range    Sodium 139 136 - 145 mmol/L    Potassium 4.7 3.5 - 5.1 mmol/L    Chloride 110 (H) 97 - 108 mmol/L    CO2 16 (L) 21 - 32 mmol/L    Anion gap 13 5 - 15 mmol/L    Glucose 82 65 - 100 mg/dL    BUN 65 (H) 6 - 20 mg/dL    Creatinine 4.72 (H) 0.55 - 1.02 mg/dL    BUN/Creatinine ratio 14 12 - 20      GFR est AA 12 (L) >60 ml/min/1.73m2    GFR est non-AA 10 (L) >60 ml/min/1.73m2    Calcium 7.6 (L) 8.5 - 10.1 mg/dL    Phosphorus 5.3 (H) 2.6 - 4.7 mg/dL    Albumin 1.3 (L) 3.5 - 5.0 g/dL   CHLORIDE, URINE RANDOM    Collection Time: 11/01/21 11:50 AM   Result Value Ref Range    Chloride,urine random 25 mmol/L   PROTEIN/CREATININE RATIO, URINE    Collection Time: 11/01/21 11:50 AM   Result Value Ref Range    Protein, urine random 146 (H) 0.0 - 11.9 mg/dL    Creatinine, urine 177.00 mg/dL    Protein/Creat.  urine Ratio 0.8     URINALYSIS W/ REFLEX CULTURE    Collection Time: 11/01/21 11:50 AM    Specimen: Urine   Result Value Ref Range    Color Mercy      Appearance Turbid (A) Clear      Specific gravity 1.019 1.003 - 1.030      pH (UA) 5.0 5.0 - 8.0      Protein 30 (A) Negative mg/dL    Glucose Negative Negative mg/dL    Ketone Negative Negative mg/dL    Bilirubin Moderate (A) Negative      Blood Small (A) Negative      Urobilinogen 4.0 (H) 0.1 - 1.0 EU/dL    Nitrites Negative Negative      Leukocyte Esterase Small (A) Negative      UA:UC IF INDICATED Urine Culture Ordered (A) Culture not indicated by UA result      WBC >100 (H) 0 - 4 /hpf    RBC 10-20 0 - 5 /hpf    Bacteria 2+ (A) Negative /hpf   CREATININE, UR, RANDOM    Collection Time: 11/01/21 11:50 AM   Result Value Ref Range    Creatinine, urine 179.00 mg/dL   SODIUM, UR, RANDOM    Collection Time: 11/01/21 11:50 AM   Result Value Ref Range    Sodium,urine random 21 mmol/L   PLASMA, ALLOCATE    Collection Time: 11/01/21  2:45 PM   Result Value Ref Range    Unit number [id number]     Blood component type FP 24H,Thaw2     Unit division 00     Status of unit [address] to transfuse     Unit number [id number]     Blood component type FP 24h,Thaw     Unit division 00     Status of unit [address] to transfuse    PLATELETS, ALLOCATE    Collection Time: 11/01/21  3:00 PM   Result Value Ref Range    Unit number [id number]     Blood component type PLP,LR PSTC1     Unit division 00     Status of unit [address] to transfuse    BLOOD GAS, ARTERIAL    Collection Time: 11/01/21  3:50 PM   Result Value Ref Range    pH 7.31 (L) 7.35 - 7.45      PCO2 23 (L) 35 - 45 mmHg    PO2 121 (H) 75 - 100 mmHg    O2 SAT 99 >95 %    BICARBONATE 14 (L) 22 - 26 mmol/L    BASE DEFICIT 13.2 (H) 0 - 2 mmol/L    O2 METHOD VENT      FIO2 50.0 %    MODE Assist Control/Volume Control      Tidal volume 550      SET RATE 12      EPAP/CPAP/PEEP 5.0      SITE Right Radial      RANDAL'S TEST PASS      Critical value read back KATELYN STARGARDT,RN    CBC WITH AUTOMATED DIFF    Collection Time: 11/01/21  5:30 PM   Result Value Ref Range    WBC 18.7 (H) 3.6 - 11.0 K/uL    RBC 3.33 (L) 3.80 - 5.20 M/uL    HGB 10.6 (L) 11.5 - 16.0 g/dL    HCT 30.4 (L) 35.0 - 47.0 %    MCV 91.3 80.0 - 99.0 FL    MCH 31.8 26.0 - 34.0 PG    MCHC 34.9 30.0 - 36.5 g/dL    RDW 19.5 (H) 11.5 - 14.5 %    PLATELET 072 (L) 076 - 400 K/uL    MPV 11.8 8.9 - 12.9 FL    NRBC 0.1 (H) 0.0  WBC    ABSOLUTE NRBC 0.02 (H) 0.00 - 0.01 K/uL    NEUTROPHILS 88 (H) 32 - 75 %    LYMPHOCYTES 6 (L) 12 - 49 %    MONOCYTES 6 5 - 13 %    EOSINOPHILS 0 0 - 7 %    BASOPHILS 0 0 - 1 %    IMMATURE GRANULOCYTES 0 %    ABS. NEUTROPHILS 16.5 (H) 1.8 - 8.0 K/UL    ABS. LYMPHOCYTES 1.1 0.8 - 3.5 K/UL    ABS. MONOCYTES 1.1 (H) 0.0 - 1.0 K/UL    ABS. EOSINOPHILS 0.0 0.0 - 0.4 K/UL    ABS.  BASOPHILS 0.0 0.0 - 0.1 K/UL    ABS. IMM. GRANS. 0.0 K/UL    DF Manual      PLATELET COMMENTS Large Platelets      RBC COMMENTS Anisocytosis  1+        RBC COMMENTS Monique cells  Present        RBC COMMENTS Polychromasia  1+            Assessment and Plan:      1. Acute Kidney Injury on CKD: probably prerenal azotemia secondary to hypotension/ use of Lasix   But probably has hepatorenal syndrome  Worsening renal functions noted, probably related to hepatorenal syndrome  Possible ATN  Recommend to check urinalysis,urine random electrolytes, creatinine and protein. no rhabdomyolysis   Will get an ultrasound of kidneys and bladder to rule out obstruction  will continue to monitor renal functions and adjust management as needed   no history of chronic kidney disease     2.  GI bleed/severe anemia:   Continue pRBC transfusions  Recommend to start levophed frip for hypotension and maintain SBP>100  GI following, EGD planned    2. Hyperbilirubinemia/chronic liver disease/suspected cirrhosis  Alcohol dependence:   Acute liver injury on chronic liver disease, probably alcohol-related  Continue to monitor with abstinence from alcohol  GI following the patient    3. Hypotension: Probably related to liver disease  On midodrine to 10 mg p.o. 3 times daily   Start levophed for hemodynamic support until GI bleed is controlled   continue ICU monitoring    4. Lower extremity edema: probably related to chronic liver disease and obesity   advised to maintain p.o. fluid restriction of 9411-9960 ml per day and and salt restriction,  Will use diuretics as needed dWill monitor fluid status clinically and adjust diuretics as needed.      5. Hyperkalemia: resolved now  Metabolic acidosis:Secondary to acute kidney injury   CO2 level is 16,   Will continue to monitor Co2 levels with multiple transfusions       Signed By: Anna Blanco MD     November 1, 2021

## 2021-11-01 NOTE — PROGRESS NOTES
Renal Progress Note    Patient: Nadia Howell MRN: [id number]  SSN: [id number]    YOB: 1975  Age: 51 y.o. Sex: female      Admit Date: 10/25/2021    LOS: 6 days     Subjective:   Patient seen at bedside. Alert and awake, no acute distress. Patient is  not giving any new complaints today.    Her creatinine has increased to 4.0 today  She denies any voiding difficulties, lower extremity edema present  Urine electrolytes were not done        Current Facility-Administered Medications   Medication Dose Route Frequency    pantoprazole (PROTONIX) 40 mg in 0.9% sodium chloride 10 mL injection  40 mg IntraVENous Q12H    0.9% sodium chloride infusion  100 mL/hr IntraVENous CONTINUOUS    octreotide (SANDOSTATIN) 500 mcg in 0.9% sodium chloride 500 mL infusion  25 mcg/hr IntraVENous CONTINUOUS    0.9% sodium chloride infusion 250 mL  250 mL IntraVENous PRN    0.9% sodium chloride infusion 250 mL  250 mL IntraVENous PRN    famotidine (PEPCID) tablet 10 mg  10 mg Oral BID    midodrine (PROAMATINE) tablet 10 mg  10 mg Oral TID    hydrOXYzine pamoate (VISTARIL) capsule 25 mg  25 mg Oral BID    traZODone (DESYREL) tablet 50 mg  50 mg Oral QHS PRN    influenza vaccine 2021-22 (6 mos+)(PF) (FLUARIX/FLULAVAL/FLUZONE QUAD) injection 0.5 mL  1 Each IntraMUSCular PRIOR TO DISCHARGE    lactulose (CHRONULAC) 10 gram/15 mL solution 30 mL  20 g Oral BID    sodium chloride (NS) flush 5-40 mL  5-40 mL IntraVENous Q8H    sodium chloride (NS) flush 5-40 mL  5-40 mL IntraVENous PRN    acetaminophen (TYLENOL) tablet 650 mg  650 mg Oral Q6H PRN    Or    acetaminophen (TYLENOL) suppository 650 mg  650 mg Rectal Q6H PRN    ondansetron (ZOFRAN ODT) tablet 4 mg  4 mg Oral Q6H PRN    Or    ondansetron (ZOFRAN) injection 4 mg  4 mg IntraVENous Q6H PRN    thiamine mononitrate (B-1) tablet 100 mg  100 mg Oral DAILY    b complex-vitamin c-folic acid 5mg (FOLBEE PLUS) tablet 1 Tablet  1 Tablet Oral DAILY    [Held by provider] spironolactone (ALDACTONE) tablet 25 mg  25 mg Oral BID        Vitals:    10/31/21 1956 10/31/21 2015 10/31/21 2026 10/31/21 2033   BP:  100/88 (!) (P) 72/43 (!) (P) 77/33   Pulse:  70 (P) 72 (P) 68   Resp:  16 (P) 19 (P) 20   Temp:  97.4 °F (36.3 °C) (P) 97.4 °F (36.3 °C) (P) 97.4 °F (36.3 °C)   SpO2: 100% 100% (P) 100% (P) 100%   Weight:       Height:         Objective:   General: alert awake well-oriented, no acute distress. HEENT: EOMI, Icterus+, no Pallor, pupils reactive, mucosa moist,  Neck: Neck is supple, No JVD, no thyromegaly  Lungs: breathsounds normal,  no rhonchi, no rales,  CVS: heart sounds normal,  no murmurs, no rubs. GI: soft, distended, nontender, normal BS,   Extremeties: no cyanosis, 1-2+ bilateral lower extremity edema present  Neuro: Alert, awake, oriented x3, speech is normal, moving all extremeties well. Skin: normal skin turgor, no skin rashes      Intake and Output:  Current Shift: No intake/output data recorded. Last three shifts: No intake/output data recorded. Lab/Data Review:  Recent Labs     10/31/21  1530 10/30/21  0725 10/29/21  0650   WBC  --  10.5 9.4   HGB 7.4* 10.6* 10.8*   HCT 20.8* 31.2* 30.3*   PLT  --  189 191     Recent Labs     10/31/21  0858 10/30/21  0725 10/29/21  0650    133* 136   K 4.7 6.0* 3.8    103 104   CO2 17* 18* 21   GLU 79 76 82   BUN 59* 46* 36*   CREA 4.05* 2.69* 1.49*   CA 8.4* 8.8 9.0   PHOS 4.2  --   --    ALB 1.6*  1.7* 1.8* 2.0*   TBILI 23.1* 29.3* 30.3*   ALT 64 76 66   INR  --  1.3*  --      No results for input(s): PH, PCO2, PO2, HCO3, FIO2 in the last 72 hours.   Recent Results (from the past 24 hour(s))   RENAL FUNCTION PANEL    Collection Time: 10/31/21  8:58 AM   Result Value Ref Range    Sodium 136 136 - 145 mmol/L    Potassium 4.7 3.5 - 5.1 mmol/L    Chloride 106 97 - 108 mmol/L    CO2 17 (L) 21 - 32 mmol/L    Anion gap 13 5 - 15 mmol/L    Glucose 79 65 - 100 mg/dL    BUN 59 (H) 6 - 20 mg/dL    Creatinine 4.05 (H) 0.55 - 1.02 mg/dL    BUN/Creatinine ratio 15 12 - 20      GFR est AA 14 (L) >60 ml/min/1.73m2    GFR est non-AA 12 (L) >60 ml/min/1.73m2    Calcium 8.4 (L) 8.5 - 10.1 mg/dL    Phosphorus 4.2 2.6 - 4.7 mg/dL    Albumin 1.7 (L) 3.5 - 5.0 g/dL   CK    Collection Time: 10/31/21  8:58 AM   Result Value Ref Range    CK 45 26 - 192 U/L   HEPATIC FUNCTION PANEL    Collection Time: 10/31/21  8:58 AM   Result Value Ref Range    Protein, total 4.8 (L) 6.4 - 8.2 g/dL    Albumin 1.6 (L) 3.5 - 5.0 g/dL    Globulin 3.2 2.0 - 4.0 g/dL    A-G Ratio 0.5 (L) 1.1 - 2.2      Bilirubin, total 23.1 (H) 0.2 - 1.0 mg/dL    Bilirubin, direct 19.2 (H) 0.0 - 0.2 mg/dL    Alk. phosphatase 60 45 - 117 U/L    AST (SGOT) 176 (H) 15 - 37 U/L    ALT (SGPT) 64 12 - 78 U/L   HGB & HCT    Collection Time: 10/31/21  3:30 PM   Result Value Ref Range    HGB 7.4 (L) 11.5 - 16.0 g/dL    HCT 20.8 (L) 35.0 - 47.0 %   TYPE & SCREEN    Collection Time: 10/31/21  6:40 PM   Result Value Ref Range    Crossmatch Expiration 11/03/2021,2359     ABO/Rh(D) O Positive     Antibody screen Negative     Unit number [id number]     Blood component type [facility]     Unit division 00     Status of unit Issued     UNIT TAG COMMENT Emergency Release     TRANSFUSION STATUS Ok to transfuse     Crossmatch result Emergency Release     Unit number [id number]     Blood component type [facility]     Unit division 00     Status of unit [address] to transfuse     Crossmatch result Compatible     Unit number [id number]     Blood component type [facility]     Unit division 00     Status of unit [place] to transfuse     Crossmatch result Compatible         Assessment and Plan:      1.  Acute Kidney Injury on CKD: probably prerenal azotemia secondary to hypotension/ use of Lasix   Worsening renal functions noted, probably related to hepatorenal syndrome  Possible ATN  Recommend to check urinalysis,urine random electrolytes, creatinine and protein. no rhabdomyolysis   Will get an ultrasound of kidneys and bladder to rule out obstruction  will continue to monitor renal functions and adjust management as needed   no history of chronic kidney disease     2. Hyperbilirubinemia/chronic liver disease/suspected cirrhosis  Alcohol dependence:   Acute liver injury on chronic liver disease, probably alcohol-related  Continue to monitor with abstinence from alcohol  GI following the patient    3. Hypotension: Probably related to liver disease   increase the dose of midodrine to 10 mg p.o. 3 times daily    given 1 dose of IV albumin for hemodynamic support yesterday  continue to monitor and blood pressures    4. Lower extremity edema: probably related to chronic liver disease and obesity   Will avoid any IV hydration at this time   will resume diuretics in next 24 hours based on renal functions  advised to maintain p.o. fluid restriction of 7507-3013 ml per day and and salt restriction,  Will monitor fluid status clinically and adjust diuretics as needed.      5. Hyperkalemia: High potassium of 6.0 is  lab error  Repeat potassium level is normal today    Metabolic acidosis:Secondary to acute kidney injury   CO2 level is 17  Will continue to monitor Co2 levels     Anemia: Stable hemoglobin  continue to monitor H&H    Addendum: Patient had a lower GI bleed with hypotension and low hemoglobin of 7.4  Transferred to the intensive care unit  GI consulted  Packed RBC transfusions ordered  Continue close monitoring in intensive care unit  Signed By: Caitlin Vargas MD     October 31, 2021

## 2021-11-01 NOTE — ANESTHESIA PREPROCEDURE EVALUATION
Relevant Problems   GASTROINTESTINAL   (+) Cirrhosis of liver (HCC)      ENDOCRINE   (+) Obesity, morbid (HCC)       Anesthetic History   No history of anesthetic complications            Review of Systems / Medical History  Patient summary reviewed, nursing notes reviewed and pertinent labs reviewed    Pulmonary  Within defined limits                 Neuro/Psych   Within defined limits           Cardiovascular  Within defined limits                Exercise tolerance: >4 METS     GI/Hepatic/Renal     GERD    Renal disease: ARF and CRI  Liver disease (CIRRHOSIS)    Comments: CHOLELITHIASIS Endo/Other        Morbid obesity (Super Morbid) and anemia (7.1)    Comments: \"BLEEDING FROM VARICOSE VEINS\"  Hx OF DIFFICULT I/V.    \"SOMETIMES FEET GETS SWOLLEN\" Other Findings   Comments: COVID-19 Vaccine: Vaccinated  Allergies: No Known Allergies  Ht: 5' 6\" (167.6 cm)  Weight: 156.4 kg (344 lb 12.8 oz)  BMI: 55.65 kg/m²  No watch meds found  CrCl: 22.8 mL/min (A)  Procedure  ESOPHAGOGASTRODUODENOSCOPY (EGD) (N/A )  Scheduled, [facility] ENDO 01        Medical History  GERD (gastroesophageal reflux disease)  Morbid obesity (HCC)  Cirrhosis (HCC)         Physical Exam    Airway  Mallampati: III  TM Distance: 4 - 6 cm  Neck ROM: normal range of motion        Cardiovascular    Rhythm: regular  Rate: normal      Pertinent negatives: No murmur   Dental         Pulmonary      Decreased breath sounds           Abdominal  GI exam deferred       Other Findings   Comments: Results for Lisa Gandhi (MRN [id number]) as of 11/1/2021 12:25    11/1/2021 06:20  WBC: 12.7 (H)  NRBC: 0.0  RBC: 2.16 (L)  HGB: 7.1 (L)  HCT: 20.5 (L)  MCV: 94.9  MCH: 32.9  MCHC: 34.6  RDW: 22.5 (H)  PLATELET: 928 (L)  MPV: 12.2    Results for Lisa Gandhi (MRN [id number]) as of 11/1/2021 12:25    11/1/2021 06:20  Sodium: 139  Potassium: 4.7  Chloride: 110 (H)  CO2: 16 (L)  Anion gap: 13  Glucose: 82  BUN: 65 (H)  Creatinine: 4.72 (H)  BUN/Creatinine ratio: 14  Calcium: 7.6 (L)  Phosphorus: 5.3 (H)  GFR est non-AA: 10 (L)  GFR est AA: 12 (L)  Results for Jodie Luciano (MRN [id number]) as of 11/1/2021 12:25    11/1/2021 06:20  Bilirubin, total: 21.4 (H)  Bilirubin, direct: 18.4 (H)  Protein, total: 3.6 (L)  Albumin: 1.3 (L)  Globulin: 2.3  A-G Ratio: 0.6 (L)  ALT: 61  AST: 151 (H)  Alk.  phosphatase: 53         Anesthetic Plan    ASA: 4, emergent  Anesthesia type: MAC and total IV anesthesia          Induction: Intravenous  Anesthetic plan and risks discussed with: Patient

## 2021-11-01 NOTE — PROGRESS NOTES
11/1/2021      Patient underwent upper endoscopy, was found to have large amount of blood and a very small minute of the stomach, as well as blood in the jejunum. Dr. Yoni Kenny initially could not get a good look. Patient then started with blood in her upper airway and the procedure was held. She was then intubated by anesthesia. EGD was attempted again and patient was found to have a laceration at the anastomosis of her Billroth II which was actively bleeding. Several endoclips were placed. Blood continued ooze from this area. I have initiated a massive transfusion protocol, lab was notified. We will give an additional 2 units of blood now along with a sixpack of platelets and 2 units of FFP    We will keep patient on the ventilator for now. Consult pulmonology for ventilator management, discussed with  [facility]    We will also consult interventional radiology in case bleeding continues, at which point she would need consideration for intervention/embolization    I have kept her  updated with our findings and treatment plan    I have asked anesthesia to place a central line as well. Patient was started on norepinephrine due to a drop in her blood pressure as low as 50 systolic    I personally spent   90   minutes of critical care time today on several different visits. This is time spent at this critically ill patient's bedside actively involved in patient care as well as the coordination of care and discussions with the patient's family. This does not include any procedural time which has been billed separately.               Antonio Doty MD

## 2021-11-01 NOTE — CONSULTS
Consult  Pulmonary, Critical Care    Name: Sonia Chase MRN: [id number]   : 1975 Hospital: [address] [place] [address]   Date: 2021  Admission date: 10/25/2021 Hospital Day: 8       Subjective/Interval History:   Seen in the ICU on the ventilator. Patient is a 69-year-old female heavy drinker presented to the emergency room on the  with weakness fatigue jaundice refused admission came back on the  and was admitted with further weakness. Had upper endoscopy that showed blood in the small bowel. She had worsening drop in hemoglobin underwent repeat endoscopy had a large amount of blood coming up into the stomach. Required intubation for protection of airway probably aspirated blood. Endoscopy after intubation showed laceration with bleeding at the a former Billroth II anastomosis site. She had endoclips placed and still had oozing of blood. She is received 5 units of blood so far. She is on propofol for sedation is still fairly active and working against the ventilator. Exam shows diffuse rhonchi in the lungs most likely related to aspiration of blood    Patient Active Problem List   Diagnosis Code    Obesity, morbid ([facility] [address] E66.01    Venous stasis ulcer ([facility] [address] I83.009, L97.909    Bleeding from varicose vein I83.899    Postop check Z09    Hyperbilirubinemia E80.6    Cirrhosis of liver (HCC) K74.60       IMPRESSION:   1. Acute hypoxic respiratory failure  2. Aspiration pneumonitis probably blood  3. Hypovolemic shock currently on norepinephrine 5 mics next line GI bleed jejunal from Billroth II site  4. Acute kidney injury possibly hypovolemia  5. Alcohol abuse  6. Body mass index is 55.65 kg/m². 7.       RECOMMENDATIONS/PLAN:   1. Maintain ventilator as his follow blood gases  2. We will check surveillance culture of sputum  3. We will switch to IV thiamine folic acid and multivitamin replacement  4. GI bleed per GI and medical attending  5.  Sedation with propofol and will add Precedex for alcohol withdrawal  6. Subjective/Initial History:   . I was asked by Corona Bundy MD to see Leti Dee a 51 y.o.  female  in consultation for a chief complaint of acute hypoxic respiratory failure aspiration pneumonitis massive GI bleed      The patient is unable to give any meaningful history or review of systems due to patient factors. Patient PCP: Dahiana Thomas  PMH:  has a past medical history of Cirrhosis ([facility] [address] GERD (gastroesophageal reflux disease), and Morbid obesity ([facility] [address] PSH:   has a past surgical history that includes hx gastric bypass. FHX: family history includes Diabetes in her father and mother; Heart Attack in her father. SHX:  reports that she has never smoked. She has never used smokeless tobacco. She reports previous alcohol use. She reports that she does not use drugs.   Systemic review unobtainable as the patient is obtunded on the ventilator on IV propofol    No Known Allergies   MEDS:   Current Facility-Administered Medications   Medication    NOREPINephrine (LEVOPHED) 8 mg in 0.9% NS 250ml infusion    succinylcholine (ANECTINE) 20 mg/mL injection    propofol (DIPRIVAN) 10 mg/mL infusion    0.9% sodium chloride infusion    EPINEPHrine (ADRENALIN) 0.1 mg/mL syringe    simethicone (MYLICON) 68MF/4.8MZ oral drops    0.9% sodium chloride infusion 250 mL    piperacillin-tazobactam (ZOSYN) 3.375 g in 0.9% sodium chloride (MBP/ADV) 100 mL MBP    pantoprazole (PROTONIX) 40 mg in 0.9% sodium chloride 10 mL injection    octreotide (SANDOSTATIN) 500 mcg in 0.9% sodium chloride 500 mL infusion    0.9% sodium chloride infusion 250 mL    famotidine (PEPCID) tablet 10 mg    midodrine (PROAMATINE) tablet 10 mg    hydrOXYzine pamoate (VISTARIL) capsule 25 mg    traZODone (DESYREL) tablet 50 mg    influenza vaccine 2021-22 (6 mos+)(PF) (FLUARIX/FLULAVAL/FLUZONE QUAD) injection 0.5 mL    lactulose (CHRONULAC) 10 gram/15 mL solution 30 mL    sodium chloride (NS) flush 5-40 mL    sodium chloride (NS) flush 5-40 mL    acetaminophen (TYLENOL) tablet 650 mg    Or    acetaminophen (TYLENOL) suppository 650 mg    ondansetron (ZOFRAN ODT) tablet 4 mg    Or    ondansetron (ZOFRAN) injection 4 mg    thiamine mononitrate (B-1) tablet 100 mg    b complex-vitamin c-folic acid 5mg (FOLBEE PLUS) tablet 1 Tablet    [Held by provider] spironolactone (ALDACTONE) tablet 25 mg        Current Facility-Administered Medications:     NOREPINephrine (LEVOPHED) 8 mg in 0.9% NS 250ml infusion, 0.5-120 mcg/min, IntraVENous, TITRATE, Henrik Rowland MD, Last Rate: 9.4 mL/hr at 11/01/21 1500, 5 mcg/min at 11/01/21 1500    succinylcholine (ANECTINE) 20 mg/mL injection, , , ,     propofol (DIPRIVAN) 10 mg/mL infusion, 0-50 mcg/kg/min, IntraVENous, TITRATE, Lizeth Zuniga MD, Last Rate: 46.9 mL/hr at 11/01/21 1637, 50 mcg/kg/min at 11/01/21 1637    0.9% sodium chloride infusion, 75 mL/hr, IntraVENous, PRN, Lizeth Zuniga MD    EPINEPHrine (ADRENALIN) 0.1 mg/mL syringe, , , PRN, Callum Dallas MD, 2 mg at 11/01/21 1430    simethicone (MYLICON) 75JE/2.2QV oral drops, , , PRN, Callum Dallas MD, 20 mg at 11/01/21 1420    0.9% sodium chloride infusion 250 mL, 250 mL, IntraVENous, PRN, Lizeth Lauren MD    piperacillin-tazobactam (ZOSYN) 3.375 g in 0.9% sodium chloride (MBP/ADV) 100 mL MBP, 3.375 g, IntraVENous, Q12H, Lizeth Zuniga MD    pantoprazole (PROTONIX) 40 mg in 0.9% sodium chloride 10 mL injection, 40 mg, IntraVENous, Q12H, Elly Allen PA-C, 40 mg at 11/01/21 1013    octreotide (SANDOSTATIN) 500 mcg in 0.9% sodium chloride 500 mL infusion, 25 mcg/hr, IntraVENous, CONTINUOUS, Elly Allen PA-C, Last Rate: 25 mL/hr at 10/31/21 1800, 25 mcg/hr at 10/31/21 1800    0.9% sodium chloride infusion 250 mL, 250 mL, IntraVENous, PRN, Elyl Allen PA-C    famotidine (PEPCID) tablet 10 mg, 10 mg, Oral, BID, Darron Needle, Austin Munoz MD, 10 mg at 21 1012    midodrine (PROAMATINE) tablet 10 mg, 10 mg, Oral, TID, Henrik Rowland MD, 10 mg at 21 1012    hydrOXYzine pamoate (VISTARIL) capsule 25 mg, 25 mg, Oral, BID, Aniket Muss, NP, 25 mg at 21 1012    traZODone (DESYREL) tablet 50 mg, 50 mg, Oral, QHS PRN, Shirley Pettit MD, 50 mg at 21 0021    influenza vaccine  (6 mos+)(PF) (FLUARIX/FLULAVAL/FLUZONE QUAD) injection 0.5 mL, 1 Each, IntraMUSCular, PRIOR TO DISCHARGE, Gypsy Gama MD    lactulose (CHRONULAC) 10 gram/15 mL solution 30 mL, 20 g, Oral, BID, Aniket Muss, NP, 30 mL at 21 1012    sodium chloride (NS) flush 5-40 mL, 5-40 mL, IntraVENous, Q8H, Gypys Gama MD, 10 mL at 21 0627    sodium chloride (NS) flush 5-40 mL, 5-40 mL, IntraVENous, PRN, Gypsy Gama MD    acetaminophen (TYLENOL) tablet 650 mg, 650 mg, Oral, Q6H PRN **OR** acetaminophen (TYLENOL) suppository 650 mg, 650 mg, Rectal, Q6H PRN, Gypsy Gama MD    ondansetron (ZOFRAN ODT) tablet 4 mg, 4 mg, Oral, Q6H PRN **OR** ondansetron (ZOFRAN) injection 4 mg, 4 mg, IntraVENous, Q6H PRN, Gypsy Gama MD, 4 mg at 10/30/21 2210    thiamine mononitrate (B-1) tablet 100 mg, 100 mg, Oral, DAILY, Gypsy Gama MD, 100 mg at 21 1012    b complex-vitamin c-folic acid 5mg (FOLBEE PLUS) tablet 1 Tablet, 1 Tablet, Oral, DAILY, Gypsy Gama MD, 1 Tablet at 10/31/21 1108    [Held by provider] spironolactone (ALDACTONE) tablet 25 mg, 25 mg, Oral, BID, Gypsy Gama MD, 25 mg at 10/30/21 1048      Objective:     Vital Signs: Telemetry:    normal sinus rhythm Intake/Output:   Visit Vitals  BP (!) 95/51   Pulse 88   Temp 97.2 °F (36.2 °C)   Resp 25   Ht 5' 6\" (1.676 m)   Wt 156.4 kg (344 lb 12.8 oz)   SpO2 100%   BMI 55.65 kg/m²       Temp (24hrs), Av.6 °F (36.4 °C), Min:97.2 °F (36.2 °C), Max:98.4 °F (36.9 °C)        O2 Device: Other (comment) (ett)           Body mass index is 55.65 kg/m². Wt Readings from Last 4 Encounters:   10/31/21 156.4 kg (344 lb 12.8 oz)   10/22/21 151.5 kg (334 lb)   08/20/21 152.7 kg (336 lb 9.6 oz)   08/19/21 151.5 kg (334 lb)          Intake/Output Summary (Last 24 hours) at 11/1/2021 1645  Last data filed at 11/1/2021 1626  Gross per 24 hour   Intake 5560.9 ml   Output 2 ml   Net 5558.9 ml       Last shift:      11/01 0701 - 11/01 1900  In: 2840.9 [I.V.:700]  Out: -   Last 3 shifts: 10/30 1901 - 11/01 0700  In: 7472 [P.O.:400; I.V.:1000]  Out: 2        Hemodynamics:    CO:    CI:    CVP:    SVR:   PAP Systolic:    PAP Diastolic:    PVR:    QY74:       Ventilator Settings:      Mode Rate TV Press PEEP FiO2 PIP Min. Vent   Assist control, Volume control    500 ml    5 cm H20 50 %  34 cm H2O  15.1 l/min      Physical Exam:    General:  female; moving extremities some minor discoordination with the ventilator despite being on propofol intubated on vent  HEENT: NCAT,   Eyes: Jaundiced; conjunctiva clear some random eye movement  Neck: no nodes, no cuff leak, no accessory MM use. Obesity obscures determination of JVD  Chest: no deformity,  Cardiac: Rapid regular rhythm  Lungs: Inspiratory and expiratory rhonchi  Abd: Obese questionably distended bowel sounds are present  Ext: Mild edema; no joint swelling;  No clubbing  : [facility] urine  Neuro: Some movement of arms and legs and some discoordination with the ventilator otherwise unresponsive random eye movement she is moving all 4 extremities  Psych-unable to assess  Skin: warm, dry, no cyanosis;   Pulses: Brachial radial pulses intact  Capillary: Rapid capillary refill      Labs:    Recent Labs     11/01/21  0620 10/31/21  1530 10/30/21  0725   WBC 12.7*  --  10.5   HGB 7.1* 7.4* 10.6*   *  --  189   INR  --   --  1.3*     Recent Labs     11/01/21  0620 10/31/21  0858 10/30/21  0725    136 133*   K 4.7 4.7 6.0*   * 106 103   CO2 16* 17* 18*   GLU 82 79 76   BUN 65* 59* 46*   CREA 4.72* 4.05* 2.69*   CA 7.6* 8.4* 8.8   PHOS 5.3* 4.2  --    ALB 1.3*  1.3* 1.6*  1.7* 1.8*   ALT 61 64 76     Recent Labs     11/01/21  1550   PH 7.31*   PCO2 23*   PO2 121*   HCO3 14*   FIO2 50.0     Recent Labs     10/31/21  0858   CPK 45     Total bili 21.4 alkaline phosphatase 53 ALT 61  Lab Results   Component Value Date/Time    CK 45 10/31/2021 08:58 AM       Imaging:  I have personally reviewed the patients radiographs and have reviewed the reports:    CXR Results  (Last 48 hours)               11/01/21 1521  XR CHEST PORT Final result    Narrative:  Chest single view. Comparison single view chest October 25, 2021. ET tube 3-4 cm above the harika. Lungs clear. Cardiac and mediastinal structures   unchanged. No pneumothorax or sizable pleural effusion. Results from [place] encounter on 10/25/21    XR CHEST PORT    Narrative  Chest single view. Comparison single view chest October 25, 2021. ET tube 3-4 cm above the harika. Lungs clear. Cardiac and mediastinal structures  unchanged. No pneumothorax or sizable pleural effusion. XR CHEST PORT    Narrative  Examination: XR CHEST PORT    History: chest pain    Comparison: Chest radiograph 9/6/2018    FINDINGS:    Single frontal portable view of the chest. Mild elevation of the right  hemidiaphragm, similar to prior. No definite focal airspace consolidation,  pneumothorax, or significant pleural effusion is evident. The cardiac silhouette  is prominent, unchanged. Mediastinal contours and osseous structures appear  unchanged. Impression  Similar exam to prior without findings of definite acute cardiopulmonary  abnormality. Results from [place] encounter on 10/25/21    CT ABD PELV WO CONT    Narrative  CT scan the abdomen and pelvis is performed without IV or oral contrast per  renal colic protocol. Coronal reconstructions are generated.  Comparison exams  are not available. Findings: The lung bases are normal. The solid abdominal organs reveals severe  diffuse fatty infiltration of the liver. There appear to be gallstones within  the dependent portion of the gallbladder. The patient is status post gastric  bypass and IUD placement. There is no free intraperitoneal fluid or gas. The  bowel is unopacified but normal in caliber. No fluid or mass is seen in the cul-de-sac. Bone windows reveal degenerative  disc disease at L3-4 and L5-S1. Impression  1. No acute findings. 2. Severe diffuse hepatic steatosis. 3. Cholelithiasis. Dose reduction: All CT scans at this facility are performed using radiation dose  reduction optimization techniques as appropriate to a performed exam, including  the following: Automated exposure control (AEC), adjustment of the MAA and/or  KUB according to the patient's size, or the patient's use of iterative  reconstruction techniques (ASIR). Discussion: Acute respiratory failure secondary to massive GI bleed with inability to protect her airway. Has diffuse rhonchi most likely aspiration of blood. Will maintain the ventilator for now. Will check a surveillance sputum culture. Will replace oral thiamine multivitamin and folic acid with IV replenishment. She is currently on propofol was still fighting the ventilator will add Precedex. Time of care 50 minutes            Thank you for allowing us to participate in the care of this patient.   We will follow along with you     Dank Soto MD

## 2021-11-02 NOTE — PROGRESS NOTES
Patient is on PT caseload, however she was transferred to ICU 10/29. Will need new PT orders when medically appropriate.

## 2021-11-02 NOTE — PROGRESS NOTES
Progress Note Patient: Pernell Chishlom MRN: [id number]  SSN: [id number] YOB: 1975  Age: 51 y.o. Sex: female Admit Date: 10/25/2021 LOS: 8 days Subjective:  
Patient examined, intubated, All Levophed, blood pressure maintained 120/80 No red blood per rectum, no melena, 
Past Medical History:  
Diagnosis Date  Cirrhosis ([facility]  GERD (gastroesophageal reflux disease)  Morbid obesity ([facility] Current Facility-Administered Medications:  
  sodium bicarbonate (8.4%) 150 mEq in dextrose 5% 1,000 mL infusion, , IntraVENous, CONTINUOUS, Aryan Metcalf MD, Last Rate: 50 mL/hr at 11/02/21 1130, New Bag at 11/02/21 1130 
  NOREPINephrine (LEVOPHED) 8 mg in 0.9% NS 250ml infusion, 0.5-120 mcg/min, IntraVENous, TITRATE, Noemy Galvez MD, Last Rate: 140.6 mL/hr at 11/02/21 1625, 75 mcg/min at 11/02/21 1625   propofol (DIPRIVAN) 10 mg/mL infusion, 0-50 mcg/kg/min, IntraVENous, TITRATE, Noemy Galvez MD, Last Rate: 23.5 mL/hr at 11/02/21 1640, 25 mcg/kg/min at 11/02/21 1640 
  0.9% sodium chloride infusion, 75 mL/hr, IntraVENous, PRN, Noemy Galvez MD 
  EPINEPHrine (ADRENALIN) 0.1 mg/mL syringe, , , PRN, Teo Canada MD, 2 mg at 11/01/21 1430 
  simethicone (MYLICON) 13JD/7.9GL oral drops, , , PRN, Teo Canada MD, 20 mg at 11/01/21 1420 
  0.9% sodium chloride infusion 250 mL, 250 mL, IntraVENous, PRN, Noemy Galvez MD 
  piperacillin-tazobactam (ZOSYN) 3.375 g in 0.9% sodium chloride (MBP/ADV) 100 mL MBP, 3.375 g, IntraVENous, Q12H, Aspen Contreras MD, Last Rate: 25 mL/hr at 11/02/21 1625, 3.375 g at 11/02/21 1625   dexmedeTOMidine (PRECEDEX) 400 mcg in 0.9% sodium chloride (MBP/ADV) 100 mL MBP, 0.1-1.5 mcg/kg/hr, IntraVENous, TITRATE, Aryan Metcalf MD, Last Rate: 15.6 mL/hr at 11/02/21 1649, 0.4 mcg/kg/hr at 11/02/21 1649 
  0.9% sodium chloride 1,000 mL with thiamine 100 mg infusion, , IntraVENous, Q24H, Aryan Metcalf MD, Last Rate: 42 mL/hr at 11/01/21 2057, New Bag at 11/01/21 2057   pantoprazole (PROTONIX) 40 mg in 0.9% sodium chloride 10 mL injection, 40 mg, IntraVENous, Q12H, Elly Allen PA-C, 40 mg at 11/02/21 2386   octreotide (SANDOSTATIN) 500 mcg in 0.9% sodium chloride 500 mL infusion, 25 mcg/hr, IntraVENous, CONTINUOUS, Elly Allen PA-C, Last Rate: 25 mL/hr at 11/02/21 1343, 25 mcg/hr at 11/02/21 1343 
  0.9% sodium chloride infusion 250 mL, 250 mL, IntraVENous, PRN, Elly Allen PA-C 
  famotidine (PEPCID) tablet 10 mg, 10 mg, Oral, BID, Michelle Lyles MD, 10 mg at 11/01/21 1012 
  midodrine (PROAMATINE) tablet 10 mg, 10 mg, Oral, TID, Henrik Rowland MD, 10 mg at 11/01/21 1012   hydrOXYzine pamoate (VISTARIL) capsule 25 mg, 25 mg, Oral, BID, Sonido Smoke, NP, 25 mg at 11/01/21 1012   traZODone (DESYREL) tablet 50 mg, 50 mg, Oral, QHS PRN, Hollie Berman MD, 50 mg at 11/01/21 0021 
  influenza vaccine 2021-22 (6 mos+)(PF) (FLUARIX/FLULAVAL/FLUZONE QUAD) injection 0.5 mL, 1 Each, IntraMUSCular, PRIOR TO DISCHARGE, Lety Rivas MD 
  lactulose (CHRONULAC) 10 gram/15 mL solution 30 mL, 20 g, Oral, BID, Sonido Smoke, NP, 30 mL at 11/01/21 1012   sodium chloride (NS) flush 5-40 mL, 5-40 mL, IntraVENous, Q8H, Lety Rivas MD, 10 mL at 11/02/21 1400 
  sodium chloride (NS) flush 5-40 mL, 5-40 mL, IntraVENous, PRN, Lety Rivas MD 
  acetaminophen (TYLENOL) tablet 650 mg, 650 mg, Oral, Q6H PRN **OR** acetaminophen (TYLENOL) suppository 650 mg, 650 mg, Rectal, Q6H PRN, Lety Rivas MD 
  ondansetron (ZOFRAN ODT) tablet 4 mg, 4 mg, Oral, Q6H PRN **OR** ondansetron (ZOFRAN) injection 4 mg, 4 mg, IntraVENous, Q6H PRN, Lety Rivas MD, 4 mg at 10/30/21 2210   [Held by provider] thiamine mononitrate (B-1) tablet 100 mg, 100 mg, Oral, DAILY, Lety Rivas MD, 100 mg at 11/01/21 1012   [Held by provider] b complex-vitamin c-folic acid 5mg (FOLBEE PLUS) tablet 1 Tablet, 1 Tablet, Oral, DAILY, Hilda Brooke MD, 1 Tablet at 10/31/21 1108   [Held by provider] spironolactone (ALDACTONE) tablet 25 mg, 25 mg, Oral, BID, Hilda Brooke MD, 25 mg at 10/30/21 1048 Objective:  
 
Vitals:  
 11/02/21 1300 11/02/21 1400 11/02/21 1500 11/02/21 1511 BP: (!) 114/57 (!) 117/57 (!) 105/53 Pulse: 94 96 96 96 Resp: 21 21 21 21 Temp:   99.7 °F (37.6 °C) SpO2: 97% 98% 97% 98% Weight:      
Height:      
  
 
Intake and Output: 
Current Shift: No intake/output data recorded. Last three shifts: 10/31 1901 - 11/02 0700 In: 9033.4 [P.O.:400; I.V.:5172.5] Out: 742 [Urine:740] Physical Exam:  
Physical Exam 
Constitutional:   
   Appearance: She is ill-appearing. HENT:  
   Head: Atraumatic. Mouth/Throat:  
   Mouth: Mucous membranes are dry. Cardiovascular:  
   Rate and Rhythm: Tachycardia present. Pulmonary:  
   Breath sounds: Normal breath sounds. Comments: Intubated, on vent Abdominal:  
   General: Abdomen is flat. Bowel sounds are normal. There is no distension. Tenderness: There is no abdominal tenderness. Musculoskeletal:     
   General: No swelling or deformity. Cervical back: Normal range of motion. Skin: 
   Findings: No bruising. Neurological:  
   General: No focal deficit present. Lab/Data Review: 
Recent Results (from the past 24 hour(s)) CBC WITH AUTOMATED DIFF Collection Time: 11/01/21  5:30 PM  
Result Value Ref Range WBC 18.7 (H) 3.6 - 11.0 K/uL  
 RBC 3.33 (L) 3.80 - 5.20 M/uL  
 HGB 10.6 (L) 11.5 - 16.0 g/dL HCT 30.4 (L) 35.0 - 47.0 % MCV 91.3 80.0 - 99.0 FL  
 MCH 31.8 26.0 - 34.0 PG  
 MCHC 34.9 30.0 - 36.5 g/dL  
 RDW 19.5 (H) 11.5 - 14.5 % PLATELET 451 (L) 573 - 400 K/uL MPV 11.8 8.9 - 12.9 FL  
 NRBC 0.1 (H) 0.0  WBC ABSOLUTE NRBC 0.02 (H) 0.00 - 0.01 K/uL NEUTROPHILS 88 (H) 32 - 75 % LYMPHOCYTES 6 (L) 12 - 49 % MONOCYTES 6 5 - 13 % EOSINOPHILS 0 0 - 7 % BASOPHILS 0 0 - 1 % IMMATURE GRANULOCYTES 0 %  
 ABS. NEUTROPHILS 16.5 (H) 1.8 - 8.0 K/UL  
 ABS. LYMPHOCYTES 1.1 0.8 - 3.5 K/UL  
 ABS. MONOCYTES 1.1 (H) 0.0 - 1.0 K/UL  
 ABS. EOSINOPHILS 0.0 0.0 - 0.4 K/UL  
 ABS. BASOPHILS 0.0 0.0 - 0.1 K/UL  
 ABS. IMM. GRANS. 0.0 K/UL  
 DF Manual    
 PLATELET COMMENTS Large Platelets RBC COMMENTS Anisocytosis 1+ RBC COMMENTS Monique cells Present RBC COMMENTS Polychromasia 1+ BLOOD GAS, ARTERIAL Collection Time: 11/02/21  4:09 AM  
Result Value Ref Range pH 7.32 (L) 7.35 - 7.45    
 PCO2 27 (L) 35 - 45 mmHg PO2 167 (H) 75 - 100 mmHg O2  >95 % BICARBONATE 16 (L) 22 - 26 mmol/L  
 BASE DEFICIT 10.7 (H) 0 - 2 mmol/L  
 O2 METHOD VENT    
 FIO2 40.0 % MODE Assist Control/Volume Control Tidal volume 550 SET RATE 12    
 EPAP/CPAP/PEEP 5.0 Sample source Arterial    
 SITE Right Brachial    
 RANDAL'S TEST PASS HEPATIC FUNCTION PANEL Collection Time: 11/02/21  4:30 AM  
Result Value Ref Range Protein, total 4.5 (L) 6.4 - 8.2 g/dL Albumin 1.7 (L) 3.5 - 5.0 g/dL Globulin 2.8 2.0 - 4.0 g/dL A-G Ratio 0.6 (L) 1.1 - 2.2 Bilirubin, total 23.6 (H) 0.2 - 1.0 mg/dL Bilirubin, direct 21.4 (H) 0.0 - 0.2 mg/dL Alk. phosphatase 76 45 - 117 U/L  
 AST (SGOT) 383 (H) 15 - 37 U/L  
 ALT (SGPT) 118 (H) 12 - 78 U/L  
PROTHROMBIN TIME + INR Collection Time: 11/02/21  4:30 AM  
Result Value Ref Range Prothrombin time 17.5 (H) 11.9 - 14.7 sec INR 1.5 (H) 0.9 - 1.1 RENAL FUNCTION PANEL Collection Time: 11/02/21  4:30 AM  
Result Value Ref Range Sodium 136 136 - 145 mmol/L Potassium 4.4 3.5 - 5.1 mmol/L Chloride 109 (H) 97 - 108 mmol/L  
 CO2 14 (LL) 21 - 32 mmol/L Anion gap 13 5 - 15 mmol/L Glucose 134 (H) 65 - 100 mg/dL BUN 68 (H) 6 - 20 mg/dL Creatinine 5.06 (H) 0.55 - 1.02 mg/dL  BUN/Creatinine ratio 13 12 - 20    
 GFR est AA 11 (L) >60 ml/min/1.73m2 GFR est non-AA 9 (L) >60 ml/min/1.73m2 Calcium 7.8 (L) 8.5 - 10.1 mg/dL Phosphorus 6.6 (H) 2.6 - 4.7 mg/dL Albumin 1.7 (L) 3.5 - 5.0 g/dL MAGNESIUM Collection Time: 11/02/21  4:30 AM  
Result Value Ref Range Magnesium 2.2 1.6 - 2.4 mg/dL AMMONIA Collection Time: 11/02/21  4:30 AM  
Result Value Ref Range Ammonia 118 (H) <32 umol/L  
CBC WITH AUTOMATED DIFF Collection Time: 11/02/21  9:15 AM  
Result Value Ref Range WBC 23.7 (H) 3.6 - 11.0 K/uL  
 RBC 3.69 (L) 3.80 - 5.20 M/uL  
 HGB 11.5 11.5 - 16.0 g/dL HCT 33.3 (L) 35.0 - 47.0 % MCV 90.2 80.0 - 99.0 FL  
 MCH 31.2 26.0 - 34.0 PG  
 MCHC 34.5 30.0 - 36.5 g/dL RDW 20.3 (H) 11.5 - 14.5 % PLATELET 719 (L) 618 - 400 K/uL MPV 11.3 8.9 - 12.9 FL  
 NRBC 0.2 (H) 0.0  WBC ABSOLUTE NRBC 0.04 (H) 0.00 - 0.01 K/uL NEUTROPHILS 84 (H) 32 - 75 % LYMPHOCYTES 9 (L) 12 - 49 % MONOCYTES 6 5 - 13 % EOSINOPHILS 1 0 - 7 % BASOPHILS 0 0 - 1 % IMMATURE GRANULOCYTES 0 %  
 ABS. NEUTROPHILS 20.0 (H) 1.8 - 8.0 K/UL  
 ABS. LYMPHOCYTES 2.1 0.8 - 3.5 K/UL  
 ABS. MONOCYTES 1.4 (H) 0.0 - 1.0 K/UL  
 ABS. EOSINOPHILS 0.2 0.0 - 0.4 K/UL  
 ABS. BASOPHILS 0.0 0.0 - 0.1 K/UL  
 ABS. IMM. GRANS. 0.0 K/UL  
 DF Manual    
 RBC COMMENTS Macrocytosis 1+ RBC COMMENTS Ovalocytes 1+ XR CHEST PORT Final Result XR CHEST PORT Final Result XR CHEST PORT Final Result Left IJ central venous catheter terminating over the lower right atrium. No  
discernible pneumothorax. XR CHEST PORT Final Result CT ABD PELV WO CONT Final Result 1. No acute findings. 2. Severe diffuse hepatic steatosis. 3. Cholelithiasis.   
  
  
  
Dose reduction: All CT scans at this facility are performed using radiation dose  
reduction optimization techniques as appropriate to a performed exam, including  
the following: Automated exposure control (AEC), adjustment of the MAA and/or KUB according to the patient's size, or the patient's use of iterative  
reconstruction techniques (ASIR). XR CHEST PORT Final Result Similar exam to prior without findings of definite acute cardiopulmonary  
abnormality. XR CHEST PORT    (Results Pending) Assessment:  
 
Active Problems: Hyperbilirubinemia (10/25/2021) Cirrhosis of liver ([facility] (10/25/2021) End-stage liver disease patient from alcohol, had portal hypertension, possible hepatorenal syndrome, acute hepatic encephalopathy, 
 
upper GI bleeding, from the anastomosis at the gastrojejunostomy site, Hypotensive white depression from an acute GI bleeding Required Levophed to maintain the blood pressure, 
ICU IN acid possibility of NG tube placement for the by mouth medication such as lactulose Plan:  
Continue current resuscitation with IV fluids, blood transfusion, Hold the NG tube placement due to the laceration at the gastrojejunostomy anastomosis which was primary GI bleeding site Continue on PPI, Octreotide If patient relatively stable hemodynamically, may repeat EGD to revisiting the bleeding site, to assist to place NG tube then Progress poor Discussed with patient's family member, ICU RN Signed By: Doug Diaz MD   
 November 2, 2021 Thank you for allowing me to participate in this patients care [facility] Referring Physician  
Mai Aguilar

## 2021-11-02 NOTE — PROGRESS NOTES
Hospitalist Progress Note Daily Progress Note: 11/2/2021 Subjective:  
Hospital course to date: Patient is a 79-year-old female with a PMH significant for morbid obesity, history of cirrhosis of the liver and recent elevated bilirubin.  She came to the ER on 10/22 with elevated bilirubin of 16.7 declined admission at that time but returns to the emergency room on 10/25 with complaints of weakness and lethargy.  Significant labs bilirubin 26.1 and direct bili greater than 16, ammonia is 73, platelet count 95. CT suggestive of diffuse hepatic steatosis and cholelithiasis with no cholecystectomy.  Previous US RP revealed findings suggestive for portal hypertension and cirrhosis, noted thickening gallbladder wall and cholecystitis, gallbladder sludge and probable gallstones.  Admitted for further management of cholelithiasis, abdominal pain, nausea, liver failure with elevated ammonia and thrombocytopenia.  GI consulted.  Scheduled for EGD to assess for esophageal varices and portal hypertension.   EGD demonstrated no evidence for varices. There was a large amount of blood clots noted in the stomach. Patient is status post Billroth II anastomosis. Efferent loop showed some small bowel bleeding and erosions. This was treated with epinephrine injections    
 
Patient with worsening renal function starting on 10/30. Creatinine was up to 4.0 on 10/31. She has several large bloody stools on 10/31, became hypotensive and was transferred to the ICU. Hemoglobin dropped to 7.4 from 10.6 today previous. After transfer to ICU blood pressure dropped into the 59A systolic. She was given a total of 3 units of blood. She was started on IV octreotide. 11/01/21: 
She continues to have bloody stools. She is on IV octreotide Liver chemistries today show a bilirubin of 21.4. Creatinine is 4.7 with a BUN of 65, potassium 4.7. Hemoglobin this morning is 7.1.   Blood pressure is 81 systolic No urine output overnight but no Arana in place. Patient underwent upper endoscopy, was found to have large amount of blood and a very small minute of the stomach, as well as blood in the jejunum. Dr. Sherice Bernstein initially could not get a good look. Patient then started with blood in her upper airway and the procedure was held. She was then intubated by anesthesia. EGD was attempted again and patient was found to have a laceration at the anastomosis of her Billroth II which was actively bleeding. Several endoclips were placed. Blood continued ooze from this area. 
  
I have initiated a massive transfusion protocol, lab was notified. We will give an additional 2 units of blood now along with a sixpack of platelets and 2 units of FFP 
  
We will keep patient on the ventilator for now. Consult pulmonology for ventilator management, discussed with Dr. Yahaira Enriquez 
  
We will also consult interventional radiology in case bleeding continues, at which point she would need consideration for intervention/embolization 
  
I have kept her  updated with our findings and treatment plan 
  
I have asked anesthesia to place a central line as well. Patient was started on norepinephrine due to a drop in her blood pressure as low as 50 systolic. Patient was seen and evaluated by pulmonologist who recommended maintaining mechanical ventilation and following blood gases. Recommended sputum cultures, IV thiamine, folic acid and multivitamin replacement, continued sedation with propofol and adding Precedex for alcohol withdrawal.  
 
Central line was placed at 1900 without any complications.  
------- Patient is seen today for follow-up. Patient continues on mechanical ventilation. She is on 25 mcg drip of norepinephrine. Patient's systolic BP is in the 69G. There was no reported rectal bleeding overnight. Patient's bilirubin elevated to 23.6 from 21.4 the previous day.   
 
CBC and renal function panel are still pending. Problem List: 
Problem List as of 11/2/2021 Date Reviewed: 11/1/2021 Codes Class Noted - Resolved Hyperbilirubinemia ICD-10-CM: E80.6 ICD-9-CM: 782.4  10/25/2021 - Present Cirrhosis of liver ([facility] ICD-10-CM: K74.60 ICD-9-CM: 571.5  10/25/2021 - Present Postop check ICD-10-CM: D61 ICD-9-CM: V67.00  10/26/2020 - Present Bleeding from varicose vein ICD-10-CM: E11.832 ICD-9-CM: 454.8  10/20/2020 - Present Obesity, morbid ([facility].) ICD-10-CM: E66.01 
ICD-9-CM: 278.01  10/13/2020 - Present Venous stasis ulcer ([facility].) ICD-10-CM: I83.009, L97.909 ICD-9-CM: 454.0  10/13/2020 - Present Medications reviewed Current Facility-Administered Medications Medication Dose Route Frequency  sodium chloride 0.9 % bolus infusion 500 mL  500 mL IntraVENous ONCE  
 albumin human 25% (BUMINATE) solution 25 g  25 g IntraVENous ONCE  
 sodium bicarbonate (8.4%) 150 mEq in dextrose 5% 1,000 mL infusion   IntraVENous CONTINUOUS  
 NOREPINephrine (LEVOPHED) 8 mg in 0.9% NS 250ml infusion  0.5-120 mcg/min IntraVENous TITRATE  propofol (DIPRIVAN) 10 mg/mL infusion  0-50 mcg/kg/min IntraVENous TITRATE  
 0.9% sodium chloride infusion  75 mL/hr IntraVENous PRN  
 EPINEPHrine (ADRENALIN) 0.1 mg/mL syringe    PRN  
 simethicone (MYLICON) 40DB/5.1EV oral drops    PRN  
 0.9% sodium chloride infusion 250 mL  250 mL IntraVENous PRN  piperacillin-tazobactam (ZOSYN) 3.375 g in 0.9% sodium chloride (MBP/ADV) 100 mL MBP  3.375 g IntraVENous Q12H  
 dexmedeTOMidine (PRECEDEX) 400 mcg in 0.9% sodium chloride (MBP/ADV) 100 mL MBP  0.1-1.5 mcg/kg/hr IntraVENous TITRATE  
 0.9% sodium chloride 1,000 mL with thiamine 100 mg infusion   IntraVENous Q24H  pantoprazole (PROTONIX) 40 mg in 0.9% sodium chloride 10 mL injection  40 mg IntraVENous Q12H  
 octreotide (SANDOSTATIN) 500 mcg in 0.9% sodium chloride 500 mL infusion  25 mcg/hr IntraVENous CONTINUOUS  
 0.9% sodium chloride infusion 250 mL  250 mL IntraVENous PRN  
 famotidine (PEPCID) tablet 10 mg  10 mg Oral BID  midodrine (PROAMATINE) tablet 10 mg  10 mg Oral TID  hydrOXYzine pamoate (VISTARIL) capsule 25 mg  25 mg Oral BID  traZODone (DESYREL) tablet 50 mg  50 mg Oral QHS PRN  
 influenza vaccine  (6 mos+)(PF) (FLUARIX/FLULAVAL/FLUZONE QUAD) injection 0.5 mL  1 Each IntraMUSCular PRIOR TO DISCHARGE  lactulose (CHRONULAC) 10 gram/15 mL solution 30 mL  20 g Oral BID  sodium chloride (NS) flush 5-40 mL  5-40 mL IntraVENous Q8H  
 sodium chloride (NS) flush 5-40 mL  5-40 mL IntraVENous PRN  
 acetaminophen (TYLENOL) tablet 650 mg  650 mg Oral Q6H PRN Or  
 acetaminophen (TYLENOL) suppository 650 mg  650 mg Rectal Q6H PRN  
 ondansetron (ZOFRAN ODT) tablet 4 mg  4 mg Oral Q6H PRN Or  
 ondansetron (ZOFRAN) injection 4 mg  4 mg IntraVENous Q6H PRN  
 [Held by provider] thiamine mononitrate (B-1) tablet 100 mg  100 mg Oral DAILY  [Held by provider] b complex-vitamin c-folic acid 5mg (FOLBEE PLUS) tablet 1 Tablet  1 Tablet Oral DAILY  [Held by provider] spironolactone (ALDACTONE) tablet 25 mg  25 mg Oral BID Review of Systems: A comprehensive review of systems was negative except for that written in the HPI. Objective:  
Physical Exam:  
 
Visit Vitals BP (!) 92/46 (BP 1 Location: Right upper arm, BP Patient Position: At rest) Pulse 83 Temp 98.4 °F (36.9 °C) Resp 21 Ht 5' 6\" (1.676 m) Wt (!) 350 lb 8.5 oz (159 kg) SpO2 97% BMI 56.58 kg/m² O2 Device: Ventilator Temp (24hrs), Av.7 °F (36.5 °C), Min:97.2 °F (36.2 °C), Max:98.5 °F (36.9 °C) No intake/output data recorded. 10/31 1901 -  0700 In: 9033.4 [P.O.:400; I.V.:5172.5] Out: 742 [Urine:740] General:   Intubated and sedated. Sclerae icteric Lungs:   Clear to auscultation bilaterally. Chest wall:  No tenderness or deformity.   
Heart:  Regular rate and rhythm, S1, S2 normal, no murmur, click, rub or gallop. Abdomen:   Soft, non-tender. Bowel sounds normal. No masses,  No organomegaly. Extremities: Extremities normal, atraumatic, no cyanosis or edema. Pulses: 2+ and symmetric all extremities. Skin: Skin color, texture, turgor normal. No rashes or lesions Neurologic: CNII-XII intact. No gross focal deficits Data Review:  
   
Recent Days: 
Recent Labs 11/02/21 
0915 11/01/21 
1730 11/01/21 
2707 WBC 23.7* 18.7* 12.7* HGB 11.5 10.6* 7.1*  
HCT 33.3* 30.4* 20.5* * 147* 145* Recent Labs 11/02/21 
0430 11/01/21 
5395 10/31/21 
6808  139 136  
K 4.4 4.7 4.7 * 110* 106 CO2 14* 16* 17* * 82 79 BUN 68* 65* 59* CREA 5.06* 4.72* 4.05* CA 7.8* 7.6* 8.4* MG 2.2  --   --   
PHOS 6.6* 5.3* 4.2 ALB 1.7*  1.7* 1.3*  1.3* 1.6*  1.7* TBILI 23.6* 21.4* 23.1* * 61 64 INR 1.5*  --   --   
 
Recent Labs 11/02/21 
0409 11/01/21 
1550 PH 7.32* 7.31* PCO2 27* 23* PO2 167* 121* HCO3 16* 14* FIO2 40.0 50.0  
 
 
24 Hour Results: 
Recent Results (from the past 24 hour(s)) CHLORIDE, URINE RANDOM Collection Time: 11/01/21 11:50 AM  
Result Value Ref Range Chloride,urine random 25 mmol/L PROTEIN/CREATININE RATIO, URINE Collection Time: 11/01/21 11:50 AM  
Result Value Ref Range Protein, urine random 146 (H) 0.0 - 11.9 mg/dL Creatinine, urine 177.00 mg/dL Protein/Creat. urine Ratio 0.8 URINALYSIS W/ REFLEX CULTURE Collection Time: 11/01/21 11:50 AM  
 Specimen: Urine Result Value Ref Range Color CIT Group Appearance Turbid (A) Clear Specific gravity 1.019 1.003 - 1.030    
 pH (UA) 5.0 5.0 - 8.0 Protein 30 (A) Negative mg/dL Glucose Negative Negative mg/dL Ketone Negative Negative mg/dL Bilirubin Moderate (A) Negative Blood Small (A) Negative Urobilinogen 4.0 (H) 0.1 - 1.0 EU/dL Nitrites Negative Negative  Leukocyte Esterase Small (A) Negative UA:UC IF INDICATED Urine Culture Ordered (A) Culture not indicated by UA result WBC >100 (H) 0 - 4 /hpf  
 RBC 10-20 0 - 5 /hpf Bacteria 2+ (A) Negative /hpf CREATININE, UR, RANDOM Collection Time: 11/01/21 11:50 AM  
Result Value Ref Range Creatinine, urine 179.00 mg/dL SODIUM, UR, RANDOM Collection Time: 11/01/21 11:50 AM  
Result Value Ref Range Sodium,urine random 21 mmol/L  
PLASMA, ALLOCATE Collection Time: 11/01/21  2:45 PM  
Result Value Ref Range Unit number [id number] Blood component type FP 24H,Thaw2 Unit division 00 Status of unit Issued,final   
 TRANSFUSION STATUS Ok to transfuse Unit number [id number] Blood component type FP 24h,Thaw Unit division 00 Status of unit Issued,final   
 TRANSFUSION STATUS Ok to transfuse PLATELETS, ALLOCATE Collection Time: 11/01/21  3:00 PM  
Result Value Ref Range Unit number [id number] Blood component type PLP,LR PSTC1 Unit division 00 Status of unit Issued,final   
 TRANSFUSION STATUS Ok to transfuse BLOOD GAS, ARTERIAL Collection Time: 11/01/21  3:50 PM  
Result Value Ref Range pH 7.31 (L) 7.35 - 7.45    
 PCO2 23 (L) 35 - 45 mmHg PO2 121 (H) 75 - 100 mmHg O2 SAT 99 >95 % BICARBONATE 14 (L) 22 - 26 mmol/L  
 BASE DEFICIT 13.2 (H) 0 - 2 mmol/L  
 O2 METHOD VENT    
 FIO2 50.0 % MODE Assist Control/Volume Control Tidal volume 550 SET RATE 12    
 EPAP/CPAP/PEEP 5.0    
 SITE Right Radial    
 RANDAL'S TEST PASS Critical value read back KATELYN STARGARDT,RN   
CBC WITH AUTOMATED DIFF Collection Time: 11/01/21  5:30 PM  
Result Value Ref Range WBC 18.7 (H) 3.6 - 11.0 K/uL  
 RBC 3.33 (L) 3.80 - 5.20 M/uL  
 HGB 10.6 (L) 11.5 - 16.0 g/dL HCT 30.4 (L) 35.0 - 47.0 % MCV 91.3 80.0 - 99.0 FL  
 MCH 31.8 26.0 - 34.0 PG  
 MCHC 34.9 30.0 - 36.5 g/dL  
 RDW 19.5 (H) 11.5 - 14.5 %  PLATELET 321 (L) 205 - 400 K/uL MPV 11.8 8.9 - 12.9 FL  
 NRBC 0.1 (H) 0.0  WBC ABSOLUTE NRBC 0.02 (H) 0.00 - 0.01 K/uL NEUTROPHILS 88 (H) 32 - 75 % LYMPHOCYTES 6 (L) 12 - 49 % MONOCYTES 6 5 - 13 % EOSINOPHILS 0 0 - 7 % BASOPHILS 0 0 - 1 % IMMATURE GRANULOCYTES 0 %  
 ABS. NEUTROPHILS 16.5 (H) 1.8 - 8.0 K/UL  
 ABS. LYMPHOCYTES 1.1 0.8 - 3.5 K/UL  
 ABS. MONOCYTES 1.1 (H) 0.0 - 1.0 K/UL  
 ABS. EOSINOPHILS 0.0 0.0 - 0.4 K/UL  
 ABS. BASOPHILS 0.0 0.0 - 0.1 K/UL  
 ABS. IMM. GRANS. 0.0 K/UL  
 DF Manual    
 PLATELET COMMENTS Large Platelets RBC COMMENTS Anisocytosis 1+ RBC COMMENTS Archbold cells Present RBC COMMENTS Polychromasia 1+ BLOOD GAS, ARTERIAL Collection Time: 11/02/21  4:09 AM  
Result Value Ref Range pH 7.32 (L) 7.35 - 7.45    
 PCO2 27 (L) 35 - 45 mmHg PO2 167 (H) 75 - 100 mmHg O2  >95 % BICARBONATE 16 (L) 22 - 26 mmol/L  
 BASE DEFICIT 10.7 (H) 0 - 2 mmol/L  
 O2 METHOD VENT    
 FIO2 40.0 % MODE Assist Control/Volume Control Tidal volume 550 SET RATE 12    
 EPAP/CPAP/PEEP 5.0 Sample source Arterial    
 SITE Right Brachial    
 RANDAL'S TEST PASS HEPATIC FUNCTION PANEL Collection Time: 11/02/21  4:30 AM  
Result Value Ref Range Protein, total 4.5 (L) 6.4 - 8.2 g/dL Albumin 1.7 (L) 3.5 - 5.0 g/dL Globulin 2.8 2.0 - 4.0 g/dL A-G Ratio 0.6 (L) 1.1 - 2.2 Bilirubin, total 23.6 (H) 0.2 - 1.0 mg/dL Bilirubin, direct 21.4 (H) 0.0 - 0.2 mg/dL Alk. phosphatase 76 45 - 117 U/L  
 AST (SGOT) 383 (H) 15 - 37 U/L  
 ALT (SGPT) 118 (H) 12 - 78 U/L  
PROTHROMBIN TIME + INR Collection Time: 11/02/21  4:30 AM  
Result Value Ref Range Prothrombin time 17.5 (H) 11.9 - 14.7 sec INR 1.5 (H) 0.9 - 1.1 RENAL FUNCTION PANEL Collection Time: 11/02/21  4:30 AM  
Result Value Ref Range Sodium 136 136 - 145 mmol/L Potassium 4.4 3.5 - 5.1 mmol/L  Chloride 109 (H) 97 - 108 mmol/L  
 CO2 14 (LL) 21 - 32 mmol/L  
 Anion gap 13 5 - 15 mmol/L Glucose 134 (H) 65 - 100 mg/dL BUN 68 (H) 6 - 20 mg/dL Creatinine 5.06 (H) 0.55 - 1.02 mg/dL BUN/Creatinine ratio 13 12 - 20 GFR est AA 11 (L) >60 ml/min/1.73m2 GFR est non-AA 9 (L) >60 ml/min/1.73m2 Calcium 7.8 (L) 8.5 - 10.1 mg/dL Phosphorus 6.6 (H) 2.6 - 4.7 mg/dL Albumin 1.7 (L) 3.5 - 5.0 g/dL MAGNESIUM Collection Time: 11/02/21  4:30 AM  
Result Value Ref Range Magnesium 2.2 1.6 - 2.4 mg/dL AMMONIA Collection Time: 11/02/21  4:30 AM  
Result Value Ref Range Ammonia 118 (H) <32 umol/L  
CBC WITH AUTOMATED DIFF Collection Time: 11/02/21  9:15 AM  
Result Value Ref Range WBC 23.7 (H) 3.6 - 11.0 K/uL  
 RBC 3.69 (L) 3.80 - 5.20 M/uL  
 HGB 11.5 11.5 - 16.0 g/dL HCT 33.3 (L) 35.0 - 47.0 % MCV 90.2 80.0 - 99.0 FL  
 MCH 31.2 26.0 - 34.0 PG  
 MCHC 34.5 30.0 - 36.5 g/dL RDW 20.3 (H) 11.5 - 14.5 % PLATELET 486 (L) 159 - 400 K/uL MPV 11.3 8.9 - 12.9 FL  
 NRBC 0.2 (H) 0.0  WBC ABSOLUTE NRBC 0.04 (H) 0.00 - 0.01 K/uL NEUTROPHILS PENDING % LYMPHOCYTES PENDING % MONOCYTES PENDING % EOSINOPHILS PENDING % BASOPHILS PENDING % IMMATURE GRANULOCYTES PENDING %  
 ABS. NEUTROPHILS PENDING K/UL  
 ABS. LYMPHOCYTES PENDING K/UL  
 ABS. MONOCYTES PENDING K/UL  
 ABS. EOSINOPHILS PENDING K/UL  
 ABS. BASOPHILS PENDING K/UL  
 ABS. IMM. GRANS. PENDING K/UL  
 DF PENDING   
 
 
XR CHEST PORT Final Result XR CHEST PORT Final Result Left IJ central venous catheter terminating over the lower right atrium. No  
discernible pneumothorax. XR CHEST PORT Final Result CT ABD PELV WO CONT Final Result 1. No acute findings. 2. Severe diffuse hepatic steatosis. 3. Cholelithiasis.   
  
  
  
Dose reduction: All CT scans at this facility are performed using radiation dose  
reduction optimization techniques as appropriate to a performed exam, including  
the following: Automated exposure control (AEC), adjustment of the MAA and/or KUB according to the patient's size, or the patient's use of iterative  
reconstruction techniques (ASIR). XR CHEST PORT Final Result Similar exam to prior without findings of definite acute cardiopulmonary  
abnormality. XR CHEST PORT    (Results Pending) Assessment: 
Acute upper gastrointestinal bleeding, appears due to jejunal bleeding. Status post EGD with epinephrine injections. Patient with recurrent bleeding on 10/31 Hypovolemic shock due to above Acute blood loss anemia End-stage alcoholic cirrhosis with hepatic failure; MELD-Na  score is 34 as of 11/1 with estimated 90-day mortality of 65% Acute kidney injury, probably ATN due to hypotension. Likely component of hepatorenal syndrome Alcohol abuse; patient was still drinking about 9 beers daily just prior to admission Nonanion gap metabolic acidosis, likely due to BRITTANY Plan: 
 
Continue levophed, dexameditine, octreotide, zosyn and propofol. We will speak with GI regarding the need for repeat endoscopy, probably only if hemoglobin continues to drop Full code status. Await rest of morning labs Wean pressors as blood pressure allows Care Plan discussed with: Patient/Family Disposition: Continued ICU care Total time spent with patient: 30 minutes. Kedar Flores

## 2021-11-02 NOTE — PROGRESS NOTES
Renal Progress Note    Patient: Genie Sanchez MRN: [id number]  SSN: [id number]    YOB: 1975  Age: 51 y.o. Sex: female      Admit Date: 10/25/2021    LOS: 8 days     Subjective:   Patient seen in ICU.  On ventilator and sedated  Hypotensive on Levophed   LGI bleed +, hemoglobin improved from 7.1-11.5 with multiple packed RBC transfusion   Her creatinine has increased to 5.0 today      Current Facility-Administered Medications   Medication Dose Route Frequency    sodium bicarbonate (8.4%) 150 mEq in dextrose 5% 1,000 mL infusion   IntraVENous CONTINUOUS    NOREPINephrine (LEVOPHED) 8 mg in 0.9% NS 250ml infusion  0.5-120 mcg/min IntraVENous TITRATE    propofol (DIPRIVAN) 10 mg/mL infusion  0-50 mcg/kg/min IntraVENous TITRATE    0.9% sodium chloride infusion  75 mL/hr IntraVENous PRN    EPINEPHrine (ADRENALIN) 0.1 mg/mL syringe    PRN    simethicone (MYLICON) 21SE/8.0GK oral drops    PRN    0.9% sodium chloride infusion 250 mL  250 mL IntraVENous PRN    piperacillin-tazobactam (ZOSYN) 3.375 g in 0.9% sodium chloride (MBP/ADV) 100 mL MBP  3.375 g IntraVENous Q12H    dexmedeTOMidine (PRECEDEX) 400 mcg in 0.9% sodium chloride (MBP/ADV) 100 mL MBP  0.1-1.5 mcg/kg/hr IntraVENous TITRATE    0.9% sodium chloride 1,000 mL with thiamine 100 mg infusion   IntraVENous Q24H    pantoprazole (PROTONIX) 40 mg in 0.9% sodium chloride 10 mL injection  40 mg IntraVENous Q12H    octreotide (SANDOSTATIN) 500 mcg in 0.9% sodium chloride 500 mL infusion  25 mcg/hr IntraVENous CONTINUOUS    0.9% sodium chloride infusion 250 mL  250 mL IntraVENous PRN    famotidine (PEPCID) tablet 10 mg  10 mg Oral BID    midodrine (PROAMATINE) tablet 10 mg  10 mg Oral TID    hydrOXYzine pamoate (VISTARIL) capsule 25 mg  25 mg Oral BID    traZODone (DESYREL) tablet 50 mg  50 mg Oral QHS PRN    influenza vaccine 2021-22 (6 mos+)(PF) (FLUARIX/FLULAVAL/FLUZONE QUAD) injection 0.5 mL  1 Each IntraMUSCular PRIOR TO DISCHARGE    lactulose (CHRONULAC) 10 gram/15 mL solution 30 mL  20 g Oral BID    sodium chloride (NS) flush 5-40 mL  5-40 mL IntraVENous Q8H    sodium chloride (NS) flush 5-40 mL  5-40 mL IntraVENous PRN    acetaminophen (TYLENOL) tablet 650 mg  650 mg Oral Q6H PRN    Or    acetaminophen (TYLENOL) suppository 650 mg  650 mg Rectal Q6H PRN    ondansetron (ZOFRAN ODT) tablet 4 mg  4 mg Oral Q6H PRN    Or    ondansetron (ZOFRAN) injection 4 mg  4 mg IntraVENous Q6H PRN    [Held by provider] thiamine mononitrate (B-1) tablet 100 mg  100 mg Oral DAILY    [Held by provider] b complex-vitamin c-folic acid 5mg (FOLBEE PLUS) tablet 1 Tablet  1 Tablet Oral DAILY    [Held by provider] spironolactone (ALDACTONE) tablet 25 mg  25 mg Oral BID        Vitals:    11/02/21 0721 11/02/21 1045 11/02/21 1100 11/02/21 1108   BP:       Pulse: 62   83   Resp: 13   21   Temp:   100 °F (37.8 °C)    SpO2: 100%   97%   Weight:       Height:  5' 6\" (1.676 m)       Objective:   General: alert awake well-oriented, no acute distress. HEENT: EOMI, Icterus+, no Pallor, pupils reactive, mucosa moist,  Neck: Neck is supple, No JVD, no thyromegaly  Lungs: breathsounds normal,  no rhonchi, no rales,  CVS: heart sounds normal,  no murmurs, no rubs. GI: soft, distended, nontender, normal BS,   Extremeties: no cyanosis, 1-2+ bilateral lower extremity edema present  Neuro: Alert, awake, oriented x3, speech is normal, moving all extremeties well. Skin: normal skin turgor, no skin rashes      Intake and Output:  Current Shift: No intake/output data recorded. Last three shifts: 10/31 1901 - 11/02 0700  In: 9033.4 [P.O.:400;  I.V.:5172.5]  Out: 742 [Urine:740]      Lab/Data Review:  Recent Labs     11/02/21  0915 11/01/21  1730 11/01/21  0620   WBC 23.7* 18.7* 12.7*   HGB 11.5 10.6* 7.1*   HCT 33.3* 30.4* 20.5*   * 147* 145*     Recent Labs     11/02/21  0430 11/01/21  0620 10/31/21  0858    139 136   K 4.4 4.7 4.7   * 110* 106   CO2 14* 16* 17*   * 82 79   BUN 68* 65* 59*   CREA 5.06* 4.72* 4.05*   CA 7.8* 7.6* 8.4*   MG 2.2  --   --    PHOS 6.6* 5.3* 4.2   ALB 1.7*  1.7* 1.3*  1.3* 1.6*  1.7*   TBILI 23.6* 21.4* 23.1*   * 61 64   INR 1.5*  --   --      Recent Labs     11/02/21  0409 11/01/21  1550   PH 7.32* 7.31*   PCO2 27* 23*   PO2 167* 121*   HCO3 16* 14*   FIO2 40.0 50.0     Recent Results (from the past 24 hour(s))   PLASMA, ALLOCATE    Collection Time: 11/01/21  2:45 PM   Result Value Ref Range    Unit number [id number]     Blood component type FP 24H,Thaw2     Unit division 00     Status of unit Issued,final     TRANSFUSION STATUS Ok to transfuse     Unit number [id number]     Blood component type FP 24h,Thaw     Unit division 00     Status of unit Issued,final     TRANSFUSION STATUS Ok to transfuse    PLATELETS, ALLOCATE    Collection Time: 11/01/21  3:00 PM   Result Value Ref Range    Unit number [id number]     Blood component type PLP,LR PSTC1     Unit division 00     Status of unit Issued,final     TRANSFUSION STATUS Ok to transfuse    BLOOD GAS, ARTERIAL    Collection Time: 11/01/21  3:50 PM   Result Value Ref Range    pH 7.31 (L) 7.35 - 7.45      PCO2 23 (L) 35 - 45 mmHg    PO2 121 (H) 75 - 100 mmHg    O2 SAT 99 >95 %    BICARBONATE 14 (L) 22 - 26 mmol/L    BASE DEFICIT 13.2 (H) 0 - 2 mmol/L    O2 METHOD VENT      FIO2 50.0 %    MODE Assist Control/Volume Control      Tidal volume 550      SET RATE 12      EPAP/CPAP/PEEP 5.0      SITE Right Radial      RANDAL'S TEST PASS      Critical value read back KATELYN STARGARDT,RN    CBC WITH AUTOMATED DIFF    Collection Time: 11/01/21  5:30 PM   Result Value Ref Range    WBC 18.7 (H) 3.6 - 11.0 K/uL    RBC 3.33 (L) 3.80 - 5.20 M/uL    HGB 10.6 (L) 11.5 - 16.0 g/dL    HCT 30.4 (L) 35.0 - 47.0 %    MCV 91.3 80.0 - 99.0 FL    MCH 31.8 26.0 - 34.0 PG    MCHC 34.9 30.0 - 36.5 g/dL    RDW 19.5 (H) 11.5 - 14.5 %    PLATELET 866 (L) 184 - 400 K/uL    MPV 11.8 8.9 - 12.9 FL NRBC 0.1 (H) 0.0  WBC    ABSOLUTE NRBC 0.02 (H) 0.00 - 0.01 K/uL    NEUTROPHILS 88 (H) 32 - 75 %    LYMPHOCYTES 6 (L) 12 - 49 %    MONOCYTES 6 5 - 13 %    EOSINOPHILS 0 0 - 7 %    BASOPHILS 0 0 - 1 %    IMMATURE GRANULOCYTES 0 %    ABS. NEUTROPHILS 16.5 (H) 1.8 - 8.0 K/UL    ABS. LYMPHOCYTES 1.1 0.8 - 3.5 K/UL    ABS. MONOCYTES 1.1 (H) 0.0 - 1.0 K/UL    ABS. EOSINOPHILS 0.0 0.0 - 0.4 K/UL    ABS. BASOPHILS 0.0 0.0 - 0.1 K/UL    ABS. IMM. GRANS. 0.0 K/UL    DF Manual      PLATELET COMMENTS Large Platelets      RBC COMMENTS Anisocytosis  1+        RBC COMMENTS [place] cells  Present        RBC COMMENTS Polychromasia  1+       BLOOD GAS, ARTERIAL    Collection Time: 11/02/21  4:09 AM   Result Value Ref Range    pH 7.32 (L) 7.35 - 7.45      PCO2 27 (L) 35 - 45 mmHg    PO2 167 (H) 75 - 100 mmHg    O2  >95 %    BICARBONATE 16 (L) 22 - 26 mmol/L    BASE DEFICIT 10.7 (H) 0 - 2 mmol/L    O2 METHOD VENT      FIO2 40.0 %    MODE Assist Control/Volume Control      Tidal volume 550      SET RATE 12      EPAP/CPAP/PEEP 5.0      Sample source Arterial      SITE Right Brachial      RANDAL'S TEST PASS     HEPATIC FUNCTION PANEL    Collection Time: 11/02/21  4:30 AM   Result Value Ref Range    Protein, total 4.5 (L) 6.4 - 8.2 g/dL    Albumin 1.7 (L) 3.5 - 5.0 g/dL    Globulin 2.8 2.0 - 4.0 g/dL    A-G Ratio 0.6 (L) 1.1 - 2.2      Bilirubin, total 23.6 (H) 0.2 - 1.0 mg/dL    Bilirubin, direct 21.4 (H) 0.0 - 0.2 mg/dL    Alk.  phosphatase 76 45 - 117 U/L    AST (SGOT) 383 (H) 15 - 37 U/L    ALT (SGPT) 118 (H) 12 - 78 U/L   PROTHROMBIN TIME + INR    Collection Time: 11/02/21  4:30 AM   Result Value Ref Range    Prothrombin time 17.5 (H) 11.9 - 14.7 sec    INR 1.5 (H) 0.9 - 1.1     RENAL FUNCTION PANEL    Collection Time: 11/02/21  4:30 AM   Result Value Ref Range    Sodium 136 136 - 145 mmol/L    Potassium 4.4 3.5 - 5.1 mmol/L    Chloride 109 (H) 97 - 108 mmol/L    CO2 14 (LL) 21 - 32 mmol/L    Anion gap 13 5 - 15 mmol/L Glucose 134 (H) 65 - 100 mg/dL    BUN 68 (H) 6 - 20 mg/dL    Creatinine 5.06 (H) 0.55 - 1.02 mg/dL    BUN/Creatinine ratio 13 12 - 20      GFR est AA 11 (L) >60 ml/min/1.73m2    GFR est non-AA 9 (L) >60 ml/min/1.73m2    Calcium 7.8 (L) 8.5 - 10.1 mg/dL    Phosphorus 6.6 (H) 2.6 - 4.7 mg/dL    Albumin 1.7 (L) 3.5 - 5.0 g/dL   MAGNESIUM    Collection Time: 11/02/21  4:30 AM   Result Value Ref Range    Magnesium 2.2 1.6 - 2.4 mg/dL   AMMONIA    Collection Time: 11/02/21  4:30 AM   Result Value Ref Range    Ammonia 118 (H) <32 umol/L   CBC WITH AUTOMATED DIFF    Collection Time: 11/02/21  9:15 AM   Result Value Ref Range    WBC 23.7 (H) 3.6 - 11.0 K/uL    RBC 3.69 (L) 3.80 - 5.20 M/uL    HGB 11.5 11.5 - 16.0 g/dL    HCT 33.3 (L) 35.0 - 47.0 %    MCV 90.2 80.0 - 99.0 FL    MCH 31.2 26.0 - 34.0 PG    MCHC 34.5 30.0 - 36.5 g/dL    RDW 20.3 (H) 11.5 - 14.5 %    PLATELET 777 (L) 492 - 400 K/uL    MPV 11.3 8.9 - 12.9 FL    NRBC 0.2 (H) 0.0  WBC    ABSOLUTE NRBC 0.04 (H) 0.00 - 0.01 K/uL    NEUTROPHILS 84 (H) 32 - 75 %    LYMPHOCYTES 9 (L) 12 - 49 %    MONOCYTES 6 5 - 13 %    EOSINOPHILS 1 0 - 7 %    BASOPHILS 0 0 - 1 %    IMMATURE GRANULOCYTES 0 %    ABS. NEUTROPHILS 20.0 (H) 1.8 - 8.0 K/UL    ABS. LYMPHOCYTES 2.1 0.8 - 3.5 K/UL    ABS. MONOCYTES 1.4 (H) 0.0 - 1.0 K/UL    ABS. EOSINOPHILS 0.2 0.0 - 0.4 K/UL    ABS. BASOPHILS 0.0 0.0 - 0.1 K/UL    ABS. IMM. GRANS. 0.0 K/UL    DF Manual      RBC COMMENTS Macrocytosis  1+        RBC COMMENTS Ovalocytes  1+            Assessment and Plan:      1.  Acute Kidney Injury : probably prerenal azotemia secondary to hypotension , Possible ATN  probably has hepatorenal syndrome, urine random sodium and chloride were low  Worsening renal functions noted,   Will try to support hemodynamic status  will continue to monitor renal functions and adjust management as needed  If no improvement in renal functions and urine output, will initiate renal replacement therapy tomorrow   no history of chronic kidney disease     2.  GI bleed/severe anemia: Stable hemoglobin post transfusions  GI following, EGD done, noted active bleeding with interventions  Monitor H&H and transfuse as needed    2. Hyperbilirubinemia/chronic liver disease/suspected cirrhosis  Alcohol dependence:   Acute liver injury on chronic liver disease, probably alcohol-related  Continue to monitor with abstinence from alcohol  GI following the patient    3. Hypotension: Probably related to liver disease/? Sepsis  Leukocytosis present  On levophed for hemodynamic support   Given normal saline 500 mL bolus and a dose of IV albumin for hemodynamic support   continue ICU monitoring    4. Metabolic acidosis: We will give 2 A of IV sodium bicarbonate and start patient on bicarb drip    4. Lower extremity edema: probably related to chronic liver disease and obesity,   Will use diuretics as needed, will monitor fluid status clinically and adjust diuretics as needed.      5. Hyperkalemia: resolved now       Signed By: Vikram Bach MD     November 2, 2021

## 2021-11-02 NOTE — PROGRESS NOTES
Bedside and Verbal shift change report given to Amie Hanson RN (oncoming nurse) by Trey Christine RN (offgoing nurse). Report included the following information SBAR, Kardex, Intake/Output, Recent Results and Dual Neuro Assessment, cardiac rhythm.

## 2021-11-02 NOTE — PROGRESS NOTES
08:00 - Patient has had 2x large dark bloody BM with clots. BP soft, last 81/40. Dr. Richard Young notified. No CBC results in yet. Called lab, verbalized they would run CBC now. 08:30 - HGB results 7.1. Dr. Richard Young notified. Orders received for 3 units PRBS's. 10:00 - Dr. Dang Darnell at bedside, orders received for norepinephrine gtt if needed for hypotension. 13:30 - Endo team, CRNA at bedside. 2nd unit of PRBC currently running. 14:00 - Endo procedure in progress, Dr. Richard Young and Dr. Kemar Guzman at bedside. Patient continues to be hypotensive, levo gtt initiated at 4 mcg/min. 14:10 - Called to bedside by Dr. Richard Young, instructed to call RT d/t need for intubation. Patient vomited, possible aspiration during EGD. RT called, RSI kit pulled from omni. Succs and propofol given by CRNA. Patient intubated with #7 ETT by CRNA. Orders received for central line to be placed. Anesthesiologist notified of need for CVAD.    14:45 - Per Dr. Richard Young, patient unable to be extubated after EGD. Orders received for sedation, pulmonary consult, and initiation of rapid transfusion protocol. Per Dr. Kemar Guzman, NO NGT/OGT to be placed. 16:30 - transfusion of all ordered blood products complete using rapid transfusion (see blood admin flowsheets). Patient has received a total of 5x PRBC's, 2x FFP, 1x platelets so far today. 17:30 - Repeat CBC sent. 18:00 - Left IJ placed by Dr. Kush Mittal. 10 mg vecuronium given prior to procedure per orders from Dr. Kush Mittal at bedside. Levo titrated to 15 mcg/min d/t significant hypotension per MD order at bedside. 18:30 - Family at bedside, updated on patient's condition. 20:00 - bedside shift report given to Shannan Méndez RN.

## 2021-11-02 NOTE — PROGRESS NOTES
07:30 - on AM assessment pt unresponsive except for positive cough/gag reflex. Sedation weaned down. 08:00 - notified Dr. Bina Atkins of [address] placement in lower right atrium, instructed to call anesthesia regarding line. Informed Dr. Yuriy Nicholson of line placement, verbalized he would come pull line back later today. 09:00 - sedation paused for 20 minutes to facilitate neuro assessment. No change in neuro response. Notified Dr. Leatha Gonzalez, instructed to keep sedation off as long as patient tolerates. 12:00 - BP continues to drop, levo titrated up. Spoke with Dr. Yuriy Nicholson again about IJ placement 13:00 - ventilator alarming, patient thrashing neck and arms, reaching for ETT. Propofol restarted (see MAR). Per Dr. Yuriy Nicholson, IJ pulled back by 2 cm. Repeat CXR ordered. 13:30 - call from radiologist stating line needs to be pulled back 3 more cm to be placed in SVC. Spoke with Dr. Yuriy Nicholson again. 15:30 - Per nursing supervisor, no anesthesiologists available to modify line, instructed to call Dr. Bina Atkins to find someone else to pull back line. 16:00 - Spoke with Dr. Kareen Mg is OK to use in right atrium. 19:00 - bedside shift report given to Briana Calderon RN.

## 2021-11-02 NOTE — PROGRESS NOTES
Hospitalist Progress Note               Daily Progress Note: 11/2/2021      Subjective:   Hospital course to date: Patient is a 41-year-old female with a PMH significant for morbid obesity, history of cirrhosis of the liver and recent elevated bilirubin.  She came to the ER on 10/22 with elevated bilirubin of 16.7 declined admission at that time but returns to the emergency room on 10/25 with complaints of weakness and lethargy.  Significant labs bilirubin 26.1 and direct bili greater than 16, ammonia is 73, platelet count 95. CT suggestive of diffuse hepatic steatosis and cholelithiasis with no cholecystectomy.  Previous US RP revealed findings suggestive for portal hypertension and cirrhosis, noted thickening gallbladder wall and cholecystitis, gallbladder sludge and probable gallstones.  Admitted for further management of cholelithiasis, abdominal pain, nausea, liver failure with elevated ammonia and thrombocytopenia.  GI consulted.  Scheduled for EGD to assess for esophageal varices and portal hypertension.   EGD demonstrated no evidence for varices. There was a large amount of blood clots noted in the stomach. Patient is status post Billroth II anastomosis. Efferent loop showed some small bowel bleeding and erosions. This was treated with epinephrine injections       Patient with worsening renal function starting on 10/30. Creatinine was up to 4.0 on 10/31. She has several large bloody stools on 10/31, became hypotensive and was transferred to the ICU. Hemoglobin dropped to 7.4 from 10.6 today previous. After transfer to ICU blood pressure dropped into the 73W systolic. She was given a total of 3 units of blood. She was started on IV octreotide. 11/01/21:  She continues to have bloody stools. She is on IV octreotide    Liver chemistries today show a bilirubin of 21.4. Creatinine is 4.7 with a BUN of 65, potassium 4.7. Hemoglobin this morning is 7.1.   Blood pressure is 81 systolic    No urine output overnight but no Arana in place. Patient underwent upper endoscopy, was found to have large amount of blood and a very small minute of the stomach, as well as blood in the jejunum. Dr. Tommie Rahman initially could not get a good look. Patient then started with blood in her upper airway and the procedure was held. She was then intubated by anesthesia. EGD was attempted again and patient was found to have a laceration at the anastomosis of her Billroth II which was actively bleeding. Several endoclips were placed. Blood continued ooze from this area.     I have initiated a massive transfusion protocol, lab was notified. We will give an additional 2 units of blood now along with a sixpack of platelets and 2 units of FFP     We will keep patient on the ventilator for now. Consult pulmonology for ventilator management, discussed with Dr. Светлана Wan     We will also consult interventional radiology in case bleeding continues, at which point she would need consideration for intervention/embolization     I have kept her  updated with our findings and treatment plan     I have asked anesthesia to place a central line as well. Patient was started on norepinephrine due to a drop in her blood pressure as low as 50 systolic. Patient was seen and evaluated by pulmonologist who recommended maintaining mechanical ventilation and following blood gases. Recommended sputum cultures, IV thiamine, folic acid and multivitamin replacement, continued sedation with propofol and adding Precedex for alcohol withdrawal.     Central line was placed at 1900 without any complications.   -------     Patient is seen today for follow-up. Patient continues on mechanical ventilation. She is on 25 mcg drip of norepinephrine. Patient's systolic BP is in the 34C. There was no reported rectal bleeding overnight. Patient's bilirubin elevated to 23.6 from 21.4 the previous day.      CBC and renal function panel are still pending        Problem List:  Problem List as of 11/2/2021 Date Reviewed: 11/1/2021        Codes Class Noted - Resolved    Hyperbilirubinemia ICD-10-CM: E80.6  ICD-9-CM: 782.4  10/25/2021 - Present        Cirrhosis of liver ([facility] ICD-10-CM: K74.60  ICD-9-CM: 571.5  10/25/2021 - Present        Postop check ICD-10-CM: S35  ICD-9-CM: V67.00  10/26/2020 - Present        Bleeding from varicose vein ICD-10-CM: I83.899  ICD-9-CM: 454.8  10/20/2020 - Present        Obesity, morbid ([facility] ICD-10-CM: E66.01  ICD-9-CM: 278.01  10/13/2020 - Present        Venous stasis ulcer ([facility] ICD-10-CM: I83.009, L97.909  ICD-9-CM: 454.0  10/13/2020 - Present              Medications reviewed  Current Facility-Administered Medications   Medication Dose Route Frequency    sodium chloride 0.9 % bolus infusion 500 mL  500 mL IntraVENous ONCE    albumin human 25% (BUMINATE) solution 25 g  25 g IntraVENous ONCE    NOREPINephrine (LEVOPHED) 8 mg in 0.9% NS 250ml infusion  0.5-120 mcg/min IntraVENous TITRATE    propofol (DIPRIVAN) 10 mg/mL infusion  0-50 mcg/kg/min IntraVENous TITRATE    0.9% sodium chloride infusion  75 mL/hr IntraVENous PRN    EPINEPHrine (ADRENALIN) 0.1 mg/mL syringe    PRN    simethicone (MYLICON) 08XN/3.8LC oral drops    PRN    0.9% sodium chloride infusion 250 mL  250 mL IntraVENous PRN    piperacillin-tazobactam (ZOSYN) 3.375 g in 0.9% sodium chloride (MBP/ADV) 100 mL MBP  3.375 g IntraVENous Q12H    dexmedeTOMidine (PRECEDEX) 400 mcg in 0.9% sodium chloride (MBP/ADV) 100 mL MBP  0.1-1.5 mcg/kg/hr IntraVENous TITRATE    0.9% sodium chloride 1,000 mL with thiamine 100 mg infusion   IntraVENous Q24H    pantoprazole (PROTONIX) 40 mg in 0.9% sodium chloride 10 mL injection  40 mg IntraVENous Q12H    octreotide (SANDOSTATIN) 500 mcg in 0.9% sodium chloride 500 mL infusion  25 mcg/hr IntraVENous CONTINUOUS    0.9% sodium chloride infusion 250 mL  250 mL IntraVENous PRN    famotidine (PEPCID) tablet 10 mg  10 mg Oral BID    midodrine (PROAMATINE) tablet 10 mg  10 mg Oral TID    hydrOXYzine pamoate (VISTARIL) capsule 25 mg  25 mg Oral BID    traZODone (DESYREL) tablet 50 mg  50 mg Oral QHS PRN    influenza vaccine  (6 mos+)(PF) (FLUARIX/FLULAVAL/FLUZONE QUAD) injection 0.5 mL  1 Each IntraMUSCular PRIOR TO DISCHARGE    lactulose (CHRONULAC) 10 gram/15 mL solution 30 mL  20 g Oral BID    sodium chloride (NS) flush 5-40 mL  5-40 mL IntraVENous Q8H    sodium chloride (NS) flush 5-40 mL  5-40 mL IntraVENous PRN    acetaminophen (TYLENOL) tablet 650 mg  650 mg Oral Q6H PRN    Or    acetaminophen (TYLENOL) suppository 650 mg  650 mg Rectal Q6H PRN    ondansetron (ZOFRAN ODT) tablet 4 mg  4 mg Oral Q6H PRN    Or    ondansetron (ZOFRAN) injection 4 mg  4 mg IntraVENous Q6H PRN    [Held by provider] thiamine mononitrate (B-1) tablet 100 mg  100 mg Oral DAILY    [Held by provider] b complex-vitamin c-folic acid 5mg (FOLBEE PLUS) tablet 1 Tablet  1 Tablet Oral DAILY    [Held by provider] spironolactone (ALDACTONE) tablet 25 mg  25 mg Oral BID       Review of Systems:   A comprehensive review of systems was negative except for that written in the HPI. Objective:   Physical Exam:     Visit Vitals  BP (!) 92/46 (BP 1 Location: Right upper arm, BP Patient Position: At rest)   Pulse 62   Temp 98.4 °F (36.9 °C)   Resp 13   Ht 5' 6\" (1.676 m)   Wt (!) 159 kg (350 lb 8.5 oz)   SpO2 100%   BMI 56.58 kg/m²      O2 Device: Ventilator    Temp (24hrs), Av.6 °F (36.4 °C), Min:97.2 °F (36.2 °C), Max:98.5 °F (36.9 °C)    No intake/output data recorded. 10/31 1901 -  0700  In: 9033.4 [P.O.:400; I.V.:5172.5]  Out: 742 [Urine:740]    General:   Intubated and sedated. Sclerae icteric   Lungs:   Clear to auscultation bilaterally. Chest wall:  No tenderness or deformity. Heart:  Regular rate and rhythm, S1, S2 normal, no murmur, click, rub or gallop. Abdomen:   Soft, non-tender.  Bowel sounds normal. No masses,  No organomegaly. Extremities: Extremities normal, atraumatic, no cyanosis or edema. Pulses: 2+ and symmetric all extremities. Skin: Skin color, texture, turgor normal. No rashes or lesions   Neurologic: CNII-XII intact. No gross focal deficits         Data Review:       Recent Days:  Recent Labs     11/01/21  1730 11/01/21  0620 10/31/21  1530   WBC 18.7* 12.7*  --    HGB 10.6* 7.1* 7.4*   HCT 30.4* 20.5* 20.8*   * 145*  --      Recent Labs     11/02/21  0430 11/01/21  0620 10/31/21  0858   NA  --  139 136   K  --  4.7 4.7   CL  --  110* 106   CO2  --  16* 17*   GLU  --  82 79   BUN  --  65* 59*   CREA  --  4.72* 4.05*   CA  --  7.6* 8.4*   PHOS  --  5.3* 4.2   ALB 1.7* 1.3*  1.3* 1.6*  1.7*   TBILI 23.6* 21.4* 23.1*   * 61 64   INR 1.5*  --   --      Recent Labs     11/02/21  0409 11/01/21  1550   PH 7.32* 7.31*   PCO2 27* 23*   PO2 167* 121*   HCO3 16* 14*   FIO2 40.0 50.0       24 Hour Results:  Recent Results (from the past 24 hour(s))   CHLORIDE, URINE RANDOM    Collection Time: 11/01/21 11:50 AM   Result Value Ref Range    Chloride,urine random 25 mmol/L   PROTEIN/CREATININE RATIO, URINE    Collection Time: 11/01/21 11:50 AM   Result Value Ref Range    Protein, urine random 146 (H) 0.0 - 11.9 mg/dL    Creatinine, urine 177.00 mg/dL    Protein/Creat.  urine Ratio 0.8     URINALYSIS W/ REFLEX CULTURE    Collection Time: 11/01/21 11:50 AM    Specimen: Urine   Result Value Ref Range    Color [place]      Appearance Turbid (A) Clear      Specific gravity 1.019 1.003 - 1.030      pH (UA) 5.0 5.0 - 8.0      Protein 30 (A) Negative mg/dL    Glucose Negative Negative mg/dL    Ketone Negative Negative mg/dL    Bilirubin Moderate (A) Negative      Blood Small (A) Negative      Urobilinogen 4.0 (H) 0.1 - 1.0 EU/dL    Nitrites Negative Negative      Leukocyte Esterase Small (A) Negative      UA:UC IF INDICATED Urine Culture Ordered (A) Culture not indicated by UA result      WBC >100 (H) 0 - 4 /hpf    RBC 10-20 0 - 5 /hpf    Bacteria 2+ (A) Negative /hpf   CREATININE, UR, RANDOM    Collection Time: 11/01/21 11:50 AM   Result Value Ref Range    Creatinine, urine 179.00 mg/dL   SODIUM, UR, RANDOM    Collection Time: 11/01/21 11:50 AM   Result Value Ref Range    Sodium,urine random 21 mmol/L   PLASMA, ALLOCATE    Collection Time: 11/01/21  2:45 PM   Result Value Ref Range    Unit number [id number]     Blood component type FP 24H,Thaw2     Unit division 00     Status of unit Issued,final     TRANSFUSION STATUS Ok to transfuse     Unit number [id number]     Blood component type FP 24h,Thaw     Unit division 00     Status of unit Issued,final     TRANSFUSION STATUS Ok to transfuse    PLATELETS, ALLOCATE    Collection Time: 11/01/21  3:00 PM   Result Value Ref Range    Unit number [id number]     Blood component type PLP,LR PSTC1     Unit division 00     Status of unit Issued,final     TRANSFUSION STATUS Ok to transfuse    BLOOD GAS, ARTERIAL    Collection Time: 11/01/21  3:50 PM   Result Value Ref Range    pH 7.31 (L) 7.35 - 7.45      PCO2 23 (L) 35 - 45 mmHg    PO2 121 (H) 75 - 100 mmHg    O2 SAT 99 >95 %    BICARBONATE 14 (L) 22 - 26 mmol/L    BASE DEFICIT 13.2 (H) 0 - 2 mmol/L    O2 METHOD VENT      FIO2 50.0 %    MODE Assist Control/Volume Control      Tidal volume 550      SET RATE 12      EPAP/CPAP/PEEP 5.0      SITE Right Radial      RANDAL'S TEST PASS      Critical value read back KATELYN STARGARDT,RN    CBC WITH AUTOMATED DIFF    Collection Time: 11/01/21  5:30 PM   Result Value Ref Range    WBC 18.7 (H) 3.6 - 11.0 K/uL    RBC 3.33 (L) 3.80 - 5.20 M/uL    HGB 10.6 (L) 11.5 - 16.0 g/dL    HCT 30.4 (L) 35.0 - 47.0 %    MCV 91.3 80.0 - 99.0 FL    MCH 31.8 26.0 - 34.0 PG    MCHC 34.9 30.0 - 36.5 g/dL    RDW 19.5 (H) 11.5 - 14.5 %    PLATELET 249 (L) 242 - 400 K/uL    MPV 11.8 8.9 - 12.9 FL    NRBC 0.1 (H) 0.0  WBC    ABSOLUTE NRBC 0.02 (H) 0.00 - 0.01 K/uL    NEUTROPHILS 88 (H) 32 - 75 %    LYMPHOCYTES 6 (L) 12 - 49 %    MONOCYTES 6 5 - 13 %    EOSINOPHILS 0 0 - 7 %    BASOPHILS 0 0 - 1 %    IMMATURE GRANULOCYTES 0 %    ABS. NEUTROPHILS 16.5 (H) 1.8 - 8.0 K/UL    ABS. LYMPHOCYTES 1.1 0.8 - 3.5 K/UL    ABS. MONOCYTES 1.1 (H) 0.0 - 1.0 K/UL    ABS. EOSINOPHILS 0.0 0.0 - 0.4 K/UL    ABS. BASOPHILS 0.0 0.0 - 0.1 K/UL    ABS. IMM. GRANS. 0.0 K/UL    DF Manual      PLATELET COMMENTS Large Platelets      RBC COMMENTS Anisocytosis  1+        RBC COMMENTS [place] cells  Present        RBC COMMENTS Polychromasia  1+       BLOOD GAS, ARTERIAL    Collection Time: 11/02/21  4:09 AM   Result Value Ref Range    pH 7.32 (L) 7.35 - 7.45      PCO2 27 (L) 35 - 45 mmHg    PO2 167 (H) 75 - 100 mmHg    O2  >95 %    BICARBONATE 16 (L) 22 - 26 mmol/L    BASE DEFICIT 10.7 (H) 0 - 2 mmol/L    O2 METHOD VENT      FIO2 40.0 %    MODE Assist Control/Volume Control      Tidal volume 550      SET RATE 12      EPAP/CPAP/PEEP 5.0      Sample source Arterial      SITE Right Brachial      RANDAL'S TEST PASS     HEPATIC FUNCTION PANEL    Collection Time: 11/02/21  4:30 AM   Result Value Ref Range    Protein, total 4.5 (L) 6.4 - 8.2 g/dL    Albumin 1.7 (L) 3.5 - 5.0 g/dL    Globulin 2.8 2.0 - 4.0 g/dL    A-G Ratio 0.6 (L) 1.1 - 2.2      Bilirubin, total 23.6 (H) 0.2 - 1.0 mg/dL    Bilirubin, direct 21.4 (H) 0.0 - 0.2 mg/dL    Alk. phosphatase 76 45 - 117 U/L    AST (SGOT) 383 (H) 15 - 37 U/L    ALT (SGPT) 118 (H) 12 - 78 U/L   PROTHROMBIN TIME + INR    Collection Time: 11/02/21  4:30 AM   Result Value Ref Range    Prothrombin time 17.5 (H) 11.9 - 14.7 sec    INR 1.5 (H) 0.9 - 1.1         XR CHEST PORT   Final Result      XR CHEST PORT   Final Result   Left IJ central venous catheter terminating over the lower right atrium. No   discernible pneumothorax. XR CHEST PORT   Final Result      CT ABD PELV WO CONT   Final Result   1. No acute findings. 2. Severe diffuse hepatic steatosis. 3. Cholelithiasis.             Dose reduction: All CT scans at this facility are performed using radiation dose   reduction optimization techniques as appropriate to a performed exam, including   the following: Automated exposure control (AEC), adjustment of the MAA and/or   KUB according to the patient's size, or the patient's use of iterative   reconstruction techniques (ASIR). XR CHEST PORT   Final Result   Similar exam to prior without findings of definite acute cardiopulmonary   abnormality. Assessment:  Acute upper gastrointestinal bleeding, appears due to jejunal bleeding. Status post EGD with epinephrine injections. Patient with recurrent bleeding on 10/31    Hypovolemic shock due to above    Acute blood loss anemia    End-stage alcoholic cirrhosis with hepatic failure; MELD-Na  score is 34 as of 11/1 with estimated 90-day mortality of 65%    Acute kidney injury, probably ATN due to hypotension. Likely component of hepatorenal syndrome    Alcohol abuse; patient was still drinking about 9 beers daily just prior to admission    Nonanion gap metabolic acidosis, likely due to BRITTANY      Plan:    Continue levophed, dexameditine, octreotide, zosyn and propofol. We will speak with GI regarding the need for repeat endoscopy, probably only if hemoglobin continues to drop  Full code status. Await rest of morning labs  Wean pressors as blood pressure allows      Care Plan discussed with: Patient/Family    Disposition: Continued ICU care        Total time spent with patient: 30 minutes.     Blas Bello MD

## 2021-11-02 NOTE — PROGRESS NOTES
Hospitalist Progress Note               Daily Progress Note: 11/2/2021      Subjective:   Hospital course to date: Patient is a 79-year-old female with a PMH significant for morbid obesity, history of cirrhosis of the liver and recent elevated bilirubin.  She came to the ER on 10/22 with elevated bilirubin of 16.7 declined admission at that time but returns to the emergency room on 10/25 with complaints of weakness and lethargy.  Significant labs bilirubin 26.1 and direct bili greater than 16, ammonia is 73, platelet count 95. CT suggestive of diffuse hepatic steatosis and cholelithiasis with no cholecystectomy.  Previous US RP revealed findings suggestive for portal hypertension and cirrhosis, noted thickening gallbladder wall and cholecystitis, gallbladder sludge and probable gallstones.  Admitted for further management of cholelithiasis, abdominal pain, nausea, liver failure with elevated ammonia and thrombocytopenia.  GI consulted.  Scheduled for EGD to assess for esophageal varices and portal hypertension.   EGD demonstrated no evidence for varices. There was a large amount of blood clots noted in the stomach. Patient is status post Billroth II anastomosis. Efferent loop showed some small bowel bleeding and erosions. This was treated with epinephrine injections       Patient with worsening renal function starting on 10/30. Creatinine was up to 4.0 on 10/31. She has several large bloody stools on 10/31, became hypotensive and was transferred to the ICU. Hemoglobin dropped to 7.4 from 10.6 today previous. After transfer to ICU blood pressure dropped into the 54D systolic. She was given a total of 3 units of blood. She was started on IV octreotide. 11/01/21:  She continues to have bloody stools. She is on IV octreotide    Liver chemistries today show a bilirubin of 21.4. Creatinine is 4.7 with a BUN of 65, potassium 4.7. Hemoglobin this morning is 7.1.   Blood pressure is 81 systolic    No urine output overnight but no Arana in place. Patient underwent upper endoscopy, was found to have large amount of blood and a very small minute of the stomach, as well as blood in the jejunum. Dr. Yola Zaidi initially could not get a good look. Patient then started with blood in her upper airway and the procedure was held. She was then intubated by anesthesia. EGD was attempted again and patient was found to have a laceration at the anastomosis of her Billroth II which was actively bleeding. Several endoclips were placed. Blood continued ooze from this area.     I have initiated a massive transfusion protocol, lab was notified. We will give an additional 2 units of blood now along with a sixpack of platelets and 2 units of FFP     We will keep patient on the ventilator for now. Consult pulmonology for ventilator management, discussed with Dr. Danni Trotter     We will also consult interventional radiology in case bleeding continues, at which point she would need consideration for intervention/embolization     I have kept her  updated with our findings and treatment plan     I have asked anesthesia to place a central line as well. Patient was started on norepinephrine due to a drop in her blood pressure as low as 50 systolic. Patient was seen and evaluated by pulmonologist who recommended maintaining mechanical ventilation and following blood gases. Recommended sputum cultures, IV thiamine, folic acid and multivitamin replacement, continued sedation with propofol and adding Precedex for alcohol withdrawal.     Central line was placed at 1900 without any complications.   -------     Patient is seen today for follow-up. Patient continues on mechanical ventilation. She is on 25 mcg drip of norepinephrine. Patient's systolic BP is in the 84C. There was no rectal bleeding overnight. Patient's bilirubin elevated to 23.6 from 21.4 the previous day.            Problem List:  Problem List as of 11/2/2021 Date Reviewed: 11/1/2021        Codes Class Noted - Resolved    Hyperbilirubinemia ICD-10-CM: E80.6  ICD-9-CM: 782.4  10/25/2021 - Present        Cirrhosis of liver ([facility] ICD-10-CM: K74.60  ICD-9-CM: 571.5  10/25/2021 - Present        Postop check ICD-10-CM: H54  ICD-9-CM: V67.00  10/26/2020 - Present        Bleeding from varicose vein ICD-10-CM: I83.899  ICD-9-CM: 454.8  10/20/2020 - Present        Obesity, morbid ([facility] ICD-10-CM: E66.01  ICD-9-CM: 278.01  10/13/2020 - Present        Venous stasis ulcer ([facility] ICD-10-CM: I83.009, L97.909  ICD-9-CM: 454.0  10/13/2020 - Present              Medications reviewed  Current Facility-Administered Medications   Medication Dose Route Frequency    NOREPINephrine (LEVOPHED) 8 mg in 0.9% NS 250ml infusion  0.5-120 mcg/min IntraVENous TITRATE    propofol (DIPRIVAN) 10 mg/mL infusion  0-50 mcg/kg/min IntraVENous TITRATE    0.9% sodium chloride infusion  75 mL/hr IntraVENous PRN    EPINEPHrine (ADRENALIN) 0.1 mg/mL syringe    PRN    simethicone (MYLICON) 81PJ/0.6UE oral drops    PRN    0.9% sodium chloride infusion 250 mL  250 mL IntraVENous PRN    piperacillin-tazobactam (ZOSYN) 3.375 g in 0.9% sodium chloride (MBP/ADV) 100 mL MBP  3.375 g IntraVENous Q12H    dexmedeTOMidine (PRECEDEX) 400 mcg in 0.9% sodium chloride (MBP/ADV) 100 mL MBP  0.1-1.5 mcg/kg/hr IntraVENous TITRATE    0.9% sodium chloride 1,000 mL with thiamine 100 mg infusion   IntraVENous Q24H    pantoprazole (PROTONIX) 40 mg in 0.9% sodium chloride 10 mL injection  40 mg IntraVENous Q12H    octreotide (SANDOSTATIN) 500 mcg in 0.9% sodium chloride 500 mL infusion  25 mcg/hr IntraVENous CONTINUOUS    0.9% sodium chloride infusion 250 mL  250 mL IntraVENous PRN    famotidine (PEPCID) tablet 10 mg  10 mg Oral BID    midodrine (PROAMATINE) tablet 10 mg  10 mg Oral TID    hydrOXYzine pamoate (VISTARIL) capsule 25 mg  25 mg Oral BID    traZODone (DESYREL) tablet 50 mg  50 mg Oral QHS PRN    influenza vaccine  (6 mos+)(PF) (FLUARIX/FLULAVAL/FLUZONE QUAD) injection 0.5 mL  1 Each IntraMUSCular PRIOR TO DISCHARGE    lactulose (CHRONULAC) 10 gram/15 mL solution 30 mL  20 g Oral BID    sodium chloride (NS) flush 5-40 mL  5-40 mL IntraVENous Q8H    sodium chloride (NS) flush 5-40 mL  5-40 mL IntraVENous PRN    acetaminophen (TYLENOL) tablet 650 mg  650 mg Oral Q6H PRN    Or    acetaminophen (TYLENOL) suppository 650 mg  650 mg Rectal Q6H PRN    ondansetron (ZOFRAN ODT) tablet 4 mg  4 mg Oral Q6H PRN    Or    ondansetron (ZOFRAN) injection 4 mg  4 mg IntraVENous Q6H PRN    [Held by provider] thiamine mononitrate (B-1) tablet 100 mg  100 mg Oral DAILY    [Held by provider] b complex-vitamin c-folic acid 5mg (FOLBEE PLUS) tablet 1 Tablet  1 Tablet Oral DAILY    [Held by provider] spironolactone (ALDACTONE) tablet 25 mg  25 mg Oral BID       Review of Systems:   A comprehensive review of systems was negative except for that written in the HPI. Objective:   Physical Exam:     Visit Vitals  BP (!) 92/46 (BP 1 Location: Right upper arm, BP Patient Position: At rest)   Pulse 62   Temp 98.4 °F (36.9 °C)   Resp 13   Ht 5' 6\" (1.676 m)   Wt (!) 350 lb 8.5 oz (159 kg)   SpO2 100%   BMI 56.58 kg/m²      O2 Device: Ventilator    Temp (24hrs), Av.6 °F (36.4 °C), Min:97.2 °F (36.2 °C), Max:98.5 °F (36.9 °C)    No intake/output data recorded. 10/31 1901 -  0700  In: 9033.4 [P.O.:400; I.V.:5172.5]  Out: 742 [Urine:740]    General:   Awake and alert   Lungs:   Clear to auscultation bilaterally. Chest wall:  No tenderness or deformity. Heart:  Regular rate and rhythm, S1, S2 normal, no murmur, click, rub or gallop. Abdomen:   Soft, non-tender. Bowel sounds normal. No masses,  No organomegaly. Extremities: Extremities normal, atraumatic, no cyanosis or edema. Pulses: 2+ and symmetric all extremities.    Skin: Skin color, texture, turgor normal. No rashes or lesions   Neurologic: CNII-XII intact. No gross focal deficits         Data Review:       Recent Days:  Recent Labs     11/01/21  1730 11/01/21  0620 10/31/21  1530   WBC 18.7* 12.7*  --    HGB 10.6* 7.1* 7.4*   HCT 30.4* 20.5* 20.8*   * 145*  --      Recent Labs     11/02/21  0430 11/01/21  0620 10/31/21  0858   NA  --  139 136   K  --  4.7 4.7   CL  --  110* 106   CO2  --  16* 17*   GLU  --  82 79   BUN  --  65* 59*   CREA  --  4.72* 4.05*   CA  --  7.6* 8.4*   PHOS  --  5.3* 4.2   ALB 1.7* 1.3*  1.3* 1.6*  1.7*   TBILI 23.6* 21.4* 23.1*   * 61 64   INR 1.5*  --   --      Recent Labs     11/02/21  0409 11/01/21  1550   PH 7.32* 7.31*   PCO2 27* 23*   PO2 167* 121*   HCO3 16* 14*   FIO2 40.0 50.0       24 Hour Results:  Recent Results (from the past 24 hour(s))   CHLORIDE, URINE RANDOM    Collection Time: 11/01/21 11:50 AM   Result Value Ref Range    Chloride,urine random 25 mmol/L   PROTEIN/CREATININE RATIO, URINE    Collection Time: 11/01/21 11:50 AM   Result Value Ref Range    Protein, urine random 146 (H) 0.0 - 11.9 mg/dL    Creatinine, urine 177.00 mg/dL    Protein/Creat.  urine Ratio 0.8     URINALYSIS W/ REFLEX CULTURE    Collection Time: 11/01/21 11:50 AM    Specimen: Urine   Result Value Ref Range    Color [place]      Appearance Turbid (A) Clear      Specific gravity 1.019 1.003 - 1.030      pH (UA) 5.0 5.0 - 8.0      Protein 30 (A) Negative mg/dL    Glucose Negative Negative mg/dL    Ketone Negative Negative mg/dL    Bilirubin Moderate (A) Negative      Blood Small (A) Negative      Urobilinogen 4.0 (H) 0.1 - 1.0 EU/dL    Nitrites Negative Negative      Leukocyte Esterase Small (A) Negative      UA:UC IF INDICATED Urine Culture Ordered (A) Culture not indicated by UA result      WBC >100 (H) 0 - 4 /hpf    RBC 10-20 0 - 5 /hpf    Bacteria 2+ (A) Negative /hpf   CREATININE, UR, RANDOM    Collection Time: 11/01/21 11:50 AM   Result Value Ref Range    Creatinine, urine 179.00 mg/dL   SODIUM, UR, RANDOM    Collection Time: 11/01/21 11:50 AM   Result Value Ref Range    Sodium,urine random 21 mmol/L   PLASMA, ALLOCATE    Collection Time: 11/01/21  2:45 PM   Result Value Ref Range    Unit number [id number]     Blood component type FP 24H,Thaw2     Unit division 00     Status of unit Issued,final     TRANSFUSION STATUS Ok to transfuse     Unit number [id number]     Blood component type FP 24h,Thaw     Unit division 00     Status of unit Issued,final     TRANSFUSION STATUS Ok to transfuse    PLATELETS, ALLOCATE    Collection Time: 11/01/21  3:00 PM   Result Value Ref Range    Unit number [id number]     Blood component type PLP,LR PSTC1     Unit division 00     Status of unit Issued,final     TRANSFUSION STATUS Ok to transfuse    BLOOD GAS, ARTERIAL    Collection Time: 11/01/21  3:50 PM   Result Value Ref Range    pH 7.31 (L) 7.35 - 7.45      PCO2 23 (L) 35 - 45 mmHg    PO2 121 (H) 75 - 100 mmHg    O2 SAT 99 >95 %    BICARBONATE 14 (L) 22 - 26 mmol/L    BASE DEFICIT 13.2 (H) 0 - 2 mmol/L    O2 METHOD VENT      FIO2 50.0 %    MODE Assist Control/Volume Control      Tidal volume 550      SET RATE 12      EPAP/CPAP/PEEP 5.0      SITE Right Radial      RANDAL'S TEST PASS      Critical value read back KATELYN STARGARDT,RN    CBC WITH AUTOMATED DIFF    Collection Time: 11/01/21  5:30 PM   Result Value Ref Range    WBC 18.7 (H) 3.6 - 11.0 K/uL    RBC 3.33 (L) 3.80 - 5.20 M/uL    HGB 10.6 (L) 11.5 - 16.0 g/dL    HCT 30.4 (L) 35.0 - 47.0 %    MCV 91.3 80.0 - 99.0 FL    MCH 31.8 26.0 - 34.0 PG    MCHC 34.9 30.0 - 36.5 g/dL    RDW 19.5 (H) 11.5 - 14.5 %    PLATELET 588 (L) 496 - 400 K/uL    MPV 11.8 8.9 - 12.9 FL    NRBC 0.1 (H) 0.0  WBC    ABSOLUTE NRBC 0.02 (H) 0.00 - 0.01 K/uL    NEUTROPHILS 88 (H) 32 - 75 %    LYMPHOCYTES 6 (L) 12 - 49 %    MONOCYTES 6 5 - 13 %    EOSINOPHILS 0 0 - 7 %    BASOPHILS 0 0 - 1 %    IMMATURE GRANULOCYTES 0 %    ABS. NEUTROPHILS 16.5 (H) 1.8 - 8.0 K/UL    ABS. LYMPHOCYTES 1.1 0.8 - 3.5 K/UL    ABS. MONOCYTES 1.1 (H) 0.0 - 1.0 K/UL    ABS. EOSINOPHILS 0.0 0.0 - 0.4 K/UL    ABS. BASOPHILS 0.0 0.0 - 0.1 K/UL    ABS. IMM. GRANS. 0.0 K/UL    DF Manual      PLATELET COMMENTS Large Platelets      RBC COMMENTS Anisocytosis  1+        RBC COMMENTS [place] cells  Present        RBC COMMENTS Polychromasia  1+       BLOOD GAS, ARTERIAL    Collection Time: 11/02/21  4:09 AM   Result Value Ref Range    pH 7.32 (L) 7.35 - 7.45      PCO2 27 (L) 35 - 45 mmHg    PO2 167 (H) 75 - 100 mmHg    O2  >95 %    BICARBONATE 16 (L) 22 - 26 mmol/L    BASE DEFICIT 10.7 (H) 0 - 2 mmol/L    O2 METHOD VENT      FIO2 40.0 %    MODE Assist Control/Volume Control      Tidal volume 550      SET RATE 12      EPAP/CPAP/PEEP 5.0      Sample source Arterial      SITE Right Brachial      RANDAL'S TEST PASS     HEPATIC FUNCTION PANEL    Collection Time: 11/02/21  4:30 AM   Result Value Ref Range    Protein, total 4.5 (L) 6.4 - 8.2 g/dL    Albumin 1.7 (L) 3.5 - 5.0 g/dL    Globulin 2.8 2.0 - 4.0 g/dL    A-G Ratio 0.6 (L) 1.1 - 2.2      Bilirubin, total 23.6 (H) 0.2 - 1.0 mg/dL    Bilirubin, direct 21.4 (H) 0.0 - 0.2 mg/dL    Alk. phosphatase 76 45 - 117 U/L    AST (SGOT) 383 (H) 15 - 37 U/L    ALT (SGPT) 118 (H) 12 - 78 U/L   PROTHROMBIN TIME + INR    Collection Time: 11/02/21  4:30 AM   Result Value Ref Range    Prothrombin time 17.5 (H) 11.9 - 14.7 sec    INR 1.5 (H) 0.9 - 1.1         XR CHEST PORT   Final Result      XR CHEST PORT   Final Result   Left IJ central venous catheter terminating over the lower right atrium. No   discernible pneumothorax. XR CHEST PORT   Final Result      CT ABD PELV WO CONT   Final Result   1. No acute findings. 2. Severe diffuse hepatic steatosis. 3. Cholelithiasis.             Dose reduction: All CT scans at this facility are performed using radiation dose   reduction optimization techniques as appropriate to a performed exam, including   the following: Automated exposure control (AEC), adjustment of the MAA and/or   KUB according to the patient's size, or the patient's use of iterative   reconstruction techniques (ASIR). XR CHEST PORT   Final Result   Similar exam to prior without findings of definite acute cardiopulmonary   abnormality. Assessment:  Acute upper gastrointestinal bleeding, appears due to jejunal bleeding. Status post EGD with epinephrine injections. Patient with recurrent bleeding on 10/31    Hypovolemic shock due to above    Acute blood loss anemia    End-stage alcoholic cirrhosis with hepatic failure; MELD-Na  score is 34 with estimated 90-day mortality of 65%    Acute kidney injury, probably ATN due to hypotension. Likely component of hepatorenal syndrome    Alcohol abuse; patient was still drinking about 9 beers daily just prior to admission    Nonanion gap metabolic acidosis, likely due to BRITTANY      Plan:    Continue levophed, dexameditine, octreotide, zosyn and propofol. GI consultation for repeat endoscopy. Pulmonology consultation for extubation. Keep NPO. Full code status. Await blood chemistry. Care Plan discussed with: Patient/Family    Disposition: Continued ICU care    I personally spent   45   minutes of critical care time. This is time spent at this critically ill patient's bedside actively involved in patient care as well as the coordination of care and discussions with the patient's family. This does not include any procedural time which has been billed separately. Total time spent with patient: 30 minutes.     [employer]

## 2021-11-02 NOTE — PROGRESS NOTES
Comprehensive Nutrition Assessment    Type and Reason for Visit: Initial (intubation, NPO)    Nutrition Recommendations/Plan:   Rec'd NG placement    Initiate TF vs PN pending GI MD assessment     If TF desired, initiate Promote at 20mL/hr with 100mL free water q4hr     If PN desired, initiate High Protein TPN at 42mL/hr, no lipids  RD to provide updated recs as TF/PN initiated    Document all BMs in EMR    Nutrition Assessment:  Dx acute on chronic liver disease. CT finding diffuse hepatic stenosis, cholelithiasis. EGD (10/29) finding jejunal erosions and blood clots in stomach, no varices. Transfer to ICU (10/30) for hypotension. F/u EGD (11/1) finding active bleed from anastomosis of previous Billroth II. Required intubation d/t suspected aspiration of blood (11/1), remains intubated at this time. Sedation held x1 hour at time of RD visit, pt remains unresponsive. Worsening renal fx thru admit, per Nephrology BRITTANY on new dx CKD. RN reports plans to remain intubated. No g-tube in place d/t concern for laceration. Pt currently NPO x5; would benefit from PN if no plans to initiate nutrition. Will await GI assessment for plan. Prior, pt on Regular diet with PO intakes documented as ~50% of meals. Labs: BUN 68, Cr 5.06, , Ca 7.8, , , ALP 76, tBili 23.6 (Trending down), Phos 6.6, Mg wnl. Meds: Propofol at 40-held, precedex-held, norepi, zosyn, trazodone, lactulose, famotidine.     Malnutrition Assessment:  Malnutrition Status:  Mild malnutrition    Context:  Acute illness     Findings of the 6 clinical characteristics of malnutrition:   Energy Intake:  7 - 50% or less of est energy requirements for 5 or more days  Weight Loss:  No significant weight loss     Body Fat Loss:  No significant body fat loss,     Muscle Mass Loss:  No significant muscle mass loss,    Fluid Accumulation:  No significant fluid accumulation,      Estimated Daily Nutrient Needs:  Energy (kcal): 1675- 2125kcal (11-14kcal/kg); Weight Used for Energy Requirements: Other (specify) (EDW)  Protein (g): 164- 205g (2-2.5g/kg); Weight Used for Protein Requirements: Ideal  Fluid (ml/day): 1675mL; Method Used for Fluid Requirements: 1 ml/kcal    Nutrition Related Findings:  NFPE without acute findings. No g-tube in place at this time. Central line placed yesterday but RN reports 'in the wrong spot'. Last BM 1/11, bloody. No dysphagia or n/v documented. No edema documented. Wounds:     (L leg; burst vericose vein)       Current Nutrition Therapies:  DIET NPO    Anthropometric Measures:  · Height:  5' 6\" (167.6 cm)  · Current Body Wt:  159 kg (350 lb 8.5 oz)   · Usual Body Wt:  151.5 kg (334 lb) (10/22)     · Ideal Body Wt:  130 lbs:  269.6 %   · Adjusted Body Weight:  (EDW 152kg; adj BW 82kg)   · BMI Category:  Obese class 3 (BMI 40.0 or greater)     Wt Readings from Last 10 Encounters:   11/02/21 (!) 159 kg (350 lb 8.5 oz)   10/22/21 151.5 kg (334 lb)   08/20/21 152.7 kg (336 lb 9.6 oz)   08/19/21 151.5 kg (334 lb)   11/23/20 155.1 kg (342 lb)   10/12/20 148.3 kg (327 lb)   Wt stability x 1 year per wt hx.     Nutrition Diagnosis:   · Inadequate protein-energy intake related to impaired respiratory function, altered GI function as evidenced by intubation, NPO or clear liquid status due to medical condition (GI bleed 2/2 laceration)    Nutrition Interventions:   Food and/or Nutrient Delivery: Continue NPO (TF vs PN pending GI assessment)  Nutrition Education and Counseling: No recommendations at this time  Coordination of Nutrition Care: Continue to monitor while inpatient    Goals:  Initiate means of nutrition to meet >50% EENs, Lytes wnl, Resumption of regular GI fx, Wt loss 1kg +/- 0.5kg per week       Nutrition Monitoring and Evaluation:   Behavioral-Environmental Outcomes: None identified  Food/Nutrient Intake Outcomes: Enteral nutrition intake/tolerance, Parenteral nutrition intake/tolerance, Diet advancement/tolerance  Physical Signs/Symptoms Outcomes: Biochemical data, Weight    Discharge Planning:     Too soon to determine     Electronically signed by Gretel Hernandez on 11/2/2021 at 10:03 AM    Contact:

## 2021-11-02 NOTE — PROGRESS NOTES
Consult  Pulmonary, Critical Care    Name: Quinn Gill MRN: [id number]   : 1975 Hospital: [address] [place] [address]   Date: 2021  Admission date: 10/25/2021 Hospital Day: 9       Subjective/Interval History:   Seen in the ICU on the ventilator. Patient is a 58-year-old female heavy drinker presented to the emergency room on the  with weakness fatigue jaundice refused admission came back on the  and was admitted with further weakness. Had upper endoscopy that showed blood in the small bowel. She had worsening drop in hemoglobin underwent repeat endoscopy had a large amount of blood coming up into the stomach. Required intubation for protection of airway probably aspirated blood. Endoscopy after intubation showed laceration with bleeding at the a former Billroth II anastomosis site. She had endoclips placed and still had oozing of blood. She is received 5 units of blood so far. She is on propofol for sedation is still fairly active and working against the ventilator. Exam shows diffuse rhonchi in the lungs most likely related to aspiration of blood   unresponsive has been off sedation for about an hour. ABGs show significant metabolic acidosis    Patient Active Problem List   Diagnosis Code    Obesity, morbid ([facility] [address] E66.01    Venous stasis ulcer ([facility] [address] I83.009, L97.909    Bleeding from varicose vein I83.899    Postop check Z09    Hyperbilirubinemia E80.6    Cirrhosis of liver ([facility] [address] K74.60       IMPRESSION:   1. Acute hypoxic respiratory failure corrected on the ventilator FiO2 down to 8%  2. Aspiration pneumonitis probably blood chest x-ray relatively clear  3. Hypovolemic shock currently on norepinephrine 34 mics  4. GI bleed jejunal from Billroth II site  5. Metabolic acidosis worsening  6. Acute kidney injury creatinine worsening despite IV fluids  7. Hepatic encephalopathy ammonia elevated  8. Alcohol abuse  Body mass index is 56.58 kg/m².   9. RECOMMENDATIONS/PLAN:   1. Maintain ventilator as is follow blood gases  2. We will place on IV bicarb  3. WBC count elevated will check blood culture  4. We will check surveillance culture of sputum  5. Banana bag not available we will give IV thiamine  6. GI bleed per GI and medical attending  7. Holding propofol due to unresponsive state  8. Subjective/Initial History:   . I was asked by Daniela Goldsmith MD to see Watson Bravo a 51 y.o.  female  in consultation for a chief complaint of acute hypoxic respiratory failure aspiration pneumonitis massive GI bleed      The patient is unable to give any meaningful history or review of systems due to patient factors. Patient PCP: Mendy Monday  PMH:  has a past medical history of Cirrhosis ([facility] [address] GERD (gastroesophageal reflux disease), and Morbid obesity ([facility] [address] PSH:   has a past surgical history that includes hx gastric bypass. FHX: family history includes Diabetes in her father and mother; Heart Attack in her father. SHX:  reports that she has never smoked. She has never used smokeless tobacco. She reports previous alcohol use. She reports that she does not use drugs.   Systemic review unobtainable as the patient is obtunded on the ventilator on IV propofol    No Known Allergies   MEDS:   Current Facility-Administered Medications   Medication    sodium chloride 0.9 % bolus infusion 500 mL    albumin human 25% (BUMINATE) solution 25 g    sodium bicarbonate (8.4%) 150 mEq in dextrose 5% 1,000 mL infusion    NOREPINephrine (LEVOPHED) 8 mg in 0.9% NS 250ml infusion    propofol (DIPRIVAN) 10 mg/mL infusion    0.9% sodium chloride infusion    EPINEPHrine (ADRENALIN) 0.1 mg/mL syringe    simethicone (MYLICON) 43FJ/2.9MX oral drops    0.9% sodium chloride infusion 250 mL    piperacillin-tazobactam (ZOSYN) 3.375 g in 0.9% sodium chloride (MBP/ADV) 100 mL MBP    dexmedeTOMidine (PRECEDEX) 400 mcg in 0.9% sodium chloride (MBP/ADV) 100 mL MBP    0.9% sodium chloride 1,000 mL with thiamine 100 mg infusion    pantoprazole (PROTONIX) 40 mg in 0.9% sodium chloride 10 mL injection    octreotide (SANDOSTATIN) 500 mcg in 0.9% sodium chloride 500 mL infusion    0.9% sodium chloride infusion 250 mL    famotidine (PEPCID) tablet 10 mg    midodrine (PROAMATINE) tablet 10 mg    hydrOXYzine pamoate (VISTARIL) capsule 25 mg    traZODone (DESYREL) tablet 50 mg    influenza vaccine 2021-22 (6 mos+)(PF) (FLUARIX/FLULAVAL/FLUZONE QUAD) injection 0.5 mL    lactulose (CHRONULAC) 10 gram/15 mL solution 30 mL    sodium chloride (NS) flush 5-40 mL    sodium chloride (NS) flush 5-40 mL    acetaminophen (TYLENOL) tablet 650 mg    Or    acetaminophen (TYLENOL) suppository 650 mg    ondansetron (ZOFRAN ODT) tablet 4 mg    Or    ondansetron (ZOFRAN) injection 4 mg    [Held by provider] thiamine mononitrate (B-1) tablet 100 mg    [Held by provider] b complex-vitamin c-folic acid 5mg (FOLBEE PLUS) tablet 1 Tablet    [Held by provider] spironolactone (ALDACTONE) tablet 25 mg        Current Facility-Administered Medications:     sodium chloride 0.9 % bolus infusion 500 mL, 500 mL, IntraVENous, ONCE, Henrik Rowland MD    albumin human 25% (BUMINATE) solution 25 g, 25 g, IntraVENous, ONCE, Henrik Rowland MD    sodium bicarbonate (8.4%) 150 mEq in dextrose 5% 1,000 mL infusion, , IntraVENous, CONTINUOUS, Chasity Rogers MD    NOREPINephrine (LEVOPHED) 8 mg in 0.9% NS 250ml infusion, 0.5-120 mcg/min, IntraVENous, TITRATE, Henrik Rowland MD, Last Rate: 56.3 mL/hr at 11/02/21 0943, 30 mcg/min at 11/02/21 0943    propofol (DIPRIVAN) 10 mg/mL infusion, 0-50 mcg/kg/min, IntraVENous, TITRATE, John Zuniga MD, Stopped at 11/02/21 0943    0.9% sodium chloride infusion, 75 mL/hr, IntraVENous, PRN, John Lane MD    EPINEPHrine (ADRENALIN) 0.1 mg/mL syringe, , , PRN, Fan Yee MD, 2 mg at 11/01/21 9770    simethicone [facility]) 20JE/7.2NI oral drops, , , PRN, Vee Prieto MD, 20 mg at 11/01/21 1420    0.9% sodium chloride infusion 250 mL, 250 mL, IntraVENous, PRN, Victor Hugo Keith MD    piperacillin-tazobactam (ZOSYN) 3.375 g in 0.9% sodium chloride (MBP/ADV) 100 mL MBP, 3.375 g, IntraVENous, Q12H, Victor Hugo Keith MD, Last Rate: 25 mL/hr at 11/02/21 0349, 3.375 g at 11/02/21 0349    dexmedeTOMidine (PRECEDEX) 400 mcg in 0.9% sodium chloride (MBP/ADV) 100 mL MBP, 0.1-1.5 mcg/kg/hr, IntraVENous, TITRATE, Lidia Palmer MD, Stopped at 11/02/21 0943    0.9% sodium chloride 1,000 mL with thiamine 100 mg infusion, , IntraVENous, Q24H, Lidia Palmer MD, Last Rate: 42 mL/hr at 11/01/21 2057, New Bag at 11/01/21 2057    pantoprazole (PROTONIX) 40 mg in 0.9% sodium chloride 10 mL injection, 40 mg, IntraVENous, Q12H, Elly Allen PA-C, 40 mg at 11/02/21 0934    octreotide (SANDOSTATIN) 500 mcg in 0.9% sodium chloride 500 mL infusion, 25 mcg/hr, IntraVENous, CONTINUOUS, Elly Allen PA-C, Last Rate: 25 mL/hr at 11/02/21 0945, 25 mcg/hr at 11/02/21 0945    0.9% sodium chloride infusion 250 mL, 250 mL, IntraVENous, PRN, Elly Allen PA-C    famotidine (PEPCID) tablet 10 mg, 10 mg, Oral, BID, Vee Prieto MD, 10 mg at 11/01/21 1012    midodrine (PROAMATINE) tablet 10 mg, 10 mg, Oral, TID, Henrik Rowland MD, 10 mg at 11/01/21 1012    hydrOXYzine pamoate (VISTARIL) capsule 25 mg, 25 mg, Oral, BID, Carrie Hart NP, 25 mg at 11/01/21 1012    traZODone (DESYREL) tablet 50 mg, 50 mg, Oral, QHS PRN, Stevie Lezama MD, 50 mg at 11/01/21 0021    influenza vaccine 2021-22 (6 mos+)(PF) (FLUARIX/FLULAVAL/FLUZONE QUAD) injection 0.5 mL, 1 Each, IntraMUSCular, PRIOR TO DISCHARGE, Hilda Brooke MD    lactulose (CHRONULAC) 10 gram/15 mL solution 30 mL, 20 g, Oral, BID, Carrie Hart NP, 30 mL at 11/01/21 1012    sodium chloride (NS) flush 5-40 mL, 5-40 mL, IntraVENous, Q8H, Hilda Brooke MD, 10 mL at 21 2590    sodium chloride (NS) flush 5-40 mL, 5-40 mL, IntraVENous, PRN, Hilda Brooke MD    acetaminophen (TYLENOL) tablet 650 mg, 650 mg, Oral, Q6H PRN **OR** acetaminophen (TYLENOL) suppository 650 mg, 650 mg, Rectal, Q6H PRN, Hilda Brooke MD    ondansetron (ZOFRAN ODT) tablet 4 mg, 4 mg, Oral, Q6H PRN **OR** ondansetron (ZOFRAN) injection 4 mg, 4 mg, IntraVENous, Q6H PRN, Hilda Brooke MD, 4 mg at 10/30/21 2210    [Held by provider] thiamine mononitrate (B-1) tablet 100 mg, 100 mg, Oral, DAILY, Hilda Brooke MD, 100 mg at 21 1012    [Held by provider] b complex-vitamin c-folic acid 5mg (FOLBEE PLUS) tablet 1 Tablet, 1 Tablet, Oral, DAILY, Hilda Brooke MD, 1 Tablet at 10/31/21 1108    [Held by provider] spironolactone (ALDACTONE) tablet 25 mg, 25 mg, Oral, BID, Hilda Brooke MD, 25 mg at 10/30/21 1048      Objective:     Vital Signs: Telemetry:    normal sinus rhythm Intake/Output:   Visit Vitals  BP (!) 92/46 (BP 1 Location: Right upper arm, BP Patient Position: At rest)   Pulse 62   Temp 98.4 °F (36.9 °C)   Resp 13   Ht 5' 6\" (1.676 m)   Wt (!) 159 kg (350 lb 8.5 oz)   SpO2 100%   BMI 56.58 kg/m²       Temp (24hrs), Av.6 °F (36.4 °C), Min:97.2 °F (36.2 °C), Max:98.5 °F (36.9 °C)        O2 Device: Ventilator           Body mass index is 56.58 kg/m². Wt Readings from Last 4 Encounters:   21 (!) 159 kg (350 lb 8.5 oz)   10/22/21 151.5 kg (334 lb)   21 152.7 kg (336 lb 9.6 oz)   21 151.5 kg (334 lb)          Intake/Output Summary (Last 24 hours) at 2021 1002  Last data filed at 2021 0600  Gross per 24 hour   Intake 6313.37 ml   Output 740 ml   Net 5573.37 ml       Last shift:      No intake/output data recorded. Last 3 shifts: 10/31 1901 -  0700  In: 9033.4 [P.O.:400;  I.V.:5172.5]  Out: 742 [Urine:740]       Hemodynamics:    CO:    CI:    CVP:    SVR:   PAP Systolic:    PAP Diastolic:    PVR: SV02:       Ventilator Settings:      Mode Rate TV Press PEEP FiO2 PIP Min. Vent   Assist control    550 ml    5 cm H20 30 %  21 cm H2O  10.3 l/min      Physical Exam:    General:  female; unresponsive propofol on hold  HEENT: NCAT,   Eyes: Jaundiced; no doll's eye movement  Neck: no nodes, no cuff leak, no accessory MM use. Obesity obscures determination of JVD  Chest: no deformity,  Cardiac:  regular rhythm  Lungs: Poor breath sounds but clear anteriorly and laterally no breath sounds in the bases  Abd: Obese questionably distended bowel sounds are present  Ext: Mild edema; no joint swelling; No clubbing  : [facility] urine  Neuro: Unresponsive propofol on hold for an hour no doll's eye reflex flaccid extremities  Psych-unable to assess  Skin: warm, dry, no cyanosis;   Pulses: Brachial radial pulses intact  Capillary: Rapid capillary refill      Labs:    Recent Labs     11/02/21  0915 11/02/21  0430 11/01/21  1730 11/01/21  0620   WBC 23.7*  --  18.7* 12.7*   HGB 11.5  --  10.6* 7.1*   *  --  147* 145*   INR  --  1.5*  --   --      Recent Labs     11/02/21  0430 11/01/21  0620 10/31/21  0858    139 136   K 4.4 4.7 4.7   * 110* 106   CO2 14* 16* 17*   * 82 79   BUN 68* 65* 59*   CREA 5.06* 4.72* 4.05*   CA 7.8* 7.6* 8.4*   MG 2.2  --   --    PHOS 6.6* 5.3* 4.2   ALB 1.7*  1.7* 1.3*  1.3* 1.6*  1.7*   * 61 64     Recent Labs     11/02/21  0409 11/01/21  1550   PH 7.32* 7.31*   PCO2 27* 23*   PO2 167* 121*   HCO3 16* 14*   FIO2 40.0 50.0     Recent Labs     10/31/21  0858   CPK 45   Ammonia 118  11/2 total bili 23.6   11/1Total bili 21.4 alkaline phosphatase 53 ALT 61  Lab Results   Component Value Date/Time    CK 45 10/31/2021 08:58 AM       Imaging:  I have personally reviewed the patients radiographs and have reviewed the reports:    CXR Results  (Last 48 hours)               11/02/21 0211  XR CHEST PORT Final result    Narrative:  Chest single view. Comparison single view chest November 1, 2021. Left neck central venous catheter and ET tube stable position. No gross interstitial or alveolar pulmonary edema. Cardiac and mediastinal   structures magnified on this AP view. No pneumothorax or sizable pleural   effusion. 11/01/21 2045  XR CHEST PORT Final result    Impression:  Left IJ central venous catheter terminating over the lower right atrium. No   discernible pneumothorax. Narrative:  Study: Chest radiograph(s), 1 view       Clinical Indication: CVAD placement. Comparison: Chest radiograph performed earlier the same day. Findings:       Interval placement of a left internal jugular approach central venous catheter   which terminates over the lower right atrium. An endotracheal tube terminates 3   cm superior to the harika. The cardiac silhouette is unchanged in size. No new focal consolidation, large pleural effusion, or discernible pneumothorax. 11/01/21 1521  XR CHEST PORT Final result    Narrative:  Chest single view. Comparison single view chest October 25, 2021. ET tube 3-4 cm above the harika. Lungs clear. Cardiac and mediastinal structures   unchanged. No pneumothorax or sizable pleural effusion. Results from [place] encounter on 10/25/21    XR CHEST PORT    Narrative  Study: Chest radiograph(s), 1 view    Clinical Indication: CVAD placement. Comparison: Chest radiograph performed earlier the same day. Findings:    Interval placement of a left internal jugular approach central venous catheter  which terminates over the lower right atrium. An endotracheal tube terminates 3  cm superior to the harika. The cardiac silhouette is unchanged in size. No new focal consolidation, large pleural effusion, or discernible pneumothorax. Impression  Left IJ central venous catheter terminating over the lower right atrium.  No  discernible pneumothorax. XR CHEST PORT    Narrative  Chest single view. Comparison single view chest November 1, 2021. Left neck central venous catheter and ET tube stable position. No gross interstitial or alveolar pulmonary edema. Cardiac and mediastinal  structures magnified on this AP view. No pneumothorax or sizable pleural  effusion. XR CHEST PORT    Narrative  Chest single view. Comparison single view chest October 25, 2021. ET tube 3-4 cm above the harika. Lungs clear. Cardiac and mediastinal structures  unchanged. No pneumothorax or sizable pleural effusion. Results from [place] encounter on 10/25/21    CT ABD PELV WO CONT    Narrative  CT scan the abdomen and pelvis is performed without IV or oral contrast per  renal colic protocol. Coronal reconstructions are generated. Comparison exams  are not available. Findings: The lung bases are normal. The solid abdominal organs reveals severe  diffuse fatty infiltration of the liver. There appear to be gallstones within  the dependent portion of the gallbladder. The patient is status post gastric  bypass and IUD placement. There is no free intraperitoneal fluid or gas. The  bowel is unopacified but normal in caliber. No fluid or mass is seen in the cul-de-sac. Bone windows reveal degenerative  disc disease at L3-4 and L5-S1. Impression  1. No acute findings. 2. Severe diffuse hepatic steatosis. 3. Cholelithiasis. Dose reduction: All CT scans at this facility are performed using radiation dose  reduction optimization techniques as appropriate to a performed exam, including  the following: Automated exposure control (AEC), adjustment of the MAA and/or  KUB according to the patient's size, or the patient's use of iterative  reconstruction techniques (ASIR). Discussion: Acute respiratory failure secondary to massive GI bleed with inability to protect her airway. Has diffuse rhonchi most likely aspiration of blood.   Will maintain the ventilator for now. Will check a surveillance sputum culture. Will replace oral thiamine multivitamin and folic acid with IV replenishment. She is currently on propofol was still fighting the ventilator will add Precedex. 11/2 unresponsive propofol on hold for an hour no doll's eye reflex ammonia is up to 118. No enteral access. Has significant metabolic acidosis will place on IV bicarb. Will not try to wean the ventilator at this point  Time of care 30 minutes            Thank you for allowing us to participate in the care of this patient.   We will follow along with you     Brittany Ayers MD

## 2021-11-02 NOTE — PROGRESS NOTES
OT eval order received and acknowledged and per chart review, pt transferred to ICU due to decline in medical status. Will need new OT eval order when pt is medically appropriate for therapy. Thank you.

## 2021-11-02 NOTE — PROGRESS NOTES
Dr. Isabel Contreras called re: placement of LIJ triple lumen catheter position per xray result. Catheter is too far in the right atrium.  Advised not to use the line till the line will be pulled out a few centimeters

## 2021-11-03 NOTE — PROGRESS NOTES
Consult Pulmonary, Critical Care Name: Carolyn Henao MRN: [id number] : 1975 Hospital: [address] [place] [address] Date: 11/3/2021  Admission date: 10/25/2021 Hospital Day: 10 Subjective/Interval History:  
Seen in the ICU on the ventilator. Patient is a 79-year-old female heavy drinker presented to the emergency room on the  with weakness fatigue jaundice refused admission came back on the  and was admitted with further weakness. Had upper endoscopy that showed blood in the small bowel. She had worsening drop in hemoglobin underwent repeat endoscopy had a large amount of blood coming up into the stomach. Required intubation for protection of airway probably aspirated blood. Endoscopy after intubation showed laceration with bleeding at the a former Billroth II anastomosis site. She had endoclips placed and still had oozing of blood. She is received 5 units of blood so far. She is on propofol for sedation is still fairly active and working against the ventilator. Exam shows diffuse rhonchi in the lungs most likely related to aspiration of blood  unresponsive has been off sedation for about an hour. ABGs show significant metabolic acidosis 11/3 back on sedation with propofol and Precedex yesterday currently is unresponsive on the ventilator. GI is considering repeat upper Endo today with possible NG placement Patient Active Problem List  
Diagnosis Code  Obesity, morbid ([facility] [address] E66.01  
 Venous stasis ulcer ([address] I83.009, L97.909  Bleeding from varicose vein I83.899  Postop check [id number]  Hyperbilirubinemia E80.6  Cirrhosis of liver ([facility] [address] K74.60 IMPRESSION:  
1. Acute hypoxic respiratory failure corrected on the ventilator FiO2 down to 30% 2. Aspiration pneumonitis probably blood chest x-ray relatively clear 3. Hypotension currently on norepinephrine 60 mics 4. Gram-negative urinary tract infection 5.  GI bleed jejunal from Billroth II site 
6. Metabolic acidosis, improved pH serum CO2 still low 7. Thrombocytopenia worsening 8. Acute kidney injury creatinine worsening despite IV fluids 9. Hepatic encephalopathy ammonia remains elevated 10. Alcohol abuse Body mass index is 58.61 kg/m². 11.   
  
RECOMMENDATIONS/PLAN:  
1. Maintain ventilator as is today try to wean tomorrow after NG insertion today 2. Continue on IV bicarb 3. WBC count elevated urine with gram-negative denise blood culture pending sputum with 1+ polys currently on Zosyn 4. Banana bag not available we will give IV thiamine 5. GI bleed per GI and medical attending hemoglobin stable 6.  
7.   
 
 
 
Subjective/Initial History:  
. I was asked by Leanne Talley MD to see Pernell Chisholm a 51 y.o.  female  in consultation for a chief complaint of acute hypoxic respiratory failure aspiration pneumonitis massive GI bleed The patient is unable to give any meaningful history or review of systems due to patient factors. Patient PCP: Mendy Osborne PMH:  has a past medical history of Cirrhosis ([facility] [address] GERD (gastroesophageal reflux disease), and Morbid obesity ([facility] [address] PSH:   has a past surgical history that includes hx gastric bypass. FHX: family history includes Diabetes in her father and mother; Heart Attack in her father. SHX:  reports that she has never smoked. She has never used smokeless tobacco. She reports previous alcohol use. She reports that she does not use drugs. Systemic review unobtainable as the patient is obtunded on the ventilator on IV propofol No Known Allergies MEDS:  
Current Facility-Administered Medications Medication  sodium bicarbonate (8.4%) 150 mEq in dextrose 5% 1,000 mL infusion  NOREPINephrine (LEVOPHED) 8 mg in 0.9% NS 250ml infusion  propofol (DIPRIVAN) 10 mg/mL infusion  
 0.9% sodium chloride infusion  EPINEPHrine (ADRENALIN) 0.1 mg/mL syringe  simethicone (MYLICON) 29YS/4.7SL oral drops  0.9% sodium chloride infusion 250 mL  piperacillin-tazobactam (ZOSYN) 3.375 g in 0.9% sodium chloride (MBP/ADV) 100 mL MBP  dexmedeTOMidine (PRECEDEX) 400 mcg in 0.9% sodium chloride (MBP/ADV) 100 mL MBP  octreotide (SANDOSTATIN) 500 mcg in 0.9% sodium chloride 500 mL infusion  
 0.9% sodium chloride infusion 250 mL  midodrine (PROAMATINE) tablet 10 mg  
 hydrOXYzine pamoate (VISTARIL) capsule 25 mg  
 traZODone (DESYREL) tablet 50 mg  
 influenza vaccine 2021-22 (6 mos+)(PF) (FLUARIX/FLULAVAL/FLUZONE QUAD) injection 0.5 mL  lactulose (CHRONULAC) 10 gram/15 mL solution 30 mL  sodium chloride (NS) flush 5-40 mL  sodium chloride (NS) flush 5-40 mL  acetaminophen (TYLENOL) tablet 650 mg Or  acetaminophen (TYLENOL) suppository 650 mg  
 ondansetron (ZOFRAN ODT) tablet 4 mg Or  
 ondansetron (ZOFRAN) injection 4 mg  [Held by provider] thiamine mononitrate (B-1) tablet 100 mg  [Held by provider] b complex-vitamin c-folic acid 5mg (FOLBEE PLUS) tablet 1 Tablet  [Held by provider] spironolactone (ALDACTONE) tablet 25 mg  
  
 
Current Facility-Administered Medications:  
  sodium bicarbonate (8.4%) 150 mEq in dextrose 5% 1,000 mL infusion, , IntraVENous, CONTINUOUS, Jose Ramirez MD, Last Rate: 50 mL/hr at 11/02/21 1130, New Bag at 11/02/21 1130 
  NOREPINephrine (LEVOPHED) 8 mg in 0.9% NS 250ml infusion, 0.5-120 mcg/min, IntraVENous, TITRATE, Geo Singh, Yamilka Jacobsen MD, Last Rate: 112.5 mL/hr at 11/03/21 1027, 60 mcg/min at 11/03/21 1027 
  propofol (DIPRIVAN) 10 mg/mL infusion, 0-50 mcg/kg/min, IntraVENous, TITRATE, Kenny Garcia MD, Last Rate: 23.5 mL/hr at 11/03/21 0915, 25 mcg/kg/min at 11/03/21 0915 
  0.9% sodium chloride infusion, 75 mL/hr, IntraVENous, PRN, Yamilka Ayala MD 
  EPINEPHrine (ADRENALIN) 0.1 mg/mL syringe, , , PRN, Lorming Rosario MD, 2 mg at 11/01/21 1430 
  simethicone (MYLICON) 48FK/4.3HN oral drops, , , PRN, Gifty Rosario MD, 20 mg at 11/01/21 1420 
  0.9% sodium chloride infusion 250 mL, 250 mL, IntraVENous, PRN, Attila William MD 
  piperacillin-tazobactam (ZOSYN) 3.375 g in 0.9% sodium chloride (MBP/ADV) 100 mL MBP, 3.375 g, IntraVENous, Q12H, Attila William MD, Last Rate: 25 mL/hr at 11/03/21 0348, 3.375 g at 11/03/21 0348   dexmedeTOMidine (PRECEDEX) 400 mcg in 0.9% sodium chloride (MBP/ADV) 100 mL MBP, 0.1-1.5 mcg/kg/hr, IntraVENous, TITRATE, Chandni Perez MD, Last Rate: 15.6 mL/hr at 11/03/21 0832, 0.4 mcg/kg/hr at 11/03/21 0009   octreotide (SANDOSTATIN) 500 mcg in 0.9% sodium chloride 500 mL infusion, 25 mcg/hr, IntraVENous, CONTINUOUS, Elly Allen PA-C, Last Rate: 25 mL/hr at 11/02/21 1343, 25 mcg/hr at 11/02/21 1343 
  0.9% sodium chloride infusion 250 mL, 250 mL, IntraVENous, PRN, Elly Allen PA-C 
  midodrine (PROAMATINE) tablet 10 mg, 10 mg, Oral, TID, Henrik Rowland MD, 10 mg at 11/01/21 1012   hydrOXYzine pamoate (VISTARIL) capsule 25 mg, 25 mg, Oral, BID, Jeff Francisco NP, 25 mg at 11/01/21 1012   traZODone (DESYREL) tablet 50 mg, 50 mg, Oral, QHS PRN, Pérez Milan MD, 50 mg at 11/01/21 0021 
  influenza vaccine 2021-22 (6 mos+)(PF) (FLUARIX/FLULAVAL/FLUZONE QUAD) injection 0.5 mL, 1 Each, IntraMUSCular, PRIOR TO DISCHARGE, Silver Richard MD 
  lactulose (CHRONULAC) 10 gram/15 mL solution 30 mL, 20 g, Oral, BID, Jeff Francisco NP, 30 mL at 11/01/21 1012   sodium chloride (NS) flush 5-40 mL, 5-40 mL, IntraVENous, Q8H, Silver Richard MD, 10 mL at 11/03/21 2420   sodium chloride (NS) flush 5-40 mL, 5-40 mL, IntraVENous, PRN, Silver Richard MD 
  acetaminophen (TYLENOL) tablet 650 mg, 650 mg, Oral, Q6H PRN **OR** acetaminophen (TYLENOL) suppository 650 mg, 650 mg, Rectal, Q6H PRN, Silver Richard MD 
  ondansetron (ZOFRAN ODT) tablet 4 mg, 4 mg, Oral, Q6H PRN **OR** ondansetron (ZOFRAN) injection 4 mg, 4 mg, IntraVENous, Q6H PRN, Yamini, David Carolina MD, 4 mg at 10/30/21 2210   [Held by provider] thiamine mononitrate (B-1) tablet 100 mg, 100 mg, Oral, DAILY, Ruth Morales MD, 100 mg at 21 1012   [Held by provider] b complex-vitamin c-folic acid 5mg (FOLBEE PLUS) tablet 1 Tablet, 1 Tablet, Oral, DAILY, Ruth Morales MD, 1 Tablet at 10/31/21 1108   [Held by provider] spironolactone (ALDACTONE) tablet 25 mg, 25 mg, Oral, BID, Ruth Morales MD, 25 mg at 10/30/21 1048 Objective:  
 
Vital Signs: Telemetry:    normal sinus rhythm Intake/Output:  
Visit Vitals BP (!) 109/59 (BP 1 Location: Right upper arm, BP Patient Position: At rest) Pulse 79 Temp 99.5 °F (37.5 °C) Resp 21 Ht 5' 6\" (1.676 m) Wt (!) 164.7 kg (363 lb 1.6 oz) SpO2 99% BMI 58.61 kg/m² Temp (24hrs), Av.6 °F (37.6 °C), Min:99.4 °F (37.4 °C), Max:100 °F (37.8 °C) O2 Device: Ventilator Body mass index is 58.61 kg/m². Wt Readings from Last 4 Encounters:  
21 (!) 164.7 kg (363 lb 1.6 oz) 10/22/21 151.5 kg (334 lb) 21 152.7 kg (336 lb 9.6 oz) 21 151.5 kg (334 lb) Intake/Output Summary (Last 24 hours) at 11/3/2021 1040 Last data filed at 11/3/2021 0600 Gross per 24 hour Intake 3285 ml Output 475 ml Net 2810 ml Last shift:      No intake/output data recorded. Last 3 shifts:  1901 -  0700 In: 5406 [I.V.:5406] Out: 1175 [FJSQP:[id number]] Hemodynamics:   
CO:   
CI:   
CVP:   
SVR:   PAP Systolic:   
PAP Diastolic:   
PVR:   
ID89:    
 
Ventilator Settings:     
Mode Rate TV Press PEEP FiO2 PIP Min. Vent Assist control, Volume control    550 ml    5 cm H20 30 %  263 cm H2O  11.6 l/min Physical Exam: 
 
General:  female; unresponsive on propofol and Precedex HEENT: NCAT, Eyes: Jaundiced; no doll's eye movement Neck: no nodes, no cuff leak, no accessory MM use. Obesity obscures determination of JVD Chest: no deformity, 
Cardiac: regular rhythm Lungs: Poor breath sounds but clear anteriorly and laterally no breath sounds in the bases Abd: Obese questionably distended bowel sounds are present Ext: Mild edema; no joint swelling; No clubbing : [facility] urine Neuro: Unresponsive on propofol and Precedex no doll's eye reflex flaccid extremities Psych-unable to assess Skin: warm, dry, no cyanosis;  
Pulses: Brachial radial pulses intact Capillary: Rapid capillary refill Labs: 
 
Recent Labs 11/03/21 
0430 11/02/21 
0915 11/02/21 
0430 11/01/21 
1730 WBC 33.3* 23.7*  --  18.7* HGB 11.8 11.5  --  10.6* PLT 77* 133*  --  147* INR  --   --  1.5*  --   
 
Recent Labs 11/03/21 
0430 11/02/21 
0430 11/01/21 
2654  136 139  
K 3.9 4.4 4.7  109* 110* CO2 15* 14* 16* * 134* 82 BUN 67* 68* 65* CREA 5.43* 5.06* 4.72* CA 6.7* 7.8* 7.6*  
MG 1.7 2.2  --   
PHOS 5.1* 6.6* 5.3* ALB 1.4*  1.4* 1.7*  1.7* 1.3*  1.3* * 118* 61 Recent Labs 11/03/21 
0456 11/02/21 
0409 11/01/21 
1550 PH 7.37 7.32* 7.31* PCO2 24* 27* 23* PO2 96 167* 121* HCO3 17* 16* 14* FIO2 30.0 40.0 50.0 Ammonia 100, 118 
11/3 total bili 22.7, , alk phos 89  
11/2 total bili 23.6  
11/1Total bili 21.4 alkaline phosphatase 53 ALT 61 Lab Results Component Value Date/Time CK 45 10/31/2021 08:58 AM  
 
 
Imaging: 
I have personally reviewed the patients radiographs and have reviewed the reports: CXR Results  (Last 48 hours) 11/03/21 0158  XR CHEST PORT Final result Narrative:  Chest single view. Comparison single view chest November 2, 2021. Unchanged ET tube. Left neck central venous catheter with its tip overlying SVC/RA junction region. Unchanged appearance for the lungs. No pneumothorax or sizable pleural effusion. 11/02/21 1330  XR CHEST PORT Final result Narrative:  Chest single view. ET tube 3-4 cm above the harika. Left neck central venous catheter overlies mid RA region. Consider pullback 3  
cm. Lungs clear. Cardiac and mediastinal structures within normal limits. No  
pneumothorax or sizable pleural effusion. Report called to CCU. Spoke with patient's nurse. 11/02/21 0211  XR CHEST PORT Final result Narrative:  Chest single view. Comparison single view chest November 1, 2021. Left neck central venous catheter and ET tube stable position. No gross interstitial or alveolar pulmonary edema. Cardiac and mediastinal  
structures magnified on this AP view. No pneumothorax or sizable pleural  
effusion. 11/01/21 2045  XR CHEST PORT Final result Impression:  Left IJ central venous catheter terminating over the lower right atrium. No  
discernible pneumothorax. Narrative:  Study: Chest radiograph(s), 1 view Clinical Indication: CVAD placement. Comparison: Chest radiograph performed earlier the same day. Findings:  
   
Interval placement of a left internal jugular approach central venous catheter  
which terminates over the lower right atrium. An endotracheal tube terminates 3  
cm superior to the harika. The cardiac silhouette is unchanged in size. No new focal consolidation, large pleural effusion, or discernible pneumothorax. 11/01/21 1521  XR CHEST PORT Final result Narrative:  Chest single view. Comparison single view chest October 25, 2021. ET tube 3-4 cm above the harika. Lungs clear. Cardiac and mediastinal structures  
unchanged. No pneumothorax or sizable pleural effusion. Results from [facility] [place] encounter on 10/25/21 XR CHEST PORT Narrative Chest single view. ET tube 3-4 cm above the harika. Left neck central venous catheter overlies mid RA region. Consider pullback 3 
cm. Lungs clear. Cardiac and mediastinal structures within normal limits.  No 
pneumothorax or sizable pleural effusion. Report called to CCU. Spoke with patient's nurse. XR CHEST PORT Narrative Chest single view. Comparison single view chest November 2, 2021. Unchanged ET tube. Left neck central venous catheter with its tip overlying SVC/RA junction region. Unchanged appearance for the lungs. No pneumothorax or sizable pleural effusion. XR CHEST PORT Narrative Study: Chest radiograph(s), 1 view Clinical Indication: CVAD placement. Comparison: Chest radiograph performed earlier the same day. Findings: 
 
Interval placement of a left internal jugular approach central venous catheter 
which terminates over the lower right atrium. An endotracheal tube terminates 3 
cm superior to the harika. The cardiac silhouette is unchanged in size. No new focal consolidation, large pleural effusion, or discernible pneumothorax. Impression Left IJ central venous catheter terminating over the lower right atrium. No 
discernible pneumothorax. Results from [address] [facility] Encounter encounter on 10/25/21 CT ABD PELV WO CONT Narrative CT scan the abdomen and pelvis is performed without IV or oral contrast per 
renal colic protocol. Coronal reconstructions are generated. Comparison exams 
are not available. Findings: The lung bases are normal. The solid abdominal organs reveals severe 
diffuse fatty infiltration of the liver. There appear to be gallstones within 
the dependent portion of the gallbladder. The patient is status post gastric 
bypass and IUD placement. There is no free intraperitoneal fluid or gas. The 
bowel is unopacified but normal in caliber. No fluid or mass is seen in the cul-de-sac. Bone windows reveal degenerative 
disc disease at L3-4 and L5-S1. Impression 1. No acute findings. 2. Severe diffuse hepatic steatosis. 3. Cholelithiasis.  
 
 
 
Dose reduction: All CT scans at this facility are performed using radiation dose 
reduction optimization techniques as appropriate to a performed exam, including 
the following: Automated exposure control (AEC), adjustment of the MAA and/or KUB according to the patient's size, or the patient's use of iterative 
reconstruction techniques (ASIR). Discussion: Acute respiratory failure secondary to massive GI bleed with inability to protect her airway. Has diffuse rhonchi most likely aspiration of blood. Will maintain the ventilator for now. Will check a surveillance sputum culture. Will replace oral thiamine multivitamin and folic acid with IV replenishment. She is currently on propofol was still fighting the ventilator will add Precedex. 11/2 unresponsive propofol on hold for an hour no doll's eye reflex ammonia is up to 118. No enteral access. Has significant metabolic acidosis will place on IV bicarb. Will not try to wean the ventilator at this point 11/3 unresponsive back on propofol and Precedex. To have repeat upper Endo and NG tube placement today. Can try to decrease ventilator tomorrow. Currently on Zosyn does have a urinary tract infection Time of care 30 minutes Thank you for allowing us to participate in the care of this patient. We will follow along with you Aayush Meza MD

## 2021-11-03 NOTE — PROCEDURES
Summary of EGD    Please see full report in the chart reviewedprocedurescanned EGD    Esophagus, no blood, no esophagitis varices.   Stomach, status post of gastro jejunostomy Billroth II, anastomosis noted with large erosion with multiple hemoclips placed, blood clots attached to the erosions laceration site, not able to place NG or a OG tube, afraid the tip of NG OG tube might  dislodge  the clots/clips that can cause further bleeding    Continue on PPI, octreotide IV  TPN nutrition if needed  Antibiotic as ordered  Monitor hemoglobin, platelets, PT/INR, transfusion if needed

## 2021-11-03 NOTE — ANESTHESIA POSTPROCEDURE EVALUATION
Procedure(s):  ESOPHAGOGASTRODUODENOSCOPY (EGD).     general    Anesthesia Post Evaluation      Multimodal analgesia: multimodal analgesia not used between 6 hours prior to anesthesia start to PACU discharge  Patient location during evaluation: ICU  Patient participation: complete - patient cannot participate  Level of consciousness: obtunded/minimal responses  Pain score: 0  Pain management: adequate  Airway patency: patent  Anesthetic complications: no  Cardiovascular status: acceptable, blood pressure returned to baseline and hemodynamically stable  Respiratory status: acceptable, intubated, ETT and ventilator  Hydration status: acceptable  Post anesthesia nausea and vomiting:  none  Final Post Anesthesia Temperature Assessment:  Normothermia (36.0-37.5 degrees C)      INITIAL Post-op Vital signs:   Vitals Value Taken Time   /62 11/03/21 1432   Temp     Pulse 91 11/03/21 1432   Resp 16 11/03/21 1432   SpO2 100 % 11/03/21 1432

## 2021-11-03 NOTE — PROGRESS NOTES
reviewed clinical chart. Patient's discharge plan is to return home. CM team will continue to follow.

## 2021-11-03 NOTE — PROGRESS NOTES
Hospitalist Progress Note Daily Progress Note: 11/3/2021 Subjective:  
Hospital course to date: Patient is a 48-year-old female with a PMH significant for morbid obesity, history of cirrhosis of the liver and recent elevated bilirubin.  She came to the ER on 10/22 with elevated bilirubin of 16.7 declined admission at that time but returns to the emergency room on 10/25 with complaints of weakness and lethargy.  Significant labs bilirubin 26.1 and direct bili greater than 16, ammonia is 73, platelet count 95. CT suggestive of diffuse hepatic steatosis and cholelithiasis with no cholecystectomy.  Previous US RP revealed findings suggestive for portal hypertension and cirrhosis, noted thickening gallbladder wall and cholecystitis, gallbladder sludge and probable gallstones.  Admitted for further management of cholelithiasis, abdominal pain, nausea, liver failure with elevated ammonia and thrombocytopenia.  GI consulted.  Scheduled for EGD to assess for esophageal varices and portal hypertension.   EGD demonstrated no evidence for varices. There was a large amount of blood clots noted in the stomach. Patient is status post Billroth II anastomosis. Efferent loop showed some small bowel bleeding and erosions. This was treated with epinephrine injections    
 
Patient with worsening renal function starting on 10/30. Creatinine was up to 4.0 on 10/31. She has several large bloody stools on 10/31, became hypotensive and was transferred to the ICU. Hemoglobin dropped to 7.4 from 10.6 today previous. After transfer to ICU blood pressure dropped into the 39Y systolic. She was given a total of 3 units of blood. She was started on IV octreotide. 11/01/21: 
She continues to have bloody stools. She is on IV octreotide Liver chemistries today show a bilirubin of 21.4. Creatinine is 4.7 with a BUN of 65, potassium 4.7. Hemoglobin this morning is 7.1.   Blood pressure is 81 systolic No urine output overnight but no Arana in place. Patient underwent upper endoscopy, was found to have large amount of blood and a very small minute of the stomach, as well as blood in the jejunum. Dr. Seda Ervin initially could not get a good look. Patient then started with blood in her upper airway and the procedure was held. She was then intubated by anesthesia. EGD was attempted again and patient was found to have a laceration at the anastomosis of her Billroth II which was actively bleeding. Several endoclips were placed. Blood continued ooze from this area. 
  
I have initiated a massive transfusion protocol, lab was notified. We will give an additional 2 units of blood now along with a sixpack of platelets and 2 units of FFP 
  
We will keep patient on the ventilator for now. Consult pulmonology for ventilator management, discussed with Dr. Eri Tate 
  
We will also consult interventional radiology in case bleeding continues, at which point she would need consideration for intervention/embolization 
  
I have kept her  updated with our findings and treatment plan 
  
I have asked anesthesia to place a central line as well. Patient was started on norepinephrine due to a drop in her blood pressure as low as 50 systolic. Patient was seen and evaluated by pulmonologist who recommended maintaining mechanical ventilation and following blood gases. Recommended sputum cultures, IV thiamine, folic acid and multivitamin replacement, continued sedation with propofol and adding Precedex for alcohol withdrawal.  
 
Central line was placed at 1900 without any complications.  
------- Patient is seen today for follow-up. She continues on mechanical ventilation. Creatinine today is up to 5.4, bicarbonate 15. Urine output 475 mL past 24 hours. Total bilirubin unchanged at 22.7. Ammonia level 100. Globin remained stable at 11.8, WBC 33,000. ABG shows pH 7.37, PCO2 24, PO2 96 on 30% FiO2 Problem List: 
Problem List as of 11/3/2021 Date Reviewed: 11/1/2021 Codes Class Noted - Resolved Hyperbilirubinemia ICD-10-CM: E80.6 ICD-9-CM: 782.4  10/25/2021 - Present Cirrhosis of liver ([facility] ICD-10-CM: K74.60 ICD-9-CM: 571.5  10/25/2021 - Present Postop check ICD-10-CM: J43 ICD-9-CM: V67.00  10/26/2020 - Present Bleeding from varicose vein ICD-10-CM: B55.866 ICD-9-CM: 454.8  10/20/2020 - Present Obesity, morbid ([facility] ICD-10-CM: E66.01 
ICD-9-CM: 278.01  10/13/2020 - Present Venous stasis ulcer ([facility] ICD-10-CM: I83.009, L97.909 ICD-9-CM: 454.0  10/13/2020 - Present Medications reviewed Current Facility-Administered Medications Medication Dose Route Frequency  sodium bicarbonate (8.4%) 150 mEq in dextrose 5% 1,000 mL infusion   IntraVENous CONTINUOUS  
 NOREPINephrine (LEVOPHED) 8 mg in 0.9% NS 250ml infusion  0.5-120 mcg/min IntraVENous TITRATE  propofol (DIPRIVAN) 10 mg/mL infusion  0-50 mcg/kg/min IntraVENous TITRATE  
 0.9% sodium chloride infusion  75 mL/hr IntraVENous PRN  
 EPINEPHrine (ADRENALIN) 0.1 mg/mL syringe    PRN  
 simethicone (MYLICON) 32JP/2.3MZ oral drops    PRN  
 0.9% sodium chloride infusion 250 mL  250 mL IntraVENous PRN  piperacillin-tazobactam (ZOSYN) 3.375 g in 0.9% sodium chloride (MBP/ADV) 100 mL MBP  3.375 g IntraVENous Q12H  
 dexmedeTOMidine (PRECEDEX) 400 mcg in 0.9% sodium chloride (MBP/ADV) 100 mL MBP  0.1-1.5 mcg/kg/hr IntraVENous TITRATE  pantoprazole (PROTONIX) 40 mg in 0.9% sodium chloride 10 mL injection  40 mg IntraVENous Q12H  
 octreotide (SANDOSTATIN) 500 mcg in 0.9% sodium chloride 500 mL infusion  25 mcg/hr IntraVENous CONTINUOUS  
 0.9% sodium chloride infusion 250 mL  250 mL IntraVENous PRN  
 famotidine (PEPCID) tablet 10 mg  10 mg Oral BID  midodrine (PROAMATINE) tablet 10 mg  10 mg Oral TID  hydrOXYzine pamoate (VISTARIL) capsule 25 mg  25 mg Oral BID  traZODone (DESYREL) tablet 50 mg  50 mg Oral QHS PRN  
 influenza vaccine  (6 mos+)(PF) (FLUARIX/FLULAVAL/FLUZONE QUAD) injection 0.5 mL  1 Each IntraMUSCular PRIOR TO DISCHARGE  lactulose (CHRONULAC) 10 gram/15 mL solution 30 mL  20 g Oral BID  sodium chloride (NS) flush 5-40 mL  5-40 mL IntraVENous Q8H  
 sodium chloride (NS) flush 5-40 mL  5-40 mL IntraVENous PRN  
 acetaminophen (TYLENOL) tablet 650 mg  650 mg Oral Q6H PRN Or  
 acetaminophen (TYLENOL) suppository 650 mg  650 mg Rectal Q6H PRN  
 ondansetron (ZOFRAN ODT) tablet 4 mg  4 mg Oral Q6H PRN Or  
 ondansetron (ZOFRAN) injection 4 mg  4 mg IntraVENous Q6H PRN  
 [Held by provider] thiamine mononitrate (B-1) tablet 100 mg  100 mg Oral DAILY  [Held by provider] b complex-vitamin c-folic acid 5mg (FOLBEE PLUS) tablet 1 Tablet  1 Tablet Oral DAILY  [Held by provider] spironolactone (ALDACTONE) tablet 25 mg  25 mg Oral BID Review of Systems: A comprehensive review of systems was negative except for that written in the HPI. Objective:  
Physical Exam:  
 
Visit Vitals BP (!) 109/59 (BP 1 Location: Right upper arm, BP Patient Position: At rest) Pulse 79 Temp 99.4 °F (37.4 °C) Resp 21 Ht 5' 6\" (1.676 m) Wt (!) 164.7 kg (363 lb 1.6 oz) SpO2 99% BMI 58.61 kg/m² O2 Device: Ventilator Temp (24hrs), Av.7 °F (37.6 °C), Min:99.4 °F (37.4 °C), Max:100 °F (37.8 °C) No intake/output data recorded.  1901 -  0700 In: 5406 [I.V.:5406] Out: 1175 [LUZXU:[id number]] General:   Intubated and sedated. Sclerae icteric Lungs:   Clear to auscultation bilaterally. Chest wall:  No tenderness or deformity. Heart:  Regular rate and rhythm, S1, S2 normal, no murmur, click, rub or gallop. Abdomen:   Soft, non-tender. Bowel sounds normal. No masses,  No organomegaly. Extremities: Extremities normal, atraumatic, no cyanosis or edema. Pulses: 2+ and symmetric all extremities.   
Skin: Skin color, texture, turgor normal. No rashes or lesions Neurologic: CNII-XII intact. No gross focal deficits Data Review:  
   
Recent Days: 
Recent Labs 11/03/21 
0430 11/02/21 
0915 11/01/21 
1730 WBC 33.3* 23.7* 18.7* HGB 11.8 11.5 10.6* HCT 33.3* 33.3* 30.4* PLT 77* 133* 147* Recent Labs 11/03/21 
0430 11/02/21 
0430 11/01/21 
8174  136 139  
K 3.9 4.4 4.7  109* 110* CO2 15* 14* 16* * 134* 82 BUN 67* 68* 65* CREA 5.43* 5.06* 4.72* CA 6.7* 7.8* 7.6*  
MG 1.7 2.2  --   
PHOS 5.1* 6.6* 5.3* ALB 1.4*  1.4* 1.7*  1.7* 1.3*  1.3* TBILI 22.7* 23.6* 21.4* * 118* 61 INR  --  1.5*  --   
 
Recent Labs 11/03/21 
0456 11/02/21 
0409 11/01/21 
1550 PH 7.37 7.32* 7.31* PCO2 24* 27* 23* PO2 96 167* 121* HCO3 17* 16* 14* FIO2 30.0 40.0 50.0  
 
 
24 Hour Results: 
Recent Results (from the past 24 hour(s)) CBC WITH AUTOMATED DIFF Collection Time: 11/02/21  9:15 AM  
Result Value Ref Range WBC 23.7 (H) 3.6 - 11.0 K/uL  
 RBC 3.69 (L) 3.80 - 5.20 M/uL  
 HGB 11.5 11.5 - 16.0 g/dL HCT 33.3 (L) 35.0 - 47.0 % MCV 90.2 80.0 - 99.0 FL  
 MCH 31.2 26.0 - 34.0 PG  
 MCHC 34.5 30.0 - 36.5 g/dL RDW 20.3 (H) 11.5 - 14.5 % PLATELET 045 (L) 449 - 400 K/uL MPV 11.3 8.9 - 12.9 FL  
 NRBC 0.2 (H) 0.0  WBC ABSOLUTE NRBC 0.04 (H) 0.00 - 0.01 K/uL NEUTROPHILS 84 (H) 32 - 75 % LYMPHOCYTES 9 (L) 12 - 49 % MONOCYTES 6 5 - 13 % EOSINOPHILS 1 0 - 7 % BASOPHILS 0 0 - 1 % IMMATURE GRANULOCYTES 0 %  
 ABS. NEUTROPHILS 20.0 (H) 1.8 - 8.0 K/UL  
 ABS. LYMPHOCYTES 2.1 0.8 - 3.5 K/UL  
 ABS. MONOCYTES 1.4 (H) 0.0 - 1.0 K/UL  
 ABS. EOSINOPHILS 0.2 0.0 - 0.4 K/UL  
 ABS. BASOPHILS 0.0 0.0 - 0.1 K/UL  
 ABS. IMM. GRANS. 0.0 K/UL  
 DF Manual    
 RBC COMMENTS Macrocytosis 1+ RBC COMMENTS Ovalocytes 1+ HEPATIC FUNCTION PANEL Collection Time: 11/03/21  4:30 AM  
Result Value Ref Range  Protein, total 4.6 (L) 6.4 - 8.2 g/dL Albumin 1.4 (L) 3.5 - 5.0 g/dL Globulin 3.2 2.0 - 4.0 g/dL A-G Ratio 0.4 (L) 1.1 - 2.2 Bilirubin, total 22.7 (H) 0.2 - 1.0 mg/dL Bilirubin, direct 17.9 (H) 0.0 - 0.2 mg/dL Alk. phosphatase 89 45 - 117 U/L  
 AST (SGOT) 454 (H) 15 - 37 U/L  
 ALT (SGPT) 138 (H) 12 - 78 U/L  
RENAL FUNCTION PANEL Collection Time: 11/03/21  4:30 AM  
Result Value Ref Range Sodium 137 136 - 145 mmol/L Potassium 3.9 3.5 - 5.1 mmol/L Chloride 108 97 - 108 mmol/L  
 CO2 15 (LL) 21 - 32 mmol/L Anion gap 14 5 - 15 mmol/L Glucose 112 (H) 65 - 100 mg/dL BUN 67 (H) 6 - 20 mg/dL Creatinine 5.43 (H) 0.55 - 1.02 mg/dL BUN/Creatinine ratio 12 12 - 20 GFR est AA 10 (L) >60 ml/min/1.73m2 GFR est non-AA 8 (L) >60 ml/min/1.73m2 Calcium 6.7 (L) 8.5 - 10.1 mg/dL Phosphorus 5.1 (H) 2.6 - 4.7 mg/dL Albumin 1.4 (L) 3.5 - 5.0 g/dL MAGNESIUM Collection Time: 11/03/21  4:30 AM  
Result Value Ref Range Magnesium 1.7 1.6 - 2.4 mg/dL AMMONIA Collection Time: 11/03/21  4:30 AM  
Result Value Ref Range Ammonia 100 (H) <32 umol/L  
CBC WITH AUTOMATED DIFF Collection Time: 11/03/21  4:30 AM  
Result Value Ref Range WBC 33.3 (H) 3.6 - 11.0 K/uL  
 RBC 3.75 (L) 3.80 - 5.20 M/uL  
 HGB 11.8 11.5 - 16.0 g/dL HCT 33.3 (L) 35.0 - 47.0 % MCV 88.8 80.0 - 99.0 FL  
 MCH 31.5 26.0 - 34.0 PG  
 MCHC 35.4 30.0 - 36.5 g/dL RDW 20.1 (H) 11.5 - 14.5 % PLATELET 77 (L) 972 - 400 K/uL MPV 11.6 8.9 - 12.9 FL  
 NRBC 0.2 (H) 0.0  WBC ABSOLUTE NRBC 0.05 (H) 0.00 - 0.01 K/uL NEUTROPHILS 84 (H) 32 - 75 % LYMPHOCYTES 10 (L) 12 - 49 % MONOCYTES 6 5 - 13 % EOSINOPHILS 0 0 - 7 % BASOPHILS 0 0 - 1 % IMMATURE GRANULOCYTES 0 %  
 ABS. NEUTROPHILS 28.0 (H) 1.8 - 8.0 K/UL  
 ABS. LYMPHOCYTES 3.3 0.8 - 3.5 K/UL  
 ABS. MONOCYTES 2.0 (H) 0.0 - 1.0 K/UL  
 ABS. EOSINOPHILS 0.0 0.0 - 0.4 K/UL  
 ABS. BASOPHILS 0.0 0.0 - 0.1 K/UL  
 ABS. IMM. GRANS. 0.0 K/UL DF AUTOMATED    
 RBC COMMENTS Normocytic, Normochromic BLOOD GAS, ARTERIAL Collection Time: 11/03/21  4:56 AM  
Result Value Ref Range pH 7.37 7.35 - 7.45    
 PCO2 24 (L) 35 - 45 mmHg PO2 96 75 - 100 mmHg O2 SAT 98 >95 % BICARBONATE 17 (L) 22 - 26 mmol/L  
 BASE DEFICIT 9.4 (H) 0 - 2 mmol/L  
 O2 METHOD VENT    
 FIO2 30.0 % MODE Assist Control/Volume Control Tidal volume 550 SET RATE 12    
 EPAP/CPAP/PEEP 5.0 Sample source Arterial    
 SITE Right Radial    
 RANDAL'S TEST PASS    
 
 
XR CHEST PORT Final Result XR CHEST PORT Final Result XR CHEST PORT Final Result Left IJ central venous catheter terminating over the lower right atrium. No  
discernible pneumothorax. XR CHEST PORT Final Result CT ABD PELV WO CONT Final Result 1. No acute findings. 2. Severe diffuse hepatic steatosis. 3. Cholelithiasis. Dose reduction: All CT scans at this facility are performed using radiation dose  
reduction optimization techniques as appropriate to a performed exam, including  
the following: Automated exposure control (AEC), adjustment of the MAA and/or KUB according to the patient's size, or the patient's use of iterative  
reconstruction techniques (ASIR). XR CHEST PORT Final Result Similar exam to prior without findings of definite acute cardiopulmonary  
abnormality. XR CHEST PORT    (Results Pending) Assessment: 
Acute upper gastrointestinal bleeding, appears due to jejunal bleeding. Status post EGD with epinephrine injections. Patient with recurrent bleeding on 10/31. Appears to be stable past 48 hours with no evidence of ongoing bleeding Hypovolemic shock due to above Acute blood loss anemia status post massive transfusion End-stage alcoholic cirrhosis with hepatic failure; MELD-Na  score is 34 as of 11/1 with estimated 90-day mortality of 65% Acute kidney injury, probably ATN due to hypotension. Likely component of hepatorenal syndrome. Worsening, now oliguric Alcohol abuse; patient was still drinking about 9 beers daily just prior to admission Nonanion gap metabolic acidosis, likely due to BRITTANY Plan: 
 
Continue levophed, dexameditine, octreotide, zosyn and propofol. Likely initiate renal replacement therapy Wean pressors as blood pressure allows Prognosis remains poor Care Plan discussed with: Patient/Family Disposition: Continued ICU care Total time spent with patient: 30 minutes.  
 
Mica Herring MD

## 2021-11-03 NOTE — ANESTHESIA PREPROCEDURE EVALUATION
Relevant Problems GASTROINTESTINAL  
(+) Cirrhosis of liver (HCC) ENDOCRINE  
(+) Obesity, morbid ([facility] Anesthetic History No history of anesthetic complications Review of Systems / Medical History Patient summary reviewed, nursing notes reviewed and pertinent labs reviewed Pulmonary Within defined limits Neuro/Psych Within defined limits Cardiovascular Within defined limits Exercise tolerance: >4 METS 
  
GI/Hepatic/Renal 
  
GERD Renal disease: ARF and CRI Liver disease (CIRRHOSIS) Comments: CHOLELITHIASIS Endo/Other Morbid obesity (Super Morbid) and anemia (7.1) Comments: \"BLEEDING FROM VARICOSE VEINS\" Hx OF DIFFICULT I/V. \"SOMETIMES FEET GETS SWOLLEN\" Other Findings Comments: Allergies No Known Allergies Ht: 5' 6\" (167.6 cm) Weight: 164.7 kg (363 lb 1.6 oz) BMI: 58.61 kg/m² Watch meds found CrCl:  
20.5 mL/min (A) Procedure ESOPHAGOGASTRODUODENOSCOPY (EGD)ng tube placement (N/A Upper GI Region) Intra-Proc, SRM ENDO 02 Medical History GERD (gastroesophageal reflux disease) Morbid obesity ([facility] Cirrhosis ([facility] Physical Exam 
 
Airway Mallampati: III 
TM Distance: 4 - 6 cm Neck ROM: normal range of motion Intubated Cardiovascular Rhythm: regular Rate: normal 
 
 
Pertinent negatives: No murmur Dental 
 
 
  
Pulmonary Decreased breath sounds Abdominal 
GI exam deferred Other Findings Comments: Results for Nuno Chavarria (MRN [id number]) as of 11/3/2021 14:09 
 
11/3/2021 04:30 WBC: 33.3 (H) NRBC: 0.2 (H) RBC: 3.75 (L) HGB: 11.8 HCT: 33.3 (L) MCV: 88.8 MCH: 31.5 MCHC: 35.4 
RDW: 20.1 (H) PLATELET: 77 (L) MPV: 11.6 NEUTROPHILS: 84 (H) LYMPHOCYTES: 10 (L) MONOCYTES: 6 
EOSINOPHILS: 0 
BASOPHILS: 0 IMMATURE GRANULOCYTES: 0 
DF: AUTOMATED ABSOLUTE NRBC: 0.05 (H) 
ABS. NEUTROPHILS: 28.0 (H) 
ABS. IMM. GRANS.: 0.0 
ABS. LYMPHOCYTES: 3.3 
ABS. MONOCYTES: 2.0 (H) 
ABS. EOSINOPHILS: 0.0 
ABS. BASOPHILS: 0.0 Results for Samy Argueta (MRN [id number]) as of 11/3/2021 14:09 
 
11/3/2021 04:30 Sodium: 137 Potassium: 3.9 Chloride: 108 CO2: 15 (LL) Anion gap: 14 Glucose: 112 (H) BUN: 67 (H) Creatinine: 5.43 (H) BUN/Creatinine ratio: 12 Calcium: 6.7 (L) Phosphorus: 5.1 (H) Magnesium: 1.7 GFR est non-AA: 8 (L) GFR est AA: 10 (L) Anesthetic Plan ASA: 4 Anesthesia type: general 
 
 
 
 
Induction: Intravenous Anesthetic plan and risks discussed with: Spouse

## 2021-11-03 NOTE — PROGRESS NOTES
Renal Progress Note Patient: Sonia Chase MRN: [id number]  SSN: [id number] YOB: 1975  Age: 51 y.o. Sex: female Admit Date: 10/25/2021 LOS: 9 days Subjective:  
Patient seen in ICU. On ventilator and sedated Hypotensive on Levophed 70 mics LGI bleed +, scheduled for upper endoscopy today Creatinine today has increased to 5.4. CO2 is only 15 despite bicarb drip at 50 mils per hour. Urine output has started improving. 300 mils overnight and 300 mils in the Arana bag already Unresponsive but on sedation with Precedex I spoke with the patient's  Diann Evans at [phone] to update him and regarding possible starting of RRT. He wants everything to be done including dialysis if and when it is needed Current Facility-Administered Medications Medication Dose Route Frequency  sodium bicarbonate tablet 1,300 mg  1,300 mg Oral TID  sodium bicarbonate (8.4%) 150 mEq in dextrose 5% 1,000 mL infusion   IntraVENous CONTINUOUS  
 NOREPINephrine (LEVOPHED) 8 mg in 0.9% NS 250ml infusion  0.5-120 mcg/min IntraVENous TITRATE  propofol (DIPRIVAN) 10 mg/mL infusion  0-50 mcg/kg/min IntraVENous TITRATE  
 0.9% sodium chloride infusion  75 mL/hr IntraVENous PRN  
 EPINEPHrine (ADRENALIN) 0.1 mg/mL syringe    PRN  
 simethicone (MYLICON) 30WZ/5.6IK oral drops    PRN  
 0.9% sodium chloride infusion 250 mL  250 mL IntraVENous PRN  piperacillin-tazobactam (ZOSYN) 3.375 g in 0.9% sodium chloride (MBP/ADV) 100 mL MBP  3.375 g IntraVENous Q12H  
 dexmedeTOMidine (PRECEDEX) 400 mcg in 0.9% sodium chloride (MBP/ADV) 100 mL MBP  0.1-1.5 mcg/kg/hr IntraVENous TITRATE  octreotide (SANDOSTATIN) 500 mcg in 0.9% sodium chloride 500 mL infusion  25 mcg/hr IntraVENous CONTINUOUS  
 0.9% sodium chloride infusion 250 mL  250 mL IntraVENous PRN  
 midodrine (PROAMATINE) tablet 10 mg  10 mg Oral TID  hydrOXYzine pamoate (VISTARIL) capsule 25 mg  25 mg Oral BID  traZODone (DESYREL) tablet 50 mg  50 mg Oral QHS PRN  
 influenza vaccine 2021-22 (6 mos+)(PF) (FLUARIX/FLULAVAL/FLUZONE QUAD) injection 0.5 mL  1 Each IntraMUSCular PRIOR TO DISCHARGE  lactulose (CHRONULAC) 10 gram/15 mL solution 30 mL  20 g Oral BID  sodium chloride (NS) flush 5-40 mL  5-40 mL IntraVENous Q8H  
 sodium chloride (NS) flush 5-40 mL  5-40 mL IntraVENous PRN  
 acetaminophen (TYLENOL) tablet 650 mg  650 mg Oral Q6H PRN Or  
 acetaminophen (TYLENOL) suppository 650 mg  650 mg Rectal Q6H PRN  
 ondansetron (ZOFRAN ODT) tablet 4 mg  4 mg Oral Q6H PRN Or  
 ondansetron (ZOFRAN) injection 4 mg  4 mg IntraVENous Q6H PRN  
 [Held by provider] thiamine mononitrate (B-1) tablet 100 mg  100 mg Oral DAILY  [Held by provider] b complex-vitamin c-folic acid 5mg (FOLBEE PLUS) tablet 1 Tablet  1 Tablet Oral DAILY  [Held by provider] spironolactone (ALDACTONE) tablet 25 mg  25 mg Oral BID Vitals:  
 11/03/21 1135 11/03/21 1200 11/03/21 1300 11/03/21 1432 BP:  (!) 114/57 (!) 113/59 110/62 Pulse: 78 80 81 91 Resp: 21 21 21 16 Temp:      
SpO2: 97% 97% 97% 100% Weight:      
Height:      
 
Objective:  
General: Intubated and sedated. Jaundiced skin HEENT: Endotracheal tube in place. Scleral icterus present, Neck: Neck is supple, No JVD, no thyromegaly Lungs: breathsounds normal,  no rhonchi, no rales, CVS: heart sounds normal,  no murmurs, no rubs. GI: Distended. Obese. Extremeties: no cyanosis, 1-2+ bilateral lower extremity edema present Neuro: She is sedated Skin: normal skin turgor, no skin rashes Intake and Output: 
Current Shift: 11/03 0701 - 11/03 1900 In: -  
Out: 250 [Urine:250] Last three shifts: 11/01 1901 - 11/03 0700 In: 5406 [I.V.:5406] Out: 1175 [OVWWY:1141] Lab/Data Review: 
Recent Labs 11/03/21 
0430 11/02/21 
0915 11/01/21 
1730 WBC 33.3* 23.7* 18.7* HGB 11.8 11.5 10.6* HCT 33.3* 33.3* 30.4* PLT 77* 133* 147* Recent Labs 11/03/21 
0430 11/02/21 
0430 11/01/21 
6404  136 139  
K 3.9 4.4 4.7  109* 110* CO2 15* 14* 16* * 134* 82 BUN 67* 68* 65* CREA 5.43* 5.06* 4.72* CA 6.7* 7.8* 7.6*  
MG 1.7 2.2  --   
PHOS 5.1* 6.6* 5.3* ALB 1.4*  1.4* 1.7*  1.7* 1.3*  1.3* TBILI 22.7* 23.6* 21.4* * 118* 61 INR  --  1.5*  --   
 
Recent Labs 11/03/21 
0456 11/02/21 
0409 11/01/21 
1550 PH 7.37 7.32* 7.31* PCO2 24* 27* 23* PO2 96 167* 121* HCO3 17* 16* 14* FIO2 30.0 40.0 50.0 Recent Results (from the past 24 hour(s)) HEPATIC FUNCTION PANEL Collection Time: 11/03/21  4:30 AM  
Result Value Ref Range Protein, total 4.6 (L) 6.4 - 8.2 g/dL Albumin 1.4 (L) 3.5 - 5.0 g/dL Globulin 3.2 2.0 - 4.0 g/dL A-G Ratio 0.4 (L) 1.1 - 2.2 Bilirubin, total 22.7 (H) 0.2 - 1.0 mg/dL Bilirubin, direct 17.9 (H) 0.0 - 0.2 mg/dL Alk. phosphatase 89 45 - 117 U/L  
 AST (SGOT) 454 (H) 15 - 37 U/L  
 ALT (SGPT) 138 (H) 12 - 78 U/L  
RENAL FUNCTION PANEL Collection Time: 11/03/21  4:30 AM  
Result Value Ref Range Sodium 137 136 - 145 mmol/L Potassium 3.9 3.5 - 5.1 mmol/L Chloride 108 97 - 108 mmol/L  
 CO2 15 (LL) 21 - 32 mmol/L Anion gap 14 5 - 15 mmol/L Glucose 112 (H) 65 - 100 mg/dL BUN 67 (H) 6 - 20 mg/dL Creatinine 5.43 (H) 0.55 - 1.02 mg/dL BUN/Creatinine ratio 12 12 - 20 GFR est AA 10 (L) >60 ml/min/1.73m2 GFR est non-AA 8 (L) >60 ml/min/1.73m2 Calcium 6.7 (L) 8.5 - 10.1 mg/dL Phosphorus 5.1 (H) 2.6 - 4.7 mg/dL Albumin 1.4 (L) 3.5 - 5.0 g/dL MAGNESIUM Collection Time: 11/03/21  4:30 AM  
Result Value Ref Range Magnesium 1.7 1.6 - 2.4 mg/dL AMMONIA Collection Time: 11/03/21  4:30 AM  
Result Value Ref Range Ammonia 100 (H) <32 umol/L  
CBC WITH AUTOMATED DIFF Collection Time: 11/03/21  4:30 AM  
Result Value Ref Range WBC 33.3 (H) 3.6 - 11.0 K/uL  
 RBC 3.75 (L) 3.80 - 5.20 M/uL  
 HGB 11.8 11.5 - 16.0 g/dL HCT 33.3 (L) 35.0 - 47.0 % MCV 88.8 80.0 - 99.0 FL  
 MCH 31.5 26.0 - 34.0 PG  
 MCHC 35.4 30.0 - 36.5 g/dL RDW 20.1 (H) 11.5 - 14.5 % PLATELET 77 (L) 906 - 400 K/uL MPV 11.6 8.9 - 12.9 FL  
 NRBC 0.2 (H) 0.0  WBC ABSOLUTE NRBC 0.05 (H) 0.00 - 0.01 K/uL NEUTROPHILS 84 (H) 32 - 75 % LYMPHOCYTES 10 (L) 12 - 49 % MONOCYTES 6 5 - 13 % EOSINOPHILS 0 0 - 7 % BASOPHILS 0 0 - 1 % IMMATURE GRANULOCYTES 0 %  
 ABS. NEUTROPHILS 28.0 (H) 1.8 - 8.0 K/UL  
 ABS. LYMPHOCYTES 3.3 0.8 - 3.5 K/UL  
 ABS. MONOCYTES 2.0 (H) 0.0 - 1.0 K/UL  
 ABS. EOSINOPHILS 0.0 0.0 - 0.4 K/UL  
 ABS. BASOPHILS 0.0 0.0 - 0.1 K/UL  
 ABS. IMM. GRANS. 0.0 K/UL  
 DF AUTOMATED    
 RBC COMMENTS Normocytic, Normochromic BLOOD GAS, ARTERIAL Collection Time: 11/03/21  4:56 AM  
Result Value Ref Range pH 7.37 7.35 - 7.45    
 PCO2 24 (L) 35 - 45 mmHg PO2 96 75 - 100 mmHg O2 SAT 98 >95 % BICARBONATE 17 (L) 22 - 26 mmol/L  
 BASE DEFICIT 9.4 (H) 0 - 2 mmol/L  
 O2 METHOD VENT    
 FIO2 30.0 % MODE Assist Control/Volume Control Tidal volume 550 SET RATE 12    
 EPAP/CPAP/PEEP 5.0 Sample source Arterial    
 SITE Right Radial    
 RANDAL'S TEST PASS PROCALCITONIN Collection Time: 11/03/21  9:30 AM  
Result Value Ref Range Procalcitonin 8.90 (H) 0 ng/mL Assessment and Plan: 1. Acute Kidney Injury :  
-BRITTANY likely secondary to ATN/possible hepatorenal syndrome  
-Urine sodium and chloride were low suggesting HRS 
-Creatinine progressively worsening. 5.4 today. Her BUN has increased to 67. CO2 is only 15  
-I spoke with pt's  Minor Talavera at [phone] to update him and regarding possible starting of RRT. He wants everything to be done including dialysis if/when 
needed 
-Her urine output has started improving 
-No impending indication to start RRT today.   If needed she will likely need CRRT based on her hemodynamic instability 
-Might need to start CRRT in a.m. if renal function continues to worsen 
-no history of chronic kidney disease 
  
2.  GI bleed/severe anemia:  
-Stable hemoglobin post transfusions -GI following, EGD done, noted active bleeding with interventions 
-Scheduled for another endoscopy today 
-Monitor H&H and transfuse as needed 3. Hyperbilirubinemia/chronic liver disease/suspected cirrhosis -Alcohol dependence:  
-Acute liver injury on chronic liver disease, probably alcohol-related. -Ammonia is 100. Her liver enzymes are>400. Plan to start like lactulose after NG tube is placed today 
-MELD score is very high suggesting very poor prognosis 
-Continue to monitor with abstinence from alcohol -GI following the patient 3. Hypotension: Probably related to liver disease/? Sepsis Leukocytosis present On levophed for hemodynamic support Also on midodrine 
 continue ICU monitoring 4. Metabolic acidosis:  
-We will give 2 A of IV sodium bicarbonate and increase bicarb drip rate to 75 mils per hour with 150 mmol of bicarb 5. Lower extremity edema: probably related to chronic liver disease and obesity, Will use diuretics as needed, will monitor fluid status clinically and adjust diuretics as needed. 6. Hyperkalemia: resolved now 
  Signed By: Jose Olsen MD   
 November 3, 2021

## 2021-11-03 NOTE — PROGRESS NOTES
Hospitalist Progress Note               Daily Progress Note: 11/3/2021      Subjective:   Hospital course to date: Patient is a 48-year-old female with a PMH significant for morbid obesity, history of cirrhosis of the liver and recent elevated bilirubin.  She came to the ER on 10/22 with elevated bilirubin of 16.7 declined admission at that time but returns to the emergency room on 10/25 with complaints of weakness and lethargy.  Significant labs bilirubin 26.1 and direct bili greater than 16, ammonia is 73, platelet count 95. CT suggestive of diffuse hepatic steatosis and cholelithiasis with no cholecystectomy.  Previous US RP revealed findings suggestive for portal hypertension and cirrhosis, noted thickening gallbladder wall and cholecystitis, gallbladder sludge and probable gallstones.  Admitted for further management of cholelithiasis, abdominal pain, nausea, liver failure with elevated ammonia and thrombocytopenia.  GI consulted.  Scheduled for EGD to assess for esophageal varices and portal hypertension.   EGD demonstrated no evidence for varices. There was a large amount of blood clots noted in the stomach. Patient is status post Billroth II anastomosis. Efferent loop showed some small bowel bleeding and erosions. This was treated with epinephrine injections       Patient with worsening renal function starting on 10/30. Creatinine was up to 4.0 on 10/31. She has several large bloody stools on 10/31, became hypotensive and was transferred to the ICU. Hemoglobin dropped to 7.4 from 10.6 today previous. After transfer to ICU blood pressure dropped into the 24L systolic. She was given a total of 3 units of blood. She was started on IV octreotide. 11/01/21:  She continues to have bloody stools. She is on IV octreotide    Liver chemistries today show a bilirubin of 21.4. Creatinine is 4.7 with a BUN of 65, potassium 4.7. Hemoglobin this morning is 7.1.   Blood pressure is 81 systolic    No urine output overnight but no Arana in place. Patient underwent upper endoscopy, was found to have large amount of blood and a very small minute of the stomach, as well as blood in the jejunum. Dr. Gilford Cox initially could not get a good look. Patient then started with blood in her upper airway and the procedure was held. She was then intubated by anesthesia. EGD was attempted again and patient was found to have a laceration at the anastomosis of her Billroth II which was actively bleeding. Several endoclips were placed. Blood continued ooze from this area.     I have initiated a massive transfusion protocol, lab was notified. We will give an additional 2 units of blood now along with a sixpack of platelets and 2 units of FFP     We will keep patient on the ventilator for now. Consult pulmonology for ventilator management, discussed with Dr. Leatha Gonzalez     We will also consult interventional radiology in case bleeding continues, at which point she would need consideration for intervention/embolization     I have kept her  updated with our findings and treatment plan     I have asked anesthesia to place a central line as well. Patient was started on norepinephrine due to a drop in her blood pressure as low as 50 systolic. Patient was seen and evaluated by pulmonologist who recommended maintaining mechanical ventilation and following blood gases. Recommended sputum cultures, IV thiamine, folic acid and multivitamin replacement, continued sedation with propofol and adding Precedex for alcohol withdrawal.     Central line was placed at 1900 without any complications.   -------     Patient is seen today for follow-up. She continues on mechanical ventilation. Creatinine today is up to 5.4, bicarbonate 15. Urine output 475 mL past 24 hours. Total bilirubin unchanged at 22.7. Ammonia level 100. Hemoglobin remains stable at 11.8, WBC 33,000.     Patient is currently on 60 mcg of norepinephrine    ABG shows pH 7.37, PCO2 24, PO2 96 on 30% FiO2    Discussed with nephrologist who is planning on starting hemodialysis today    Urine culture showing gram-negative rods    Platelet count down to 77 today    Problem List:  Problem List as of 11/3/2021 Date Reviewed: 11/1/2021          Codes Class Noted - Resolved    Hyperbilirubinemia ICD-10-CM: E80.6  ICD-9-CM: 782.4  10/25/2021 - Present        Cirrhosis of liver ([facility].) ICD-10-CM: K74.60  ICD-9-CM: 571.5  10/25/2021 - Present        Postop check ICD-10-CM: O35  ICD-9-CM: V67.00  10/26/2020 - Present        Bleeding from varicose vein ICD-10-CM: I83.899  ICD-9-CM: 454.8  10/20/2020 - Present        Obesity, morbid ([place] [address].) ICD-10-CM: E66.01  ICD-9-CM: 278.01  10/13/2020 - Present        Venous stasis ulcer ([place] [address].) ICD-10-CM: I83.009, L97.909  ICD-9-CM: 454.0  10/13/2020 - Present              Medications reviewed  Current Facility-Administered Medications   Medication Dose Route Frequency    sodium bicarbonate (8.4%) 150 mEq in dextrose 5% 1,000 mL infusion   IntraVENous CONTINUOUS    NOREPINephrine (LEVOPHED) 8 mg in 0.9% NS 250ml infusion  0.5-120 mcg/min IntraVENous TITRATE    propofol (DIPRIVAN) 10 mg/mL infusion  0-50 mcg/kg/min IntraVENous TITRATE    0.9% sodium chloride infusion  75 mL/hr IntraVENous PRN    EPINEPHrine (ADRENALIN) 0.1 mg/mL syringe    PRN    simethicone (MYLICON) 66HK/0.3KO oral drops    PRN    0.9% sodium chloride infusion 250 mL  250 mL IntraVENous PRN    piperacillin-tazobactam (ZOSYN) 3.375 g in 0.9% sodium chloride (MBP/ADV) 100 mL MBP  3.375 g IntraVENous Q12H    dexmedeTOMidine (PRECEDEX) 400 mcg in 0.9% sodium chloride (MBP/ADV) 100 mL MBP  0.1-1.5 mcg/kg/hr IntraVENous TITRATE    pantoprazole (PROTONIX) 40 mg in 0.9% sodium chloride 10 mL injection  40 mg IntraVENous Q12H    octreotide (SANDOSTATIN) 500 mcg in 0.9% sodium chloride 500 mL infusion  25 mcg/hr IntraVENous CONTINUOUS    0.9% sodium chloride infusion 250 mL  250 mL IntraVENous PRN    famotidine (PEPCID) tablet 10 mg  10 mg Oral BID    midodrine (PROAMATINE) tablet 10 mg  10 mg Oral TID    hydrOXYzine pamoate (VISTARIL) capsule 25 mg  25 mg Oral BID    traZODone (DESYREL) tablet 50 mg  50 mg Oral QHS PRN    influenza vaccine  (6 mos+)(PF) (FLUARIX/FLULAVAL/FLUZONE QUAD) injection 0.5 mL  1 Each IntraMUSCular PRIOR TO DISCHARGE    lactulose (CHRONULAC) 10 gram/15 mL solution 30 mL  20 g Oral BID    sodium chloride (NS) flush 5-40 mL  5-40 mL IntraVENous Q8H    sodium chloride (NS) flush 5-40 mL  5-40 mL IntraVENous PRN    acetaminophen (TYLENOL) tablet 650 mg  650 mg Oral Q6H PRN    Or    acetaminophen (TYLENOL) suppository 650 mg  650 mg Rectal Q6H PRN    ondansetron (ZOFRAN ODT) tablet 4 mg  4 mg Oral Q6H PRN    Or    ondansetron (ZOFRAN) injection 4 mg  4 mg IntraVENous Q6H PRN    [Held by provider] thiamine mononitrate (B-1) tablet 100 mg  100 mg Oral DAILY    [Held by provider] b complex-vitamin c-folic acid 5mg (FOLBEE PLUS) tablet 1 Tablet  1 Tablet Oral DAILY    [Held by provider] spironolactone (ALDACTONE) tablet 25 mg  25 mg Oral BID       Review of Systems:   A comprehensive review of systems was negative except for that written in the HPI. Objective:   Physical Exam:     Visit Vitals  BP (!) 109/59 (BP 1 Location: Right upper arm, BP Patient Position: At rest)   Pulse 79   Temp 99.5 °F (37.5 °C)   Resp 21   Ht 5' 6\" (1.676 m)   Wt (!) 164.7 kg (363 lb 1.6 oz)   SpO2 99%   BMI 58.61 kg/m²      O2 Device: Ventilator    Temp (24hrs), Av.6 °F (37.6 °C), Min:99.4 °F (37.4 °C), Max:100 °F (37.8 °C)    No intake/output data recorded.  1901 -  0700  In: 5406 [I.V.:5406]  Out: 6957 [Urine:1175]    General:   Intubated and sedated. Sclerae icteric   Lungs:   Clear to auscultation bilaterally. Chest wall:  No tenderness or deformity.    Heart:  Regular rate and rhythm, S1, S2 normal, no murmur, click, rub or gallop. Abdomen:   Soft, non-tender. Bowel sounds normal. No masses,  No organomegaly. Extremities: Extremities normal, atraumatic, no cyanosis or edema. Pulses: 2+ and symmetric all extremities. Skin: Skin color, texture, turgor normal. No rashes or lesions   Neurologic: CNII-XII intact. No gross focal deficits         Data Review:       Recent Days:  Recent Labs     11/03/21  0430 11/02/21  0915 11/01/21  1730   WBC 33.3* 23.7* 18.7*   HGB 11.8 11.5 10.6*   HCT 33.3* 33.3* 30.4*   PLT 77* 133* 147*     Recent Labs     11/03/21  0430 11/02/21  0430 11/01/21  0620    136 139   K 3.9 4.4 4.7    109* 110*   CO2 15* 14* 16*   * 134* 82   BUN 67* 68* 65*   CREA 5.43* 5.06* 4.72*   CA 6.7* 7.8* 7.6*   MG 1.7 2.2  --    PHOS 5.1* 6.6* 5.3*   ALB 1.4*  1.4* 1.7*  1.7* 1.3*  1.3*   TBILI 22.7* 23.6* 21.4*   * 118* 61   INR  --  1.5*  --      Recent Labs     11/03/21  0456 11/02/21  0409 11/01/21  1550   PH 7.37 7.32* 7.31*   PCO2 24* 27* 23*   PO2 96 167* 121*   HCO3 17* 16* 14*   FIO2 30.0 40.0 50.0       24 Hour Results:  Recent Results (from the past 24 hour(s))   CBC WITH AUTOMATED DIFF    Collection Time: 11/02/21  9:15 AM   Result Value Ref Range    WBC 23.7 (H) 3.6 - 11.0 K/uL    RBC 3.69 (L) 3.80 - 5.20 M/uL    HGB 11.5 11.5 - 16.0 g/dL    HCT 33.3 (L) 35.0 - 47.0 %    MCV 90.2 80.0 - 99.0 FL    MCH 31.2 26.0 - 34.0 PG    MCHC 34.5 30.0 - 36.5 g/dL    RDW 20.3 (H) 11.5 - 14.5 %    PLATELET 037 (L) 693 - 400 K/uL    MPV 11.3 8.9 - 12.9 FL    NRBC 0.2 (H) 0.0  WBC    ABSOLUTE NRBC 0.04 (H) 0.00 - 0.01 K/uL    NEUTROPHILS 84 (H) 32 - 75 %    LYMPHOCYTES 9 (L) 12 - 49 %    MONOCYTES 6 5 - 13 %    EOSINOPHILS 1 0 - 7 %    BASOPHILS 0 0 - 1 %    IMMATURE GRANULOCYTES 0 %    ABS. NEUTROPHILS 20.0 (H) 1.8 - 8.0 K/UL    ABS. LYMPHOCYTES 2.1 0.8 - 3.5 K/UL    ABS. MONOCYTES 1.4 (H) 0.0 - 1.0 K/UL    ABS. EOSINOPHILS 0.2 0.0 - 0.4 K/UL    ABS. BASOPHILS 0.0 0.0 - 0.1 K/UL    ABS. IMM. Julianna Estrella. 0.0 K/UL    DF Manual      RBC COMMENTS Macrocytosis  1+        RBC COMMENTS Ovalocytes  1+       HEPATIC FUNCTION PANEL    Collection Time: 11/03/21  4:30 AM   Result Value Ref Range    Protein, total 4.6 (L) 6.4 - 8.2 g/dL    Albumin 1.4 (L) 3.5 - 5.0 g/dL    Globulin 3.2 2.0 - 4.0 g/dL    A-G Ratio 0.4 (L) 1.1 - 2.2      Bilirubin, total 22.7 (H) 0.2 - 1.0 mg/dL    Bilirubin, direct 17.9 (H) 0.0 - 0.2 mg/dL    Alk. phosphatase 89 45 - 117 U/L    AST (SGOT) 454 (H) 15 - 37 U/L    ALT (SGPT) 138 (H) 12 - 78 U/L   RENAL FUNCTION PANEL    Collection Time: 11/03/21  4:30 AM   Result Value Ref Range    Sodium 137 136 - 145 mmol/L    Potassium 3.9 3.5 - 5.1 mmol/L    Chloride 108 97 - 108 mmol/L    CO2 15 (LL) 21 - 32 mmol/L    Anion gap 14 5 - 15 mmol/L    Glucose 112 (H) 65 - 100 mg/dL    BUN 67 (H) 6 - 20 mg/dL    Creatinine 5.43 (H) 0.55 - 1.02 mg/dL    BUN/Creatinine ratio 12 12 - 20      GFR est AA 10 (L) >60 ml/min/1.73m2    GFR est non-AA 8 (L) >60 ml/min/1.73m2    Calcium 6.7 (L) 8.5 - 10.1 mg/dL    Phosphorus 5.1 (H) 2.6 - 4.7 mg/dL    Albumin 1.4 (L) 3.5 - 5.0 g/dL   MAGNESIUM    Collection Time: 11/03/21  4:30 AM   Result Value Ref Range    Magnesium 1.7 1.6 - 2.4 mg/dL   AMMONIA    Collection Time: 11/03/21  4:30 AM   Result Value Ref Range    Ammonia 100 (H) <32 umol/L   CBC WITH AUTOMATED DIFF    Collection Time: 11/03/21  4:30 AM   Result Value Ref Range    WBC 33.3 (H) 3.6 - 11.0 K/uL    RBC 3.75 (L) 3.80 - 5.20 M/uL    HGB 11.8 11.5 - 16.0 g/dL    HCT 33.3 (L) 35.0 - 47.0 %    MCV 88.8 80.0 - 99.0 FL    MCH 31.5 26.0 - 34.0 PG    MCHC 35.4 30.0 - 36.5 g/dL    RDW 20.1 (H) 11.5 - 14.5 %    PLATELET 77 (L) 775 - 400 K/uL    MPV 11.6 8.9 - 12.9 FL    NRBC 0.2 (H) 0.0  WBC    ABSOLUTE NRBC 0.05 (H) 0.00 - 0.01 K/uL    NEUTROPHILS 84 (H) 32 - 75 %    LYMPHOCYTES 10 (L) 12 - 49 %    MONOCYTES 6 5 - 13 %    EOSINOPHILS 0 0 - 7 %    BASOPHILS 0 0 - 1 %    IMMATURE GRANULOCYTES 0 %    ABS. NEUTROPHILS 28.0 (H) 1.8 - 8.0 K/UL    ABS. LYMPHOCYTES 3.3 0.8 - 3.5 K/UL    ABS. MONOCYTES 2.0 (H) 0.0 - 1.0 K/UL    ABS. EOSINOPHILS 0.0 0.0 - 0.4 K/UL    ABS. BASOPHILS 0.0 0.0 - 0.1 K/UL    ABS. IMM. GRANS. 0.0 K/UL    DF AUTOMATED      RBC COMMENTS Normocytic, Normochromic     BLOOD GAS, ARTERIAL    Collection Time: 11/03/21  4:56 AM   Result Value Ref Range    pH 7.37 7.35 - 7.45      PCO2 24 (L) 35 - 45 mmHg    PO2 96 75 - 100 mmHg    O2 SAT 98 >95 %    BICARBONATE 17 (L) 22 - 26 mmol/L    BASE DEFICIT 9.4 (H) 0 - 2 mmol/L    O2 METHOD VENT      FIO2 30.0 %    MODE Assist Control/Volume Control      Tidal volume 550      SET RATE 12      EPAP/CPAP/PEEP 5.0      Sample source Arterial      SITE Right Radial      RANDAL'S TEST PASS         XR CHEST PORT   Final Result      XR CHEST PORT   Final Result      XR CHEST PORT   Final Result      XR CHEST PORT   Final Result   Left IJ central venous catheter terminating over the lower right atrium. No   discernible pneumothorax. XR CHEST PORT   Final Result      CT ABD PELV WO CONT   Final Result   1. No acute findings. 2. Severe diffuse hepatic steatosis. 3. Cholelithiasis. Dose reduction: All CT scans at this facility are performed using radiation dose   reduction optimization techniques as appropriate to a performed exam, including   the following: Automated exposure control (AEC), adjustment of the MAA and/or   KUB according to the patient's size, or the patient's use of iterative   reconstruction techniques (ASIR). XR CHEST PORT   Final Result   Similar exam to prior without findings of definite acute cardiopulmonary   abnormality. Assessment:  Acute upper gastrointestinal bleeding, appears due to jejunal bleeding. Status post EGD with epinephrine injections. Patient with recurrent bleeding on 10/31. Appears to be stable past 48 hours with no evidence of ongoing bleeding    Complicated UTI due to gram-negative rods.   Was present prior to placement of Arana catheter    Hypovolemic shock due to above, possibly with component of septic shock due to UTI    Acute blood loss anemia status post massive transfusion    End-stage alcoholic cirrhosis with hepatic failure; MELD-Na  score is 36 with estimated 90-day mortality of 65%    Acute kidney injury, probably ATN due to hypotension. Likely component of hepatorenal syndrome. Worsening, now oliguric    Hepatic encephalopathy    Alcohol abuse; patient was still drinking about 9 beers daily just prior to admission    Nonanion gap metabolic acidosis, likely due to BRITTANY    Thrombocytopenia, likely multifactorial secondary to acute bleeding and sepsis as well as cirrhosis      Plan:    Continue levophed, dexameditine, octreotide, zosyn and propofol. Nephrology to initiate renal replacement therapy today  Wean pressors as blood pressure allows  GI contemplating repeat EGD to evaluate bleeding site as well as to endoscopically place NG tube so we can begin enteral nutrition  Continue lactulose  Procalcitonin    Discussed with pulmonologist and nephrologist    Care Plan discussed with: Patient/Family    Disposition: Continued ICU care      I personally spent  50   minutes of critical care time. This is time spent at this critically ill patient's bedside actively involved in patient care as well as the coordination of care and discussions with the patient's family. This does not include any procedural time which has been billed separately.           Miriam Pitts MD

## 2021-11-03 NOTE — PROGRESS NOTES
Bedside and Verbal shift change report given to Trinity RN (oncoming nurse) by Italia Mon RN (offgoing nurse). Report included the following information SBAR, Kardex, Procedure Summary, Intake/Output, Recent Results and Dual Neuro Assessment, cardiac rhythm.

## 2021-11-04 NOTE — PROGRESS NOTES
Hospitalist Progress Note               Daily Progress Note: 11/4/2021      Subjective:   Hospital course to date: Patient is a 48-year-old female with a PMH significant for morbid obesity, history of cirrhosis of the liver and recent elevated bilirubin.  She came to the ER on 10/22 with elevated bilirubin of 16.7 declined admission at that time but returns to the emergency room on 10/25 with complaints of weakness and lethargy.  Significant labs bilirubin 26.1 and direct bili greater than 16, ammonia is 73, platelet count 95. CT suggestive of diffuse hepatic steatosis and cholelithiasis with no cholecystectomy.  Previous US RP revealed findings suggestive for portal hypertension and cirrhosis, noted thickening gallbladder wall and cholecystitis, gallbladder sludge and probable gallstones.  Admitted for further management of cholelithiasis, abdominal pain, nausea, liver failure with elevated ammonia and thrombocytopenia.  GI consulted.  Scheduled for EGD to assess for esophageal varices and portal hypertension.   EGD demonstrated no evidence for varices. There was a large amount of blood clots noted in the stomach. Patient is status post Billroth II anastomosis. Efferent loop showed some small bowel bleeding and erosions. This was treated with epinephrine injections       Patient with worsening renal function starting on 10/30. Creatinine was up to 4.0 on 10/31. She has several large bloody stools on 10/31, became hypotensive and was transferred to the ICU. Hemoglobin dropped to 7.4 from 10.6 today previous. After transfer to ICU blood pressure dropped into the 24O systolic. She was given a total of 3 units of blood. She was started on IV octreotide. 11/01/21:  She continues to have bloody stools. She is on IV octreotide    Liver chemistries today show a bilirubin of 21.4. Creatinine is 4.7 with a BUN of 65, potassium 4.7. Hemoglobin this morning is 7.1.   Blood pressure is 81 systolic    No urine output overnight but no Arana in place. Patient underwent upper endoscopy, was found to have large amount of blood and a very small minute of the stomach, as well as blood in the jejunum. Dr. Skip Ceron initially could not get a good look. Patient then started with blood in her upper airway and the procedure was held. She was then intubated by anesthesia. EGD was attempted again and patient was found to have a laceration at the anastomosis of her Billroth II which was actively bleeding. Several endoclips were placed. Blood continued ooze from this area.     I have initiated a massive transfusion protocol, lab was notified. We will give an additional 2 units of blood now along with a sixpack of platelets and 2 units of FFP     We will keep patient on the ventilator for now. Consult pulmonology for ventilator management, discussed with Dr. Katherine Fothergill     We will also consult interventional radiology in case bleeding continues, at which point she would need consideration for intervention/embolization     I have kept her  updated with our findings and treatment plan     I have asked anesthesia to place a central line as well. Patient was started on norepinephrine due to a drop in her blood pressure as low as 50 systolic. Patient was seen and evaluated by pulmonologist who recommended maintaining mechanical ventilation and following blood gases. Recommended sputum cultures, IV thiamine, folic acid and multivitamin replacement, continued sedation with propofol and adding Precedex for alcohol withdrawal.     Central line was placed at 1900 without any complications. 11/03  She continues on mechanical ventilation. Creatinine today is up to 5.4, bicarbonate 15. Urine output 475 mL past 24 hours. Total bilirubin unchanged at 22.7. Ammonia level 100. Hemoglobin remains stable at 11.8, WBC 33,000.     Patient is currently on 60 mcg of norepinephrine    ABG shows pH 7.37, PCO2 24, PO2 96 on 30% FiO2    Discussed with nephrologist who is planning on starting hemodialysis today    Urine culture showing gram-negative rods    Platelet count down to 77 today.        -------     Patient is seen today for follow-up. Patient was seen by nephrology on 11/03 who notes that urine output is improving with 300 mils overnight and 300 mils in the Arana bag already. Patient is unresponsive but on sedation with Precedex. Patient's  was updated who said he would want everything done on his wife including RRT and dialysis if needed. Nephrologist recommends CRRT in the morning if patient's renal function continues to worsen. Patient was seen by gastroenterology on 11/03 for possible NG or OG tube placement which was not done due to concern that the tube might dislodge  the clots/clips that can cause further bleeding. Procalcitonin level on 11/03 was 8.9 indicating bacterial source of infection. CMP reveals that bilirubin decreased to 17.6 from 22.7 the previous day. Albumin decreased to 1.0 from 1.4 the previous day. Ammonia decreased to 94 from 100 the previous day. Hemoglobin decreased to 10.5 and WBC decreased to 33.2.          Problem List:  Problem List as of 11/4/2021 Date Reviewed: 11/3/2021          Codes Class Noted - Resolved    Hyperbilirubinemia ICD-10-CM: E80.6  ICD-9-CM: 782.4  10/25/2021 - Present        Cirrhosis of liver (RUST 75.) ICD-10-CM: K74.60  ICD-9-CM: 571.5  10/25/2021 - Present        Postop check ICD-10-CM: D80  ICD-9-CM: V67.00  10/26/2020 - Present        Bleeding from varicose vein ICD-10-CM: I83.899  ICD-9-CM: 454.8  10/20/2020 - Present        Obesity, morbid (RUST 75.) ICD-10-CM: E66.01  ICD-9-CM: 278.01  10/13/2020 - Present        Venous stasis ulcer (RUST 75.) ICD-10-CM: I83.009, L97.909  ICD-9-CM: 454.0  10/13/2020 - Present              Medications reviewed  Current Facility-Administered Medications   Medication Dose Route Frequency    sodium bicarbonate tablet 1,300 mg  1,300 mg Oral TID    NOREPINephrine (LEVOPHED) 16,000 mcg in dextrose 5% 250 mL infusion  0.5-30 mcg/min IntraVENous TITRATE    sodium bicarbonate (8.4%) 150 mEq in dextrose 5% 1,000 mL infusion   IntraVENous CONTINUOUS    propofoL (DIPRIVAN) 10 mg/mL injection  0-50 mcg/kg/min IntraVENous TITRATE    0.9% sodium chloride infusion  75 mL/hr IntraVENous PRN    EPINEPHrine (ADRENALIN) 0.1 mg/mL syringe    PRN    simethicone (MYLICON) 66IY/0.7LT oral drops    PRN    0.9% sodium chloride infusion 250 mL  250 mL IntraVENous PRN    piperacillin-tazobactam (ZOSYN) 3.375 g in 0.9% sodium chloride (MBP/ADV) 100 mL MBP  3.375 g IntraVENous Q12H    dexmedeTOMidine (PRECEDEX) 400 mcg in 0.9% sodium chloride (MBP/ADV) 100 mL MBP  0.1-1.5 mcg/kg/hr IntraVENous TITRATE    octreotide (SANDOSTATIN) 500 mcg in 0.9% sodium chloride 500 mL infusion  25 mcg/hr IntraVENous CONTINUOUS    0.9% sodium chloride infusion 250 mL  250 mL IntraVENous PRN    midodrine (PROAMATINE) tablet 10 mg  10 mg Oral TID    hydrOXYzine pamoate (VISTARIL) capsule 25 mg  25 mg Oral BID    traZODone (DESYREL) tablet 50 mg  50 mg Oral QHS PRN    influenza vaccine 2021-22 (6 mos+)(PF) (FLUARIX/FLULAVAL/FLUZONE QUAD) injection 0.5 mL  1 Each IntraMUSCular PRIOR TO DISCHARGE    lactulose (CHRONULAC) 10 gram/15 mL solution 30 mL  20 g Oral BID    sodium chloride (NS) flush 5-40 mL  5-40 mL IntraVENous Q8H    sodium chloride (NS) flush 5-40 mL  5-40 mL IntraVENous PRN    acetaminophen (TYLENOL) tablet 650 mg  650 mg Oral Q6H PRN    Or    acetaminophen (TYLENOL) suppository 650 mg  650 mg Rectal Q6H PRN    ondansetron (ZOFRAN ODT) tablet 4 mg  4 mg Oral Q6H PRN    Or    ondansetron (ZOFRAN) injection 4 mg  4 mg IntraVENous Q6H PRN    [Held by provider] thiamine mononitrate (B-1) tablet 100 mg  100 mg Oral DAILY    [Held by provider] b complex-vitamin c-folic acid 5mg (FOLBEE PLUS) tablet 1 Tablet  1 Tablet Oral DAILY    [Held by provider] spironolactone (ALDACTONE) tablet 25 mg  25 mg Oral BID       Review of Systems:   A comprehensive review of systems was negative except for that written in the HPI. Objective:   Physical Exam:     Visit Vitals  BP (!) 92/44 (BP 1 Location: Right upper arm, BP Patient Position: At rest)   Pulse 76   Temp 99.5 °F (37.5 °C)   Resp 23   Ht 5' 6\" (1.676 m)   Wt (!) 363 lb 1.6 oz (164.7 kg)   SpO2 96%   BMI 58.61 kg/m²      O2 Device: Ventilator    Temp (24hrs), Av.2 °F (37.9 °C), Min:98.7 °F (37.1 °C), Max:102.4 °F (39.1 °C)    No intake/output data recorded.  1901 -  0700  In: 6283.5 [I.V.:6283.5]  Out: 1330 [YPIQI:[id number]]    General:   Intubated and sedated. Sclerae icteric   Lungs:   Clear to auscultation bilaterally. Chest wall:  No tenderness or deformity. Heart:  Regular rate and rhythm, S1, S2 normal, no murmur, click, rub or gallop. Abdomen:   Soft, non-tender. Bowel sounds normal. No masses,  No organomegaly. Extremities: Extremities normal, atraumatic, no cyanosis or edema. Pulses: 2+ and symmetric all extremities. Skin: Skin color, texture, turgor normal. No rashes or lesions   Neurologic: CNII-XII intact.   No gross focal deficits         Data Review:       Recent Days:  Recent Labs     21  0600 21  0430 21  0915   WBC 33.2* 33.3* 23.7*   HGB 10.5* 11.8 11.5   HCT 30.2* 33.3* 33.3*   PLT 32* 77* 133*     Recent Labs     21  0600 21  0430 21  0430    137 136   K 3.8 3.9 4.4   * 108 109*   CO2 14* 15* 14*   * 112* 134*   BUN 66* 67* 68*   CREA 5.67* 5.43* 5.06*   CA 6.3* 6.7* 7.8*   MG 1.6 1.7 2.2   PHOS 5.7* 5.1* 6.6*   ALB 1.0*  1.0* 1.4*  1.4* 1.7*  1.7*   TBILI 17.6* 22.7* 23.6*   * 138* 118*   INR 2.3*  --  1.5*     Recent Labs     21  0608 21  0456 21  0409   PH 7.38 7.37 7.32*   PCO2 23* 24* 27*   PO2 86 96 167*   HCO3 17* 17* 16*   FIO2 30 30.0 40.0       24 Hour Results:  Recent Results (from the past 24 hour(s))   PROCALCITONIN    Collection Time: 11/03/21  9:30 AM   Result Value Ref Range    Procalcitonin 8.90 (H) 0 ng/mL   GLUCOSE, POC    Collection Time: 11/03/21  5:55 PM   Result Value Ref Range    Glucose (POC) 96 65 - 117 mg/dL    Performed by Hugh Ahn    GLUCOSE, POC    Collection Time: 11/03/21  9:20 PM   Result Value Ref Range    Glucose (POC) 86 65 - 117 mg/dL    Performed by William Hirsch    HEPATIC FUNCTION PANEL    Collection Time: 11/04/21  6:00 AM   Result Value Ref Range    Protein, total 3.9 (L) 6.4 - 8.2 g/dL    Albumin 1.0 (L) 3.5 - 5.0 g/dL    Globulin 2.9 2.0 - 4.0 g/dL    A-G Ratio 0.3 (L) 1.1 - 2.2      Bilirubin, total 17.6 (H) 0.2 - 1.0 mg/dL    Bilirubin, direct 14.9 (H) 0.0 - 0.2 mg/dL    Alk.  phosphatase 83 45 - 117 U/L    AST (SGOT) 468 (H) 15 - 37 U/L    ALT (SGPT) 138 (H) 12 - 78 U/L   AMMONIA    Collection Time: 11/04/21  6:00 AM   Result Value Ref Range    Ammonia 94 (H) <32 umol/L   PROTHROMBIN TIME + INR    Collection Time: 11/04/21  6:00 AM   Result Value Ref Range    Prothrombin time 24.4 (H) 11.9 - 14.7 sec    INR 2.3 (H) 0.9 - 1.1     RENAL FUNCTION PANEL    Collection Time: 11/04/21  6:00 AM   Result Value Ref Range    Sodium 140 136 - 145 mmol/L    Potassium 3.8 3.5 - 5.1 mmol/L    Chloride 111 (H) 97 - 108 mmol/L    CO2 14 (LL) 21 - 32 mmol/L    Anion gap 15 5 - 15 mmol/L    Glucose 108 (H) 65 - 100 mg/dL    BUN 66 (H) 6 - 20 mg/dL    Creatinine 5.67 (H) 0.55 - 1.02 mg/dL    BUN/Creatinine ratio 12 12 - 20      GFR est AA 10 (L) >60 ml/min/1.73m2    GFR est non-AA 8 (L) >60 ml/min/1.73m2    Calcium 6.3 (LL) 8.5 - 10.1 mg/dL    Phosphorus 5.7 (H) 2.6 - 4.7 mg/dL    Albumin 1.0 (L) 3.5 - 5.0 g/dL   MAGNESIUM    Collection Time: 11/04/21  6:00 AM   Result Value Ref Range    Magnesium 1.6 1.6 - 2.4 mg/dL   CBC WITH AUTOMATED DIFF    Collection Time: 11/04/21  6:00 AM   Result Value Ref Range    WBC 33.2 (H) 3.6 - 11.0 K/uL    RBC 3.36 (L) 3.80 - 5.20 M/uL    HGB 10.5 (L) 11.5 - 16.0 g/dL    HCT 30.2 (L) 35.0 - 47.0 %    MCV 89.9 80.0 - 99.0 FL    MCH 31.3 26.0 - 34.0 PG    MCHC 34.8 30.0 - 36.5 g/dL    RDW 20.5 (H) 11.5 - 14.5 %    PLATELET 32 (LL) 644 - 400 K/uL    MPV 12.3 8.9 - 12.9 FL    NRBC 0.2 (H) 0.0  WBC    ABSOLUTE NRBC 0.08 (H) 0.00 - 0.01 K/uL    NEUTROPHILS 85 (H) 32 - 75 %    LYMPHOCYTES 8 (L) 12 - 49 %    MONOCYTES 4 (L) 5 - 13 %    EOSINOPHILS 1 0 - 7 %    BASOPHILS 0 0 - 1 %    METAMYELOCYTES 1 (H) 0 %    MYELOCYTES 1 (H) 0 %    IMMATURE GRANULOCYTES 0 %    ABS. NEUTROPHILS 28.2 (H) 1.8 - 8.0 K/UL    ABS. LYMPHOCYTES 2.7 0.8 - 3.5 K/UL    ABS. MONOCYTES 1.3 (H) 0.0 - 1.0 K/UL    ABS. EOSINOPHILS 0.3 0.0 - 0.4 K/UL    ABS. BASOPHILS 0.0 0.0 - 0.1 K/UL    ABS. IMM. GRANS. 0.0 K/UL    DF Manual      RBC COMMENTS Polychromasia  1+        RBC COMMENTS Teardrop cells  1+       BLOOD GAS, ARTERIAL    Collection Time: 11/04/21  6:08 AM   Result Value Ref Range    pH 7.38 7.35 - 7.45      PCO2 23 (L) 35 - 45 mmHg    PO2 86 75 - 100 mmHg    O2 SAT 96 >95 %    BICARBONATE 17 (L) 22 - 26 mmol/L    BASE DEFICIT 10.0 (H) 0 - 2 mmol/L    O2 METHOD VENT      FIO2 30 %    MODE SIMV      Tidal volume 550      PRESSURE SUPPORT 15      EPAP/CPAP/PEEP 5      Sample source Arterial      SITE Right Brachial      RANDAL'S TEST Positive         XR CHEST PORT   Final Result      XR CHEST PORT   Final Result      XR CHEST PORT   Final Result      XR CHEST PORT   Final Result   Left IJ central venous catheter terminating over the lower right atrium. No   discernible pneumothorax. XR CHEST PORT   Final Result      CT ABD PELV WO CONT   Final Result   1. No acute findings. 2. Severe diffuse hepatic steatosis. 3. Cholelithiasis.             Dose reduction: All CT scans at this facility are performed using radiation dose   reduction optimization techniques as appropriate to a performed exam, including   the following: Automated exposure control (AEC), adjustment of the MAA and/or KUB according to the patient's size, or the patient's use of iterative   reconstruction techniques (ASIR). XR CHEST PORT   Final Result   Similar exam to prior without findings of definite acute cardiopulmonary   abnormality. XR CHEST PORT    (Results Pending)        Assessment:  Acute upper gastrointestinal bleeding, appears due to jejunal bleeding. Status post EGD with epinephrine injections. Patient with recurrent bleeding on 10/31. Appears to be stable past 48 hours with no evidence of ongoing bleeding    Complicated UTI due to gram-negative rods. Was present prior to placement of Arana catheter    Hypovolemic shock due to above, possibly with component of septic shock due to UTI    Acute blood loss anemia status post massive transfusion    End-stage alcoholic cirrhosis with hepatic failure; MELD-Na  score is 36 with estimated 90-day mortality of 65%    Acute kidney injury, probably ATN due to hypotension. Likely component of hepatorenal syndrome. Worsening, now oliguric    Hepatic encephalopathy    Alcohol abuse; patient was still drinking about 9 beers daily just prior to admission    Nonanion gap metabolic acidosis, likely due to BRITTANY    Thrombocytopenia, likely multifactorial secondary to acute bleeding and sepsis as well as cirrhosis      Plan:    Continue levophed, dexameditine, octreotide, zosyn and propofol. Nephrology to initiate renal replacement therapy today  Wean pressors as blood pressure allows  Continue lactulose      Discussed with pulmonologist and nephrologist    Care Plan discussed with: Patient/Family    Disposition: Continued ICU care      I personally spent  50   minutes of critical care time. This is time spent at this critically ill patient's bedside actively involved in patient care as well as the coordination of care and discussions with the patient's family. This does not include any procedural time which has been billed separately.           Nelia Banks

## 2021-11-04 NOTE — PROGRESS NOTES
Renal Progress Note    Patient: Farhana Astudillo MRN: [id number]  SSN: [id number]    YOB: 1975  Age: 51 y.o. Sex: female      Admit Date: 10/25/2021    LOS: 10 days     Subjective:   Patient seen in ICU. On ventilator and sedated   at bedside  She is on 2 pressors today with her map around 61  Renal output about 950 mils in the past 24 hours  Unresponsive but on sedation with Precedex  Discussed again with the  regarding further care and his expectations. I am not sure he understands how sick she is.   He was asking about liver transplant and other options    Current Facility-Administered Medications   Medication Dose Route Frequency    0.9% sodium chloride infusion 250 mL  250 mL IntraVENous PRN    PHENYLephrine (KALIN-SYNEPHRINE) 30 mg in 0.9% sodium chloride 250 mL infusion   mcg/min IntraVENous TITRATE    NOREPINephrine (LEVOPHED) 16,000 mcg in dextrose 5% 250 mL infusion  0.5-65 mcg/min IntraVENous TITRATE    vasopressin (VASOSTRICT) 20 Units in 0.9% sodium chloride 100 mL infusion  0.01-0.04 Units/min IntraVENous TITRATE    magnesium sulfate 1 g/100 ml IVPB (premix or compounded)  1 g IntraVENous ONCE    sodium bicarbonate (8.4%) 200 mEq in dextrose 5% 1,000 mL infusion   IntraVENous CONTINUOUS    calcium gluconate 1 gram in sodium chloride (ISO-OSM) 50 mL infusion  1 g IntraVENous ONCE    sodium bicarbonate tablet 1,300 mg  1,300 mg Oral TID    propofoL (DIPRIVAN) 10 mg/mL injection  0-50 mcg/kg/min IntraVENous TITRATE    0.9% sodium chloride infusion  75 mL/hr IntraVENous PRN    EPINEPHrine (ADRENALIN) 0.1 mg/mL syringe    PRN    simethicone (MYLICON) 28CP/7.4WT oral drops    PRN    0.9% sodium chloride infusion 250 mL  250 mL IntraVENous PRN    piperacillin-tazobactam (ZOSYN) 3.375 g in 0.9% sodium chloride (MBP/ADV) 100 mL MBP  3.375 g IntraVENous Q12H    dexmedeTOMidine (PRECEDEX) 400 mcg in 0.9% sodium chloride (MBP/ADV) 100 mL MBP  0.1-1.5 mcg/kg/hr IntraVENous TITRATE    octreotide (SANDOSTATIN) 500 mcg in 0.9% sodium chloride 500 mL infusion  25 mcg/hr IntraVENous CONTINUOUS    0.9% sodium chloride infusion 250 mL  250 mL IntraVENous PRN    midodrine (PROAMATINE) tablet 10 mg  10 mg Oral TID    hydrOXYzine pamoate (VISTARIL) capsule 25 mg  25 mg Oral BID    traZODone (DESYREL) tablet 50 mg  50 mg Oral QHS PRN    influenza vaccine 2021-22 (6 mos+)(PF) (FLUARIX/FLULAVAL/FLUZONE QUAD) injection 0.5 mL  1 Each IntraMUSCular PRIOR TO DISCHARGE    lactulose (CHRONULAC) 10 gram/15 mL solution 30 mL  20 g Oral BID    sodium chloride (NS) flush 5-40 mL  5-40 mL IntraVENous Q8H    sodium chloride (NS) flush 5-40 mL  5-40 mL IntraVENous PRN    acetaminophen (TYLENOL) tablet 650 mg  650 mg Oral Q6H PRN    Or    acetaminophen (TYLENOL) suppository 650 mg  650 mg Rectal Q6H PRN    ondansetron (ZOFRAN ODT) tablet 4 mg  4 mg Oral Q6H PRN    Or    ondansetron (ZOFRAN) injection 4 mg  4 mg IntraVENous Q6H PRN    [Held by provider] thiamine mononitrate (B-1) tablet 100 mg  100 mg Oral DAILY    [Held by provider] b complex-vitamin c-folic acid 5mg (FOLBEE PLUS) tablet 1 Tablet  1 Tablet Oral DAILY    [Held by provider] spironolactone (ALDACTONE) tablet 25 mg  25 mg Oral BID        Vitals:    11/04/21 1110 11/04/21 1200 11/04/21 1225 11/04/21 1240   BP:  (!) 112/55 (!) 105/48 109/64   Pulse: 80 90 90 90   Resp:  21 21 21   Temp:  (!) 100.8 °F (38.2 °C) (!) 100.8 °F (38.2 °C) (!) 100.8 °F (38.2 °C)   SpO2:  93% 93% 94%   Weight:       Height:         Objective:   General: Intubated and sedated. Jaundiced skin  HEENT: Endotracheal tube in place. Scleral icterus present,  Neck: Neck is supple, No JVD, no thyromegaly  Lungs: breathsounds normal,  no rhonchi, no rales,  CVS: heart sounds normal,  no murmurs, no rubs. GI: Distended. Obese.     Extremeties: no cyanosis, 1-2+ bilateral lower extremity edema present  Neuro: She is sedated  Skin: normal skin turgor, no skin rashes      Intake and Output:  Current Shift: No intake/output data recorded. Last three shifts: 11/02 1901 - 11/04 0700  In: 8769.7 [I.V.:8769.7]  Out: 1330 [Urine:1330]      Lab/Data Review:  Recent Labs     11/04/21  0600 11/03/21  0430 11/02/21  0915   WBC 33.2* 33.3* 23.7*   HGB 10.5* 11.8 11.5   HCT 30.2* 33.3* 33.3*   PLT 32* 77* 133*     Recent Labs     11/04/21  0600 11/03/21  0430 11/02/21  0430    137 136   K 3.8 3.9 4.4   * 108 109*   CO2 14* 15* 14*   * 112* 134*   BUN 66* 67* 68*   CREA 5.67* 5.43* 5.06*   CA 6.3* 6.7* 7.8*   MG 1.6 1.7 2.2   PHOS 5.7* 5.1* 6.6*   ALB 1.0*  1.0* 1.4*  1.4* 1.7*  1.7*   TBILI 17.6* 22.7* 23.6*   * 138* 118*   INR 2.3*  --  1.5*     Recent Labs     11/04/21  0608 11/03/21  0456 11/02/21  0409   PH 7.38 7.37 7.32*   PCO2 23* 24* 27*   PO2 86 96 167*   HCO3 17* 17* 16*   FIO2 30 30.0 40.0     Recent Results (from the past 24 hour(s))   GLUCOSE, POC    Collection Time: 11/03/21  5:55 PM   Result Value Ref Range    Glucose (POC) 96 65 - 117 mg/dL    Performed by Sharyle Corporal HALEY    GLUCOSE, POC    Collection Time: 11/03/21  9:20 PM   Result Value Ref Range    Glucose (POC) 86 65 - 117 mg/dL    Performed by Ty Steward    HEPATIC FUNCTION PANEL    Collection Time: 11/04/21  6:00 AM   Result Value Ref Range    Protein, total 3.9 (L) 6.4 - 8.2 g/dL    Albumin 1.0 (L) 3.5 - 5.0 g/dL    Globulin 2.9 2.0 - 4.0 g/dL    A-G Ratio 0.3 (L) 1.1 - 2.2      Bilirubin, total 17.6 (H) 0.2 - 1.0 mg/dL    Bilirubin, direct 14.9 (H) 0.0 - 0.2 mg/dL    Alk.  phosphatase 83 45 - 117 U/L    AST (SGOT) 468 (H) 15 - 37 U/L    ALT (SGPT) 138 (H) 12 - 78 U/L   AMMONIA    Collection Time: 11/04/21  6:00 AM   Result Value Ref Range    Ammonia 94 (H) <32 umol/L   PROTHROMBIN TIME + INR    Collection Time: 11/04/21  6:00 AM   Result Value Ref Range    Prothrombin time 24.4 (H) 11.9 - 14.7 sec    INR 2.3 (H) 0.9 - 1.1     RENAL FUNCTION PANEL    Collection Time: 11/04/21 6:00 AM   Result Value Ref Range    Sodium 140 136 - 145 mmol/L    Potassium 3.8 3.5 - 5.1 mmol/L    Chloride 111 (H) 97 - 108 mmol/L    CO2 14 (LL) 21 - 32 mmol/L    Anion gap 15 5 - 15 mmol/L    Glucose 108 (H) 65 - 100 mg/dL    BUN 66 (H) 6 - 20 mg/dL    Creatinine 5.67 (H) 0.55 - 1.02 mg/dL    BUN/Creatinine ratio 12 12 - 20      GFR est AA 10 (L) >60 ml/min/1.73m2    GFR est non-AA 8 (L) >60 ml/min/1.73m2    Calcium 6.3 (LL) 8.5 - 10.1 mg/dL    Phosphorus 5.7 (H) 2.6 - 4.7 mg/dL    Albumin 1.0 (L) 3.5 - 5.0 g/dL   MAGNESIUM    Collection Time: 11/04/21  6:00 AM   Result Value Ref Range    Magnesium 1.6 1.6 - 2.4 mg/dL   CBC WITH AUTOMATED DIFF    Collection Time: 11/04/21  6:00 AM   Result Value Ref Range    WBC 33.2 (H) 3.6 - 11.0 K/uL    RBC 3.36 (L) 3.80 - 5.20 M/uL    HGB 10.5 (L) 11.5 - 16.0 g/dL    HCT 30.2 (L) 35.0 - 47.0 %    MCV 89.9 80.0 - 99.0 FL    MCH 31.3 26.0 - 34.0 PG    MCHC 34.8 30.0 - 36.5 g/dL    RDW 20.5 (H) 11.5 - 14.5 %    PLATELET 32 (LL) 912 - 400 K/uL    MPV 12.3 8.9 - 12.9 FL    NRBC 0.2 (H) 0.0  WBC    ABSOLUTE NRBC 0.08 (H) 0.00 - 0.01 K/uL    NEUTROPHILS 85 (H) 32 - 75 %    LYMPHOCYTES 8 (L) 12 - 49 %    MONOCYTES 4 (L) 5 - 13 %    EOSINOPHILS 1 0 - 7 %    BASOPHILS 0 0 - 1 %    METAMYELOCYTES 1 (H) 0 %    MYELOCYTES 1 (H) 0 %    IMMATURE GRANULOCYTES 0 %    ABS. NEUTROPHILS 28.2 (H) 1.8 - 8.0 K/UL    ABS. LYMPHOCYTES 2.7 0.8 - 3.5 K/UL    ABS. MONOCYTES 1.3 (H) 0.0 - 1.0 K/UL    ABS. EOSINOPHILS 0.3 0.0 - 0.4 K/UL    ABS. BASOPHILS 0.0 0.0 - 0.1 K/UL    ABS. IMM.  GRANS. 0.0 K/UL    DF Manual      RBC COMMENTS Polychromasia  1+        RBC COMMENTS Teardrop cells  1+       BLOOD GAS, ARTERIAL    Collection Time: 11/04/21  6:08 AM   Result Value Ref Range    pH 7.38 7.35 - 7.45      PCO2 23 (L) 35 - 45 mmHg    PO2 86 75 - 100 mmHg    O2 SAT 96 >95 %    BICARBONATE 17 (L) 22 - 26 mmol/L    BASE DEFICIT 10.0 (H) 0 - 2 mmol/L    O2 METHOD VENT      FIO2 30 %    MODE SIMV Tidal volume 550      PRESSURE SUPPORT 15      EPAP/CPAP/PEEP 5      Sample source Arterial      SITE Right Brachial      RANDAL'S TEST Positive     GLUCOSE, POC    Collection Time: 11/04/21  8:05 AM   Result Value Ref Range    Glucose (POC) 103 65 - 117 mg/dL    Performed by Aldair Kramer    TYPE & SCREEN    Collection Time: 11/04/21  8:25 AM   Result Value Ref Range    Crossmatch Expiration 11/07/2021,2359     ABO/Rh(D) [place] Positive     Antibody screen Negative    PLASMA, ALLOCATE    Collection Time: 11/04/21  8:29 AM   Result Value Ref Range    Unit number [id number]     Blood component type FP 24H,Thaw     Unit division 00     Status of unit [address] to transfuse    GLUCOSE, POC    Collection Time: 11/04/21 11:35 AM   Result Value Ref Range    Glucose (POC) 104 65 - 117 mg/dL    Performed by Aldair Kramer         Assessment and Plan:      1. Acute Kidney Injury :   -BRITTANY likely secondary to ATN/possible hepatorenal syndrome   -Urine sodium and chloride were low suggesting HRS  -Creatinine progressively worsening. 5.6 today. Her BUN has increased to 66. CO2 is only 14  -Urine output about 950 mils in the past 24 hours  -No impending indication to start RRT today. If needed she will likely need CRRT based on her hemodynamic instability  -Might need to start CRRT in a.m. if renal function continues to worsen  -no history of chronic kidney disease     2.  GI bleed/severe anemia:   -Stable hemoglobin post transfusions  -GI following, EGD done again yesterday and she had a clot at the anastomotic site. No plan to put NG tube because of that  -Monitor H&H and transfuse as needed    3. Hyperbilirubinemia/chronic liver disease/suspected cirrhosis  -Alcohol dependence:   -Acute liver injury on chronic liver disease, probably alcohol-related. -Ammonia is 100. Her liver enzymes are>400.   Plan to start like lactulose after NG tube is placed today  -MELD score is>40,suggesting very poor prognosis  -Continue to monitor with abstinence from alcohol  -GI following the patient    3. Hypotension: Probably related to liver disease/? Sepsis  Leukocytosis present   currently on 2 pressors  Also on midodrine but unable to give because patient does not have an NG tube   continue ICU monitoring    4. Metabolic acidosis:   -Bicarb is only 14. Likely because of advanced renal disease   -Currently on bicarbonate drip which I will decrease the rate of because of worsening edema. Unable to give oral bicarb because of no NG tube  - will give 2 A of IV bicarb now  -If acidosis does not improve might need to start dialysis in a.m.    5. Lower extremity edema: probably related to chronic liver disease and obesity,   Will use diuretics as needed, will monitor fluid status clinically and adjust diuretics as needed. 6. Hyperkalemia: resolved now.   Potassium is 3.8       Signed By: Catie Mckoy MD     November 4, 2021

## 2021-11-04 NOTE — PROGRESS NOTES
Hospitalist Progress Note               Daily Progress Note: 11/4/2021      Subjective:   Hospital course to date: Patient is a 79-year-old female with a PMH significant for morbid obesity, history of cirrhosis of the liver and recent elevated bilirubin.  She came to the ER on 10/22 with elevated bilirubin of 16.7 declined admission at that time but returns to the emergency room on 10/25 with complaints of weakness and lethargy.  Significant labs bilirubin 26.1 and direct bili greater than 16, ammonia is 73, platelet count 95. CT suggestive of diffuse hepatic steatosis and cholelithiasis with no cholecystectomy.  Previous US RP revealed findings suggestive for portal hypertension and cirrhosis, noted thickening gallbladder wall and cholecystitis, gallbladder sludge and probable gallstones.  Admitted for further management of cholelithiasis, abdominal pain, nausea, liver failure with elevated ammonia and thrombocytopenia.  GI consulted.  Scheduled for EGD to assess for esophageal varices and portal hypertension.   EGD demonstrated no evidence for varices. There was a large amount of blood clots noted in the stomach. Patient is status post Billroth II anastomosis. Efferent loop showed some small bowel bleeding and erosions. This was treated with epinephrine injections       Patient with worsening renal function starting on 10/30. Creatinine was up to 4.0 on 10/31. She has several large bloody stools on 10/31, became hypotensive and was transferred to the ICU. Hemoglobin dropped to 7.4 from 10.6 today previous. After transfer to ICU blood pressure dropped into the 48A systolic. She was given a total of 3 units of blood. She was started on IV octreotide. 11/01/21:  She continues to have bloody stools. She is on IV octreotide    Liver chemistries today show a bilirubin of 21.4. Creatinine is 4.7 with a BUN of 65, potassium 4.7. Hemoglobin this morning is 7.1.   Blood pressure is 81 systolic    No urine output overnight but no Arana in place. Patient underwent upper endoscopy, was found to have large amount of blood and a very small minute of the stomach, as well as blood in the jejunum. Dr. Radha Claudio initially could not get a good look. Patient then started with blood in her upper airway and the procedure was held. She was then intubated by anesthesia. EGD was attempted again and patient was found to have a laceration at the anastomosis of her Billroth II which was actively bleeding. Several endoclips were placed. Blood continued ooze from this area.     I have initiated a massive transfusion protocol, lab was notified. We will give an additional 2 units of blood now along with a sixpack of platelets and 2 units of FFP     We will keep patient on the ventilator for now. Consult pulmonology for ventilator management, discussed with Dr. Surjit Manzano     We will also consult interventional radiology in case bleeding continues, at which point she would need consideration for intervention/embolization     I have kept her  updated with our findings and treatment plan     I have asked anesthesia to place a central line as well. Patient was started on norepinephrine due to a drop in her blood pressure as low as 50 systolic. Patient was seen and evaluated by pulmonologist who recommended maintaining mechanical ventilation and following blood gases. Recommended sputum cultures, IV thiamine, folic acid and multivitamin replacement, continued sedation with propofol and adding Precedex for alcohol withdrawal.     Central line was placed at 1900 without any complications.   -------     Patient is seen today for follow-up. She continues on mechanical ventilation. Dr. Radha Claudio repeated her EGD yesterday which demonstrated anastomotic erosion and clot attached to the erosion. Hemoglobin remained stable at 10.5. White count is 33,000. INR 2.3    Creatinine 5.6 with urine output 980 mL.   She was not started on dialysis yesterday due to improvement in urine output    She is now on maximum dose norepinephrine and is to be starting Kalin-Synephrine.      Platelet count down to 32      Problem List:  Problem List as of 11/4/2021 Date Reviewed: 11/3/2021          Codes Class Noted - Resolved    Hyperbilirubinemia ICD-10-CM: E80.6  ICD-9-CM: 782.4  10/25/2021 - Present        Cirrhosis of liver ([facility] ICD-10-CM: K74.60  ICD-9-CM: 571.5  10/25/2021 - Present        Postop check ICD-10-CM: X37  ICD-9-CM: V67.00  10/26/2020 - Present        Bleeding from varicose vein ICD-10-CM: I83.899  ICD-9-CM: 454.8  10/20/2020 - Present        Obesity, morbid ([facility] ICD-10-CM: E66.01  ICD-9-CM: 278.01  10/13/2020 - Present        Venous stasis ulcer ([facility] ICD-10-CM: I83.009, L97.909  ICD-9-CM: 454.0  10/13/2020 - Present              Medications reviewed  Current Facility-Administered Medications   Medication Dose Route Frequency    PHENYLephrine (KALIN-SYNEPHRINE) 30 mg in 0.9% sodium chloride 250 mL infusion   mcg/min IntraVENous TITRATE    sodium bicarbonate tablet 1,300 mg  1,300 mg Oral TID    NOREPINephrine (LEVOPHED) 16,000 mcg in dextrose 5% 250 mL infusion  0.5-30 mcg/min IntraVENous TITRATE    sodium bicarbonate (8.4%) 150 mEq in dextrose 5% 1,000 mL infusion   IntraVENous CONTINUOUS    propofoL (DIPRIVAN) 10 mg/mL injection  0-50 mcg/kg/min IntraVENous TITRATE    0.9% sodium chloride infusion  75 mL/hr IntraVENous PRN    EPINEPHrine (ADRENALIN) 0.1 mg/mL syringe    PRN    simethicone (MYLICON) 99WH/3.5EM oral drops    PRN    0.9% sodium chloride infusion 250 mL  250 mL IntraVENous PRN    piperacillin-tazobactam (ZOSYN) 3.375 g in 0.9% sodium chloride (MBP/ADV) 100 mL MBP  3.375 g IntraVENous Q12H    dexmedeTOMidine (PRECEDEX) 400 mcg in 0.9% sodium chloride (MBP/ADV) 100 mL MBP  0.1-1.5 mcg/kg/hr IntraVENous TITRATE    octreotide (SANDOSTATIN) 500 mcg in 0.9% sodium chloride 500 mL infusion  25 mcg/hr IntraVENous CONTINUOUS    0.9% sodium chloride infusion 250 mL  250 mL IntraVENous PRN    midodrine (PROAMATINE) tablet 10 mg  10 mg Oral TID    hydrOXYzine pamoate (VISTARIL) capsule 25 mg  25 mg Oral BID    traZODone (DESYREL) tablet 50 mg  50 mg Oral QHS PRN    influenza vaccine  (6 mos+)(PF) (FLUARIX/FLULAVAL/FLUZONE QUAD) injection 0.5 mL  1 Each IntraMUSCular PRIOR TO DISCHARGE    lactulose (CHRONULAC) 10 gram/15 mL solution 30 mL  20 g Oral BID    sodium chloride (NS) flush 5-40 mL  5-40 mL IntraVENous Q8H    sodium chloride (NS) flush 5-40 mL  5-40 mL IntraVENous PRN    acetaminophen (TYLENOL) tablet 650 mg  650 mg Oral Q6H PRN    Or    acetaminophen (TYLENOL) suppository 650 mg  650 mg Rectal Q6H PRN    ondansetron (ZOFRAN ODT) tablet 4 mg  4 mg Oral Q6H PRN    Or    ondansetron (ZOFRAN) injection 4 mg  4 mg IntraVENous Q6H PRN    [Held by provider] thiamine mononitrate (B-1) tablet 100 mg  100 mg Oral DAILY    [Held by provider] b complex-vitamin c-folic acid 5mg (FOLBEE PLUS) tablet 1 Tablet  1 Tablet Oral DAILY    [Held by provider] spironolactone (ALDACTONE) tablet 25 mg  25 mg Oral BID       Review of Systems:   A comprehensive review of systems was negative except for that written in the HPI. Objective:   Physical Exam:     Visit Vitals  BP (!) 87/46 (BP 1 Location: Right upper arm, BP Patient Position: At rest)   Pulse 76   Temp (!) 87 °F (30.6 °C)   Resp 23   Ht 5' 6\" (1.676 m)   Wt (!) 164.7 kg (363 lb 1.6 oz)   SpO2 97%   BMI 58.61 kg/m²      O2 Device: Ventilator    Temp (24hrs), Av.3 °F (36.8 °C), Min:87 °F (30.6 °C), Max:102.4 °F (39.1 °C)    No intake/output data recorded.  1901 -  0700  In: 8769.7 [I.V.:8769.7]  Out: 1330 [Urine:1330]    General:   Intubated and sedated. Sclerae icteric   Lungs:   Clear to auscultation bilaterally. Chest wall:  No tenderness or deformity.    Heart:  Regular rate and rhythm, S1, S2 normal, no murmur, click, rub or gallop. Abdomen:   Soft, non-tender. Bowel sounds normal. No masses,  No organomegaly. Extremities: Extremities normal, atraumatic, no cyanosis or edema. Pulses: 2+ and symmetric all extremities. Skin: Skin color, texture, turgor normal. No rashes or lesions   Neurologic: CNII-XII intact. No gross focal deficits         Data Review:       Recent Days:  Recent Labs     11/04/21  0600 11/03/21  0430 11/02/21  0915   WBC 33.2* 33.3* 23.7*   HGB 10.5* 11.8 11.5   HCT 30.2* 33.3* 33.3*   PLT 32* 77* 133*     Recent Labs     11/04/21  0600 11/03/21  0430 11/02/21  0430    137 136   K 3.8 3.9 4.4   * 108 109*   CO2 14* 15* 14*   * 112* 134*   BUN 66* 67* 68*   CREA 5.67* 5.43* 5.06*   CA 6.3* 6.7* 7.8*   MG 1.6 1.7 2.2   PHOS 5.7* 5.1* 6.6*   ALB 1.0*  1.0* 1.4*  1.4* 1.7*  1.7*   TBILI 17.6* 22.7* 23.6*   * 138* 118*   INR 2.3*  --  1.5*     Recent Labs     11/04/21  0608 11/03/21  0456 11/02/21  0409   PH 7.38 7.37 7.32*   PCO2 23* 24* 27*   PO2 86 96 167*   HCO3 17* 17* 16*   FIO2 30 30.0 40.0       24 Hour Results:  Recent Results (from the past 24 hour(s))   PROCALCITONIN    Collection Time: 11/03/21  9:30 AM   Result Value Ref Range    Procalcitonin 8.90 (H) 0 ng/mL   GLUCOSE, POC    Collection Time: 11/03/21  5:55 PM   Result Value Ref Range    Glucose (POC) 96 65 - 117 mg/dL    Performed by Kyara WELCH    GLUCOSE, POC    Collection Time: 11/03/21  9:20 PM   Result Value Ref Range    Glucose (POC) 86 65 - 117 mg/dL    Performed by Nagi Garduno    HEPATIC FUNCTION PANEL    Collection Time: 11/04/21  6:00 AM   Result Value Ref Range    Protein, total 3.9 (L) 6.4 - 8.2 g/dL    Albumin 1.0 (L) 3.5 - 5.0 g/dL    Globulin 2.9 2.0 - 4.0 g/dL    A-G Ratio 0.3 (L) 1.1 - 2.2      Bilirubin, total 17.6 (H) 0.2 - 1.0 mg/dL    Bilirubin, direct 14.9 (H) 0.0 - 0.2 mg/dL    Alk.  phosphatase 83 45 - 117 U/L    AST (SGOT) 468 (H) 15 - 37 U/L    ALT (SGPT) 138 (H) 12 - 78 U/L   AMMONIA Collection Time: 11/04/21  6:00 AM   Result Value Ref Range    Ammonia 94 (H) <32 umol/L   PROTHROMBIN TIME + INR    Collection Time: 11/04/21  6:00 AM   Result Value Ref Range    Prothrombin time 24.4 (H) 11.9 - 14.7 sec    INR 2.3 (H) 0.9 - 1.1     RENAL FUNCTION PANEL    Collection Time: 11/04/21  6:00 AM   Result Value Ref Range    Sodium 140 136 - 145 mmol/L    Potassium 3.8 3.5 - 5.1 mmol/L    Chloride 111 (H) 97 - 108 mmol/L    CO2 14 (LL) 21 - 32 mmol/L    Anion gap 15 5 - 15 mmol/L    Glucose 108 (H) 65 - 100 mg/dL    BUN 66 (H) 6 - 20 mg/dL    Creatinine 5.67 (H) 0.55 - 1.02 mg/dL    BUN/Creatinine ratio 12 12 - 20      GFR est AA 10 (L) >60 ml/min/1.73m2    GFR est non-AA 8 (L) >60 ml/min/1.73m2    Calcium 6.3 (LL) 8.5 - 10.1 mg/dL    Phosphorus 5.7 (H) 2.6 - 4.7 mg/dL    Albumin 1.0 (L) 3.5 - 5.0 g/dL   MAGNESIUM    Collection Time: 11/04/21  6:00 AM   Result Value Ref Range    Magnesium 1.6 1.6 - 2.4 mg/dL   CBC WITH AUTOMATED DIFF    Collection Time: 11/04/21  6:00 AM   Result Value Ref Range    WBC 33.2 (H) 3.6 - 11.0 K/uL    RBC 3.36 (L) 3.80 - 5.20 M/uL    HGB 10.5 (L) 11.5 - 16.0 g/dL    HCT 30.2 (L) 35.0 - 47.0 %    MCV 89.9 80.0 - 99.0 FL    MCH 31.3 26.0 - 34.0 PG    MCHC 34.8 30.0 - 36.5 g/dL    RDW 20.5 (H) 11.5 - 14.5 %    PLATELET 32 (LL) 089 - 400 K/uL    MPV 12.3 8.9 - 12.9 FL    NRBC 0.2 (H) 0.0  WBC    ABSOLUTE NRBC 0.08 (H) 0.00 - 0.01 K/uL    NEUTROPHILS 85 (H) 32 - 75 %    LYMPHOCYTES 8 (L) 12 - 49 %    MONOCYTES 4 (L) 5 - 13 %    EOSINOPHILS 1 0 - 7 %    BASOPHILS 0 0 - 1 %    METAMYELOCYTES 1 (H) 0 %    MYELOCYTES 1 (H) 0 %    IMMATURE GRANULOCYTES 0 %    ABS. NEUTROPHILS 28.2 (H) 1.8 - 8.0 K/UL    ABS. LYMPHOCYTES 2.7 0.8 - 3.5 K/UL    ABS. MONOCYTES 1.3 (H) 0.0 - 1.0 K/UL    ABS. EOSINOPHILS 0.3 0.0 - 0.4 K/UL    ABS. BASOPHILS 0.0 0.0 - 0.1 K/UL    ABS. IMM.  GRANS. 0.0 K/UL    DF Manual      RBC COMMENTS Polychromasia  1+        RBC COMMENTS Teardrop cells  1+       BLOOD GAS, ARTERIAL    Collection Time: 11/04/21  6:08 AM   Result Value Ref Range    pH 7.38 7.35 - 7.45      PCO2 23 (L) 35 - 45 mmHg    PO2 86 75 - 100 mmHg    O2 SAT 96 >95 %    BICARBONATE 17 (L) 22 - 26 mmol/L    BASE DEFICIT 10.0 (H) 0 - 2 mmol/L    O2 METHOD VENT      FIO2 30 %    MODE SIMV      Tidal volume 550      PRESSURE SUPPORT 15      EPAP/CPAP/PEEP 5      Sample source Arterial      SITE Right Brachial      RANDAL'S TEST Positive         XR CHEST PORT   Final Result      XR CHEST PORT   Final Result      XR CHEST PORT   Final Result      XR CHEST PORT   Final Result   Left IJ central venous catheter terminating over the lower right atrium. No   discernible pneumothorax. XR CHEST PORT   Final Result      CT ABD PELV WO CONT   Final Result   1. No acute findings. 2. Severe diffuse hepatic steatosis. 3. Cholelithiasis. Dose reduction: All CT scans at this facility are performed using radiation dose   reduction optimization techniques as appropriate to a performed exam, including   the following: Automated exposure control (AEC), adjustment of the MAA and/or   KUB according to the patient's size, or the patient's use of iterative   reconstruction techniques (ASIR). XR CHEST PORT   Final Result   Similar exam to prior without findings of definite acute cardiopulmonary   abnormality. XR CHEST PORT    (Results Pending)        Assessment:  Acute upper gastrointestinal bleeding, due to laceration at Billroth II anastomosis site. Status post EGD with epinephrine injections. Patient with recurrent   bleeding on 10/31. Repeat EGD on 11/3 with adherent clot at anastomosis. Appears to be stable past 72 hours with no evidence of hemoglobin dropping    Hypovolemic shock due to above     Septic shock due to UTI. Pressor requirement increasing    Acute kidney injury, probably ATN due to hypotension. Likely component of hepatorenal syndrome.  Worsening, although with improving urine output    Multiple organ dysfunction syndrome with Ozark II score of 23, indicating 40% estimated mortality    Complicated UTI due to E. coli. Pansensitive    Hepatic encephalopathy    Coagulopathy due to liver failure     Thrombocytopenia due to sepsis and consumption/cirrhosis, worsening    Acute blood loss anemia status post massive transfusion hemoglobin remains stable    End-stage alcoholic cirrhosis with hepatic failure; MELD-Na  score is 40 as of 11/1 with estimated 90-day mortality of 66%    Alcohol abuse; patient was still drinking about 9 beers daily just prior to admission    Nonanion gap metabolic acidosis, likely due to BRITTANY      Plan:  Continue mechanical ventilation   continue levophed, add Abelardo-Synephrine  Continue IV Zosyn  Give 2 units of FFP today  Continue IV Protonix and octreotide  I will speak with nephrology regarding renal replacement therapy although creatinine appears to be plateauing and urine output is improving so we can probably hold off    Prognosis remains grim    I updated her  this morning. Recommended he consider DO NOT RESUSCITATE status. He states he is on his way and now      Care Plan discussed with: Patient/Family    Disposition: Continued ICU care        Total time spent with patient: 30 minutes.     Opal Ann MD

## 2021-11-04 NOTE — PROGRESS NOTES
Bedside and Verbal shift change report given to Trinity RN (oncoming nurse) by Crystal Mccartney RN (offgoing nurse).  Report included the following information SBAR, Kardex, Intake/Output, Recent Results and Dual Neuro Assessment, cardiac rhythm  Dr. Chen Tovar paged: brittle BP mostly low

## 2021-11-04 NOTE — PROGRESS NOTES
Consult Pulmonary, Critical Care Name: Jenna Baeza MRN: [id number] : 1975 Hospital: [address] [place] [address] Date: 2021  Admission date: 10/25/2021 Hospital Day: 6 Subjective/Interval History:  
Seen in the ICU on the ventilator. Patient is a 43-year-old female heavy drinker presented to the emergency room on the  with weakness fatigue jaundice refused admission came back on the  and was admitted with further weakness. Had upper endoscopy that showed blood in the small bowel. She had worsening drop in hemoglobin underwent repeat endoscopy had a large amount of blood coming up into the stomach. Required intubation for protection of airway probably aspirated blood. Endoscopy after intubation showed laceration with bleeding at the a former Billroth II anastomosis site. She had endoclips placed and still had oozing of blood. She is received 5 units of blood so far. She is on propofol for sedation is still fairly active and working against the ventilator. Exam shows diffuse rhonchi in the lungs most likely related to aspiration of blood  unresponsive has been off sedation for about an hour. ABGs show significant metabolic acidosis 11/3 back on sedation with propofol and Precedex yesterday currently is unresponsive on the ventilator. GI is considering repeat upper Endo today with possible NG placement  hypotensive we will add Abelardo-Synephrine generalized edema anasarca hemodynamically unstable on ventilator Patient Active Problem List  
Diagnosis Code  Obesity, morbid ([facility] [address] E66.01  
 Venous stasis ulcer ([facility] [address] I83.009, L97.909  Bleeding from varicose vein I83.899  Postop check [id number]  Hyperbilirubinemia E80.6  Cirrhosis of liver ([facility] [address] K74.60 IMPRESSION:  
1. Acute hypoxic respiratory failure corrected on the ventilator FiO2 down to 30% 2. Aspiration pneumonitis probably blood chest x-ray relatively clear 3.  Intravascular volume depletion with shock hypotension currently on norepinephrine 60 mics 4. Gram-negative urinary tract infection 5. GI bleed jejunal from Billroth II site 6. Portal pulmonary hypertension 7. Metabolic acidosis, improved pH serum CO2 still low 8. Thrombocytopenia worsening 9. Acute kidney injury creatinine worsening despite IV fluids 10. Hepatic encephalopathy ammonia remains elevated 11. Alcohol abuse Body mass index is 58.61 kg/m². RECOMMENDATIONS/PLAN:  
1. On assist control mode tidal volume 550 pressure support of 15 PEEP of 530% FiO2 2. Continue on IV bicarb 3. Non-Anion gap metabolic acidosis 4. Patient is hypotensive with intravascular volume depletion we will add and use epinephrine discussed with the nurse at bedside 5. WBC count elevated urine with gram-negative denise blood culture pending sputum with 1+ polys currently on Zosyn 6. Worsening of the thrombocytopenia requiring FFP continue with octreotide and Protonix 7. Banana bag not available we will give IV thiamine 8. GI bleed per GI and medical attending hemoglobin stable 9. Chest x-ray shows bibasilar atelectasis Subjective/Initial History:  
. I was asked by Jen Arreaga MD to see Quinn Burns a 51 y.o.  female  in consultation for a chief complaint of acute hypoxic respiratory failure aspiration pneumonitis massive GI bleed The patient is unable to give any meaningful history or review of systems due to patient factors. Patient PCP: Nighat Ziegler PMH:  has a past medical history of Cirrhosis ([facility] [address] GERD (gastroesophageal reflux disease), and Morbid obesity ([facility] [address] PSH:   has a past surgical history that includes hx gastric bypass. FHX: family history includes Diabetes in her father and mother; Heart Attack in her father. SHX:  reports that she has never smoked. She has never used smokeless tobacco. She reports previous alcohol use.  She reports that she does not use drugs. 
Systemic review unobtainable as the patient is obtunded on the ventilator on IV propofol No Known Allergies MEDS:  
Current Facility-Administered Medications Medication  PHENYLephrine (KALIN-SYNEPHRINE) 30 mg in 0.9% sodium chloride 250 mL infusion  
 0.9% sodium chloride infusion 250 mL  sodium bicarbonate tablet 1,300 mg  
 NOREPINephrine (LEVOPHED) 16,000 mcg in dextrose 5% 250 mL infusion  sodium bicarbonate (8.4%) 150 mEq in dextrose 5% 1,000 mL infusion  propofoL (DIPRIVAN) 10 mg/mL injection  0.9% sodium chloride infusion  EPINEPHrine (ADRENALIN) 0.1 mg/mL syringe  simethicone (MYLICON) 20PC/3.5RI oral drops  0.9% sodium chloride infusion 250 mL  piperacillin-tazobactam (ZOSYN) 3.375 g in 0.9% sodium chloride (MBP/ADV) 100 mL MBP  dexmedeTOMidine (PRECEDEX) 400 mcg in 0.9% sodium chloride (MBP/ADV) 100 mL MBP  octreotide (SANDOSTATIN) 500 mcg in 0.9% sodium chloride 500 mL infusion  
 0.9% sodium chloride infusion 250 mL  midodrine (PROAMATINE) tablet 10 mg  
 hydrOXYzine pamoate (VISTARIL) capsule 25 mg  
 traZODone (DESYREL) tablet 50 mg  
 influenza vaccine 2021-22 (6 mos+)(PF) (FLUARIX/FLULAVAL/FLUZONE QUAD) injection 0.5 mL  lactulose (CHRONULAC) 10 gram/15 mL solution 30 mL  sodium chloride (NS) flush 5-40 mL  sodium chloride (NS) flush 5-40 mL  acetaminophen (TYLENOL) tablet 650 mg Or  acetaminophen (TYLENOL) suppository 650 mg  
 ondansetron (ZOFRAN ODT) tablet 4 mg Or  
 ondansetron (ZOFRAN) injection 4 mg  [Held by provider] thiamine mononitrate (B-1) tablet 100 mg  [Held by provider] b complex-vitamin c-folic acid 5mg (FOLBEE PLUS) tablet 1 Tablet  [Held by provider] spironolactone (ALDACTONE) tablet 25 mg  
  
 
Current Facility-Administered Medications:  
  PHENYLephrine (KALIN-SYNEPHRINE) 30 mg in 0.9% sodium chloride 250 mL infusion,  mcg/min, IntraVENous, TITRATE, Gerri Zuniga Do, MD, Last Rate: 10 mL/hr at 11/04/21 0819, 20 mcg/min at 11/04/21 0819 
  0.9% sodium chloride infusion 250 mL, 250 mL, IntraVENous, PRN, Jesse Henning MD 
  sodium bicarbonate tablet 1,300 mg, 1,300 mg, Oral, TID, Simeon Boyd MD 
  NOREPINephrine (LEVOPHED) 16,000 mcg in dextrose 5% 250 mL infusion, 0.5-30 mcg/min, IntraVENous, TITRATE, Jesse Henning MD, Last Rate: 37.5 mL/hr at 11/04/21 0820, 40 mcg/min at 11/04/21 0820 
  sodium bicarbonate (8.4%) 150 mEq in dextrose 5% 1,000 mL infusion, , IntraVENous, CONTINUOUS, Simeon Boyd MD, Last Rate: 75 mL/hr at 11/04/21 0716, New Bag at 11/04/21 6647   propofoL (DIPRIVAN) 10 mg/mL injection, 0-50 mcg/kg/min, IntraVENous, TITRATE, Jesse Henning MD, Last Rate: 28.2 mL/hr at 11/04/21 0721, 30 mcg/kg/min at 11/04/21 0721 
  0.9% sodium chloride infusion, 75 mL/hr, IntraVENous, PRN, Jesse Henning MD 
  EPINEPHrine (ADRENALIN) 0.1 mg/mL syringe, , , PRN, Cy Ayala MD, 2 mg at 11/01/21 1430 
  simethicone (MYLICON) 92VI/7.6QS oral drops, , , PRN, Cy Ayala MD, 20 mg at 11/01/21 1420 
  0.9% sodium chloride infusion 250 mL, 250 mL, IntraVENous, PRN, Jesse Henning MD 
  piperacillin-tazobactam (ZOSYN) 3.375 g in 0.9% sodium chloride (MBP/ADV) 100 mL MBP, 3.375 g, IntraVENous, Q12H, Koffi Clifton MD, Last Rate: 25 mL/hr at 11/04/21 0343, 3.375 g at 11/04/21 0343   dexmedeTOMidine (PRECEDEX) 400 mcg in 0.9% sodium chloride (MBP/ADV) 100 mL MBP, 0.1-1.5 mcg/kg/hr, IntraVENous, TITRATE, Yenifer Hansen MD, Last Rate: 15.6 mL/hr at 11/04/21 0716, 0.4 mcg/kg/hr at 11/04/21 2646   octreotide (SANDOSTATIN) 500 mcg in 0.9% sodium chloride 500 mL infusion, 25 mcg/hr, IntraVENous, CONTINUOUS, Elly Allen PA-C, Last Rate: 25 mL/hr at 11/03/21 1314, 25 mcg/hr at 11/03/21 1314 
  0.9% sodium chloride infusion 250 mL, 250 mL, IntraVENous, PRN, Elly Allen PA-C 
  midodrine (PROAMATINE) tablet 10 mg, 10 mg, Oral, TID, Henrik Rowland MD, 10 mg at 21 1012   hydrOXYzine pamoate (VISTARIL) capsule 25 mg, 25 mg, Oral, BID, [place] Plants, NP, 25 mg at 21 1012   traZODone (DESYREL) tablet 50 mg, 50 mg, Oral, QHS PRN, Delio Matta MD, 50 mg at 21 0021 
  influenza vaccine  (6 mos+)(PF) (FLUARIX/FLULAVAL/FLUZONE QUAD) injection 0.5 mL, 1 Each, IntraMUSCular, PRIOR TO DISCHARGE, Joceline North MD 
  lactulose (CHRONULAC) 10 gram/15 mL solution 30 mL, 20 g, Oral, BID, Yahaira Plants, NP, 30 mL at 21 1012   sodium chloride (NS) flush 5-40 mL, 5-40 mL, IntraVENous, Q8H, Joceline North MD, 10 mL at 21 2132   sodium chloride (NS) flush 5-40 mL, 5-40 mL, IntraVENous, PRN, Joceline North MD 
  acetaminophen (TYLENOL) tablet 650 mg, 650 mg, Oral, Q6H PRN **OR** acetaminophen (TYLENOL) suppository 650 mg, 650 mg, Rectal, Q6H PRN, Joceline North MD, 650 mg at 21 0319 
  ondansetron (ZOFRAN ODT) tablet 4 mg, 4 mg, Oral, Q6H PRN **OR** ondansetron (ZOFRAN) injection 4 mg, 4 mg, IntraVENous, Q6H PRN, Joceline North MD, 4 mg at 10/30/21 2210   [Held by provider] thiamine mononitrate (B-1) tablet 100 mg, 100 mg, Oral, DAILY, Joceline North MD, 100 mg at 21 1012   [Held by provider] b complex-vitamin c-folic acid 5mg (FOLBEE PLUS) tablet 1 Tablet, 1 Tablet, Oral, DAILY, Joceline North MD, 1 Tablet at 10/31/21 1108   [Held by provider] spironolactone (ALDACTONE) tablet 25 mg, 25 mg, Oral, BID, Joceline North MD, 25 mg at 10/30/21 1048 Objective:  
 
Vital Signs: Telemetry:    normal sinus rhythm Intake/Output:  
Visit Vitals BP (!) 87/46 (BP 1 Location: Right upper arm, BP Patient Position: At rest) Pulse 76 Temp (!) 87 °F (30.6 °C) Resp 23 Ht 5' 6\" (1.676 m) Wt (!) 164.7 kg (363 lb 1.6 oz) SpO2 97% BMI 58.61 kg/m² Temp (24hrs), Av.3 °F (36.8 °C), Min:87 °F (30.6 °C), Max:102.4 °F (39.1 °C) O2 Device: Ventilator Body mass index is 58.61 kg/m². Wt Readings from Last 4 Encounters:  
11/04/21 (!) 164.7 kg (363 lb 1.6 oz) 10/22/21 151.5 kg (334 lb) 08/20/21 152.7 kg (336 lb 9.6 oz) 08/19/21 151.5 kg (334 lb) Intake/Output Summary (Last 24 hours) at 11/4/2021 6064 Last data filed at 11/4/2021 0600 Gross per 24 hour Intake 5484.67 ml Output 980 ml Net 4504.67 ml Last shift:      No intake/output data recorded. Last 3 shifts: 11/02 1901 - 11/04 0700 In: 8769.7 [I.V.:8769.7] Out: 1330 [PUSCU:[id number]] Hemodynamics:   
CO:   
CI:   
CVP:   
SVR:   PAP Systolic:   
PAP Diastolic:   
PVR:   
VW23:    
 
Ventilator Settings:     
Mode Rate TV Press PEEP FiO2 PIP Min. Vent SIMV, Volume control, Pressure support    550 ml 15 cm H2O  5 cm H20 30 %  25 cm H2O  13.3 l/min Physical Exam: 
 
General:  female; unresponsive on propofol and Precedex HEENT: NCAT, Eyes: Jaundiced; no doll's eye movement Neck: no nodes, no cuff leak, no accessory MM use. Obesity obscures determination of JVD Chest: no deformity, 
Cardiac:  regular rhythm Lungs: Poor breath sounds but clear anteriorly and laterally no breath sounds in the bases Abd: Obese questionably distended bowel sounds are present Ext: Mild edema; no joint swelling; No clubbing : [facility] urine Neuro: Unresponsive on propofol and Precedex no doll's eye reflex flaccid extremities Psych-unable to assess Skin: warm, dry, no cyanosis;  
Pulses: Brachial radial pulses intact Capillary: Rapid capillary refill Labs: 
 
Recent Labs 11/04/21 
0600 11/03/21 
0430 11/02/21 
0915 11/02/21 
0430 11/01/21 
1730 WBC 33.2* 33.3* 23.7*  --    < >  
HGB 10.5* 11.8 11.5  --    < >  
PLT 32* 77* 133*  --    < >  
INR 2.3*  --   --  1.5*  --   
 < > = values in this interval not displayed. Recent Labs 11/04/21 
0600 11/03/21 
0430 11/02/21 
0430  137 136  
K 3.8 3.9 4.4  
* 108 109* CO2 14* 15* 14* * 112* 134* BUN 66* 67* 68* CREA 5.67* 5.43* 5.06* CA 6.3* 6.7* 7.8*  
MG 1.6 1.7 2.2 PHOS 5.7* 5.1* 6.6* ALB 1.0*  1.0* 1.4*  1.4* 1.7*  1.7* * 138* 118* Recent Labs 11/04/21 
3723 11/03/21 
0456 11/02/21 
0409 PH 7.38 7.37 7.32* PCO2 23* 24* 27* PO2 86 96 167* HCO3 17* 17* 16* FIO2 30 30.0 40.0 Ammonia 100, 118 
11/3 total bili 22.7, , alk phos 89  
11/2 total bili 23.6  
11/1Total bili 21.4 alkaline phosphatase 53 ALT 61 Lab Results Component Value Date/Time CK 45 10/31/2021 08:58 AM  
 
 
Imaging: 
I have personally reviewed the patients radiographs and have reviewed the reports: CXR Results  (Last 48 hours) 11/04/21 0156  XR CHEST PORT Final result Impression:  Residual atelectasis at the lung bases Narrative:  Semiupright portable radiograph of the chest 1:56 a.m. compared with November 3,  
2021. INDICATION: Respiratory failure. Endotracheal tube tip projects between the clavicles approximately 3.3 cm above  
the harika. Low lung volumes. Heart size appears stable. Left IJ central line  
tip projects over the right atrium. Improved aeration in the perihilar regions  
with residual atelectasis at the lung bases. No effusion or pneumothorax. 11/03/21 0158  XR CHEST PORT Final result Narrative:  Chest single view. Comparison single view chest November 2, 2021. Unchanged ET tube. Left neck central venous catheter with its tip overlying SVC/RA junction region. Unchanged appearance for the lungs. No pneumothorax or sizable pleural effusion. 11/02/21 1330  XR CHEST PORT Final result Narrative:  Chest single view. ET tube 3-4 cm above the harika. Left neck central venous catheter overlies mid RA region. Consider pullback 3  
cm. Lungs clear. Cardiac and mediastinal structures within normal limits.  No  
pneumothorax or sizable pleural effusion. Report called to CCU. Spoke with patient's nurse. Results from [facility] Encounter encounter on 10/25/21 XR CHEST PORT Narrative Semiupright portable radiograph of the chest 1:56 a.m. compared with November 3, 
2021. INDICATION: Respiratory failure. Endotracheal tube tip projects between the clavicles approximately 3.3 cm above 
the harika. Low lung volumes. Heart size appears stable. Left IJ central line 
tip projects over the right atrium. Improved aeration in the perihilar regions 
with residual atelectasis at the lung bases. No effusion or pneumothorax. Impression Residual atelectasis at the lung bases XR CHEST PORT Narrative Chest single view. ET tube 3-4 cm above the harika. Left neck central venous catheter overlies mid RA region. Consider pullback 3 
cm. Lungs clear. Cardiac and mediastinal structures within normal limits. No 
pneumothorax or sizable pleural effusion. Report called to CCU. Spoke with patient's nurse. XR CHEST PORT Narrative Chest single view. Comparison single view chest November 2, 2021. Unchanged ET tube. Left neck central venous catheter with its tip overlying SVC/RA junction region. Unchanged appearance for the lungs. No pneumothorax or sizable pleural effusion. Results from [facility] Encounter encounter on 10/25/21 CT ABD PELV WO CONT Narrative CT scan the abdomen and pelvis is performed without IV or oral contrast per 
renal colic protocol. Coronal reconstructions are generated. Comparison exams 
are not available. Findings: The lung bases are normal. The solid abdominal organs reveals severe 
diffuse fatty infiltration of the liver. There appear to be gallstones within 
the dependent portion of the gallbladder. The patient is status post gastric 
bypass and IUD placement. There is no free intraperitoneal fluid or gas. The 
bowel is unopacified but normal in caliber.  
 
No fluid or mass is seen in the cul-de-sac. Bone windows reveal degenerative 
disc disease at L3-4 and L5-S1. Impression 1. No acute findings. 2. Severe diffuse hepatic steatosis. 3. Cholelithiasis. Dose reduction: All CT scans at this facility are performed using radiation dose 
reduction optimization techniques as appropriate to a performed exam, including 
the following: Automated exposure control (AEC), adjustment of the MAA and/or KUB according to the patient's size, or the patient's use of iterative 
reconstruction techniques (ASIR). Discussion: Acute respiratory failure secondary to massive GI bleed with inability to protect her airway. Has diffuse rhonchi most likely aspiration of blood. Will maintain the ventilator for now. Will check a surveillance sputum culture. Will replace oral thiamine multivitamin and folic acid with IV replenishment. She is currently on propofol was still fighting the ventilator will add Precedex. 11/2 unresponsive propofol on hold for an hour no doll's eye reflex ammonia is up to 118. No enteral access. Has significant metabolic acidosis will place on IV bicarb. Will not try to wean the ventilator at this point 11/3 unresponsive back on propofol and Precedex. To have repeat upper Endo and NG tube placement today. Can try to decrease ventilator tomorrow. Currently on Zosyn does have a urinary tract infection 114 continue with the current vent settings no change hemodynamically unstable adding NEOS epinephrine to Levophed Time of care 30 minutes Thank you for allowing us to participate in the care of this patient. We will follow along with you Cindee Jeans, MD

## 2021-11-05 NOTE — PROGRESS NOTES
Progress Note    Patient: Joe Jeronimo MRN: [id number]  SSN: [id number]    YOB: 1975  Age: 51 y.o.   Sex: female      Admit Date: 10/25/2021    LOS: 11 days     Subjective:   Patient examined, intubated,   on Levophed, vasopressin, and others Blood pressure maintained   120/80   No red blood per rectum, no melena,  Hb relatively stable  Past Medical History:   Diagnosis Date    Cirrhosis ([facility]     GERD (gastroesophageal reflux disease)     Morbid obesity ([facility]         Current Facility-Administered Medications:     0.9% sodium chloride infusion 250 mL, 250 mL, IntraVENous, PRN, Nadiya Jones MD    PHENYLephrine (KALIN-SYNEPHRINE) 30 mg in 0.9% sodium chloride 250 mL infusion,  mcg/min, IntraVENous, TITRATE, Nadiya Jones MD, Last Rate: 30 mL/hr at 11/05/21 1644, 60 mcg/min at 11/05/21 1644    NOREPINephrine (LEVOPHED) 16,000 mcg in dextrose 5% 250 mL infusion, 0.5-65 mcg/min, IntraVENous, TITRATE, Nadiya Jones MD, Last Rate: 56.3 mL/hr at 11/05/21 1418, 60 mcg/min at 11/05/21 1418    vasopressin (VASOSTRICT) 20 Units in 0.9% sodium chloride 100 mL infusion, 0.01-0.04 Units/min, IntraVENous, TITRATE, Nadiya Jones MD, Last Rate: 3 mL/hr at 11/05/21 1628, 0.01 Units/min at 11/05/21 1628    sodium bicarbonate (8.4%) 200 mEq in dextrose 5% 1,000 mL infusion, , IntraVENous, CONTINUOUS, Riaz Giron MD, Last Rate: 50 mL/hr at 11/05/21 1307, New Bag at 11/05/21 1307    sodium bicarbonate tablet 1,300 mg, 1,300 mg, Oral, TID, Riaz Giron MD    propofoL (DIPRIVAN) 10 mg/mL injection, 0-50 mcg/kg/min, IntraVENous, TITRATE, Nadiya Jones MD, Last Rate: 28.2 mL/hr at 11/05/21 1645, 30 mcg/kg/min at 11/05/21 1645    0.9% sodium chloride infusion, 75 mL/hr, IntraVENous, PRN, Nadiya Jones MD    EPINEPHrine (ADRENALIN) 0.1 mg/mL syringe, , , PRN, Nimesh Rodriguez MD, 2 mg at 11/01/21 1430    simethicone (MYLICON) 15SU/1.6ON oral drops, , , PRN, Nimesh Rodriguez, MD, 20 mg at 11/01/21 1420    0.9% sodium chloride infusion 250 mL, 250 mL, IntraVENous, PRN, Daniel Berrios MD    piperacillin-tazobactam (ZOSYN) 3.375 g in 0.9% sodium chloride (MBP/ADV) 100 mL MBP, 3.375 g, IntraVENous, Q12H, Daniel Zuniga MD, Last Rate: 25 mL/hr at 11/05/21 1626, 3.375 g at 11/05/21 1626    dexmedeTOMidine (PRECEDEX) 400 mcg in 0.9% sodium chloride (MBP/ADV) 100 mL MBP, 0.1-1.5 mcg/kg/hr, IntraVENous, TITRATE, Venus Otero MD, Stopped at 11/04/21 1300    octreotide (SANDOSTATIN) 500 mcg in 0.9% sodium chloride 500 mL infusion, 25 mcg/hr, IntraVENous, CONTINUOUS, Elly Allen PA-C, Last Rate: 25 mL/hr at 11/05/21 0726, 25 mcg/hr at 11/05/21 0726    0.9% sodium chloride infusion 250 mL, 250 mL, IntraVENous, PRN, Elly Allen PA-C    midodrine (PROAMATINE) tablet 10 mg, 10 mg, Oral, TID, Henrik Rowland MD, 10 mg at 11/01/21 1012    hydrOXYzine pamoate (VISTARIL) capsule 25 mg, 25 mg, Oral, BID, Gilbert Nab, NP, 25 mg at 11/01/21 1012    traZODone (DESYREL) tablet 50 mg, 50 mg, Oral, QHS PRN, Benjamin Yang MD, 50 mg at 11/01/21 0021    influenza vaccine 2021-22 (6 mos+)(PF) (FLUARIX/FLULAVAL/FLUZONE QUAD) injection 0.5 mL, 1 Each, IntraMUSCular, PRIOR TO DISCHARGE, Patti Quintana MD    lactulose (CHRONULAC) 10 gram/15 mL solution 30 mL, 20 g, Oral, BID, Gilbert Nab, NP, 30 mL at 11/01/21 1012    sodium chloride (NS) flush 5-40 mL, 5-40 mL, IntraVENous, Q8H, Patti Quintana MD, 10 mL at 11/05/21 1312    sodium chloride (NS) flush 5-40 mL, 5-40 mL, IntraVENous, PRN, Patti Quintana MD    acetaminophen (TYLENOL) tablet 650 mg, 650 mg, Oral, Q6H PRN **OR** acetaminophen (TYLENOL) suppository 650 mg, 650 mg, Rectal, Q6H PRN, Patti Quintana MD, 650 mg at 11/05/21 1038    ondansetron (ZOFRAN ODT) tablet 4 mg, 4 mg, Oral, Q6H PRN **OR** ondansetron (ZOFRAN) injection 4 mg, 4 mg, IntraVENous, Q6H PRN, Patti Quintana, MD, 4 mg at 10/30/21 2210    [Held by provider] thiamine mononitrate (B-1) tablet 100 mg, 100 mg, Oral, DAILY, Ruth Morales MD, 100 mg at 11/01/21 1012    [Held by provider] b complex-vitamin c-folic acid 5mg (FOLBEE PLUS) tablet 1 Tablet, 1 Tablet, Oral, DAILY, Ruth Morales MD, 1 Tablet at 10/31/21 1108    [Held by provider] spironolactone (ALDACTONE) tablet 25 mg, 25 mg, Oral, BID, Ruth Morales MD, 25 mg at 10/30/21 1048    Objective:     Vitals:    11/05/21 1500 11/05/21 1535 11/05/21 1600 11/05/21 1644   BP: (!) 122/58  115/60 (!) 113/53   Pulse: 82 86 77 77   Resp: 27 24 26    Temp: (!) 102 °F (38.9 °C)  (!) 102 °F (38.9 °C)    SpO2: 100% 98% 100%    Weight:       Height:            Intake and Output:  Current Shift: 11/05 0701 - 11/05 1900  In: 1646.1 [I.V.:1646.1]  Out: 160 [Urine:160]  Last three shifts: 11/03 1901 - 11/05 0700  In: 9485.1 [I.V.:8823.1]  Out: 1005 [Urine:1005]    Physical Exam:   Physical Exam  Constitutional:       Appearance: She is ill-appearing. HENT:      Head: Atraumatic. Mouth/Throat:      Mouth: Mucous membranes are dry. Cardiovascular:      Rate and Rhythm: Normal rate. Pulmonary:      Breath sounds: Normal breath sounds. Comments: Intubated, on vent  Abdominal:      General: Abdomen is flat. Bowel sounds are normal. There is no distension. Tenderness: There is no abdominal tenderness. There is no rebound. Musculoskeletal:         General: No swelling or deformity. Cervical back: Normal range of motion. Skin:     Findings: No bruising. Neurological:      General: No focal deficit present.           Lab/Data Review:  Recent Results (from the past 24 hour(s))   CBC WITH AUTOMATED DIFF    Collection Time: 11/05/21  5:15 AM   Result Value Ref Range    WBC 37.3 (H) 3.6 - 11.0 K/uL    RBC 3.29 (L) 3.80 - 5.20 M/uL    HGB 10.3 (L) 11.5 - 16.0 g/dL    HCT 29.3 (L) 35.0 - 47.0 %    MCV 89.1 80.0 - 99.0 FL    MCH 31.3 26.0 - 34.0 PG    MCHC 35.2 30.0 - 36.5 g/dL    RDW 20.3 (H) 11.5 - 14.5 %    PLATELET 29 (LL) 009 - 400 K/uL    NRBC 0.1 (H) 0.0  WBC    ABSOLUTE NRBC 0.04 (H) 0.00 - 0.01 K/uL    NEUTROPHILS 86 (H) 32 - 75 %    LYMPHOCYTES 10 (L) 12 - 49 %    MONOCYTES 4 (L) 5 - 13 %    EOSINOPHILS 0 0 - 7 %    BASOPHILS 0 0 - 1 %    IMMATURE GRANULOCYTES 0 %    ABS. NEUTROPHILS 32.1 (H) 1.8 - 8.0 K/UL    ABS. LYMPHOCYTES 3.7 (H) 0.8 - 3.5 K/UL    ABS. MONOCYTES 1.5 (H) 0.0 - 1.0 K/UL    ABS. EOSINOPHILS 0.0 0.0 - 0.4 K/UL    ABS. BASOPHILS 0.0 0.0 - 0.1 K/UL    ABS. IMM. GRANS. 0.0 K/UL    DF Manual      RBC COMMENTS Normocytic, Normochromic     RENAL FUNCTION PANEL    Collection Time: 11/05/21  5:15 AM   Result Value Ref Range    Sodium 139 136 - 145 mmol/L    Potassium 3.5 3.5 - 5.1 mmol/L    Chloride 106 97 - 108 mmol/L    CO2 18 (L) 21 - 32 mmol/L    Anion gap 15 5 - 15 mmol/L    Glucose 116 (H) 65 - 100 mg/dL    BUN 67 (H) 6 - 20 mg/dL    Creatinine 5.70 (H) 0.55 - 1.02 mg/dL    BUN/Creatinine ratio 12 12 - 20      GFR est AA 10 (L) >60 ml/min/1.73m2    GFR est non-AA 8 (L) >60 ml/min/1.73m2    Calcium 6.0 (LL) 8.5 - 10.1 mg/dL    Phosphorus 6.3 (H) 2.6 - 4.7 mg/dL    Albumin 1.1 (L) 3.5 - 5.0 g/dL   AMMONIA    Collection Time: 11/05/21  5:15 AM   Result Value Ref Range    Ammonia 90 (H) <32 umol/L   HEPATIC FUNCTION PANEL    Collection Time: 11/05/21  5:15 AM   Result Value Ref Range    Protein, total 4.1 (L) 6.4 - 8.2 g/dL    Albumin 1.1 (L) 3.5 - 5.0 g/dL    Globulin 3.0 2.0 - 4.0 g/dL    A-G Ratio 0.4 (L) 1.1 - 2.2      Bilirubin, total 18.7 (H) 0.2 - 1.0 mg/dL    Bilirubin, direct 14.9 (H) 0.0 - 0.2 mg/dL    Alk.  phosphatase 85 45 - 117 U/L    AST (SGOT) 386 (H) 15 - 37 U/L    ALT (SGPT) 120 (H) 12 - 78 U/L   PROTHROMBIN TIME + INR    Collection Time: 11/05/21  5:15 AM   Result Value Ref Range    Prothrombin time 22.8 (H) 11.9 - 14.7 sec    INR 2.1 (H) 0.9 - 1.1     PROCALCITONIN    Collection Time: 11/05/21  5:15 AM Result Value Ref Range    Procalcitonin 23.86 (H) 0 ng/mL   BLOOD GAS, ARTERIAL    Collection Time: 11/05/21  5:54 AM   Result Value Ref Range    pH 7.42 7.35 - 7.45      PCO2 24 (L) 35 - 45 mmHg    PO2 77 75 - 100 mmHg    O2 SAT 95 (L) >95 %    BICARBONATE 18 (L) 22 - 26 mmol/L    BASE DEFICIT 7.6 (H) 0 - 2 mmol/L    O2 METHOD VENT      FIO2 30.0 %    MODE SIMV      Tidal volume 550      PRESSURE SUPPORT 15.0      EPAP/CPAP/PEEP 5.0      SITE Left Radial      RANDAL'S TEST PASS     LACTIC ACID    Collection Time: 11/05/21 10:15 AM   Result Value Ref Range    Lactic acid 2.8 (HH) 0.4 - 2.0 mmol/L   COVID-19 RAPID TEST    Collection Time: 11/05/21 10:50 AM   Result Value Ref Range    Specimen source Please find results under separate order      COVID-19 rapid test Not Detected Not Detected          XR CHEST PORT   Final Result      XR CHEST PORT   Final Result   Residual atelectasis at the lung bases      XR CHEST PORT   Final Result      XR CHEST PORT   Final Result      XR CHEST PORT   Final Result      XR CHEST PORT   Final Result   Left IJ central venous catheter terminating over the lower right atrium. No   discernible pneumothorax. XR CHEST PORT   Final Result      CT ABD PELV WO CONT   Final Result   1. No acute findings. 2. Severe diffuse hepatic steatosis. 3. Cholelithiasis. Dose reduction: All CT scans at this facility are performed using radiation dose   reduction optimization techniques as appropriate to a performed exam, including   the following: Automated exposure control (AEC), adjustment of the MAA and/or   KUB according to the patient's size, or the patient's use of iterative   reconstruction techniques (ASIR). XR CHEST PORT   Final Result   Similar exam to prior without findings of definite acute cardiopulmonary   abnormality.       XR CHEST PORT    (Results Pending)        Assessment:     Active Problems:    Hyperbilirubinemia (10/25/2021)      Cirrhosis of liver ([address] (10/25/2021)      End-stage liver disease patient from alcohol, had portal hypertension, possible hepatorenal syndrome, acute hepatic encephalopathy,    upper GI bleeding, from the anastomosis at the gastrojejunostomy site,s/p clips x 8 clips   Hypotensive white depression from an acute GI bleeding  Required Levophed and other pressure to maintain the blood pressure,  ICU IN acid possibility of NG tube placement for the by mouth medication such as lactulose  Plan:   Continue current resuscitation with IV fluids, blood transfusion,  Hold the NG tube placement due to the laceration at the gastrojejunostomy anastomosis which was primary GI bleeding site  Continue on PPI, Octreotide   Agree with the hospitalist assessment, patient DNR      If patient relatively stable hemodynamically, may repeat EGD to revisiting the bleeding site, to assist to place NG tube then    Progress poor  I would not follow the patient daily, call us should hemoglobin drop further or  signs of active bleed    Discussed with Dr Heath Yates By: Megan Gramajo MD     November 5, 2021        Thank you for allowing me to participate in this patients care  Cc Referring Physician   Brinda Walton

## 2021-11-05 NOTE — PROGRESS NOTES
Progress Note    Patient: Quinn Gill MRN: [id number]  SSN: [id number]    YOB: 1975  Age: 51 y.o.   Sex: female      Admit Date: 10/25/2021    LOS: 10 days     Subjective:   Patient examined, intubated,   on Levophed, blood pressure maintained 120/80   No red blood per rectum, no melena,  Hb relatively stable  Past Medical History:   Diagnosis Date    Cirrhosis ([facility]     GERD (gastroesophageal reflux disease)     Morbid obesity ([facility]         Current Facility-Administered Medications:     0.9% sodium chloride infusion 250 mL, 250 mL, IntraVENous, PRN, Yolanda Johnson MD    PHENYLephrine (KALIN-SYNEPHRINE) 30 mg in 0.9% sodium chloride 250 mL infusion,  mcg/min, IntraVENous, TITRATE, Yolanda Johnson MD, Last Rate: 100 mL/hr at 11/04/21 2015, 200 mcg/min at 11/04/21 2015    NOREPINephrine (LEVOPHED) 16,000 mcg in dextrose 5% 250 mL infusion, 0.5-65 mcg/min, IntraVENous, TITRATE, Yolanda Johnson MD, Last Rate: 60.9 mL/hr at 11/04/21 2015, 65 mcg/min at 11/04/21 2015    vasopressin (VASOSTRICT) 20 Units in 0.9% sodium chloride 100 mL infusion, 0.01-0.04 Units/min, IntraVENous, TITRATE, Yolanda Johnson MD, Last Rate: 3 mL/hr at 11/04/21 1508, 0.01 Units/min at 11/04/21 1508    sodium bicarbonate (8.4%) 200 mEq in dextrose 5% 1,000 mL infusion, , IntraVENous, CONTINUOUS, Graciela Brown MD, Last Rate: 50 mL/hr at 11/04/21 1436, New Bag at 11/04/21 1436    sodium bicarbonate tablet 1,300 mg, 1,300 mg, Oral, TID, Graciela Brown MD    propofoL (DIPRIVAN) 10 mg/mL injection, 0-50 mcg/kg/min, IntraVENous, TITRATE, Yoladna Johnson MD, Last Rate: 28.2 mL/hr at 11/04/21 1827, 30 mcg/kg/min at 11/04/21 1827    0.9% sodium chloride infusion, 75 mL/hr, IntraVENous, PRN, Esequiel Smith, Yolanda Ayala MD    EPINEPHrine (ADRENALIN) 0.1 mg/mL syringe, , , PRN, Eulalia Condon MD, 2 mg at 11/01/21 1430    simethicone (MYLICON) 44QK/9.1SH oral drops, , , PRN, Eulalia Condon MD, 20 mg at 11/01/21 1420    0.9% sodium chloride infusion 250 mL, 250 mL, IntraVENous, PRN, Alisson Vazquez MD    piperacillin-tazobactam (ZOSYN) 3.375 g in 0.9% sodium chloride (MBP/ADV) 100 mL MBP, 3.375 g, IntraVENous, Q12H, Alisson Zuniga MD, Last Rate: 25 mL/hr at 11/04/21 1743, 3.375 g at 11/04/21 1743    dexmedeTOMidine (PRECEDEX) 400 mcg in 0.9% sodium chloride (MBP/ADV) 100 mL MBP, 0.1-1.5 mcg/kg/hr, IntraVENous, TITRATE, Anton Kellogg MD, Stopped at 11/04/21 1300    octreotide (SANDOSTATIN) 500 mcg in 0.9% sodium chloride 500 mL infusion, 25 mcg/hr, IntraVENous, CONTINUOUS, Elly Allen PA-C, Last Rate: 25 mL/hr at 11/04/21 0948, 25 mcg/hr at 11/04/21 0948    0.9% sodium chloride infusion 250 mL, 250 mL, IntraVENous, PRN, Elly Allen PA-C    midodrine (PROAMATINE) tablet 10 mg, 10 mg, Oral, TID, Henrik Rowland MD, 10 mg at 11/01/21 1012    hydrOXYzine pamoate (VISTARIL) capsule 25 mg, 25 mg, Oral, BID, Maryclare Rubio, NP, 25 mg at 11/01/21 1012    traZODone (DESYREL) tablet 50 mg, 50 mg, Oral, QHS PRN, Quique Wood MD, 50 mg at 11/01/21 0021    influenza vaccine 2021-22 (6 mos+)(PF) (FLUARIX/FLULAVAL/FLUZONE QUAD) injection 0.5 mL, 1 Each, IntraMUSCular, PRIOR TO DISCHARGE, Ronald Blank MD    lactulose (CHRONULAC) 10 gram/15 mL solution 30 mL, 20 g, Oral, BID, Maryclare Rubio, NP, 30 mL at 11/01/21 1012    sodium chloride (NS) flush 5-40 mL, 5-40 mL, IntraVENous, Q8H, Ronald Blank MD, 10 mL at 11/04/21 1515    sodium chloride (NS) flush 5-40 mL, 5-40 mL, IntraVENous, PRN, Ronald Blank MD    acetaminophen (TYLENOL) tablet 650 mg, 650 mg, Oral, Q6H PRN **OR** acetaminophen (TYLENOL) suppository 650 mg, 650 mg, Rectal, Q6H PRN, Ronald Blank MD, 650 mg at 11/04/21 0319    ondansetron (ZOFRAN ODT) tablet 4 mg, 4 mg, Oral, Q6H PRN **OR** ondansetron (ZOFRAN) injection 4 mg, 4 mg, IntraVENous, Q6H PRN, Ronald Blank MD, 4 mg at 10/30/21 Jazzy Rebol[address] 97 by provider] thiamine mononitrate (B-1) tablet 100 mg, 100 mg, Oral, DAILY, Claudia Mendieta MD, 100 mg at 11/01/21 1012    [Held by provider] b complex-vitamin c-folic acid 5mg (FOLBEE PLUS) tablet 1 Tablet, 1 Tablet, Oral, DAILY, Claudia Mendieta MD, 1 Tablet at 10/31/21 1108    [Held by provider] spironolactone (ALDACTONE) tablet 25 mg, 25 mg, Oral, BID, Claudia Mendieta MD, 25 mg at 10/30/21 1048    Objective:     Vitals:    11/04/21 1920 11/04/21 1927 11/04/21 2000 11/04/21 2128   BP: (!) 112/56  137/66    Pulse: 87  90 91   Resp: 21 22 19   Temp: 100 °F (37.8 °C) 98.9 °F (37.2 °C)     SpO2: 95%  92% 93%   Weight:       Height:            Intake and Output:  Current Shift: No intake/output data recorded. Last three shifts: 11/03 0701 - 11/04 1900  In: 9393.5 [I.V.:8731.5]  Out: 1180 [Urine:1180]    Physical Exam:   Physical Exam  Constitutional:       Appearance: She is ill-appearing. HENT:      Head: Atraumatic. Mouth/Throat:      Mouth: Mucous membranes are dry. Cardiovascular:      Rate and Rhythm: Tachycardia present. Pulmonary:      Breath sounds: Normal breath sounds. Comments: Intubated, on vent  Abdominal:      General: Abdomen is flat. Bowel sounds are normal. There is no distension. Tenderness: There is no abdominal tenderness. Musculoskeletal:         General: No swelling or deformity. Cervical back: Normal range of motion. Skin:     Findings: No bruising. Neurological:      General: No focal deficit present. Lab/Data Review:  Recent Results (from the past 24 hour(s))   HEPATIC FUNCTION PANEL    Collection Time: 11/04/21  6:00 AM   Result Value Ref Range    Protein, total 3.9 (L) 6.4 - 8.2 g/dL    Albumin 1.0 (L) 3.5 - 5.0 g/dL    Globulin 2.9 2.0 - 4.0 g/dL    A-G Ratio 0.3 (L) 1.1 - 2.2      Bilirubin, total 17.6 (H) 0.2 - 1.0 mg/dL    Bilirubin, direct 14.9 (H) 0.0 - 0.2 mg/dL    Alk.  phosphatase 83 45 - 117 U/L    AST (SGOT) 468 (H) 15 - 37 U/L    ALT (SGPT) 138 (H) 12 - 78 U/L   AMMONIA    Collection Time: 11/04/21  6:00 AM   Result Value Ref Range    Ammonia 94 (H) <32 umol/L   PROTHROMBIN TIME + INR    Collection Time: 11/04/21  6:00 AM   Result Value Ref Range    Prothrombin time 24.4 (H) 11.9 - 14.7 sec    INR 2.3 (H) 0.9 - 1.1     RENAL FUNCTION PANEL    Collection Time: 11/04/21  6:00 AM   Result Value Ref Range    Sodium 140 136 - 145 mmol/L    Potassium 3.8 3.5 - 5.1 mmol/L    Chloride 111 (H) 97 - 108 mmol/L    CO2 14 (LL) 21 - 32 mmol/L    Anion gap 15 5 - 15 mmol/L    Glucose 108 (H) 65 - 100 mg/dL    BUN 66 (H) 6 - 20 mg/dL    Creatinine 5.67 (H) 0.55 - 1.02 mg/dL    BUN/Creatinine ratio 12 12 - 20      GFR est AA 10 (L) >60 ml/min/1.73m2    GFR est non-AA 8 (L) >60 ml/min/1.73m2    Calcium 6.3 (LL) 8.5 - 10.1 mg/dL    Phosphorus 5.7 (H) 2.6 - 4.7 mg/dL    Albumin 1.0 (L) 3.5 - 5.0 g/dL   MAGNESIUM    Collection Time: 11/04/21  6:00 AM   Result Value Ref Range    Magnesium 1.6 1.6 - 2.4 mg/dL   CBC WITH AUTOMATED DIFF    Collection Time: 11/04/21  6:00 AM   Result Value Ref Range    WBC 33.2 (H) 3.6 - 11.0 K/uL    RBC 3.36 (L) 3.80 - 5.20 M/uL    HGB 10.5 (L) 11.5 - 16.0 g/dL    HCT 30.2 (L) 35.0 - 47.0 %    MCV 89.9 80.0 - 99.0 FL    MCH 31.3 26.0 - 34.0 PG    MCHC 34.8 30.0 - 36.5 g/dL    RDW 20.5 (H) 11.5 - 14.5 %    PLATELET 32 (LL) 426 - 400 K/uL    MPV 12.3 8.9 - 12.9 FL    NRBC 0.2 (H) 0.0  WBC    ABSOLUTE NRBC 0.08 (H) 0.00 - 0.01 K/uL    NEUTROPHILS 85 (H) 32 - 75 %    LYMPHOCYTES 8 (L) 12 - 49 %    MONOCYTES 4 (L) 5 - 13 %    EOSINOPHILS 1 0 - 7 %    BASOPHILS 0 0 - 1 %    METAMYELOCYTES 1 (H) 0 %    MYELOCYTES 1 (H) 0 %    IMMATURE GRANULOCYTES 0 %    ABS. NEUTROPHILS 28.2 (H) 1.8 - 8.0 K/UL    ABS. LYMPHOCYTES 2.7 0.8 - 3.5 K/UL    ABS. MONOCYTES 1.3 (H) 0.0 - 1.0 K/UL    ABS. EOSINOPHILS 0.3 0.0 - 0.4 K/UL    ABS. BASOPHILS 0.0 0.0 - 0.1 K/UL    ABS. IMM.  GRANS. 0.0 K/UL    DF Manual      RBC COMMENTS Polychromasia  1+        RBC COMMENTS Teardrop cells  1+       BLOOD GAS, ARTERIAL    Collection Time: 11/04/21  6:08 AM   Result Value Ref Range    pH 7.38 7.35 - 7.45      PCO2 23 (L) 35 - 45 mmHg    PO2 86 75 - 100 mmHg    O2 SAT 96 >95 %    BICARBONATE 17 (L) 22 - 26 mmol/L    BASE DEFICIT 10.0 (H) 0 - 2 mmol/L    O2 METHOD VENT      FIO2 30 %    MODE SIMV      Tidal volume 550      PRESSURE SUPPORT 15      EPAP/CPAP/PEEP 5      Sample source Arterial      SITE Right Brachial      RANDAL'S TEST Positive     GLUCOSE, POC    Collection Time: 11/04/21  8:05 AM   Result Value Ref Range    Glucose (POC) 103 65 - 117 mg/dL    Performed by Aldair Yanh    TYPE & SCREEN    Collection Time: 11/04/21  8:25 AM   Result Value Ref Range    Crossmatch Expiration 11/07/2021,2359     ABO/Rh(D) Rico [place] Positive     Antibody screen Negative    PLASMA, ALLOCATE    Collection Time: 11/04/21  8:29 AM   Result Value Ref Range    Unit number [id number]     Blood component type FP 24H,Thaw     Unit division 00     Status of unit Issued     [address] to transfuse     Unit number [id number]     Blood component type FP 24H,Thaw     Unit division 00     Status of unit [address] to transfuse    GLUCOSE, POC    Collection Time: 11/04/21 11:35 AM   Result Value Ref Range    Glucose (POC) 104 65 - 117 mg/dL    Performed by Aldair Yanh    GLUCOSE, POC    Collection Time: 11/04/21  4:01 PM   Result Value Ref Range    Glucose (POC) 110 65 - 117 mg/dL    Performed by Aldair [facility]         XR CHEST PORT   Final Result   Residual atelectasis at the lung bases      XR CHEST PORT   Final Result      XR CHEST PORT   Final Result      XR CHEST PORT   Final Result      XR CHEST PORT   Final Result   Left IJ central venous catheter terminating over the lower right atrium. No   discernible pneumothorax. XR CHEST PORT   Final Result      CT ABD PELV WO CONT   Final Result   1. No acute findings.    2. Severe diffuse hepatic steatosis. 3. Cholelithiasis. Dose reduction: All CT scans at this facility are performed using radiation dose   reduction optimization techniques as appropriate to a performed exam, including   the following: Automated exposure control (AEC), adjustment of the MAA and/or   KUB according to the patient's size, or the patient's use of iterative   reconstruction techniques (ASIR). XR CHEST PORT   Final Result   Similar exam to prior without findings of definite acute cardiopulmonary   abnormality.       XR CHEST PORT    (Results Pending)        Assessment:     Active Problems:    Hyperbilirubinemia (10/25/2021)      Cirrhosis of liver ([address] (10/25/2021)      End-stage liver disease patient from alcohol, had portal hypertension, possible hepatorenal syndrome, acute hepatic encephalopathy,    upper GI bleeding, from the anastomosis at the gastrojejunostomy site,s/p clips x 8 clips   Hypotensive white depression from an acute GI bleeding  Required Levophed to maintain the blood pressure,  ICU IN acid possibility of NG tube placement for the by mouth medication such as lactulose  Plan:   Continue current resuscitation with IV fluids, blood transfusion,  Hold the NG tube placement due to the laceration at the gastrojejunostomy anastomosis which was primary GI bleeding site  Continue on PPI, Octreotide         If patient relatively stable hemodynamically, may repeat EGD to revisiting the bleeding site, to assist to place NG tube then    Progress poor  Discussed with patient's [facility] ICU RN    Signed By: Marii Go MD     November 4, 2021        Thank you for allowing me to participate in this patients care  Cc Referring Physician   Rebeca Wan

## 2021-11-05 NOTE — PROGRESS NOTES
Hospitalist Progress Note               Daily Progress Note: 11/5/2021      Subjective:     46F, morbid obesity s/p bilroth procedure and alcohol induced chronic liver disease presents with generalized weakness and acute on chronic liver failure    Her condition was complicated by septic shock s/t UTI with UGIB with ABLA S/p EGD and blood clot at anastomasis site, she developed HRS. Please see Dr. Trisha Sicard note for intense details    ENCOUNTER NOTE      Spoke to [facility] transfer centre, latif HRS and acute on chronic liver failure      Spoke to [facility] critical care, who reviewed it with transplant team     Too critical to transfer,     Advised they will keep her on list for 72 hours,      If we can cut down to only 1 vasopressor levophed and she is off of vasopressin and gemma, then they will consider transfer.      Incase its > 72 hours then we have to restart the transfer process     -------------------------------------------------------------------------------     At 2 pm     Had a family discussion , mother, brother and cousin (who is in helath care)     Agreed for DNR DNI     In the next 48-72 hours our target it to improve renal functions and try to wean the pressors if possible     Family will revisit in next 48 hours, and if her condition hasnt improve they will go for comfort care  And if improves , and is on 1 pressor she will be transferred to [facility].  [facility] will consider her if she is only on 1 pressor      Problem List:  Problem List as of 11/5/2021 Date Reviewed: 11/3/2021          Codes Class Noted - Resolved    Hyperbilirubinemia ICD-10-CM: E80.6  ICD-9-CM: 782.4  10/25/2021 - Present        Cirrhosis of liver ([facility] [address] ICD-10-CM: K74.60  ICD-9-CM: 571.5  10/25/2021 - Present        Postop check ICD-10-CM: M83  ICD-9-CM: V67.00  10/26/2020 - Present        Bleeding from varicose vein ICD-10-CM: I83.899  ICD-9-CM: 454.8  10/20/2020 - Present        Obesity, morbid ([facility] ICD-10-CM: E66.01  ICD-9-CM: 278.01  10/13/2020 - Present        Venous stasis ulcer ([facility]) ICD-10-CM: I83.009, L97.909  ICD-9-CM: 454.0  10/13/2020 - Present              Medications reviewed  Current Facility-Administered Medications   Medication Dose Route Frequency    0.9% sodium chloride infusion 250 mL  250 mL IntraVENous PRN    PHENYLephrine (KALIN-SYNEPHRINE) 30 mg in 0.9% sodium chloride 250 mL infusion   mcg/min IntraVENous TITRATE    NOREPINephrine (LEVOPHED) 16,000 mcg in dextrose 5% 250 mL infusion  0.5-65 mcg/min IntraVENous TITRATE    vasopressin (VASOSTRICT) 20 Units in 0.9% sodium chloride 100 mL infusion  0.01-0.04 Units/min IntraVENous TITRATE    sodium bicarbonate (8.4%) 200 mEq in dextrose 5% 1,000 mL infusion   IntraVENous CONTINUOUS    sodium bicarbonate tablet 1,300 mg  1,300 mg Oral TID    propofoL (DIPRIVAN) 10 mg/mL injection  0-50 mcg/kg/min IntraVENous TITRATE    0.9% sodium chloride infusion  75 mL/hr IntraVENous PRN    EPINEPHrine (ADRENALIN) 0.1 mg/mL syringe    PRN    simethicone (MYLICON) 64AJ/5.8QP oral drops    PRN    0.9% sodium chloride infusion 250 mL  250 mL IntraVENous PRN    piperacillin-tazobactam (ZOSYN) 3.375 g in 0.9% sodium chloride (MBP/ADV) 100 mL MBP  3.375 g IntraVENous Q12H    dexmedeTOMidine (PRECEDEX) 400 mcg in 0.9% sodium chloride (MBP/ADV) 100 mL MBP  0.1-1.5 mcg/kg/hr IntraVENous TITRATE    octreotide (SANDOSTATIN) 500 mcg in 0.9% sodium chloride 500 mL infusion  25 mcg/hr IntraVENous CONTINUOUS    0.9% sodium chloride infusion 250 mL  250 mL IntraVENous PRN    midodrine (PROAMATINE) tablet 10 mg  10 mg Oral TID    hydrOXYzine pamoate (VISTARIL) capsule 25 mg  25 mg Oral BID    traZODone (DESYREL) tablet 50 mg  50 mg Oral QHS PRN    influenza vaccine 2021-22 (6 mos+)(PF) (FLUARIX/FLULAVAL/FLUZONE QUAD) injection 0.5 mL  1 Each IntraMUSCular PRIOR TO DISCHARGE    lactulose (CHRONULAC) 10 gram/15 mL solution 30 mL  20 g Oral BID    sodium chloride (NS) flush 5-40 mL 5-40 mL IntraVENous Q8H    sodium chloride (NS) flush 5-40 mL  5-40 mL IntraVENous PRN    acetaminophen (TYLENOL) tablet 650 mg  650 mg Oral Q6H PRN    Or    acetaminophen (TYLENOL) suppository 650 mg  650 mg Rectal Q6H PRN    ondansetron (ZOFRAN ODT) tablet 4 mg  4 mg Oral Q6H PRN    Or    ondansetron (ZOFRAN) injection 4 mg  4 mg IntraVENous Q6H PRN    [Held by provider] thiamine mononitrate (B-1) tablet 100 mg  100 mg Oral DAILY    [Held by provider] b complex-vitamin c-folic acid 5mg (FOLBEE PLUS) tablet 1 Tablet  1 Tablet Oral DAILY    [Held by provider] spironolactone (ALDACTONE) tablet 25 mg  25 mg Oral BID       Review of Systems:   A comprehensive review of systems was negative except for that written in the HPI. Objective:   Physical Exam:     Visit Vitals  BP (!) 113/53   Pulse 77   Temp (!) 102 °F (38.9 °C)   Resp 26   Ht 5' 6\" (1.676 m)   Wt (!) 178.4 kg (393 lb 4.8 oz)   SpO2 100%   BMI 63.48 kg/m²      O2 Device: Ventilator    Temp (24hrs), Av.9 °F (38.3 °C), Min:98.9 °F (37.2 °C), Max:102 °F (38.9 °C)    701 - 1900  In: 1646.1 [I.V.:1646.1]  Out: 160 [Urine:160]   1901 -  0700  In: 9485.1 [I.V.:8823.1]  Out: 4826 [Urine:1005]    General:   Intubated and sedated. Sclerae icteric   Lungs:   Clear to auscultation bilaterally. Chest wall:  No tenderness or deformity. Heart:  Regular rate and rhythm, S1, S2 normal, no murmur, click, rub or gallop. Abdomen:   Soft, non-tender. Bowel sounds normal. No masses,  No organomegaly. Extremities: Extremities normal, atraumatic, no cyanosis or edema. Pulses: 2+ and symmetric all extremities. Skin: Skin color, texture, turgor normal. No rashes or lesions   Neurologic: CNII-XII intact.   No gross focal deficits         Data Review:       Recent Days:  Recent Labs     21  0515 21  0600 21  0430   WBC 37.3* 33.2* 33.3*   HGB 10.3* 10.5* 11.8   HCT 29.3* 30.2* 33.3*   PLT 29* 32* 77*     Recent Labs 11/05/21  0515 11/04/21  0600 11/03/21  0430    140 137   K 3.5 3.8 3.9    111* 108   CO2 18* 14* 15*   * 108* 112*   BUN 67* 66* 67*   CREA 5.70* 5.67* 5.43*   CA 6.0* 6.3* 6.7*   MG  --  1.6 1.7   PHOS 6.3* 5.7* 5.1*   ALB 1.1*  1.1* 1.0*  1.0* 1.4*  1.4*   TBILI 18.7* 17.6* 22.7*   * 138* 138*   INR 2.1* 2.3*  --      Recent Labs     11/05/21  0554 11/04/21  0608 11/03/21  0456   PH 7.42 7.38 7.37   PCO2 24* 23* 24*   PO2 77 86 96   HCO3 18* 17* 17*   FIO2 30.0 30 30.0       24 Hour Results:  Recent Results (from the past 24 hour(s))   CBC WITH AUTOMATED DIFF    Collection Time: 11/05/21  5:15 AM   Result Value Ref Range    WBC 37.3 (H) 3.6 - 11.0 K/uL    RBC 3.29 (L) 3.80 - 5.20 M/uL    HGB 10.3 (L) 11.5 - 16.0 g/dL    HCT 29.3 (L) 35.0 - 47.0 %    MCV 89.1 80.0 - 99.0 FL    MCH 31.3 26.0 - 34.0 PG    MCHC 35.2 30.0 - 36.5 g/dL    RDW 20.3 (H) 11.5 - 14.5 %    PLATELET 29 (LL) 191 - 400 K/uL    NRBC 0.1 (H) 0.0  WBC    ABSOLUTE NRBC 0.04 (H) 0.00 - 0.01 K/uL    NEUTROPHILS 86 (H) 32 - 75 %    LYMPHOCYTES 10 (L) 12 - 49 %    MONOCYTES 4 (L) 5 - 13 %    EOSINOPHILS 0 0 - 7 %    BASOPHILS 0 0 - 1 %    IMMATURE GRANULOCYTES 0 %    ABS. NEUTROPHILS 32.1 (H) 1.8 - 8.0 K/UL    ABS. LYMPHOCYTES 3.7 (H) 0.8 - 3.5 K/UL    ABS. MONOCYTES 1.5 (H) 0.0 - 1.0 K/UL    ABS. EOSINOPHILS 0.0 0.0 - 0.4 K/UL    ABS. BASOPHILS 0.0 0.0 - 0.1 K/UL    ABS. IMM.  GRANS. 0.0 K/UL    DF Manual      RBC COMMENTS Normocytic, Normochromic     RENAL FUNCTION PANEL    Collection Time: 11/05/21  5:15 AM   Result Value Ref Range    Sodium 139 136 - 145 mmol/L    Potassium 3.5 3.5 - 5.1 mmol/L    Chloride 106 97 - 108 mmol/L    CO2 18 (L) 21 - 32 mmol/L    Anion gap 15 5 - 15 mmol/L    Glucose 116 (H) 65 - 100 mg/dL    BUN 67 (H) 6 - 20 mg/dL    Creatinine 5.70 (H) 0.55 - 1.02 mg/dL    BUN/Creatinine ratio 12 12 - 20      GFR est AA 10 (L) >60 ml/min/1.73m2    GFR est non-AA 8 (L) >60 ml/min/1.73m2    Calcium 6.0 (LL) 8.5 - 10.1 mg/dL    Phosphorus 6.3 (H) 2.6 - 4.7 mg/dL    Albumin 1.1 (L) 3.5 - 5.0 g/dL   AMMONIA    Collection Time: 11/05/21  5:15 AM   Result Value Ref Range    Ammonia 90 (H) <32 umol/L   HEPATIC FUNCTION PANEL    Collection Time: 11/05/21  5:15 AM   Result Value Ref Range    Protein, total 4.1 (L) 6.4 - 8.2 g/dL    Albumin 1.1 (L) 3.5 - 5.0 g/dL    Globulin 3.0 2.0 - 4.0 g/dL    A-G Ratio 0.4 (L) 1.1 - 2.2      Bilirubin, total 18.7 (H) 0.2 - 1.0 mg/dL    Bilirubin, direct 14.9 (H) 0.0 - 0.2 mg/dL    Alk.  phosphatase 85 45 - 117 U/L    AST (SGOT) 386 (H) 15 - 37 U/L    ALT (SGPT) 120 (H) 12 - 78 U/L   PROTHROMBIN TIME + INR    Collection Time: 11/05/21  5:15 AM   Result Value Ref Range    Prothrombin time 22.8 (H) 11.9 - 14.7 sec    INR 2.1 (H) 0.9 - 1.1     PROCALCITONIN    Collection Time: 11/05/21  5:15 AM   Result Value Ref Range    Procalcitonin 23.86 (H) 0 ng/mL   BLOOD GAS, ARTERIAL    Collection Time: 11/05/21  5:54 AM   Result Value Ref Range    pH 7.42 7.35 - 7.45      PCO2 24 (L) 35 - 45 mmHg    PO2 77 75 - 100 mmHg    O2 SAT 95 (L) >95 %    BICARBONATE 18 (L) 22 - 26 mmol/L    BASE DEFICIT 7.6 (H) 0 - 2 mmol/L    O2 METHOD VENT      FIO2 30.0 %    MODE SIMV      Tidal volume 550      PRESSURE SUPPORT 15.0      EPAP/CPAP/PEEP 5.0      SITE Left Radial      RANDAL'S TEST PASS     LACTIC ACID    Collection Time: 11/05/21 10:15 AM   Result Value Ref Range    Lactic acid 2.8 (HH) 0.4 - 2.0 mmol/L   COVID-19 RAPID TEST    Collection Time: 11/05/21 10:50 AM   Result Value Ref Range    Specimen source Please find results under separate order      COVID-19 rapid test Not Detected Not Detected         XR CHEST PORT   Final Result      XR CHEST PORT   Final Result   Residual atelectasis at the lung bases      XR CHEST PORT   Final Result      XR CHEST PORT   Final Result      XR CHEST PORT   Final Result      XR CHEST PORT   Final Result   Left IJ central venous catheter terminating over the lower right atrium. No   discernible pneumothorax. XR CHEST PORT   Final Result      CT ABD PELV WO CONT   Final Result   1. No acute findings. 2. Severe diffuse hepatic steatosis. 3. Cholelithiasis. Dose reduction: All CT scans at this facility are performed using radiation dose   reduction optimization techniques as appropriate to a performed exam, including   the following: Automated exposure control (AEC), adjustment of the MAA and/or   KUB according to the patient's size, or the patient's use of iterative   reconstruction techniques (ASIR). XR CHEST PORT   Final Result   Similar exam to prior without findings of definite acute cardiopulmonary   abnormality. XR CHEST PORT    (Results Pending)        Assessment:  Acute upper gastrointestinal bleeding, due to laceration at Billroth II anastomosis site. Status post EGD with epinephrine injections. Patient with recurrent   bleeding on 10/31. Repeat EGD on 11/3 with adherent clot at anastomosis. Appears to be stable past 72 hours with no evidence of hemoglobin dropping    Hypovolemic shock due to above     Septic shock due to UTI. Pressor requirement increasing    Acute kidney injury, probably ATN due to hypotension. Likely component of hepatorenal syndrome. Worsening, although with improving urine output    Multiple organ dysfunction syndrome with [place] II score of 23, indicating 40% estimated mortality    Complicated UTI due to E. coli.   Pansensitive    Hepatic encephalopathy    Coagulopathy due to liver failure     Thrombocytopenia due to sepsis and consumption/cirrhosis, worsening    Acute blood loss anemia status post massive transfusion hemoglobin remains stable    End-stage alcoholic cirrhosis with hepatic failure; MELD-Na  score is 40 as of 11/1 with estimated 90-day mortality of 66%    Alcohol abuse; patient was still drinking about 9 beers daily just prior to admission    Nonanion gap metabolic acidosis, likely due to BRITTANY      Plan:  ENCOUNTER NOTE      Spoke to [facility] transfer centre, latif HRS and acute on chronic liver failure      Spoke to [facility] critical care, who reviewed it with transplant team     Too critical to transfer,     Advised they will keep her on list for 72 hours,      If we can cut down to only 1 vasopressor levophed and she is off of vasopressin and gemma, then they will consider transfer.      Incase its > 72 hours then we have to restart the transfer process     -------------------------------------------------------------------------------     At 2 pm     Had a family discussion , mother, brother and cousin (who is in helath care)     Agreed for DNR DNI     In the next 48-72 hours our target it to improve renal functions and try to wean the pressors if possible     Family will revisit in next 48 hours, and if her condition hasnt improve they will go for comfort care  And if improves , and is on 1 pressor she will be transferred to [facility]. [facility] will consider her if she is only on 1 pressor    Care Plan discussed with: Patient/Family    Disposition: Continued ICU care        Total time spent with patient: 30 minutes.     Zoie Brito MD

## 2021-11-05 NOTE — PROGRESS NOTES
ENCOUNTER NOTE     Spoke to [facility] transfer centre, latif HRS and acute on chronic liver failure     Spoke to [facility] critical care, who reviewed it with transplant team    Too critical to transfer,    Advised they will keep her on list for 72 hours,     If we can cut down to only 1 vasopressor levophed and she is off of vasopressin and gemma, then they will consider transfer. Incase its > 72 hours then we have to restart the transfer process    -------------------------------------------------------------------------------    At 2 pm    Had a family discussion , mother, brother and cousin (who is in helath care)    Agreed for DNR DNI    In the next 48-72 hours our target it to improve renal functions and try to wean the pressors if possible    Family will revisit in next 48 hours, and if her condition hasnt improve they will go for comfort care  And if improves , and is on 1 pressor she will be transferred to [facility].  [facility] will consider her if she is only on 1 pressor

## 2021-11-05 NOTE — PROGRESS NOTES
Renal Progress Note Patient: Renea Madrigal MRN: [id number]  SSN: [id number] YOB: 1975  Age: 51 y.o. Sex: female Admit Date: 10/25/2021 LOS: 11 days Subjective:  
Patient seen in ICU. On ventilator and sedated Family  at bedside She is on 3 pressors today with her map around 72 Renal output about 950 mils in the past 24 hours Unresponsive but on sedation with Precedex Discussed again with the  yesterday. ..regarding further care and his expectations. I am not sure he understands how sick she is. He was asking about liver transplant and other options Current Facility-Administered Medications Medication Dose Route Frequency  0.9% sodium chloride infusion 250 mL  250 mL IntraVENous PRN  
 PHENYLephrine (KALIN-SYNEPHRINE) 30 mg in 0.9% sodium chloride 250 mL infusion   mcg/min IntraVENous TITRATE  
 NOREPINephrine (LEVOPHED) 16,000 mcg in dextrose 5% 250 mL infusion  0.5-65 mcg/min IntraVENous TITRATE  vasopressin (VASOSTRICT) 20 Units in 0.9% sodium chloride 100 mL infusion  0.01-0.04 Units/min IntraVENous TITRATE  sodium bicarbonate (8.4%) 200 mEq in dextrose 5% 1,000 mL infusion   IntraVENous CONTINUOUS  
 sodium bicarbonate tablet 1,300 mg  1,300 mg Oral TID  propofoL (DIPRIVAN) 10 mg/mL injection  0-50 mcg/kg/min IntraVENous TITRATE  
 0.9% sodium chloride infusion  75 mL/hr IntraVENous PRN  
 EPINEPHrine (ADRENALIN) 0.1 mg/mL syringe    PRN  
 simethicone (MYLICON) 01RD/1.9DX oral drops    PRN  
 0.9% sodium chloride infusion 250 mL  250 mL IntraVENous PRN  piperacillin-tazobactam (ZOSYN) 3.375 g in 0.9% sodium chloride (MBP/ADV) 100 mL MBP  3.375 g IntraVENous Q12H  
 dexmedeTOMidine (PRECEDEX) 400 mcg in 0.9% sodium chloride (MBP/ADV) 100 mL MBP  0.1-1.5 mcg/kg/hr IntraVENous TITRATE  octreotide (SANDOSTATIN) 500 mcg in 0.9% sodium chloride 500 mL infusion  25 mcg/hr IntraVENous CONTINUOUS  
 0.9% sodium chloride infusion 250 mL 250 mL IntraVENous PRN  
 midodrine (PROAMATINE) tablet 10 mg  10 mg Oral TID  hydrOXYzine pamoate (VISTARIL) capsule 25 mg  25 mg Oral BID  traZODone (DESYREL) tablet 50 mg  50 mg Oral QHS PRN  
 influenza vaccine 2021-22 (6 mos+)(PF) (FLUARIX/FLULAVAL/FLUZONE QUAD) injection 0.5 mL  1 Each IntraMUSCular PRIOR TO DISCHARGE  lactulose (CHRONULAC) 10 gram/15 mL solution 30 mL  20 g Oral BID  sodium chloride (NS) flush 5-40 mL  5-40 mL IntraVENous Q8H  
 sodium chloride (NS) flush 5-40 mL  5-40 mL IntraVENous PRN  
 acetaminophen (TYLENOL) tablet 650 mg  650 mg Oral Q6H PRN Or  
 acetaminophen (TYLENOL) suppository 650 mg  650 mg Rectal Q6H PRN  
 ondansetron (ZOFRAN ODT) tablet 4 mg  4 mg Oral Q6H PRN Or  
 ondansetron (ZOFRAN) injection 4 mg  4 mg IntraVENous Q6H PRN  
 [Held by provider] thiamine mononitrate (B-1) tablet 100 mg  100 mg Oral DAILY  [Held by provider] b complex-vitamin c-folic acid 5mg (FOLBEE PLUS) tablet 1 Tablet  1 Tablet Oral DAILY  [Held by provider] spironolactone (ALDACTONE) tablet 25 mg  25 mg Oral BID Vitals:  
 11/05/21 0815 11/05/21 0900 11/05/21 1000 11/05/21 1108 BP:  (!) 110/51 (!) 107/51 Pulse: 80 80 80 83 Resp: 23 24 25 27 Temp:      
SpO2: 100% 96% 97% 100% Weight:      
Height:      
 
Objective:  
General: Intubated and sedated. Jaundiced skin HEENT: Endotracheal tube in place. Scleral icterus present, Neck: Neck is supple, No JVD, no thyromegaly Lungs: breathsounds normal,  no rhonchi, no rales, CVS: heart sounds normal,  no murmurs, no rubs. GI: Distended. Obese. Extremeties: no cyanosis, 1-2+ bilateral lower extremity edema present Neuro: She is sedated Skin: normal skin turgor, no skin rashes Intake and Output: 
Current Shift: 11/05 0701 - 11/05 1900 In: -  
Out: 60 [Urine:60] Last three shifts: 11/03 1901 - 11/05 0700 In: 9485.1 [I.V.:8823.1] Out: 1005 [NYO:8390] Lab/Data Review: 
Recent Labs 11/05/21 
0515 11/04/21 
0600 11/03/21 
0430 WBC 37.3* 33.2* 33.3* HGB 10.3* 10.5* 11.8 HCT 29.3* 30.2* 33.3*  
PLT 29* 32* 77* Recent Labs 11/05/21 
0515 11/04/21 
0600 11/03/21 
0430  140 137  
K 3.5 3.8 3.9  111* 108 CO2 18* 14* 15* * 108* 112* BUN 67* 66* 67* CREA 5.70* 5.67* 5.43* CA 6.0* 6.3* 6.7* MG  --  1.6 1.7 PHOS 6.3* 5.7* 5.1* ALB 1.1*  1.1* 1.0*  1.0* 1.4*  1.4* TBILI 18.7* 17.6* 22.7* * 138* 138* INR 2.1* 2.3*  --   
 
Recent Labs 11/05/21 
6902 11/04/21 
3131 11/03/21 
3368 PH 7.42 7.38 7.37  
PCO2 24* 23* 24* PO2 77 86 96 HCO3 18* 17* 17* FIO2 30.0 30 30.0 Recent Results (from the past 24 hour(s)) GLUCOSE, POC Collection Time: 11/04/21 11:35 AM  
Result Value Ref Range Glucose (POC) 104 65 - 117 mg/dL Performed by Yamil Thompson GLUCOSE, POC Collection Time: 11/04/21  4:01 PM  
Result Value Ref Range Glucose (POC) 110 65 - 117 mg/dL Performed by Yamil Thompson CBC WITH AUTOMATED DIFF Collection Time: 11/05/21  5:15 AM  
Result Value Ref Range WBC 37.3 (H) 3.6 - 11.0 K/uL  
 RBC 3.29 (L) 3.80 - 5.20 M/uL  
 HGB 10.3 (L) 11.5 - 16.0 g/dL HCT 29.3 (L) 35.0 - 47.0 % MCV 89.1 80.0 - 99.0 FL  
 MCH 31.3 26.0 - 34.0 PG  
 MCHC 35.2 30.0 - 36.5 g/dL RDW 20.3 (H) 11.5 - 14.5 % PLATELET 29 (LL) 705 - 400 K/uL NRBC 0.1 (H) 0.0  WBC ABSOLUTE NRBC 0.04 (H) 0.00 - 0.01 K/uL NEUTROPHILS 86 (H) 32 - 75 % LYMPHOCYTES 10 (L) 12 - 49 % MONOCYTES 4 (L) 5 - 13 % EOSINOPHILS 0 0 - 7 % BASOPHILS 0 0 - 1 % IMMATURE GRANULOCYTES 0 %  
 ABS. NEUTROPHILS 32.1 (H) 1.8 - 8.0 K/UL  
 ABS. LYMPHOCYTES 3.7 (H) 0.8 - 3.5 K/UL  
 ABS. MONOCYTES 1.5 (H) 0.0 - 1.0 K/UL  
 ABS. EOSINOPHILS 0.0 0.0 - 0.4 K/UL  
 ABS. BASOPHILS 0.0 0.0 - 0.1 K/UL  
 ABS. IMM. GRANS. 0.0 K/UL  
 DF Manual    
 RBC COMMENTS Normocytic, Normochromic RENAL FUNCTION PANEL  Collection Time: 11/05/21 5:15 AM  
Result Value Ref Range Sodium 139 136 - 145 mmol/L Potassium 3.5 3.5 - 5.1 mmol/L Chloride 106 97 - 108 mmol/L  
 CO2 18 (L) 21 - 32 mmol/L Anion gap 15 5 - 15 mmol/L Glucose 116 (H) 65 - 100 mg/dL BUN 67 (H) 6 - 20 mg/dL Creatinine 5.70 (H) 0.55 - 1.02 mg/dL BUN/Creatinine ratio 12 12 - 20 GFR est AA 10 (L) >60 ml/min/1.73m2 GFR est non-AA 8 (L) >60 ml/min/1.73m2 Calcium 6.0 (LL) 8.5 - 10.1 mg/dL Phosphorus 6.3 (H) 2.6 - 4.7 mg/dL Albumin 1.1 (L) 3.5 - 5.0 g/dL AMMONIA Collection Time: 11/05/21  5:15 AM  
Result Value Ref Range Ammonia 90 (H) <32 umol/L  
HEPATIC FUNCTION PANEL Collection Time: 11/05/21  5:15 AM  
Result Value Ref Range Protein, total 4.1 (L) 6.4 - 8.2 g/dL Albumin 1.1 (L) 3.5 - 5.0 g/dL Globulin 3.0 2.0 - 4.0 g/dL A-G Ratio 0.4 (L) 1.1 - 2.2 Bilirubin, total 18.7 (H) 0.2 - 1.0 mg/dL Bilirubin, direct 14.9 (H) 0.0 - 0.2 mg/dL Alk. phosphatase 85 45 - 117 U/L  
 AST (SGOT) 386 (H) 15 - 37 U/L  
 ALT (SGPT) 120 (H) 12 - 78 U/L  
PROTHROMBIN TIME + INR Collection Time: 11/05/21  5:15 AM  
Result Value Ref Range Prothrombin time 22.8 (H) 11.9 - 14.7 sec INR 2.1 (H) 0.9 - 1.1 PROCALCITONIN Collection Time: 11/05/21  5:15 AM  
Result Value Ref Range Procalcitonin 23.86 (H) 0 ng/mL BLOOD GAS, ARTERIAL Collection Time: 11/05/21  5:54 AM  
Result Value Ref Range pH 7.42 7.35 - 7.45    
 PCO2 24 (L) 35 - 45 mmHg PO2 77 75 - 100 mmHg O2 SAT 95 (L) >95 % BICARBONATE 18 (L) 22 - 26 mmol/L  
 BASE DEFICIT 7.6 (H) 0 - 2 mmol/L  
 O2 METHOD VENT    
 FIO2 30.0 % MODE SIMV Tidal volume 550 PRESSURE SUPPORT 15.0 EPAP/CPAP/PEEP 5.0    
 SITE Left Radial    
 RANDAL'S TEST PASS Assessment and Plan: 1.  Acute Kidney Injury :  
-BRITTANY likely secondary to ATN/possible hepatorenal syndrome  
-Urine sodium and chloride were low suggesting HRS 
-Creatinine has been stable for the past 3 days. 5.7today. Her BUN has increased to 67   
-Urine output about 650 mils in the past 24 hours 
-No impending indication to start RRT today. If needed she will likely need CRRT based on her hemodynamic instability 
-Might need to start CRRT in a.m. if renal function continues to worsen 
-no history of chronic kidney disease 
  
2.  GI bleed/severe anemia:  
-Stable hemoglobin post transfusions -GI following, EGD done again on 11/3 and she had a clot at the anastomotic site. No plan to put NG tube because of that 
-Monitor H&H and transfuse as needed 3. Hyperbilirubinemia/chronic liver disease/suspected cirrhosis -Alcohol dependence:  
-Acute liver injury on chronic liver disease, probably alcohol-related. -Ammonia is 100. Her liver enzymes are>400.   
-MELD score is>40,suggesting very poor prognosis 
-Continue to monitor with abstinence from alcohol -GI following the patient 3. Hypotension: Probably related to liver disease/? Sepsis Leukocytosis present 
 currently on 3 pressors Also on midodrine but unable to give because patient does not have an NG tube 
 continue ICU monitoring 4. Metabolic acidosis:  
-Bicarb is only 14-->18. Likely because of advanced renal disease  
-Currently on bicarbonate drip which I will decrease the rate of because of worsening edema. Unable to give oral bicarb because of no NG tube - will give 2 A of IV bicarb now 
-If acidosis does not improve might need to start dialysis in a.m. 
 
5. Lower extremity edema: probably related to chronic liver disease and obesity, Will use diuretics as needed, will monitor fluid status clinically and adjust diuretics as needed. 6. Hyperkalemia: resolved now. Potassium is 3.8 
  
 
Signed By: Candice Bell MD   
 November 5, 2021

## 2021-11-05 NOTE — PROGRESS NOTES
Pulmonary, Critical Care note    Name: Corazon Ratliff MRN: [id number]   : 1975 Hospital: [address] [place] [address]   Date: 2021  Admission date: 10/25/2021 Hospital Day: 12       Subjective/Interval History:   Seen in the ICU on the ventilator. Patient is a 58-year-old female heavy drinker presented to the emergency room on the  with weakness fatigue jaundice refused admission came back on the  and was admitted with further weakness. Had upper endoscopy that showed blood in the small bowel. She had worsening drop in hemoglobin underwent repeat endoscopy had a large amount of blood coming up into the stomach. Required intubation for protection of airway probably aspirated blood. Endoscopy after intubation showed laceration with bleeding at the a former Billroth II anastomosis site. She had endoclips placed and still had oozing of blood. She is received 5 units of blood so far. She is on propofol for sedation is still fairly active and working against the ventilator. Exam shows diffuse rhonchi in the lungs most likely related to aspiration of blood   unresponsive has been off sedation for about an hour. ABGs show significant metabolic acidosis  82/2 back on sedation with propofol and Precedex yesterday currently is unresponsive on the ventilator. GI is considering repeat upper Endo today with possible NG placement   hypotensive we will add Abelardo-Synephrine generalized edema anasarca hemodynamically unstable on ventilator    Patient Active Problem List   Diagnosis Code    Obesity, morbid ([facility] [address] E66.01    Venous stasis ulcer ([facility] [address] I83.009, L97.909    Bleeding from varicose vein I83.899    Postop check Z09    Hyperbilirubinemia E80.6    Cirrhosis of liver ([address] K74.60       IMPRESSION:   1. Acute hypoxic respiratory failure corrected on the ventilator FiO2 down to 30%  2. Aspiration pneumonitis probably blood chest x-ray relatively clear  3.  Intravascular volume depletion with shock hypotension currently on norepinephrine 60 mics  4. Gram-negative urinary tract infection  5. GI bleed jejunal from Billroth II site  6. Portal pulmonary hypertension  7. Metabolic acidosis, improved pH serum CO2 still low  8. Thrombocytopenia worsening  9. Acute kidney injury creatinine worsening despite IV fluids  10. Hepatic encephalopathy ammonia remains elevated  11. Alcohol abuse  12. Leukocytosis  Body mass index is 63.48 kg/m². RECOMMENDATIONS/PLAN:   1. On SIMV mode tidal volume 550 pressure support of 15 PEEP of 5 and 30% FiO2  2. Continue on IV bicarb  3. Non-Anion gap metabolic acidosis  4. Patient is hypotensive with intravascular volume depletion   5. WBC count elevated urine with gram-negative denise blood culture pending sputum with 1+ polys currently on Zosyn  6. Worsening of the thrombocytopenia requiring FFP continue with octreotide and Protonix  7. Banana bag not available we will give IV thiamine  8. GI bleed per GI and medical attending hemoglobin stable  9. Chest x-ray shows bibasilar atelectasis         Subjective/Initial History:   . I was asked by Xavier De Santiago MD to see Sonia Chase a 51 y.o.  female  in consultation for a chief complaint of acute hypoxic respiratory failure aspiration pneumonitis massive GI bleed      The patient is unable to give any meaningful history or review of systems due to patient factors. Patient PCP: Carolann Anthony  PMH:  has a past medical history of Cirrhosis ([address] GERD (gastroesophageal reflux disease), and Morbid obesity ([address] PSH:   has a past surgical history that includes hx gastric bypass. FHX: family history includes Diabetes in her father and mother; Heart Attack in her father. SHX:  reports that she has never smoked. She has never used smokeless tobacco. She reports previous alcohol use. She reports that she does not use drugs.   Systemic review unobtainable as the patient is obtunded on the ventilator on IV propofol    No Known Allergies   MEDS:   Current Facility-Administered Medications   Medication    0.9% sodium chloride infusion 250 mL    PHENYLephrine (KALIN-SYNEPHRINE) 30 mg in 0.9% sodium chloride 250 mL infusion    NOREPINephrine (LEVOPHED) 16,000 mcg in dextrose 5% 250 mL infusion    vasopressin (VASOSTRICT) 20 Units in 0.9% sodium chloride 100 mL infusion    sodium bicarbonate (8.4%) 200 mEq in dextrose 5% 1,000 mL infusion    sodium bicarbonate tablet 1,300 mg    propofoL (DIPRIVAN) 10 mg/mL injection    0.9% sodium chloride infusion    EPINEPHrine (ADRENALIN) 0.1 mg/mL syringe    simethicone (MYLICON) 34FK/1.4IY oral drops    0.9% sodium chloride infusion 250 mL    piperacillin-tazobactam (ZOSYN) 3.375 g in 0.9% sodium chloride (MBP/ADV) 100 mL MBP    dexmedeTOMidine (PRECEDEX) 400 mcg in 0.9% sodium chloride (MBP/ADV) 100 mL MBP    octreotide (SANDOSTATIN) 500 mcg in 0.9% sodium chloride 500 mL infusion    0.9% sodium chloride infusion 250 mL    midodrine (PROAMATINE) tablet 10 mg    hydrOXYzine pamoate (VISTARIL) capsule 25 mg    traZODone (DESYREL) tablet 50 mg    influenza vaccine 2021-22 (6 mos+)(PF) (FLUARIX/FLULAVAL/FLUZONE QUAD) injection 0.5 mL    lactulose (CHRONULAC) 10 gram/15 mL solution 30 mL    sodium chloride (NS) flush 5-40 mL    sodium chloride (NS) flush 5-40 mL    acetaminophen (TYLENOL) tablet 650 mg    Or    acetaminophen (TYLENOL) suppository 650 mg    ondansetron (ZOFRAN ODT) tablet 4 mg    Or    ondansetron (ZOFRAN) injection 4 mg    [Held by provider] thiamine mononitrate (B-1) tablet 100 mg    [Held by provider] b complex-vitamin c-folic acid 5mg (FOLBEE PLUS) tablet 1 Tablet    [Held by provider] spironolactone (ALDACTONE) tablet 25 mg        Current Facility-Administered Medications:     0.9% sodium chloride infusion 250 mL, 250 mL, IntraVENous, PRN, Lzieth Lauren MD    PHENYLephrine (KALIN-SYNEPHRINE) 30 mg in 0.9% sodium chloride 250 mL infusion,  mcg/min, IntraVENous, TITRATE, Christopher Almaraz MD, Last Rate: 50 mL/hr at 11/05/21 0502, 100 mcg/min at 11/05/21 0502    NOREPINephrine (LEVOPHED) 16,000 mcg in dextrose 5% 250 mL infusion, 0.5-65 mcg/min, IntraVENous, TITRATE, Christopher Almaraz MD, Last Rate: 56.3 mL/hr at 11/05/21 0836, 60 mcg/min at 11/05/21 0836    vasopressin (VASOSTRICT) 20 Units in 0.9% sodium chloride 100 mL infusion, 0.01-0.04 Units/min, IntraVENous, TITRATE, Christopher Almaraz MD, Last Rate: 3 mL/hr at 11/04/21 1508, 0.01 Units/min at 11/04/21 1508    sodium bicarbonate (8.4%) 200 mEq in dextrose 5% 1,000 mL infusion, , IntraVENous, CONTINUOUS, Maryan Richardson MD, Last Rate: 50 mL/hr at 11/04/21 1436, New Bag at 11/04/21 1436    sodium bicarbonate tablet 1,300 mg, 1,300 mg, Oral, TID, Maryan Richardson MD    propofoL (DIPRIVAN) 10 mg/mL injection, 0-50 mcg/kg/min, IntraVENous, TITRATE, Christopher Almaraz MD, Last Rate: 18.8 mL/hr at 11/05/21 0502, 20 mcg/kg/min at 11/05/21 0502    0.9% sodium chloride infusion, 75 mL/hr, IntraVENous, PRN, Christopher Almaraz MD    EPINEPHrine (ADRENALIN) 0.1 mg/mL syringe, , , PRN, Rosina Phillips MD, 2 mg at 11/01/21 1430    simethicone (MYLICON) 04SK/4.4VT oral drops, , , PRN, Rosina Phillips MD, 20 mg at 11/01/21 1420    0.9% sodium chloride infusion 250 mL, 250 mL, IntraVENous, PRN, KarenChristopher Morgan MD    piperacillin-tazobactam (ZOSYN) 3.375 g in 0.9% sodium chloride (MBP/ADV) 100 mL MBP, 3.375 g, IntraVENous, Q12H, Christopher Zuniga MD, Last Rate: 25 mL/hr at 11/05/21 0502, 3.375 g at 11/05/21 0502    dexmedeTOMidine (PRECEDEX) 400 mcg in 0.9% sodium chloride (MBP/ADV) 100 mL MBP, 0.1-1.5 mcg/kg/hr, IntraVENous, TITRATE, Lincoln Araujo MD, Stopped at 11/04/21 1300    octreotide (SANDOSTATIN) 500 mcg in 0.9% sodium chloride 500 mL infusion, 25 mcg/hr, IntraVENous, CONTINUOUS, Elly Allen PA-C, Last Rate: 25 mL/hr at 11/05/21 0726, 25 mcg/hr at 11/05/21 0726    0.9% sodium chloride infusion 250 mL, 250 mL, IntraVENous, PRN, Elly Allen PA-C    midodrine (PROAMATINE) tablet 10 mg, 10 mg, Oral, TID, Henrik Rowland MD, 10 mg at 11/01/21 1012    hydrOXYzine pamoate (VISTARIL) capsule 25 mg, 25 mg, Oral, BID, Granger Cough, NP, 25 mg at 11/01/21 1012    traZODone (DESYREL) tablet 50 mg, 50 mg, Oral, QHS PRN, Deni Banegas MD, 50 mg at 11/01/21 0021    influenza vaccine 2021-22 (6 mos+)(PF) (FLUARIX/FLULAVAL/FLUZONE QUAD) injection 0.5 mL, 1 Each, IntraMUSCular, PRIOR TO DISCHARGE, Shelly Louie MD    lactulose (CHRONULAC) 10 gram/15 mL solution 30 mL, 20 g, Oral, BID, Granger Cough, NP, 30 mL at 11/01/21 1012    sodium chloride (NS) flush 5-40 mL, 5-40 mL, IntraVENous, Q8H, Shelly Louie MD, 10 mL at 11/05/21 0510    sodium chloride (NS) flush 5-40 mL, 5-40 mL, IntraVENous, PRN, Shelly Louie MD    acetaminophen (TYLENOL) tablet 650 mg, 650 mg, Oral, Q6H PRN **OR** acetaminophen (TYLENOL) suppository 650 mg, 650 mg, Rectal, Q6H PRN, Shelly Louie MD, 650 mg at 11/05/21 0003    ondansetron (ZOFRAN ODT) tablet 4 mg, 4 mg, Oral, Q6H PRN **OR** ondansetron (ZOFRAN) injection 4 mg, 4 mg, IntraVENous, Q6H PRN, Shelly Louie MD, 4 mg at 10/30/21 2210    [Held by provider] thiamine mononitrate (B-1) tablet 100 mg, 100 mg, Oral, DAILY, Shelly Louie MD, 100 mg at 11/01/21 1012    [Held by provider] b complex-vitamin c-folic acid 5mg (FOLBEE PLUS) tablet 1 Tablet, 1 Tablet, Oral, DAILY, Shelly Louie MD, 1 Tablet at 10/31/21 1108    [Held by provider] spironolactone (ALDACTONE) tablet 25 mg, 25 mg, Oral, BID, Shelly Louie MD, 25 mg at 10/30/21 1048      Objective:     Vital Signs: Telemetry:    normal sinus rhythm Intake/Output:   Visit Vitals  BP (!) 108/52   Pulse 80   Temp (!) 101.1 °F (38.4 °C)   Resp 23   Ht 5' 6\" (1.676 m)   Wt (!) 178.4 kg (393 lb 4.8 oz) SpO2 100%   BMI 63.48 kg/m²       Temp (24hrs), Av.3 °F (37.9 °C), Min:98.9 °F (37.2 °C), Max:101.1 °F (38.4 °C)        O2 Device: Ventilator           Body mass index is 63.48 kg/m². Wt Readings from Last 4 Encounters:   21 (!) 178.4 kg (393 lb 4.8 oz)   10/22/21 151.5 kg (334 lb)   21 152.7 kg (336 lb 9.6 oz)   21 151.5 kg (334 lb)          Intake/Output Summary (Last 24 hours) at 2021 0933  Last data filed at 2021 0700  Gross per 24 hour   Intake 6998.98 ml   Output 675 ml   Net 6323.98 ml       Last shift:      No intake/output data recorded. Last 3 shifts:  1901 -  0700  In: 9485.1 [I.V.:8823.1]  Out: 1005 [Urine:1005]       Hemodynamics:    CO:    CI:    CVP:    SVR:   PAP Systolic:    PAP Diastolic:    PVR:    GY82:       Ventilator Settings:      Mode Rate TV Press PEEP FiO2 PIP Min. Vent   SIMV, Pressure support, Volume control    550 ml 15 cm H2O  5 cm H20 30 %  28 cm H2O  8.21 l/min      Physical Exam:    General:  female; unresponsive on propofol and Precedex  HEENT: NCAT,   Eyes: Jaundiced; no doll's eye movement  Neck: no nodes, no cuff leak, no accessory MM use. Obesity obscures determination of JVD  Chest: no deformity,  Cardiac:  regular rhythm  Lungs: Poor breath sounds but clear anteriorly and laterally no breath sounds in the bases  Abd: Obese questionably distended bowel sounds are present  Ext: Mild edema; no joint swelling;  No clubbing  : [facility] urine  Neuro: Unresponsive on propofol and Precedex no doll's eye reflex flaccid extremities  Psych-unable to assess  Skin: warm, dry, no cyanosis;   Pulses: Brachial radial pulses intact  Capillary: Rapid capillary refill      Labs:    Recent Labs     21  0515 21  0600 21  0430   WBC 37.3* 33.2* 33.3*   HGB 10.3* 10.5* 11.8   PLT 29* 32* 77*   INR 2.1* 2.3*  --      Recent Labs     21  0515 21  0600 21  0430    140 137   K 3.5 3.8 3.9    111* 108   CO2 18* 14* 15*   * 108* 112*   BUN 67* 66* 67*   CREA 5.70* 5.67* 5.43*   CA 6.0* 6.3* 6.7*   MG  --  1.6 1.7   PHOS 6.3* 5.7* 5.1*   ALB 1.1*  1.1* 1.0*  1.0* 1.4*  1.4*   * 138* 138*     Recent Labs     11/05/21  0554 11/04/21  0608 11/03/21  0456   PH 7.42 7.38 7.37   PCO2 24* 23* 24*   PO2 77 86 96   HCO3 18* 17* 17*   FIO2 30.0 30 30.0     Ammonia 100, 118  11/3 total bili 22.7, , alk phos 89   11/2 total bili 23.6   11/1Total bili 21.4 alkaline phosphatase 53 ALT 61  Lab Results   Component Value Date/Time    CK 45 10/31/2021 08:58 AM       Imaging:  I have personally reviewed the patients radiographs and have reviewed the reports:    CXR Results  (Last 48 hours)               11/05/21 0240  XR CHEST PORT Final result    Narrative:  Chest single view. Comparison single view chest November 4, 2021. ET tube, left neck central venous catheter stable position. Unchanged appearance for the lungs. Some central vessel crowding. No gross   interstitial or alveolar pulmonary edema. Cardiac and mediastinal structures   unchanged. No pneumothorax or sizable pleural effusion. 11/04/21 0156  XR CHEST PORT Final result    Impression:  Residual atelectasis at the lung bases       Narrative:  Semiupright portable radiograph of the chest 1:56 a.m. compared with November 3,   2021. INDICATION: Respiratory failure. Endotracheal tube tip projects between the clavicles approximately 3.3 cm above   the harika. Low lung volumes. Heart size appears stable. Left IJ central line   tip projects over the right atrium. Improved aeration in the perihilar regions   with residual atelectasis at the lung bases. No effusion or pneumothorax. Results from [place] encounter on 10/25/21    XR CHEST PORT    Narrative  Chest single view. Comparison single view chest November 4, 2021.     ET tube, left neck central venous catheter stable position. Unchanged appearance for the lungs. Some central vessel crowding. No gross  interstitial or alveolar pulmonary edema. Cardiac and mediastinal structures  unchanged. No pneumothorax or sizable pleural effusion. XR CHEST PORT    Narrative  Semiupright portable radiograph of the chest 1:56 a.m. compared with November 3,  2021. INDICATION: Respiratory failure. Endotracheal tube tip projects between the clavicles approximately 3.3 cm above  the harika. Low lung volumes. Heart size appears stable. Left IJ central line  tip projects over the right atrium. Improved aeration in the perihilar regions  with residual atelectasis at the lung bases. No effusion or pneumothorax. Impression  Residual atelectasis at the lung bases      XR CHEST PORT    Narrative  Chest single view. ET tube 3-4 cm above the harika. Left neck central venous catheter overlies mid RA region. Consider pullback 3  cm. Lungs clear. Cardiac and mediastinal structures within normal limits. No  pneumothorax or sizable pleural effusion. Report called to CCU. Spoke with patient's nurse. Results from [place] encounter on 10/25/21    CT ABD PELV WO CONT    Narrative  CT scan the abdomen and pelvis is performed without IV or oral contrast per  renal colic protocol. Coronal reconstructions are generated. Comparison exams  are not available. Findings: The lung bases are normal. The solid abdominal organs reveals severe  diffuse fatty infiltration of the liver. There appear to be gallstones within  the dependent portion of the gallbladder. The patient is status post gastric  bypass and IUD placement. There is no free intraperitoneal fluid or gas. The  bowel is unopacified but normal in caliber. No fluid or mass is seen in the cul-de-sac. Bone windows reveal degenerative  disc disease at L3-4 and L5-S1. Impression  1. No acute findings. 2. Severe diffuse hepatic steatosis. 3. Cholelithiasis.         Dose reduction: All CT scans at this facility are performed using radiation dose  reduction optimization techniques as appropriate to a performed exam, including  the following: Automated exposure control (AEC), adjustment of the MAA and/or  KUB according to the patient's size, or the patient's use of iterative  reconstruction techniques (ASIR). Discussion: Acute respiratory failure secondary to massive GI bleed with inability to protect her airway. Has diffuse rhonchi most likely aspiration of blood. Will maintain the ventilator for now. Will check a surveillance sputum culture. Will replace oral thiamine multivitamin and folic acid with IV replenishment. She is currently on propofol was still fighting the ventilator will add Precedex. 11/2 unresponsive propofol on hold for an hour no doll's eye reflex ammonia is up to 118. No enteral access. Has significant metabolic acidosis will place on IV bicarb. Will not try to wean the ventilator at this point  11/3 unresponsive back on propofol and Precedex. To have repeat upper Endo and NG tube placement today. Can try to decrease ventilator tomorrow. Currently on Zosyn does have a urinary tract infection  114 continue with the current vent settings no change hemodynamically unstable adding NEOS epinephrine to Levophed  11/5 on SIMV vent settings 40% FiO2 on vasopressors  Time of care 30 minutes            Thank you for allowing us to participate in the care of this patient.   We will follow along with you     Jayla Vega MD

## 2021-11-06 NOTE — PROGRESS NOTES
Renal Progress Note    Patient: Corazon Ratliff MRN: [id number]  SSN: [id number]    YOB: 1975  Age: 51 y.o. Sex: female      Admit Date: 10/25/2021    LOS: 12 days     Subjective:   Patient seen in ICU. On ventilator and sedated  Family  at bedside  She is on 2 pressors today with her map around 61  Renal output about 650 mils in the past 24 hours  Unresponsive but on sedation with Precedex  No family at bedside  Noted discussion with [facility] yesterday.   Patient is DNR/DNI now    Current Facility-Administered Medications   Medication Dose Route Frequency    0.9% sodium chloride infusion 250 mL  250 mL IntraVENous PRN    PHENYLephrine (KALIN-SYNEPHRINE) 30 mg in 0.9% sodium chloride 250 mL infusion   mcg/min IntraVENous TITRATE    NOREPINephrine (LEVOPHED) 16,000 mcg in dextrose 5% 250 mL infusion  0.5-65 mcg/min IntraVENous TITRATE    vasopressin (VASOSTRICT) 20 Units in 0.9% sodium chloride 100 mL infusion  0.01-0.04 Units/min IntraVENous TITRATE    sodium bicarbonate tablet 1,300 mg  1,300 mg Oral TID    propofoL (DIPRIVAN) 10 mg/mL injection  0-50 mcg/kg/min IntraVENous TITRATE    0.9% sodium chloride infusion  75 mL/hr IntraVENous PRN    EPINEPHrine (ADRENALIN) 0.1 mg/mL syringe    PRN    simethicone (MYLICON) 61OE/1.0JC oral drops    PRN    0.9% sodium chloride infusion 250 mL  250 mL IntraVENous PRN    piperacillin-tazobactam (ZOSYN) 3.375 g in 0.9% sodium chloride (MBP/ADV) 100 mL MBP  3.375 g IntraVENous Q12H    dexmedeTOMidine (PRECEDEX) 400 mcg in 0.9% sodium chloride (MBP/ADV) 100 mL MBP  0.1-1.5 mcg/kg/hr IntraVENous TITRATE    octreotide (SANDOSTATIN) 500 mcg in 0.9% sodium chloride 500 mL infusion  25 mcg/hr IntraVENous CONTINUOUS    0.9% sodium chloride infusion 250 mL  250 mL IntraVENous PRN    midodrine (PROAMATINE) tablet 10 mg  10 mg Oral TID    hydrOXYzine pamoate (VISTARIL) capsule 25 mg  25 mg Oral BID    traZODone (DESYREL) tablet 50 mg  50 mg Oral QHS PRN    influenza vaccine 2021-22 (6 mos+)(PF) (FLUARIX/FLULAVAL/FLUZONE QUAD) injection 0.5 mL  1 Each IntraMUSCular PRIOR TO DISCHARGE    lactulose (CHRONULAC) 10 gram/15 mL solution 30 mL  20 g Oral BID    sodium chloride (NS) flush 5-40 mL  5-40 mL IntraVENous Q8H    sodium chloride (NS) flush 5-40 mL  5-40 mL IntraVENous PRN    acetaminophen (TYLENOL) tablet 650 mg  650 mg Oral Q6H PRN    Or    acetaminophen (TYLENOL) suppository 650 mg  650 mg Rectal Q6H PRN    ondansetron (ZOFRAN ODT) tablet 4 mg  4 mg Oral Q6H PRN    Or    ondansetron (ZOFRAN) injection 4 mg  4 mg IntraVENous Q6H PRN    [Held by provider] thiamine mononitrate (B-1) tablet 100 mg  100 mg Oral DAILY    [Held by provider] b complex-vitamin c-folic acid 5mg (FOLBEE PLUS) tablet 1 Tablet  1 Tablet Oral DAILY    [Held by provider] spironolactone (ALDACTONE) tablet 25 mg  25 mg Oral BID        Vitals:    11/06/21 0900 11/06/21 1000 11/06/21 1100 11/06/21 1149   BP: 100/61 103/61 (!) 99/56    Pulse: 66 66 66 85   Resp: 22 22 22    Temp: 98.6 °F (37 °C) 98.4 °F (36.9 °C) 98.4 °F (36.9 °C)    SpO2: 98% 97% 97% 97%   Weight:       Height:         Objective:   General: Intubated and sedated. Jaundiced skin  HEENT: Endotracheal tube in place. Scleral icterus present,  Neck: Neck is supple, No JVD, no thyromegaly  Lungs: breathsounds normal,  no rhonchi, no rales,  CVS: heart sounds normal,  no murmurs, no rubs. GI: Distended. Obese.     Extremeties: no cyanosis, 1-2+ bilateral lower extremity edema present  Neuro: She is sedated  Skin: normal skin turgor, no skin rashes      Intake and Output:  Current Shift: 11/06 0701 - 11/06 1900  In: 3291.6 [I.V.:3291.6]  Out: -   Last three shifts: 11/04 1901 - 11/06 0700  In: 5544.4 [I.V.:5544.4]  Out: 1035 [Urine:1035]      Lab/Data Review:  Recent Labs     11/06/21  1220 11/05/21  0515 11/04/21  0600   WBC 44.6* 37.3* 33.2*   HGB 10.7* 10.3* 10.5*   HCT 30.0* 29.3* 30.2*   PLT 19* 29* 32*     Recent Labs 11/05/21  0515 11/04/21  0600    140   K 3.5 3.8    111*   CO2 18* 14*   * 108*   BUN 67* 66*   CREA 5.70* 5.67*   CA 6.0* 6.3*   MG  --  1.6   PHOS 6.3* 5.7*   ALB 1.1*  1.1* 1.0*  1.0*   TBILI 18.7* 17.6*   * 138*   INR 2.1* 2.3*     Recent Labs     11/06/21  0545 11/05/21  0554 11/04/21  0608   PH 7.39 7.42 7.38   PCO2 26* 24* 23*   PO2 78 77 86   HCO3 18* 18* 17*   FIO2 30.0 30.0 30     Recent Results (from the past 24 hour(s))   BLOOD GAS, ARTERIAL    Collection Time: 11/06/21  5:45 AM   Result Value Ref Range    pH 7.39 7.35 - 7.45      PCO2 26 (L) 35 - 45 mmHg    PO2 78 75 - 100 mmHg    O2 SAT 95 (L) >95 %    BICARBONATE 18 (L) 22 - 26 mmol/L    BASE DEFICIT 7.8 (H) 0 - 2 mmol/L    O2 METHOD VENT      FIO2 30.0 %    MODE SIMV      Tidal volume 550      PRESSURE SUPPORT 45.0      EPAP/CPAP/PEEP 5.0      SITE Right Radial      RANDAL'S TEST PASS     CBC WITH AUTOMATED DIFF    Collection Time: 11/06/21 12:20 PM   Result Value Ref Range    WBC 44.6 (H) 3.6 - 11.0 K/uL    RBC 3.30 (L) 3.80 - 5.20 M/uL    HGB 10.7 (L) 11.5 - 16.0 g/dL    HCT 30.0 (L) 35.0 - 47.0 %    MCV 90.9 80.0 - 99.0 FL    MCH 32.4 26.0 - 34.0 PG    MCHC 35.7 30.0 - 36.5 g/dL    RDW 20.6 (H) 11.5 - 14.5 %    PLATELET 19 (LL) 746 - 400 K/uL    NRBC 0.2 (H) 0.0  WBC    ABSOLUTE NRBC 0.07 (H) 0.00 - 0.01 K/uL    NEUTROPHILS PENDING %    LYMPHOCYTES PENDING %    MONOCYTES PENDING %    EOSINOPHILS PENDING %    BASOPHILS PENDING %    IMMATURE GRANULOCYTES PENDING %    ABS. NEUTROPHILS PENDING K/UL    ABS. LYMPHOCYTES PENDING K/UL    ABS. MONOCYTES PENDING K/UL    ABS. EOSINOPHILS PENDING K/UL    ABS. BASOPHILS PENDING K/UL    ABS. IMM. GRANS. PENDING K/UL    DF PENDING         Assessment and Plan:      1. Acute Kidney Injury :   -BRITTANY likely secondary to ATN/possible hepatorenal syndrome   -Urine sodium and chloride were low suggesting HRS  -Creatinine has been stable for the past 3 days. 5.7 today.   Labs today are pending. BUN has increased to 67    -Urine output about 650 mils in the past 24 hours  -No impending indication to start RRT today. I am checking stat labs today. If her renal function not better or her acidosis is worse might consider starting CRRT today  -We will give her 1 dose of Lasix with albumin  -I will discontinue bicarbonate drip because of worsening anasarca  -no history of chronic kidney disease     2.  GI bleed/severe anemia:   -Stable hemoglobin post transfusions  -GI following, EGD done again on 11/3 and she had a clot at the anastomotic site. No plan to put NG tube because of that  -Monitor H&H and transfuse as needed    3. Hyperbilirubinemia/chronic liver disease/suspected cirrhosis  -Alcohol dependence:   -Acute liver injury on chronic liver disease, probably alcohol-related. -Ammonia is 100. Her liver enzymes are>400.    -MELD score is>40,suggesting very poor prognosis  -Continue to monitor with abstinence from alcohol  -GI following the patient    3. Hypotension: Probably related to liver disease/? Sepsis  Leukocytosis present   currently on 3 pressors  Also on midodrine but unable to give because patient does not have an NG tube   continue ICU monitoring    4. Metabolic acidosis:   -Bicarb is only 14-->18. Likely because of advanced renal disease   -Currently on bicarbonate drip which I will discontinue because of anasarca unable to give oral bicarb because of no NG tube  - will give 2 A of IV bicarb now  -If acidosis does not improve might need to start dialysis in a.m.    5. Lower extremity edema: probably related to chronic liver disease and obesity,   Will use diuretics as needed, will monitor fluid status clinically and adjust diuretics as needed. 6. Hyperkalemia: resolved now.   Potassium is 3.8       Signed By: Hu Castañeda MD     November 6, 2021

## 2021-11-06 NOTE — PROGRESS NOTES
Hospitalist Progress Note               Daily Progress Note: 11/6/2021      Subjective:     46F, morbid obesity s/p bilroth procedure and alcohol induced chronic liver disease presents with generalized weakness and acute on chronic liver failure    Her condition was complicated by septic shock s/t UTI with UGIB with ABLA S/p EGD and blood clot at anastomasis site, she developed HRS. Please see Dr. Kaylee Barry note for intense details    ENCOUNTER NOTE      Spoke to [facility] transfer centre, latif HRS and acute on chronic liver failure      Spoke to [facility], who reviewed it with transplant team     Too critical to transfer,     Advised they will keep her on list for 72 hours,      If we can cut down to only 1 vasopressor levophed and she is off of vasopressin and gemma, then they will consider transfer.      Incase its > 72 hours then we have to restart the transfer process     -------------------------------------------------------------------------------     At 2 pm     Had a family discussion , mother, brother and cousin (who is in helath care)     Agreed for DNR DNI     In the next 48-72 hours our target it to improve renal functions and try to wean the pressors if possible     Family will revisit in next 48 hours, and if her condition hasnt improve they will go for comfort care  And if improves , and is on 1 pressor she will be transferred to [facility]. [facility] will consider her if she is only on 1 pressor      11/6/2021: her renal functions have remained the same at 5.6, she is still on 2 pressors.                     Tomorrow plans to have another discussion    Problem List:  Problem List as of 11/6/2021 Date Reviewed: 11/3/2021          Codes Class Noted - Resolved    Hyperbilirubinemia ICD-10-CM: E80.6  ICD-9-CM: 782.4  10/25/2021 - Present        Cirrhosis of liver ([facility] [address] ICD-10-CM: K74.60  ICD-9-CM: 571.5  10/25/2021 - Present        Postop check ICD-10-CM: D29  ICD-9-CM: V67.00  10/26/2020 - Present Bleeding from varicose vein ICD-10-CM: I83.899  ICD-9-CM: 454.8  10/20/2020 - Present        Obesity, morbid ([address] ICD-10-CM: E66.01  ICD-9-CM: 278.01  10/13/2020 - Present        Venous stasis ulcer ([address] ICD-10-CM: I83.009, L97.909  ICD-9-CM: 454.0  10/13/2020 - Present              Medications reviewed  Current Facility-Administered Medications   Medication Dose Route Frequency    calcium gluconate 2 g/100 mL sodium chloride (ISO-OSM)  2 g IntraVENous ONCE    0.9% sodium chloride infusion 250 mL  250 mL IntraVENous PRN    PHENYLephrine (KALIN-SYNEPHRINE) 30 mg in 0.9% sodium chloride 250 mL infusion   mcg/min IntraVENous TITRATE    NOREPINephrine (LEVOPHED) 16,000 mcg in dextrose 5% 250 mL infusion  0.5-65 mcg/min IntraVENous TITRATE    vasopressin (VASOSTRICT) 20 Units in 0.9% sodium chloride 100 mL infusion  0.01-0.04 Units/min IntraVENous TITRATE    sodium bicarbonate tablet 1,300 mg  1,300 mg Oral TID    propofoL (DIPRIVAN) 10 mg/mL injection  0-50 mcg/kg/min IntraVENous TITRATE    0.9% sodium chloride infusion  75 mL/hr IntraVENous PRN    EPINEPHrine (ADRENALIN) 0.1 mg/mL syringe    PRN    simethicone (MYLICON) 72QN/4.9YV oral drops    PRN    0.9% sodium chloride infusion 250 mL  250 mL IntraVENous PRN    piperacillin-tazobactam (ZOSYN) 3.375 g in 0.9% sodium chloride (MBP/ADV) 100 mL MBP  3.375 g IntraVENous Q12H    dexmedeTOMidine (PRECEDEX) 400 mcg in 0.9% sodium chloride (MBP/ADV) 100 mL MBP  0.1-1.5 mcg/kg/hr IntraVENous TITRATE    octreotide (SANDOSTATIN) 500 mcg in 0.9% sodium chloride 500 mL infusion  25 mcg/hr IntraVENous CONTINUOUS    0.9% sodium chloride infusion 250 mL  250 mL IntraVENous PRN    midodrine (PROAMATINE) tablet 10 mg  10 mg Oral TID    hydrOXYzine pamoate (VISTARIL) capsule 25 mg  25 mg Oral BID    traZODone (DESYREL) tablet 50 mg  50 mg Oral QHS PRN    influenza vaccine 2021-22 (6 mos+)(PF) (FLUARIX/FLULAVAL/FLUZONE QUAD) injection 0.5 mL  1 Each IntraMUSCular PRIOR TO DISCHARGE    lactulose (CHRONULAC) 10 gram/15 mL solution 30 mL  20 g Oral BID    sodium chloride (NS) flush 5-40 mL  5-40 mL IntraVENous Q8H    sodium chloride (NS) flush 5-40 mL  5-40 mL IntraVENous PRN    acetaminophen (TYLENOL) tablet 650 mg  650 mg Oral Q6H PRN    Or    acetaminophen (TYLENOL) suppository 650 mg  650 mg Rectal Q6H PRN    ondansetron (ZOFRAN ODT) tablet 4 mg  4 mg Oral Q6H PRN    Or    ondansetron (ZOFRAN) injection 4 mg  4 mg IntraVENous Q6H PRN    [Held by provider] thiamine mononitrate (B-1) tablet 100 mg  100 mg Oral DAILY    [Held by provider] b complex-vitamin c-folic acid 5mg (FOLBEE PLUS) tablet 1 Tablet  1 Tablet Oral DAILY    [Held by provider] spironolactone (ALDACTONE) tablet 25 mg  25 mg Oral BID       Review of Systems:   A comprehensive review of systems was negative except for that written in the HPI. Objective:   Physical Exam:     Visit Vitals  BP (!) 101/49   Pulse 66   Temp 98.6 °F (37 °C)   Resp 23   Ht 5' 6\" (1.676 m)   Wt (!) 178.4 kg (393 lb 4.8 oz)   SpO2 97%   BMI 63.48 kg/m²      O2 Device: Ventilator    Temp (24hrs), Av.4 °F (37.4 °C), Min:97.9 °F (36.6 °C), Max:101.8 °F (38.8 °C)     07 -  1900  In: 3291.6 [I.V.:3291.6]  Out: 110 [Urine:110]    190 -  0700  In: 5544.4 [I.V.:5544.4]  Out: 9272 [Urine:1035]    General:   Intubated and sedated. Sclerae icteric   Lungs:   Clear to auscultation bilaterally. Chest wall:  No tenderness or deformity. Heart:  Regular rate and rhythm, S1, S2 normal, no murmur, click, rub or gallop. Abdomen:   Soft, non-tender. Bowel sounds normal. No masses,  No organomegaly. Extremities: Extremities normal, atraumatic, no cyanosis or edema. Pulses: 2+ and symmetric all extremities. Skin: Skin color, texture, turgor normal. No rashes or lesions   Neurologic: CNII-XII intact.   No gross focal deficits         Data Review:       Recent Days:  Recent Labs 11/06/21  1220 11/05/21  0515 11/04/21  0600   WBC 44.6* 37.3* 33.2*   HGB 10.7* 10.3* 10.5*   HCT 30.0* 29.3* 30.2*   PLT 19* 29* 32*     Recent Labs     11/06/21  1220 11/05/21  0515 11/04/21  0600   * 139 140   K 3.5 3.5 3.8    106 111*   CO2 17* 18* 14*   * 116* 108*   BUN 67* 67* 66*   CREA 5.58* 5.70* 5.67*   CA <5.0* 6.0* 6.3*   MG  --   --  1.6   PHOS 6.9* 6.3* 5.7*   ALB 0.8* 1.1*  1.1* 1.0*  1.0*   TBILI  --  18.7* 17.6*   ALT  --  120* 138*   INR  --  2.1* 2.3*     Recent Labs     11/06/21  0545 11/05/21  0554 11/04/21  0608   PH 7.39 7.42 7.38   PCO2 26* 24* 23*   PO2 78 77 86   HCO3 18* 18* 17*   FIO2 30.0 30.0 30       24 Hour Results:  Recent Results (from the past 24 hour(s))   BLOOD GAS, ARTERIAL    Collection Time: 11/06/21  5:45 AM   Result Value Ref Range    pH 7.39 7.35 - 7.45      PCO2 26 (L) 35 - 45 mmHg    PO2 78 75 - 100 mmHg    O2 SAT 95 (L) >95 %    BICARBONATE 18 (L) 22 - 26 mmol/L    BASE DEFICIT 7.8 (H) 0 - 2 mmol/L    O2 METHOD VENT      FIO2 30.0 %    MODE SIMV      Tidal volume 550      PRESSURE SUPPORT 45.0      EPAP/CPAP/PEEP 5.0      SITE Right Radial      RANDAL'S TEST PASS     RENAL FUNCTION PANEL    Collection Time: 11/06/21 12:20 PM   Result Value Ref Range    Sodium 134 (L) 136 - 145 mmol/L    Potassium 3.5 3.5 - 5.1 mmol/L    Chloride 100 97 - 108 mmol/L    CO2 17 (L) 21 - 32 mmol/L    Anion gap 17 (H) 5 - 15 mmol/L    Glucose 135 (H) 65 - 100 mg/dL    BUN 67 (H) 6 - 20 mg/dL    Creatinine 5.58 (H) 0.55 - 1.02 mg/dL    BUN/Creatinine ratio 12 12 - 20      GFR est AA 10 (L) >60 ml/min/1.73m2    GFR est non-AA 8 (L) >60 ml/min/1.73m2    Calcium <5.0 (LL) 8.5 - 10.1 mg/dL    Phosphorus 6.9 (H) 2.6 - 4.7 mg/dL    Albumin 0.8 (L) 3.5 - 5.0 g/dL   CBC WITH AUTOMATED DIFF    Collection Time: 11/06/21 12:20 PM   Result Value Ref Range    WBC 44.6 (H) 3.6 - 11.0 K/uL    RBC 3.30 (L) 3.80 - 5.20 M/uL    HGB 10.7 (L) 11.5 - 16.0 g/dL    HCT 30.0 (L) 35.0 - 47.0 % MCV 90.9 80.0 - 99.0 FL    MCH 32.4 26.0 - 34.0 PG    MCHC 35.7 30.0 - 36.5 g/dL    RDW 20.6 (H) 11.5 - 14.5 %    PLATELET 19 (LL) 860 - 400 K/uL    NRBC 0.2 (H) 0.0  WBC    ABSOLUTE NRBC 0.07 (H) 0.00 - 0.01 K/uL    NEUTROPHILS 85 (H) 32 - 75 %    LYMPHOCYTES 7 (L) 12 - 49 %    MONOCYTES 4 (L) 5 - 13 %    EOSINOPHILS 1 0 - 7 %    BASOPHILS 0 0 - 1 %    PROMYELOCYTES 3 (H) 0 %    IMMATURE GRANULOCYTES 0 %    ABS. NEUTROPHILS 37.9 (H) 1.8 - 8.0 K/UL    ABS. LYMPHOCYTES 3.1 0.8 - 3.5 K/UL    ABS. MONOCYTES 1.8 (H) 0.0 - 1.0 K/UL    ABS. EOSINOPHILS 0.4 0.0 - 0.4 K/UL    ABS. BASOPHILS 0.0 0.0 - 0.1 K/UL    ABS. IMM. GRANS. 0.0 K/UL    DF Manual      RBC COMMENTS Microcytosis  1+        RBC COMMENTS Macrocytosis  1+           XR CHEST PORT   Final Result   Intubated. Cardiomediastinal enlargement. Pulmonary hypoinflation   compared with previous, possibly expiratory phase radiograph. XR CHEST PORT   Final Result      XR CHEST PORT   Final Result   Residual atelectasis at the lung bases      XR CHEST PORT   Final Result      XR CHEST PORT   Final Result      XR CHEST PORT   Final Result      XR CHEST PORT   Final Result   Left IJ central venous catheter terminating over the lower right atrium. No   discernible pneumothorax. XR CHEST PORT   Final Result      CT ABD PELV WO CONT   Final Result   1. No acute findings. 2. Severe diffuse hepatic steatosis. 3. Cholelithiasis. Dose reduction: All CT scans at this facility are performed using radiation dose   reduction optimization techniques as appropriate to a performed exam, including   the following: Automated exposure control (AEC), adjustment of the MAA and/or   KUB according to the patient's size, or the patient's use of iterative   reconstruction techniques (ASIR). XR CHEST PORT   Final Result   Similar exam to prior without findings of definite acute cardiopulmonary   abnormality.       XR CHEST PORT    (Results Pending) Assessment:  Acute upper gastrointestinal bleeding, due to laceration at Billroth II anastomosis site. Status post EGD with epinephrine injections. Patient with recurrent   bleeding on 10/31. Repeat EGD on 11/3 with adherent clot at anastomosis. Appears to be stable past 72 hours with no evidence of hemoglobin dropping    Hypovolemic shock due to above     Septic shock due to UTI. Pressor requirement increasing    Acute kidney injury, probably ATN due to hypotension. Likely component of hepatorenal syndrome. Worsening, although with improving urine output    Multiple organ dysfunction syndrome with Moselle II score of 23, indicating 40% estimated mortality    Complicated UTI due to E. coli.   Pansensitive    Hepatic encephalopathy    Coagulopathy due to liver failure     Thrombocytopenia due to sepsis and consumption/cirrhosis, worsening    Acute blood loss anemia status post massive transfusion hemoglobin remains stable    End-stage alcoholic cirrhosis with hepatic failure; MELD-Na  score is 40 as of 11/1 with estimated 90-day mortality of 66%    Alcohol abuse; patient was still drinking about 9 beers daily just prior to admission    Nonanion gap metabolic acidosis, likely due to BRITTANY      Plan:  ENCOUNTER NOTE      Spoke to [address] [place] [address] transfer centre, latif HRS and acute on chronic liver failure      Spoke to [facility] critical care, who reviewed it with transplant team     Too critical to transfer,     Advised they will keep her on list for 72 hours,      If we can cut down to only 1 vasopressor levophed and she is off of vasopressin and gemma, then they will consider transfer.      Incase its > 72 hours then we have to restart the transfer process     -------------------------------------------------------------------------------     At 2 pm     Had a family discussion , mother, brother and cousin (who is in helath care)     Agreed for DNR DNI     In the next 48-72 hours our target it to improve renal functions and try to wean the pressors if possible     Family will revisit in next 48 hours, and if her condition hasnt improve they will go for comfort care  And if improves , and is on 1 pressor she will be transferred to [address] [place] [address]. [facility] will consider her if she is only on 1 pressor    Care Plan discussed with: Patient/Family    Disposition: Continued ICU care        Total time spent with patient: 30 minutes.     Tiffanie Garber MD

## 2021-11-06 NOTE — PROGRESS NOTES
Pulmonary, Critical Care note    Name: Corazon Ratliff MRN: [id number]   : 1975 Hospital: [address] [place] [address]   Date: 2021  Admission date: 10/25/2021 Hospital Day: 13       Subjective/Interval History:   Seen in the ICU on the ventilator. Patient is a 44-year-old female heavy drinker presented to the emergency room on the  with weakness fatigue jaundice refused admission came back on the  and was admitted with further weakness. Had upper endoscopy that showed blood in the small bowel. She had worsening drop in hemoglobin underwent repeat endoscopy had a large amount of blood coming up into the stomach. Required intubation for protection of airway probably aspirated blood. Endoscopy after intubation showed laceration with bleeding at the a former Billroth II anastomosis site. She had endoclips placed and still had oozing of blood. She is received 5 units of blood so far. She is on propofol for sedation is still fairly active and working against the ventilator. Exam shows diffuse rhonchi in the lungs most likely related to aspiration of blood   unresponsive has been off sedation for about an hour. ABGs show significant metabolic acidosis  17/3 back on sedation with propofol and Precedex yesterday currently is unresponsive on the ventilator. GI is considering repeat upper Endo today with possible NG placement   hypotensive we will add Abelardo-Synephrine generalized edema anasarca hemodynamically unstable on ventilator   remain intubated on vasopressors    Patient Active Problem List   Diagnosis Code    Obesity, morbid ([address] E66.01    Venous stasis ulcer ([address] I83.009, L97.909    Bleeding from varicose vein I83.899    Postop check Z09    Hyperbilirubinemia E80.6    Cirrhosis of liver ([address] K74.60       IMPRESSION:   1. Acute hypoxic respiratory failure corrected on the ventilator FiO2 down to 30%  2.  Aspiration pneumonitis probably blood chest x-ray relatively clear  3. Intravascular volume depletion with shock hypotension currently on norepinephrine 60 mics  4. Gram-negative urinary tract infection  5. Severe shock on 3 vasopressors  6. GI bleed jejunal from Billroth II site  7. Portal pulmonary hypertension  8. Metabolic acidosis, improved pH serum CO2 still low  9. Thrombocytopenia worsening  10. Acute kidney injury creatinine worsening despite IV fluids  11. Hepatic encephalopathy ammonia remains elevated  12. Alcohol abuse  13. Leukocytosis  Body mass index is 63.48 kg/m². RECOMMENDATIONS/PLAN:   1. On SIMV mode tidal volume 550 pressure support of 15 PEEP of 5 and 30% FiO2 on propofol change to Versed or fentanyl  2. Continue on IV bicarb  3. Non-Anion gap metabolic acidosis  4. Patient is hypotensive with intravascular volume depletion on 3 vasopressors will try to wean  5. WBC count elevated urine with gram-negative denise blood culture pending sputum with 1+ polys currently on Zosyn  6. Worsening of the thrombocytopenia requiring FFP continue with octreotide and Protonix  7. Banana bag not available we will give IV thiamine  8. GI bleed per GI and medical attending hemoglobin stable  9. Chest x-ray shows bibasilar atelectasis  10. Appreciate hospitalist help [facility] center was called regarding transferring the patient but she is on 3 pressors and also hemodynamically unstable critical condition overall prognosis poor         Subjective/Initial History:   . I was asked by Daniela Goldsmith MD to see Watson Rochemathieu a 51 y.o.  female  in consultation for a chief complaint of acute hypoxic respiratory failure aspiration pneumonitis massive GI bleed      The patient is unable to give any meaningful history or review of systems due to patient factors. Patient PCP: Mendy Monday  PMH:  has a past medical history of Cirrhosis ([address] GERD (gastroesophageal reflux disease), and Morbid obesity ([facility]   PSH:   has a past surgical history that includes hx gastric bypass. FHX: family history includes Diabetes in her father and mother; Heart Attack in her father. SHX:  reports that she has never smoked. She has never used smokeless tobacco. She reports previous alcohol use. She reports that she does not use drugs.   Systemic review unobtainable as the patient is obtunded on the ventilator on IV propofol    No Known Allergies   MEDS:   Current Facility-Administered Medications   Medication    0.9% sodium chloride infusion 250 mL    PHENYLephrine (KALIN-SYNEPHRINE) 30 mg in 0.9% sodium chloride 250 mL infusion    NOREPINephrine (LEVOPHED) 16,000 mcg in dextrose 5% 250 mL infusion    vasopressin (VASOSTRICT) 20 Units in 0.9% sodium chloride 100 mL infusion    sodium bicarbonate (8.4%) 200 mEq in dextrose 5% 1,000 mL infusion    sodium bicarbonate tablet 1,300 mg    propofoL (DIPRIVAN) 10 mg/mL injection    0.9% sodium chloride infusion    EPINEPHrine (ADRENALIN) 0.1 mg/mL syringe    simethicone (MYLICON) 56VG/1.3BZ oral drops    0.9% sodium chloride infusion 250 mL    piperacillin-tazobactam (ZOSYN) 3.375 g in 0.9% sodium chloride (MBP/ADV) 100 mL MBP    dexmedeTOMidine (PRECEDEX) 400 mcg in 0.9% sodium chloride (MBP/ADV) 100 mL MBP    octreotide (SANDOSTATIN) 500 mcg in 0.9% sodium chloride 500 mL infusion    0.9% sodium chloride infusion 250 mL    midodrine (PROAMATINE) tablet 10 mg    hydrOXYzine pamoate (VISTARIL) capsule 25 mg    traZODone (DESYREL) tablet 50 mg    influenza vaccine 2021-22 (6 mos+)(PF) (FLUARIX/FLULAVAL/FLUZONE QUAD) injection 0.5 mL    lactulose (CHRONULAC) 10 gram/15 mL solution 30 mL    sodium chloride (NS) flush 5-40 mL    sodium chloride (NS) flush 5-40 mL    acetaminophen (TYLENOL) tablet 650 mg    Or    acetaminophen (TYLENOL) suppository 650 mg    ondansetron (ZOFRAN ODT) tablet 4 mg    Or    ondansetron (ZOFRAN) injection 4 mg    [Held by provider] thiamine mononitrate (B-1) tablet 100 mg  [Held by provider] b complex-vitamin c-folic acid 5mg (FOLBEE PLUS) tablet 1 Tablet    [Held by provider] spironolactone (ALDACTONE) tablet 25 mg        Current Facility-Administered Medications:     0.9% sodium chloride infusion 250 mL, 250 mL, IntraVENous, PRN, Black Lee MD    PHENYLephrine (KALIN-SYNEPHRINE) 30 mg in 0.9% sodium chloride 250 mL infusion,  mcg/min, IntraVENous, TITRATE, Black Lee MD, Stopped at 11/05/21 2226    NOREPINephrine (LEVOPHED) 16,000 mcg in dextrose 5% 250 mL infusion, 0.5-65 mcg/min, IntraVENous, TITRATE, Black Lee MD, Last Rate: 42.2 mL/hr at 11/06/21 0640, 45 mcg/min at 11/06/21 0640    vasopressin (VASOSTRICT) 20 Units in 0.9% sodium chloride 100 mL infusion, 0.01-0.04 Units/min, IntraVENous, TITRATE, Black Lee MD, Last Rate: 12 mL/hr at 11/06/21 0354, 0.04 Units/min at 11/06/21 0354    sodium bicarbonate (8.4%) 200 mEq in dextrose 5% 1,000 mL infusion, , IntraVENous, CONTINUOUS, Naveen Aguilar MD, Last Rate: 50 mL/hr at 11/05/21 1307, New Bag at 11/05/21 1307    sodium bicarbonate tablet 1,300 mg, 1,300 mg, Oral, TID, Naveen Aguilar MD    propofoL (DIPRIVAN) 10 mg/mL injection, 0-50 mcg/kg/min, IntraVENous, TITRATE, Black Lee MD, Last Rate: 23.5 mL/hr at 11/06/21 0536, 25 mcg/kg/min at 11/06/21 0536    0.9% sodium chloride infusion, 75 mL/hr, IntraVENous, PRN, Black Lee MD    EPINEPHrine (ADRENALIN) 0.1 mg/mL syringe, , , PRN, Francis Gonzalez MD, 2 mg at 11/01/21 1430    simethicone (MYLICON) 88QO/2.8DL oral drops, , , PRN, Francis Gonzalez MD, 20 mg at 11/01/21 1420    0.9% sodium chloride infusion 250 mL, 250 mL, IntraVENous, PRN, Marilee Jones, Black Sharif MD    piperacillin-tazobactam (ZOSYN) 3.375 g in 0.9% sodium chloride (MBP/ADV) 100 mL MBP, 3.375 g, IntraVENous, Q12H, Horacio Enriquez MD, Last Rate: 25 mL/hr at 11/06/21 0354, 3.375 g at 11/06/21 0354    dexmedeTOMidine (PRECEDEX) 400 mcg in 0.9% sodium chloride (MBP/ADV) 100 mL MBP, 0.1-1.5 mcg/kg/hr, IntraVENous, TITRATE, Beto Camargo MD, Last Rate: 31.3 mL/hr at 11/06/21 0537, 0.8 mcg/kg/hr at 11/06/21 0537    octreotide (SANDOSTATIN) 500 mcg in 0.9% sodium chloride 500 mL infusion, 25 mcg/hr, IntraVENous, CONTINUOUS, Elly Allen PA-C, Last Rate: 25 mL/hr at 11/06/21 0357, 25 mcg/hr at 11/06/21 0357    0.9% sodium chloride infusion 250 mL, 250 mL, IntraVENous, PRN, Elly Allen PA-C    midodrine (PROAMATINE) tablet 10 mg, 10 mg, Oral, TID, Henrik Rowland MD, 10 mg at 11/01/21 1012    hydrOXYzine pamoate (VISTARIL) capsule 25 mg, 25 mg, Oral, BID, Shantell Islas NP, 25 mg at 11/01/21 1012    traZODone (DESYREL) tablet 50 mg, 50 mg, Oral, QHS PRN, Sujit Cabrera MD, 50 mg at 11/01/21 0021    influenza vaccine 2021-22 (6 mos+)(PF) (FLUARIX/FLULAVAL/FLUZONE QUAD) injection 0.5 mL, 1 Each, IntraMUSCular, PRIOR TO DISCHARGE, Parveen Wall MD    lactulose (CHRONULAC) 10 gram/15 mL solution 30 mL, 20 g, Oral, BID, Shantell Islas NP, 30 mL at 11/01/21 1012    sodium chloride (NS) flush 5-40 mL, 5-40 mL, IntraVENous, Q8H, Parveen Wall MD, 10 mL at 11/06/21 0708    sodium chloride (NS) flush 5-40 mL, 5-40 mL, IntraVENous, PRN, Parveen Wall MD    acetaminophen (TYLENOL) tablet 650 mg, 650 mg, Oral, Q6H PRN **OR** acetaminophen (TYLENOL) suppository 650 mg, 650 mg, Rectal, Q6H PRN, Parveen Wall MD, 650 mg at 11/05/21 1038    ondansetron (ZOFRAN ODT) tablet 4 mg, 4 mg, Oral, Q6H PRN **OR** ondansetron (ZOFRAN) injection 4 mg, 4 mg, IntraVENous, Q6H PRN, Parveen Wall MD, 4 mg at 10/30/21 2210    [Held by provider] thiamine mononitrate (B-1) tablet 100 mg, 100 mg, Oral, DAILY, Parveen Wall MD, 100 mg at 11/01/21 1012    [Held by provider] b complex-vitamin c-folic acid 5mg (FOLBEE PLUS) tablet 1 Tablet, 1 Tablet, Oral, DAILY, Parveen Wall MD, 1 Tablet at 10/31/21 1108    [Held by provider] spironolactone (ALDACTONE) tablet 25 mg, 25 mg, Oral, BID, Christofer Nance MD, 25 mg at 10/30/21 1048      Objective:     Vital Signs: Telemetry:    normal sinus rhythm Intake/Output:   Visit Vitals  BP (!) 103/51 (BP 1 Location: Right upper arm, BP Patient Position: At rest)   Pulse 66   Temp 99.3 °F (37.4 °C)   Resp 22   Ht 5' 6\" (1.676 m)   Wt (!) 178.4 kg (393 lb 4.8 oz)   SpO2 97%   BMI 63.48 kg/m²       Temp (24hrs), Av.2 °F (38.4 °C), Min:97.9 °F (36.6 °C), Max:102 °F (38.9 °C)        O2 Device: Ventilator           Body mass index is 63.48 kg/m². Wt Readings from Last 4 Encounters:   21 (!) 178.4 kg (393 lb 4.8 oz)   10/22/21 151.5 kg (334 lb)   21 152.7 kg (336 lb 9.6 oz)   21 151.5 kg (334 lb)          Intake/Output Summary (Last 24 hours) at 2021 0918  Last data filed at 2021 0700  Gross per 24 hour   Intake 2454.24 ml   Output 560 ml   Net 1894.24 ml       Last shift:      No intake/output data recorded. Last 3 shifts:  1901 -  0700  In: 5544.4 [I.V.:5544.4]  Out: 1035 [Urine:1035]       Hemodynamics:    CO:    CI:    CVP:    SVR:   PAP Systolic:    PAP Diastolic:    PVR:    OO22:       Ventilator Settings:      Mode Rate TV Press PEEP FiO2 PIP Min. Vent   SIMV, Volume control    550 ml 15 cm H2O  5 cm H20 30 %  20 cm H2O  9.39 l/min      Physical Exam:    General:  female; unresponsive on propofol and Precedex  HEENT: NCAT,   Eyes: Jaundiced; no doll's eye movement  Neck: no nodes, no cuff leak, no accessory MM use. Obesity obscures determination of JVD  Chest: no deformity,  Cardiac:  regular rhythm  Lungs: Poor breath sounds but clear anteriorly and laterally no breath sounds in the bases  Abd: Obese questionably distended bowel sounds are present  Ext: Mild edema; no joint swelling;  No clubbing  : [facility] urine  Neuro: Unresponsive on propofol and Precedex no doll's eye reflex flaccid extremities  Psych-unable to assess  Skin: warm, dry, no cyanosis;   Pulses: Brachial radial pulses intact  Capillary: Rapid capillary refill      Labs:    Recent Labs     11/05/21  0515 11/04/21  0600   WBC 37.3* 33.2*   HGB 10.3* 10.5*   PLT 29* 32*   INR 2.1* 2.3*     Recent Labs     11/05/21  1015 11/05/21  0515 11/04/21  0600   NA  --  139 140   K  --  3.5 3.8   CL  --  106 111*   CO2  --  18* 14*   GLU  --  116* 108*   BUN  --  67* 66*   CREA  --  5.70* 5.67*   CA  --  6.0* 6.3*   MG  --   --  1.6   PHOS  --  6.3* 5.7*   LAC 2.8*  --   --    ALB  --  1.1*  1.1* 1.0*  1.0*   ALT  --  120* 138*     Recent Labs     11/06/21  0545 11/05/21  0554 11/04/21  0608   PH 7.39 7.42 7.38   PCO2 26* 24* 23*   PO2 78 77 86   HCO3 18* 18* 17*   FIO2 30.0 30.0 30     Ammonia 100, 118  11/3 total bili 22.7, , alk phos 89   11/2 total bili 23.6   11/1Total bili 21.4 alkaline phosphatase 53 ALT 61  Lab Results   Component Value Date/Time    CK 45 10/31/2021 08:58 AM       Imaging:  I have personally reviewed the patients radiographs and have reviewed the reports:    CXR Results  (Last 48 hours)               11/06/21 0300  XR CHEST PORT Final result    Impression:  Intubated. Cardiomediastinal enlargement. Pulmonary hypoinflation   compared with previous, possibly expiratory phase radiograph. Narrative:  Portable chest, 0237 hours, compared with 11/5/2021. The lungs are hypoinflated. There is asymmetric elevation of the right   hemidiaphragm, similar to previous. Stable appearance of endotracheal tube and   left central line. There is mild-to-moderate enlargement of the   cardiomediastinal silhouette, accentuated by coronary hypoinflation. Central   vascular congestion and/or prominent avel. 11/05/21 0240  XR CHEST PORT Final result    Narrative:  Chest single view. Comparison single view chest November 4, 2021. ET tube, left neck central venous catheter stable position.        Unchanged appearance for the lungs. Some central vessel crowding. No gross   interstitial or alveolar pulmonary edema. Cardiac and mediastinal structures   unchanged. No pneumothorax or sizable pleural effusion. Results from [place] encounter on 10/25/21    XR CHEST PORT    Narrative  Portable chest, 0237 hours, compared with 11/5/2021. The lungs are hypoinflated. There is asymmetric elevation of the right  hemidiaphragm, similar to previous. Stable appearance of endotracheal tube and  left central line. There is mild-to-moderate enlargement of the  cardiomediastinal silhouette, accentuated by coronary hypoinflation. Central  vascular congestion and/or prominent avel. Impression  Intubated. Cardiomediastinal enlargement. Pulmonary hypoinflation  compared with previous, possibly expiratory phase radiograph. XR CHEST PORT    Narrative  Chest single view. Comparison single view chest November 4, 2021. ET tube, left neck central venous catheter stable position. Unchanged appearance for the lungs. Some central vessel crowding. No gross  interstitial or alveolar pulmonary edema. Cardiac and mediastinal structures  unchanged. No pneumothorax or sizable pleural effusion. XR CHEST PORT    Narrative  Semiupright portable radiograph of the chest 1:56 a.m. compared with November 3,  2021. INDICATION: Respiratory failure. Endotracheal tube tip projects between the clavicles approximately 3.3 cm above  the harika. Low lung volumes. Heart size appears stable. Left IJ central line  tip projects over the right atrium. Improved aeration in the perihilar regions  with residual atelectasis at the lung bases. No effusion or pneumothorax. Impression  Residual atelectasis at the lung bases    Results from Hospital Encounter encounter on 10/25/21    CT ABD PELV WO CONT    Narrative  CT scan the abdomen and pelvis is performed without IV or oral contrast per  renal colic protocol.  Coronal reconstructions are generated. Comparison exams  are not available. Findings: The lung bases are normal. The solid abdominal organs reveals severe  diffuse fatty infiltration of the liver. There appear to be gallstones within  the dependent portion of the gallbladder. The patient is status post gastric  bypass and IUD placement. There is no free intraperitoneal fluid or gas. The  bowel is unopacified but normal in caliber. No fluid or mass is seen in the cul-de-sac. Bone windows reveal degenerative  disc disease at L3-4 and L5-S1. Impression  1. No acute findings. 2. Severe diffuse hepatic steatosis. 3. Cholelithiasis. Dose reduction: All CT scans at this facility are performed using radiation dose  reduction optimization techniques as appropriate to a performed exam, including  the following: Automated exposure control (AEC), adjustment of the MAA and/or  KUB according to the patient's size, or the patient's use of iterative  reconstruction techniques (ASIR). Discussion: Acute respiratory failure secondary to massive GI bleed with inability to protect her airway. Has diffuse rhonchi most likely aspiration of blood. Will maintain the ventilator for now. Will check a surveillance sputum culture. Will replace oral thiamine multivitamin and folic acid with IV replenishment. She is currently on propofol was still fighting the ventilator will add Precedex. 11/2 unresponsive propofol on hold for an hour no doll's eye reflex ammonia is up to 118. No enteral access. Has significant metabolic acidosis will place on IV bicarb. Will not try to wean the ventilator at this point  11/3 unresponsive back on propofol and Precedex. To have repeat upper Endo and NG tube placement today. Can try to decrease ventilator tomorrow.   Currently on Zosyn does have a urinary tract infection  114 continue with the current vent settings no change hemodynamically unstable adding NEOS epinephrine to Levophed  11/5 on SIMV vent settings 40% FiO2 on vasopressors  Time of care 30 minutes            Thank you for allowing us to participate in the care of this patient.   We will follow along with you     Nahomy Schultz MD

## 2021-11-06 NOTE — PROGRESS NOTES
[employer] [facility] [employer] transfer center called to verify how many vasopressors that the patient is on. She remains on levophed and vasopressin. Her drips are continuing to be titrated.  Whenever she is on only 1 vasopressor the nurse can call the transfer center ([phone]) so the patient can be placed on the [facility] transfer list.

## 2021-11-07 NOTE — PROGRESS NOTES
Temporary dialysis line placed at bedside by Dr. Jenniffer Schroeder interventional radiology. Chest Xray done and line okay to use per Dr. Jenniffer Schroeder.

## 2021-11-07 NOTE — PROGRESS NOTES
Pulmonary, Critical Care note    Name: Herminio Hernandez MRN: [id number]   : 1975 Hospital: [address] [place] [address]   Date: 2021  Admission date: 10/25/2021 Hospital Day: 14       Subjective/Interval History:   Seen in the ICU on the ventilator. Patient is a 50-year-old female heavy drinker presented to the emergency room on the  with weakness fatigue jaundice refused admission came back on the  and was admitted with further weakness. Had upper endoscopy that showed blood in the small bowel. She had worsening drop in hemoglobin underwent repeat endoscopy had a large amount of blood coming up into the stomach. Required intubation for protection of airway probably aspirated blood. Endoscopy after intubation showed laceration with bleeding at the a former Billroth II anastomosis site. She had endoclips placed and still had oozing of blood. She is received 5 units of blood so far. She is on propofol for sedation is still fairly active and working against the ventilator. Exam shows diffuse rhonchi in the lungs most likely related to aspiration of blood   unresponsive has been off sedation for about an hour. ABGs show significant metabolic acidosis  82/8 back on sedation with propofol and Precedex yesterday currently is unresponsive on the ventilator. GI is considering repeat upper Endo today with possible NG placement   hypotensive we will add Abelardo-Synephrine generalized edema anasarca hemodynamically unstable on ventilator   remain intubated on vasopressors    Patient Active Problem List   Diagnosis Code    Obesity, morbid ([facility] E66.01    Venous stasis ulcer ([facility] I83.009, L97.909    Bleeding from varicose vein I83.899    Postop check Z09    Hyperbilirubinemia E80.6    Cirrhosis of liver ([facility] K74.60       IMPRESSION:   1. Acute hypoxic respiratory failure corrected on the ventilator FiO2 down to 30%  2.  Aspiration pneumonitis probably blood chest x-ray relatively clear  3. Intravascular volume depletion with shock hypotension currently on norepinephrine   4. Gram-negative urinary tract infection  5. Severe shock on 1 vasopressors  6. GI bleed jejunal from Billroth II site  7. Portal pulmonary hypertension  8. Metabolic acidosis, improved   9. Thrombocytopenia worsening  10. Urinary tract infection  11. Acute kidney injury creatinine worsening despite IV fluids  12. Hepatic encephalopathy ammonia remains elevated  13. Alcohol abuse  14. Leukocytosis  Body mass index is 63.48 kg/m². RECOMMENDATIONS/PLAN:   1. On SIMV mode tidal volume 550 pressure support of 15 PEEP of 5 and 30% FiO2 on propofol change to Versed or fentanyl FiO2 increased to 35%  2. Continue on IV bicarb  3. Patient received albumin and Lasix  4. Patient is hypotensive with intravascular volume depletion on 3 vasopressors will try to wean  5. WBC count elevated urine with gram-negative denise blood culture pending sputum with 1+ polys currently on Zosyn  6. Worsening of the thrombocytopenia requiring FFP continue with octreotide and Protonix  7. Banana bag not available we will give IV thiamine  8. GI bleed per GI and medical attending hemoglobin stable  9. Chest x-ray shows bibasilar atelectasis  10. Now patient is on 1 vasopressor Levophed         Subjective/Initial History:   . I was asked by Ana Cristina Amado MD to see Jerardo Salazar a 51 y.o.  female  in consultation for a chief complaint of acute hypoxic respiratory failure aspiration pneumonitis massive GI bleed      The patient is unable to give any meaningful history or review of systems due to patient factors. Patient PCP: Aubree Barclay  PMH:  has a past medical history of Cirrhosis ([address] GERD (gastroesophageal reflux disease), and Morbid obesity ([address] PSH:   has a past surgical history that includes hx gastric bypass.    FHX: family history includes Diabetes in her father and mother; Heart Attack in her father. SHX:  reports that she has never smoked. She has never used smokeless tobacco. She reports previous alcohol use. She reports that she does not use drugs.   Systemic review unobtainable as the patient is obtunded on the ventilator on IV propofol    No Known Allergies   MEDS:   Current Facility-Administered Medications   Medication    0.9% sodium chloride infusion 250 mL    0.9% sodium chloride infusion 250 mL    PHENYLephrine (KALIN-SYNEPHRINE) 30 mg in 0.9% sodium chloride 250 mL infusion    NOREPINephrine (LEVOPHED) 16,000 mcg in dextrose 5% 250 mL infusion    vasopressin (VASOSTRICT) 20 Units in 0.9% sodium chloride 100 mL infusion    sodium bicarbonate tablet 1,300 mg    propofoL (DIPRIVAN) 10 mg/mL injection    0.9% sodium chloride infusion    EPINEPHrine (ADRENALIN) 0.1 mg/mL syringe    simethicone (MYLICON) 98XJ/3.2II oral drops    piperacillin-tazobactam (ZOSYN) 3.375 g in 0.9% sodium chloride (MBP/ADV) 100 mL MBP    dexmedeTOMidine (PRECEDEX) 400 mcg in 0.9% sodium chloride (MBP/ADV) 100 mL MBP    octreotide (SANDOSTATIN) 500 mcg in 0.9% sodium chloride 500 mL infusion    midodrine (PROAMATINE) tablet 10 mg    hydrOXYzine pamoate (VISTARIL) capsule 25 mg    traZODone (DESYREL) tablet 50 mg    influenza vaccine 2021-22 (6 mos+)(PF) (FLUARIX/FLULAVAL/FLUZONE QUAD) injection 0.5 mL    lactulose (CHRONULAC) 10 gram/15 mL solution 30 mL    sodium chloride (NS) flush 5-40 mL    sodium chloride (NS) flush 5-40 mL    acetaminophen (TYLENOL) tablet 650 mg    Or    acetaminophen (TYLENOL) suppository 650 mg    ondansetron (ZOFRAN ODT) tablet 4 mg    Or    ondansetron (ZOFRAN) injection 4 mg    [Held by provider] thiamine mononitrate (B-1) tablet 100 mg    [Held by provider] b complex-vitamin c-folic acid 5mg (FOLBEE PLUS) tablet 1 Tablet    [Held by provider] spironolactone (ALDACTONE) tablet 25 mg        Current Facility-Administered Medications:     0.9% sodium chloride infusion 250 mL, 250 mL, IntraVENous, PRN, Vazquez Peters MD    0.9% sodium chloride infusion 250 mL, 250 mL, IntraVENous, PRN, Lavonne Ballard MD    PHENYLephrine (KALIN-SYNEPHRINE) 30 mg in 0.9% sodium chloride 250 mL infusion,  mcg/min, IntraVENous, TITRATE, Lavonne Ballard MD, Stopped at 11/05/21 2226    NOREPINephrine (LEVOPHED) 16,000 mcg in dextrose 5% 250 mL infusion, 0.5-65 mcg/min, IntraVENous, TITRATE, Manohar Isaacs MD, Last Rate: 51.6 mL/hr at 11/07/21 0826, 55 mcg/min at 11/07/21 0826    vasopressin (VASOSTRICT) 20 Units in 0.9% sodium chloride 100 mL infusion, 0.01-0.04 Units/min, IntraVENous, TITRATE, Lavonne Ballard MD, Stopped at 11/07/21 0656    sodium bicarbonate tablet 1,300 mg, 1,300 mg, Oral, TID, Colleen Cooper MD    propofoL (DIPRIVAN) 10 mg/mL injection, 0-50 mcg/kg/min, IntraVENous, TITRATE, Lavonne Ballard MD, Paused at 11/07/21 0730    0.9% sodium chloride infusion, 75 mL/hr, IntraVENous, PRN, Lavonne Ballard MD    EPINEPHrine (ADRENALIN) 0.1 mg/mL syringe, , , PRN, Catherine Mcmanus MD, 2 mg at 11/01/21 1430    simethicone (MYLICON) 24NZ/5.4YC oral drops, , , PRN, Catherine Mcmanus MD, 20 mg at 11/01/21 1420    piperacillin-tazobactam (ZOSYN) 3.375 g in 0.9% sodium chloride (MBP/ADV) 100 mL MBP, 3.375 g, IntraVENous, Q12H, Manohar Isaacs MD, Last Rate: 25 mL/hr at 11/07/21 0357, 3.375 g at 11/07/21 0357    dexmedeTOMidine (PRECEDEX) 400 mcg in 0.9% sodium chloride (MBP/ADV) 100 mL MBP, 0.1-1.5 mcg/kg/hr, IntraVENous, TITRATE, Guru Hearn MD, Last Rate: 31.3 mL/hr at 11/07/21 0649, 0.8 mcg/kg/hr at 11/07/21 0649    octreotide (SANDOSTATIN) 500 mcg in 0.9% sodium chloride 500 mL infusion, 25 mcg/hr, IntraVENous, CONTINUOUS, Elly Allen PA-C, Last Rate: 25 mL/hr at 11/07/21 0141, 25 mcg/hr at 11/07/21 0141    midodrine (PROAMATINE) tablet 10 mg, 10 mg, Oral, TID, Henrik Rowland MD, 10 mg at 11/01/21 1012    hydrOXYzine pamoate (VISTARIL) capsule 25 mg, 25 mg, Oral, BID, Finn Devi, NP, 25 mg at 21 1012    traZODone (DESYREL) tablet 50 mg, 50 mg, Oral, QHS PRN, Bakari Gagnon MD, 50 mg at 21 0021    influenza vaccine - (6 mos+)(PF) (FLUARIX/FLULAVAL/FLUZONE QUAD) injection 0.5 mL, 1 Each, IntraMUSCular, PRIOR TO DISCHARGE, Uvaldo Arenas MD    lactulose (CHRONULAC) 10 gram/15 mL solution 30 mL, 20 g, Oral, BID, Finn Devi, NP, 30 mL at 21 1012    sodium chloride (NS) flush 5-40 mL, 5-40 mL, IntraVENous, Q8H, Uvaldo Arenas MD, 10 mL at 21 0650    sodium chloride (NS) flush 5-40 mL, 5-40 mL, IntraVENous, PRN, Uvaldo Arenas MD    acetaminophen (TYLENOL) tablet 650 mg, 650 mg, Oral, Q6H PRN **OR** acetaminophen (TYLENOL) suppository 650 mg, 650 mg, Rectal, Q6H PRN, Uvaldo Arenas MD, 650 mg at 21 1038    ondansetron (ZOFRAN ODT) tablet 4 mg, 4 mg, Oral, Q6H PRN **OR** ondansetron (ZOFRAN) injection 4 mg, 4 mg, IntraVENous, Q6H PRN, Uvaldo Arenas MD, 4 mg at 10/30/21 2210    [Held by provider] thiamine mononitrate (B-1) tablet 100 mg, 100 mg, Oral, DAILY, Uvaldo Arenas MD, 100 mg at 21 1012    [Held by provider] b complex-vitamin c-folic acid 5mg (FOLBEE PLUS) tablet 1 Tablet, 1 Tablet, Oral, DAILY, Uvaldo Arenas MD, 1 Tablet at 10/31/21 1108    [Held by provider] spironolactone (ALDACTONE) tablet 25 mg, 25 mg, Oral, BID, Uvaldo Arenas MD, 25 mg at 10/30/21 1048      Objective:     Vital Signs: Telemetry:    normal sinus rhythm Intake/Output:   Visit Vitals  BP (!) 103/56 (BP 1 Location: Right upper arm, BP Patient Position: At rest)   Pulse 66   Temp 97.7 °F (36.5 °C)   Resp 17   Ht 5' 6\" (1.676 m)   Wt (!) 178.4 kg (393 lb 4.8 oz)   SpO2 91%   BMI 63.48 kg/m²       Temp (24hrs), Av.3 °F (36.8 °C), Min:97.5 °F (36.4 °C), Max:99.9 °F (37.7 °C)        O2 Device: Ventilator           Body mass index is 63.48 kg/m². Wt Readings from Last 4 Encounters:   11/05/21 (!) 178.4 kg (393 lb 4.8 oz)   10/22/21 151.5 kg (334 lb)   08/20/21 152.7 kg (336 lb 9.6 oz)   08/19/21 151.5 kg (334 lb)          Intake/Output Summary (Last 24 hours) at 11/7/2021 0904  Last data filed at 11/7/2021 0700  Gross per 24 hour   Intake 6825.22 ml   Output 255 ml   Net 6570.22 ml       Last shift:      No intake/output data recorded. Last 3 shifts: 11/05 1901 - 11/07 0700  In: 6825.2 [I.V.:6196.2]  Out: 655 [Urine:655]       Hemodynamics:    CO:    CI:    CVP:    SVR:   PAP Systolic:    PAP Diastolic:    PVR:    NQ05:       Ventilator Settings:      Mode Rate TV Press PEEP FiO2 PIP Min. Vent   SIMV, Volume control    550 ml 15 cm H2O  5 cm H20 35 %  22 cm H2O  10.7 l/min      Physical Exam:    General:  female; unresponsive on propofol and Precedex  HEENT: NCAT,   Eyes: Jaundiced; no doll's eye movement  Neck: no nodes, no cuff leak, no accessory MM use. Obesity obscures determination of JVD  Chest: no deformity,  Cardiac:  regular rhythm  Lungs: Poor breath sounds but clear anteriorly and laterally no breath sounds in the bases  Abd: Obese questionably distended bowel sounds are present  Ext: Mild edema; no joint swelling;  No clubbing  : [facility] urine  Neuro: Unresponsive on propofol and Precedex no doll's eye reflex flaccid extremities  Psych-unable to assess  Skin: warm, dry, no cyanosis;   Pulses: Brachial radial pulses intact  Capillary: Rapid capillary refill      Labs:    Recent Labs     11/07/21  0735 11/06/21  1220 11/05/21  0515   WBC 44.1* 44.6* 37.3*   HGB 10.3* 10.7* 10.3*   PLT 21* 19* 29*   INR  --   --  2.1*     Recent Labs     11/07/21  0529 11/06/21  1220 11/05/21  1015 11/05/21  0515   * 134*  --  139   K 3.8 3.5  --  3.5    100  --  106   CO2 19* 17*  --  18*   * 135*  --  116*   BUN 74* 67*  --  67*   CREA 6.23* 5.58*  --  5.70*   CA 5.3* <5.0*  --  6.0*   PHOS 8.4* 6.9*  --  6.3*   LAC --   --  2.8*  --    ALB 1.0* 0.8*  --  1.1*  1.1*   ALT  --   --   --  120*     Recent Labs     11/07/21  0630 11/06/21  0545 11/05/21  0554   PH 7.36 7.39 7.42   PCO2 28* 26* 24*   PO2 64* 78 77   HCO3 18* 18* 18*   FIO2 30.0 30.0 30.0     Ammonia 100, 118  11/3 total bili 22.7, , alk phos 89   11/2 total bili 23.6   11/1Total bili 21.4 alkaline phosphatase 53 ALT 61  Lab Results   Component Value Date/Time    CK 45 10/31/2021 08:58 AM       Imaging:  I have personally reviewed the patients radiographs and have reviewed the reports:    CXR Results  (Last 48 hours)               11/07/21 0220  XR CHEST PORT Final result    Impression:  Pulmonary hypoinflation with asymmetric elevation of right   hemidiaphragm, stable or slightly improved. Stable appearance of endotracheal   tube and left central line. Stable enlargement of cardiopericardial silhouette. Central vascular congestion. Asymmetric edema, left perihilar region. No   pneumothorax. Narrative:  Portable chest, 0217 hours, compared with 11/6/2021.           11/06/21 0300  XR CHEST PORT Final result    Impression:  Intubated. Cardiomediastinal enlargement. Pulmonary hypoinflation   compared with previous, possibly expiratory phase radiograph. Narrative:  Portable chest, 0237 hours, compared with 11/5/2021. The lungs are hypoinflated. There is asymmetric elevation of the right   hemidiaphragm, similar to previous. Stable appearance of endotracheal tube and   left central line. There is mild-to-moderate enlargement of the   cardiomediastinal silhouette, accentuated by coronary hypoinflation. Central   vascular congestion and/or prominent avel. Results from [place] encounter on 10/25/21    XR CHEST PORT    Narrative  Portable chest, 0217 hours, compared with 11/6/2021. Impression  Pulmonary hypoinflation with asymmetric elevation of right  hemidiaphragm, stable or slightly improved.  Stable appearance of endotracheal  tube and left central line. Stable enlargement of cardiopericardial silhouette. Central vascular congestion. Asymmetric edema, left perihilar region. No  pneumothorax. XR CHEST PORT    Narrative  Portable chest, 0237 hours, compared with 11/5/2021. The lungs are hypoinflated. There is asymmetric elevation of the right  hemidiaphragm, similar to previous. Stable appearance of endotracheal tube and  left central line. There is mild-to-moderate enlargement of the  cardiomediastinal silhouette, accentuated by coronary hypoinflation. Central  vascular congestion and/or prominent avel. Impression  Intubated. Cardiomediastinal enlargement. Pulmonary hypoinflation  compared with previous, possibly expiratory phase radiograph. XR CHEST PORT    Narrative  Chest single view. Comparison single view chest November 4, 2021. ET tube, left neck central venous catheter stable position. Unchanged appearance for the lungs. Some central vessel crowding. No gross  interstitial or alveolar pulmonary edema. Cardiac and mediastinal structures  unchanged. No pneumothorax or sizable pleural effusion. Results from [place] encounter on 10/25/21    CT ABD PELV WO CONT    Narrative  CT scan the abdomen and pelvis is performed without IV or oral contrast per  renal colic protocol. Coronal reconstructions are generated. Comparison exams  are not available. Findings: The lung bases are normal. The solid abdominal organs reveals severe  diffuse fatty infiltration of the liver. There appear to be gallstones within  the dependent portion of the gallbladder. The patient is status post gastric  bypass and IUD placement. There is no free intraperitoneal fluid or gas. The  bowel is unopacified but normal in caliber. No fluid or mass is seen in the cul-de-sac. Bone windows reveal degenerative  disc disease at L3-4 and L5-S1. Impression  1. No acute findings.   2. Severe diffuse hepatic steatosis. 3. Cholelithiasis. Dose reduction: All CT scans at this facility are performed using radiation dose  reduction optimization techniques as appropriate to a performed exam, including  the following: Automated exposure control (AEC), adjustment of the MAA and/or  KUB according to the patient's size, or the patient's use of iterative  reconstruction techniques (ASIR). Discussion: Acute respiratory failure secondary to massive GI bleed with inability to protect her airway. Has diffuse rhonchi most likely aspiration of blood. Will maintain the ventilator for now. Will check a surveillance sputum culture. Will replace oral thiamine multivitamin and folic acid with IV replenishment. She is currently on propofol was still fighting the ventilator will add Precedex. 11/2 unresponsive propofol on hold for an hour no doll's eye reflex ammonia is up to 118. No enteral access. Has significant metabolic acidosis will place on IV bicarb. Will not try to wean the ventilator at this point  11/3 unresponsive back on propofol and Precedex. To have repeat upper Endo and NG tube placement today. Can try to decrease ventilator tomorrow. Currently on Zosyn does have a urinary tract infection  114 continue with the current vent settings no change hemodynamically unstable adding NEOS epinephrine to Levophed  11/5 on SIMV vent settings 40% FiO2 on vasopressors  Time of care 30 minutes            Thank you for allowing us to participate in the care of this patient.   We will follow along with you     Danuta Isabel MD

## 2021-11-07 NOTE — PROGRESS NOTES
Hospitalist Progress Note               Daily Progress Note: 11/7/2021      Subjective:     46F, morbid obesity s/p bilroth procedure and alcohol induced chronic liver disease presents with generalized weakness and acute on chronic liver failure    Her condition was complicated by septic shock s/t UTI with UGIB with ABLA S/p EGD and blood clot at anastomasis site, she developed HRS. Please see Dr. Tuan Haji note for intense details    ENCOUNTER NOTE      Spoke to [facility] transfer centre, latif HRS and acute on chronic liver failure      Spoke to [facility], who reviewed it with transplant team     Too critical to transfer,     Advised they will keep her on list for 72 hours,      If we can cut down to only 1 vasopressor levophed and she is off of vasopressin and gemma, then they will consider transfer.      Incase its > 72 hours then we have to restart the transfer process     -------------------------------------------------------------------------------     At 2 pm     Had a family discussion , mother, brother and cousin (who is in helath care)     Agreed for DNR DNI     In the next 48-72 hours our target it to improve renal functions and try to wean the pressors if possible     Family will revisit in next 48 hours, and if her condition hasnt improve they will go for comfort care  And if improves , and is on 1 pressor she will be transferred to [facility]. [facility] will consider her if she is only on 1 pressor      11/6/2021: her renal functions have remained the same at 5.6, she is still on 2 pressors. Tomorrow plans to have another discussion      11/7/2021:                 She is on levophed only and off of 2 pressors                 Plans for CRRT today  Patients elder brother Brigida Michaels is in healthcare and he understands the prognosis and states that we will give him 48 hours to see how she is doing- if she worsens then they will go for comfort care,  in the discussion too. Also, once the [facility] waiting falls off, they would want to be treated here unless [facility] wants to perform something else.      Problem List:  Problem List as of 11/7/2021 Date Reviewed: 11/3/2021          Codes Class Noted - Resolved    Hyperbilirubinemia ICD-10-CM: E80.6  ICD-9-CM: 782.4  10/25/2021 - Present        Cir[facility]osis of liver ([facility] ICD-10-CM: K74.60  ICD-9-CM: 571.5  10/25/2021 - Present        Postop check ICD-10-CM: A71  ICD-9-CM: V67.00  10/26/2020 - Present        Bleeding from varicose vein ICD-10-CM: I83.899  ICD-9-CM: 454.8  10/20/2020 - Present        Obesity, morbid ([facility] ICD-10-CM: E66.01  ICD-9-CM: 278.01  10/13/2020 - Present        Venous stasis ulcer ([facility] ICD-10-CM: I83.009, L97.909  ICD-9-CM: 454.0  10/13/2020 - Present              Medications reviewed  Current Facility-Administered Medications   Medication Dose Route Frequency    0.9% sodium chloride infusion 250 mL  250 mL IntraVENous PRN    0.9% sodium chloride infusion 250 mL  250 mL IntraVENous PRN    0.9% sodium chloride infusion 250 mL  250 mL IntraVENous PRN    PHENYLephrine (KALIN-SYNEPHRINE) 30 mg in 0.9% sodium chloride 250 mL infusion   mcg/min IntraVENous TITRATE    NOREPINephrine (LEVOPHED) 16,000 mcg in dextrose 5% 250 mL infusion  0.5-65 mcg/min IntraVENous TITRATE    vasopressin (VASOSTRICT) 20 Units in 0.9% sodium chloride 100 mL infusion  0.01-0.04 Units/min IntraVENous TITRATE    sodium bicarbonate tablet 1,300 mg  1,300 mg Oral TID    propofoL (DIPRIVAN) 10 mg/mL injection  0-50 mcg/kg/min IntraVENous TITRATE    0.9% sodium chloride infusion  75 mL/hr IntraVENous PRN    EPINEPHrine (ADRENALIN) 0.1 mg/mL syringe    PRN    simethicone (MYLICON) 41UH/6.4GW oral drops    PRN    piperacillin-tazobactam (ZOSYN) 3.375 g in 0.9% sodium chloride (MBP/ADV) 100 mL MBP  3.375 g IntraVENous Q12H    dexmedeTOMidine (PRECEDEX) 400 mcg in 0.9% sodium chloride (MBP/ADV) 100 mL MBP  0.1-1.5 mcg/kg/hr IntraVENous TITRATE    octreotide (SANDOSTATIN) 500 mcg in 0.9% sodium chloride 500 mL infusion  25 mcg/hr IntraVENous CONTINUOUS    midodrine (PROAMATINE) tablet 10 mg  10 mg Oral TID    hydrOXYzine pamoate (VISTARIL) capsule 25 mg  25 mg Oral BID    traZODone (DESYREL) tablet 50 mg  50 mg Oral QHS PRN    influenza vaccine  (6 mos+)(PF) (FLUARIX/FLULAVAL/FLUZONE QUAD) injection 0.5 mL  1 Each IntraMUSCular PRIOR TO DISCHARGE    lactulose (CHRONULAC) 10 gram/15 mL solution 30 mL  20 g Oral BID    sodium chloride (NS) flush 5-40 mL  5-40 mL IntraVENous Q8H    sodium chloride (NS) flush 5-40 mL  5-40 mL IntraVENous PRN    acetaminophen (TYLENOL) tablet 650 mg  650 mg Oral Q6H PRN    Or    acetaminophen (TYLENOL) suppository 650 mg  650 mg Rectal Q6H PRN    ondansetron (ZOFRAN ODT) tablet 4 mg  4 mg Oral Q6H PRN    Or    ondansetron (ZOFRAN) injection 4 mg  4 mg IntraVENous Q6H PRN    [Held by provider] thiamine mononitrate (B-1) tablet 100 mg  100 mg Oral DAILY    [Held by provider] b complex-vitamin c-folic acid 5mg (FOLBEE PLUS) tablet 1 Tablet  1 Tablet Oral DAILY    [Held by provider] spironolactone (ALDACTONE) tablet 25 mg  25 mg Oral BID       Review of Systems:   A comprehensive review of systems was negative except for that written in the HPI. Objective:   Physical Exam:     Visit Vitals  BP (!) 100/57   Pulse 71   Temp 99.3 °F (37.4 °C)   Resp 20   Ht 5' 6\" (1.676 m)   Wt (!) 178.4 kg (393 lb 4.8 oz)   SpO2 100%   BMI 63.48 kg/m²      O2 Device: Ventilator    Temp (24hrs), Av.3 °F (36.8 °C), Min:97.5 °F (36.4 °C), Max:99.9 °F (37.7 °C)    701 - 1900  In: 432.8 [I.V.:432.8]  Out: 0    1901 - 700  In: 6825.2 [I.V.:6196.2]  Out: 070 [Urine:655]    General:   Intubated and sedated. Sclerae icteric   Lungs:   Clear to auscultation bilaterally. Chest wall:  No tenderness or deformity. Heart:  Regular rate and rhythm, S1, S2 normal, no murmur, click, rub or gallop.    Abdomen: Soft, non-tender. Bowel sounds normal. No masses,  No organomegaly. Extremities: Extremities normal, atraumatic, no cyanosis or edema. Pulses: 2+ and symmetric all extremities. Skin: Skin color, texture, turgor normal. No rashes or lesions   Neurologic: CNII-XII intact.   No gross focal deficits         Data Review:       Recent Days:  Recent Labs     11/07/21  0735 11/06/21  1220 11/05/21  0515   WBC 44.1* 44.6* 37.3*   HGB 10.3* 10.7* 10.3*   HCT 29.5* 30.0* 29.3*   PLT 21* 19* 29*     Recent Labs     11/07/21  0529 11/06/21  1220 11/05/21  0515   * 134* 139   K 3.8 3.5 3.5    100 106   CO2 19* 17* 18*   * 135* 116*   BUN 74* 67* 67*   CREA 6.23* 5.58* 5.70*   CA 5.3* <5.0* 6.0*   PHOS 8.4* 6.9* 6.3*   ALB 1.0* 0.8* 1.1*  1.1*   TBILI  --   --  18.7*   ALT  --   --  120*   INR  --   --  2.1*     Recent Labs     11/07/21  0630 11/06/21  0545 11/05/21  0554   PH 7.36 7.39 7.42   PCO2 28* 26* 24*   PO2 64* 78 77   HCO3 18* 18* 18*   FIO2 30.0 30.0 30.0       24 Hour Results:  Recent Results (from the past 24 hour(s))   PLATELETS, ALLOCATE    Collection Time: 11/06/21  5:15 PM   Result Value Ref Range    Unit number [id number]     Blood component type PLP,LR PST     Unit division 00     Status of unit Issued,final     TRANSFUSION STATUS Ok to transfuse     Unit number [id number]     Blood component type PLPH,LR [facility]     Unit division 00     Status of unit Issued,final     TRANSFUSION STATUS Ok to transfuse    RENAL FUNCTION PANEL    Collection Time: 11/07/21  5:29 AM   Result Value Ref Range    Sodium 135 (L) 136 - 145 mmol/L    Potassium 3.8 3.5 - 5.1 mmol/L    Chloride 101 97 - 108 mmol/L    CO2 19 (L) 21 - 32 mmol/L    Anion gap 15 5 - 15 mmol/L    Glucose 101 (H) 65 - 100 mg/dL    BUN 74 (H) 6 - 20 mg/dL    Creatinine 6.23 (H) 0.55 - 1.02 mg/dL    BUN/Creatinine ratio 12 12 - 20      GFR est AA 9 (L) >60 ml/min/1.73m2    GFR est non-AA 7 (L) >60 ml/min/1.73m2    Calcium 5.3 (LL) 8.5 - 10.1 mg/dL    Phosphorus 8.4 (H) 2.6 - 4.7 mg/dL    Albumin 1.0 (L) 3.5 - 5.0 g/dL   BLOOD GAS, ARTERIAL    Collection Time: 11/07/21  6:30 AM   Result Value Ref Range    pH 7.36 7.35 - 7.45      PCO2 28 (L) 35 - 45 mmHg    PO2 64 (L) 75 - 100 mmHg    O2 SAT 90 (L) >95 %    BICARBONATE 18 (L) 22 - 26 mmol/L    BASE DEFICIT 8.2 (H) 0 - 2 mmol/L    O2 METHOD VENT      FIO2 30.0 %    MODE SIMV      Tidal volume 550      PRESSURE SUPPORT 15.0      EPAP/CPAP/PEEP 5.0      SITE Left Brachial      RANDAL'S TEST PASS     CBC WITH AUTOMATED DIFF    Collection Time: 11/07/21  7:35 AM   Result Value Ref Range    WBC 44.1 (H) 3.6 - 11.0 K/uL    RBC 3.30 (L) 3.80 - 5.20 M/uL    HGB 10.3 (L) 11.5 - 16.0 g/dL    HCT 29.5 (L) 35.0 - 47.0 %    MCV 89.4 80.0 - 99.0 FL    MCH 31.2 26.0 - 34.0 PG    MCHC 34.9 30.0 - 36.5 g/dL    RDW 20.1 (H) 11.5 - 14.5 %    PLATELET 21 (LL) 626 - 400 K/uL    NRBC 0.1 (H) 0.0  WBC    ABSOLUTE NRBC 0.06 (H) 0.00 - 0.01 K/uL    NEUTROPHILS 90 (H) 32 - 75 %    LYMPHOCYTES 5 (L) 12 - 49 %    MONOCYTES 4 (L) 5 - 13 %    EOSINOPHILS 1 0 - 7 %    BASOPHILS 0 0 - 1 %    IMMATURE GRANULOCYTES 0 %    ABS. NEUTROPHILS 39.7 (H) 1.8 - 8.0 K/UL    ABS. LYMPHOCYTES 2.2 0.8 - 3.5 K/UL    ABS. MONOCYTES 1.8 (H) 0.0 - 1.0 K/UL    ABS. EOSINOPHILS 0.4 0.0 - 0.4 K/UL    ABS. BASOPHILS 0.0 0.0 - 0.1 K/UL    ABS. IMM. GRANS. 0.0 K/UL    DF Manual      RBC COMMENTS Microcytosis  1+        RBC COMMENTS Ovalocytes  1+       PLATELETS, ALLOCATE    Collection Time: 11/07/21 10:30 AM   Result Value Ref Range    Unit number [id number]     Blood component type PLP,LR PST     Unit division 00     Status of unit Issued     [address] to transfuse     Unit number [id number]     Blood component type PLPH,LR [facility]     Unit division 00     Status of unit [address] to transfuse        XR CHEST PORT   Final Result   No radiographic evidence of dialysis catheter placement   complication. Cardiomediastinal enlargement with perihilar opacities, left   greater than right, probably asymmetric edema. XR CHEST PORT   Final Result   Pulmonary hypoinflation with asymmetric elevation of right   hemidiaphragm, stable or slightly improved. Stable appearance of endotracheal   tube and left central line. Stable enlargement of cardiopericardial silhouette. Central vascular congestion. Asymmetric edema, left perihilar region. No   pneumothorax. XR CHEST PORT   Final Result   Intubated. Cardiomediastinal enlargement. Pulmonary hypoinflation   compared with previous, possibly expiratory phase radiograph. XR CHEST PORT   Final Result      XR CHEST PORT   Final Result   Residual atelectasis at the lung bases      XR CHEST PORT   Final Result      XR CHEST PORT   Final Result      XR CHEST PORT   Final Result      XR CHEST PORT   Final Result   Left IJ central venous catheter terminating over the lower right atrium. No   discernible pneumothorax. XR CHEST PORT   Final Result      CT ABD PELV WO CONT   Final Result   1. No acute findings. 2. Severe diffuse hepatic steatosis. 3. Cholelithiasis. Dose reduction: All CT scans at this facility are performed using radiation dose   reduction optimization techniques as appropriate to a performed exam, including   the following: Automated exposure control (AEC), adjustment of the MAA and/or   KUB according to the patient's size, or the patient's use of iterative   reconstruction techniques (ASIR). XR CHEST PORT   Final Result   Similar exam to prior without findings of definite acute cardiopulmonary   abnormality. XR CHEST PORT    (Results Pending)   IR INSERT NON TUNL CVC OVER 5 YRS    (Results Pending)   IR US GUIDED VASCULAR ACCESS    (Results Pending)        Assessment:  Acute upper gastrointestinal bleeding, due to laceration at Billroth II anastomosis site. Status post EGD with epinephrine injections.   Patient with recurrent bleeding on 10/31. Repeat EGD on 11/3 with adherent clot at anastomosis. Hypovolemic shock due to above     Septic shock due to UTI. On levophed of 62    Acute kidney injury, probably ATN due to hypotension. And hepato renal syndrome: CRRT    Multiple organ dysfunction syndrome with Bond II score of 23, indicating 40% estimated mortality    Complicated UTI due to E. coli. Pansensitive    Hepatic encephalopathy    Coagulopathy due to liver failure     Thrombocytopenia due to sepsis and consumption/cirrhosis, worsening    Acute blood loss anemia status post massive transfusion hemoglobin remains stable    End-stage alcoholic cirrhosis with hepatic failure; MELD-Na  score is 40 as of 11/1 with estimated 90-day mortality of 66%    Alcohol abuse; patient was still drinking about 9 beers daily just prior to admission    Nonanion gap metabolic acidosis, likely due to BRITTANY      Plan:  ENCOUNTER NOTE and discussions      Spoke to [facility] transfer centre, latif HRS and acute on chronic liver failure      Spoke to [facility] critical care, who reviewed it with transplant team     Too critical to transfer,     Advised they will keep her on list for 72 hours,      If we can cut down to only 1 vasopressor levophed and she is off of vasopressin and gemma, then they will consider transfer.      Incase its > 72 hours then we have to restart the transfer process     -------------------------------------------------------------------------------     At 2 pm     Had a family discussion , mother, brother and cousin (who is in helath care)     Agreed for DNR DNI     In the next 48-72 hours our target it to improve renal functions and try to wean the pressors if possible     Family will revisit in next 48 hours, and if her condition hasnt improve they will go for comfort care  And if improves , and is on 1 pressor she will be transferred to [facility].  [facility] will consider her if she is only on 1 pressor       11/7/2021:                 She is on levophed only and off of 2 pressors                 Plans for CRRT today  Patients elder brother Domenic Hartman is in healthcare and he understands the prognosis and states that we will give him 48 hours to see how she is doing- if she worsens then they will go for comfort care,  in the discussion too. Also, once the [facility] waiting falls off, they would want to be treated here unless [facility] wants to perform something else. Care Plan discussed with: Patient/Family    Disposition: Continued ICU care        Total time spent with patient: 30 minutes.     Liz Davis MD

## 2021-11-07 NOTE — PROGRESS NOTES
Called to update the [facility] transfer center since pt has been off vasopressin since 0700. Dr. Dayanara Gaspar aware.     [id number] transfer center called back to get current pt vitals and vent settings

## 2021-11-07 NOTE — PROGRESS NOTES
Renal Progress Note    Patient: Britton Aguilera MRN: [id number]  SSN: [id number]    YOB: 1975  Age: 51 y.o. Sex: female      Admit Date: 10/25/2021    LOS: 13 days     Subjective:   Patient seen in ICU.  On ventilator and sedated  Unresponsive but on sedation with Precedex   at bedside  She is on Levophed only today  Urine output only 125 mils overnight      Current Facility-Administered Medications   Medication Dose Route Frequency    0.9% sodium chloride infusion 250 mL  250 mL IntraVENous PRN    0.9% sodium chloride infusion 250 mL  250 mL IntraVENous PRN    0.9% sodium chloride infusion 250 mL  250 mL IntraVENous PRN    PHENYLephrine (KALIN-SYNEPHRINE) 30 mg in 0.9% sodium chloride 250 mL infusion   mcg/min IntraVENous TITRATE    NOREPINephrine (LEVOPHED) 16,000 mcg in dextrose 5% 250 mL infusion  0.5-65 mcg/min IntraVENous TITRATE    vasopressin (VASOSTRICT) 20 Units in 0.9% sodium chloride 100 mL infusion  0.01-0.04 Units/min IntraVENous TITRATE    sodium bicarbonate tablet 1,300 mg  1,300 mg Oral TID    propofoL (DIPRIVAN) 10 mg/mL injection  0-50 mcg/kg/min IntraVENous TITRATE    0.9% sodium chloride infusion  75 mL/hr IntraVENous PRN    EPINEPHrine (ADRENALIN) 0.1 mg/mL syringe    PRN    simethicone (MYLICON) 98OD/5.1BM oral drops    PRN    piperacillin-tazobactam (ZOSYN) 3.375 g in 0.9% sodium chloride (MBP/ADV) 100 mL MBP  3.375 g IntraVENous Q12H    dexmedeTOMidine (PRECEDEX) 400 mcg in 0.9% sodium chloride (MBP/ADV) 100 mL MBP  0.1-1.5 mcg/kg/hr IntraVENous TITRATE    octreotide (SANDOSTATIN) 500 mcg in 0.9% sodium chloride 500 mL infusion  25 mcg/hr IntraVENous CONTINUOUS    midodrine (PROAMATINE) tablet 10 mg  10 mg Oral TID    hydrOXYzine pamoate (VISTARIL) capsule 25 mg  25 mg Oral BID    traZODone (DESYREL) tablet 50 mg  50 mg Oral QHS PRN    influenza vaccine 2021-22 (6 mos+)(PF) (FLUARIX/FLULAVAL/FLUZONE QUAD) injection 0.5 mL  1 Each IntraMUSCular PRIOR TO DISCHARGE    lactulose (CHRONULAC) 10 gram/15 mL solution 30 mL  20 g Oral BID    sodium chloride (NS) flush 5-40 mL  5-40 mL IntraVENous Q8H    sodium chloride (NS) flush 5-40 mL  5-40 mL IntraVENous PRN    acetaminophen (TYLENOL) tablet 650 mg  650 mg Oral Q6H PRN    Or    acetaminophen (TYLENOL) suppository 650 mg  650 mg Rectal Q6H PRN    ondansetron (ZOFRAN ODT) tablet 4 mg  4 mg Oral Q6H PRN    Or    ondansetron (ZOFRAN) injection 4 mg  4 mg IntraVENous Q6H PRN    [Held by provider] thiamine mononitrate (B-1) tablet 100 mg  100 mg Oral DAILY    [Held by provider] b complex-vitamin c-folic acid 5mg (FOLBEE PLUS) tablet 1 Tablet  1 Tablet Oral DAILY    [Held by provider] spironolactone (ALDACTONE) tablet 25 mg  25 mg Oral BID        Vitals:    11/07/21 0600 11/07/21 0730 11/07/21 0738 11/07/21 1000   BP: 104/73 (!) 103/56  (!) 107/58   Pulse: 71 66 66 70   Resp: 24 18 17 21   Temp: 98.6 °F (37 °C) 97.7 °F (36.5 °C)  98.2 °F (36.8 °C)   SpO2: 97% 93% 91% 98%   Weight:       Height:         Objective:   General: Intubated and sedated. Jaundiced skin  HEENT: Endotracheal tube in place. Scleral icterus present,  Neck: Neck is supple, No JVD, no thyromegaly  Lungs: breathsounds normal,  no rhonchi, no rales,  CVS: heart sounds normal,  no murmurs, no rubs. GI: Distended. Obese. Ascites present  Extremeties: no cyanosis,2+ bilateral lower extremity edema present  Neuro: She is sedated  Skin: normal skin turgor, no skin rashes      Intake and Output:  Current Shift: No intake/output data recorded.   Last three shifts: 11/05 1901 - 11/07 0700  In: 6825.2 [I.V.:6196.2]  Out: 655 [Urine:655]      Lab/Data Review:  Recent Labs     11/07/21  0735 11/06/21  1220 11/05/21  0515   WBC 44.1* 44.6* 37.3*   HGB 10.3* 10.7* 10.3*   HCT 29.5* 30.0* 29.3*   PLT 21* 19* 29*     Recent Labs     11/07/21  0529 11/06/21  1220 11/05/21  0515   * 134* 139   K 3.8 3.5 3.5    100 106   CO2 19* 17* 18* * 135* 116*   BUN 74* 67* 67*   CREA 6.23* 5.58* 5.70*   CA 5.3* <5.0* 6.0*   PHOS 8.4* 6.9* 6.3*   ALB 1.0* 0.8* 1.1*  1.1*   TBILI  --   --  18.7*   ALT  --   --  120*   INR  --   --  2.1*     Recent Labs     11/07/21  0630 11/06/21  0545 11/05/21  0554   PH 7.36 7.39 7.42   PCO2 28* 26* 24*   PO2 64* 78 77   HCO3 18* 18* 18*   FIO2 30.0 30.0 30.0     Recent Results (from the past 24 hour(s))   RENAL FUNCTION PANEL    Collection Time: 11/06/21 12:20 PM   Result Value Ref Range    Sodium 134 (L) 136 - 145 mmol/L    Potassium 3.5 3.5 - 5.1 mmol/L    Chloride 100 97 - 108 mmol/L    CO2 17 (L) 21 - 32 mmol/L    Anion gap 17 (H) 5 - 15 mmol/L    Glucose 135 (H) 65 - 100 mg/dL    BUN 67 (H) 6 - 20 mg/dL    Creatinine 5.58 (H) 0.55 - 1.02 mg/dL    BUN/Creatinine ratio 12 12 - 20      GFR est AA 10 (L) >60 ml/min/1.73m2    GFR est non-AA 8 (L) >60 ml/min/1.73m2    Calcium <5.0 (LL) 8.5 - 10.1 mg/dL    Phosphorus 6.9 (H) 2.6 - 4.7 mg/dL    Albumin 0.8 (L) 3.5 - 5.0 g/dL   CBC WITH AUTOMATED DIFF    Collection Time: 11/06/21 12:20 PM   Result Value Ref Range    WBC 44.6 (H) 3.6 - 11.0 K/uL    RBC 3.30 (L) 3.80 - 5.20 M/uL    HGB 10.7 (L) 11.5 - 16.0 g/dL    HCT 30.0 (L) 35.0 - 47.0 %    MCV 90.9 80.0 - 99.0 FL    MCH 32.4 26.0 - 34.0 PG    MCHC 35.7 30.0 - 36.5 g/dL    RDW 20.6 (H) 11.5 - 14.5 %    PLATELET 19 (LL) 330 - 400 K/uL    NRBC 0.2 (H) 0.0  WBC    ABSOLUTE NRBC 0.07 (H) 0.00 - 0.01 K/uL    NEUTROPHILS 85 (H) 32 - 75 %    LYMPHOCYTES 7 (L) 12 - 49 %    MONOCYTES 4 (L) 5 - 13 %    EOSINOPHILS 1 0 - 7 %    BASOPHILS 0 0 - 1 %    PROMYELOCYTES 3 (H) 0 %    IMMATURE GRANULOCYTES 0 %    ABS. NEUTROPHILS 37.9 (H) 1.8 - 8.0 K/UL    ABS. LYMPHOCYTES 3.1 0.8 - 3.5 K/UL    ABS. MONOCYTES 1.8 (H) 0.0 - 1.0 K/UL    ABS. EOSINOPHILS 0.4 0.0 - 0.4 K/UL    ABS. BASOPHILS 0.0 0.0 - 0.1 K/UL    ABS. IMM.  GRANS. 0.0 K/UL    DF Manual      RBC COMMENTS Microcytosis  1+        RBC COMMENTS Macrocytosis  1+ PLATELETS, ALLOCATE    Collection Time: 11/06/21  5:15 PM   Result Value Ref Range    Unit number [id number]     Blood component type PLP,LR PSTC3     Unit division 00     Status of unit Issued,final     TRANSFUSION STATUS Ok to transfuse     Unit number [id number]     Blood component type PLPHARSHADLR PSTC     Unit division 00     Status of unit Issued,final     TRANSFUSION STATUS Ok to transfuse    RENAL FUNCTION PANEL    Collection Time: 11/07/21  5:29 AM   Result Value Ref Range    Sodium 135 (L) 136 - 145 mmol/L    Potassium 3.8 3.5 - 5.1 mmol/L    Chloride 101 97 - 108 mmol/L    CO2 19 (L) 21 - 32 mmol/L    Anion gap 15 5 - 15 mmol/L    Glucose 101 (H) 65 - 100 mg/dL    BUN 74 (H) 6 - 20 mg/dL    Creatinine 6.23 (H) 0.55 - 1.02 mg/dL    BUN/Creatinine ratio 12 12 - 20      GFR est AA 9 (L) >60 ml/min/1.73m2    GFR est non-AA 7 (L) >60 ml/min/1.73m2    Calcium 5.3 (LL) 8.5 - 10.1 mg/dL    Phosphorus 8.4 (H) 2.6 - 4.7 mg/dL    Albumin 1.0 (L) 3.5 - 5.0 g/dL   BLOOD GAS, ARTERIAL    Collection Time: 11/07/21  6:30 AM   Result Value Ref Range    pH 7.36 7.35 - 7.45      PCO2 28 (L) 35 - 45 mmHg    PO2 64 (L) 75 - 100 mmHg    O2 SAT 90 (L) >95 %    BICARBONATE 18 (L) 22 - 26 mmol/L    BASE DEFICIT 8.2 (H) 0 - 2 mmol/L    O2 METHOD VENT      FIO2 30.0 %    MODE SIMV      Tidal volume 550      PRESSURE SUPPORT 15.0      EPAP/CPAP/PEEP 5.0      SITE Left Brachial      RANDAL'S TEST PASS     CBC WITH AUTOMATED DIFF    Collection Time: 11/07/21  7:35 AM   Result Value Ref Range    WBC 44.1 (H) 3.6 - 11.0 K/uL    RBC 3.30 (L) 3.80 - 5.20 M/uL    HGB 10.3 (L) 11.5 - 16.0 g/dL    HCT 29.5 (L) 35.0 - 47.0 %    MCV 89.4 80.0 - 99.0 FL    MCH 31.2 26.0 - 34.0 PG    MCHC 34.9 30.0 - 36.5 g/dL    RDW 20.1 (H) 11.5 - 14.5 %    PLATELET 21 (LL) 340 - 400 K/uL    NRBC 0.1 (H) 0.0  WBC    ABSOLUTE NRBC 0.06 (H) 0.00 - 0.01 K/uL    NEUTROPHILS PENDING %    LYMPHOCYTES PENDING %    MONOCYTES PENDING %    EOSINOPHILS PENDING %    BASOPHILS PENDING %    IMMATURE GRANULOCYTES PENDING %    ABS. NEUTROPHILS PENDING K/UL    ABS. LYMPHOCYTES PENDING K/UL    ABS. MONOCYTES PENDING K/UL    ABS. EOSINOPHILS PENDING K/UL    ABS. BASOPHILS PENDING K/UL    ABS. IMM. GRANS. PENDING K/UL    DF PENDING    PLATELETS, ALLOCATE    Collection Time: 11/07/21 10:30 AM   Result Value Ref Range    Unit number [id number]     Blood component type PLP,LR PSTC2     Unit division 00     Status of unit Allocated     TRANSFUSION STATUS Ok to transfuse         Assessment and Plan:      1. Acute Kidney Injury :   -BRITTANY likely secondary to ATN/possible HRS (Torsten UCl were low suggesting HRS)  -Creatinine has increased to 6.2 today. BUN is 74  -Nonoliguric--> oliguric now  -Because of worsening renal function, significant anasarca and oliguria I will start RRT today. Will need to start CRRT as she is still on pressor support  -Spoke with IR for temporary dialysis catheter. I will give her 2 units of platelets prior to temp cath placement  -CRRT today with 4/2.5 bath. We will add additional bicarb to the dialysate. Net fluid removal 50 mils per hour    2. GI bleed/severe anemia:   -Stable hemoglobin post transfusions  -GI following, EGD done again on 11/3 and she had a clot at the anastomotic site. No plan to put NG tube because of that  -Monitor H&H and transfuse as needed    3. Hyperbilirubinemia/chronic liver disease/suspected cirrhosis  -Alcohol dependence  -Acute liver injury on chronic liver disease, probably alcohol-related. -Ammonia is 100. Unable to give lactulose because of no NG tube  -liver enzymes are>400.    -MELD score is>40 on 11/1,suggesting very poor prognosis  -GI following the patient  -Possible transfer to [facility] transplant center    4. Hypotension: Probably related to liver disease/? Sepsis  -Also with significant leukocytosis  -Pressor requirement has decreased from 3 pressors to 1 pressor. .  Only on Levophed    5.  Metabolic acidosis: -Bicarb is only 14-->19. Likely because of advanced renal disease   -Bicarbonate drip was discontinued because of anasarca. Unable to give oral bicarb  -I will add bicarbonate to the dialysate    6. Hyperkalemia: resolved now.   Potassium is 3.8       Signed By: Tyron Bundy MD     November 7, 2021

## 2021-11-08 NOTE — PROGRESS NOTES
Renal Progress Note    Patient: Raymond Brown MRN: [id number]  SSN: [id number]    YOB: 1975  Age: 51 y.o. Sex: female      Admit Date: 10/25/2021    LOS: 14 days     Subjective:   Patient seen in ICU.  On ventilator and sedated  On CRRT  On 2 pressors  anasarca    Current Facility-Administered Medications   Medication Dose Route Frequency    potassium chloride 10 mEq in 100 ml IVPB  10 mEq IntraVENous Q1H    magnesium sulfate 2 g/50 ml IVPB (premix or compounded)  2 g IntraVENous ONCE    calcium gluconate 2 g/100 mL sodium chloride (ISO-OSM)  2 g IntraVENous ONCE    white petrolatum-mineral oiL (LACRILUBE S.O.P.) ointment   Both Eyes Q12H    midazolam in normal saline (VERSED) 1 mg/mL infusion  0-10 mg/hr IntraVENous TITRATE    sodium bicarbonate 8.4 % (1 mEq/mL) injection 200 mEq  200 mEq IntraVENous ONCE    0.9% sodium chloride infusion 250 mL  250 mL IntraVENous PRN    sodium bicarbonate (8.4%) 15 mEq in bicarbonate dialysis soln No.9 4/2.5 (PRISMASOL) 5,000 mL infusion   Extracorporeal DIALYSIS CONTINUOUS    0.9% sodium chloride infusion  50 mL/hr IntraVENous CONTINUOUS    0.9% sodium chloride infusion 250 mL  250 mL IntraVENous PRN    0.9% sodium chloride infusion 250 mL  250 mL IntraVENous PRN    PHENYLephrine (KALIN-SYNEPHRINE) 30 mg in 0.9% sodium chloride 250 mL infusion   mcg/min IntraVENous TITRATE    NOREPINephrine (LEVOPHED) 16,000 mcg in dextrose 5% 250 mL infusion  0.5-65 mcg/min IntraVENous TITRATE    vasopressin (VASOSTRICT) 20 Units in 0.9% sodium chloride 100 mL infusion  0.01-0.04 Units/min IntraVENous TITRATE    [Held by provider] sodium bicarbonate tablet 1,300 mg  1,300 mg Oral TID    propofoL (DIPRIVAN) 10 mg/mL injection  0-50 mcg/kg/min IntraVENous TITRATE    0.9% sodium chloride infusion  75 mL/hr IntraVENous PRN    EPINEPHrine (ADRENALIN) 0.1 mg/mL syringe    PRN    simethicone (MYLICON) 90TR/5.3DA oral drops    PRN    piperacillin-tazobactam (ZOSYN) 3.375 g in 0.9% sodium chloride (MBP/ADV) 100 mL MBP  3.375 g IntraVENous Q12H    dexmedeTOMidine (PRECEDEX) 400 mcg in 0.9% sodium chloride (MBP/ADV) 100 mL MBP  0.1-1.5 mcg/kg/hr IntraVENous TITRATE    octreotide (SANDOSTATIN) 500 mcg in 0.9% sodium chloride 500 mL infusion  25 mcg/hr IntraVENous CONTINUOUS    midodrine (PROAMATINE) tablet 10 mg  10 mg Oral TID    [Held by provider] hydrOXYzine pamoate (VISTARIL) capsule 25 mg  25 mg Oral BID    traZODone (DESYREL) tablet 50 mg  50 mg Oral QHS PRN    influenza vaccine 2021-22 (6 mos+)(PF) (FLUARIX/FLULAVAL/FLUZONE QUAD) injection 0.5 mL  1 Each IntraMUSCular PRIOR TO DISCHARGE    lactulose (CHRONULAC) 10 gram/15 mL solution 30 mL  20 g Oral BID    sodium chloride (NS) flush 5-40 mL  5-40 mL IntraVENous Q8H    sodium chloride (NS) flush 5-40 mL  5-40 mL IntraVENous PRN    acetaminophen (TYLENOL) tablet 650 mg  650 mg Oral Q6H PRN    Or    acetaminophen (TYLENOL) suppository 650 mg  650 mg Rectal Q6H PRN    ondansetron (ZOFRAN ODT) tablet 4 mg  4 mg Oral Q6H PRN    Or    ondansetron (ZOFRAN) injection 4 mg  4 mg IntraVENous Q6H PRN    [Held by provider] thiamine mononitrate (B-1) tablet 100 mg  100 mg Oral DAILY    [Held by provider] b complex-vitamin c-folic acid 5mg (FOLBEE PLUS) tablet 1 Tablet  1 Tablet Oral DAILY    [Held by provider] spironolactone (ALDACTONE) tablet 25 mg  25 mg Oral BID        Vitals:    11/08/21 0526 11/08/21 0602 11/08/21 0700 11/08/21 0825   BP:  107/73 (!) 112/21    Pulse: 64 66 67 67   Resp: 24 25  27   Temp:  (!) 95.9 °F (35.5 °C) (!) 96.1 °F (35.6 °C)    SpO2: 99% 99%  96%   Weight:       Height:         Objective:   General: Intubated and sedated. Jaundiced skin  HEENT: Endotracheal tube in place. Scleral icterus present,  Neck: Neck is supple, No JVD, no thyromegaly  Lungs: breathsounds normal,  no rhonchi, no rales,  CVS: heart sounds normal,  no murmurs, no rubs. GI: Distended. Obese.   Ascites present  Extremeties: no cyanosis,2+ bilateral lower extremity edema present  Neuro: She is sedated  Skin: normal skin turgor, no skin rashes      Intake and Output:  Current Shift: 11/08 0701 - 11/08 1900  In: -   Out: 395   Last three shifts: 11/06 1901 - 11/08 0700  In: 5467 [I.V.:4342]  Out: 2480 [Urine:147]      Lab/Data Review:  Recent Labs     11/08/21  0415 11/07/21  0735 11/06/21  1220   WBC 41.1* 44.1* 44.6*   HGB 9.7* 10.3* 10.7*   HCT 28.1* 29.5* 30.0*   PLT 21* 21* 19*     Recent Labs     11/08/21  0415 11/07/21  1815 11/07/21  1413 11/07/21  0529    136  --  135*   K 3.7 3.8  --  3.8   * 104  --  101   CO2 13* 17*  --  19*   GLU 55* 112*  --  101*   BUN 44* 64*  --  74*   CREA 3.83* 5.37*  --  6.23*   CA 5.0* 5.4*  --  5.3*   MG 1.6 1.6 1.5*  --    PHOS 5.8* 7.7*  --  8.4*   ALB 0.8* 1.0*  --  1.0*   INR  --   --  1.8*  --      Recent Labs     11/08/21  0550 11/07/21  0630 11/06/21  0545   PH 7.28* 7.36 7.39   PCO2 26* 28* 26*   PO2 70* 64* 78   HCO3 15* 18* 18*   FIO2 35.0 30.0 30.0     Recent Results (from the past 24 hour(s))   PLATELETS, ALLOCATE    Collection Time: 11/07/21 10:30 AM   Result Value Ref Range    Unit number [id number]     Blood component type WANDERLR [facility]     Unit division 00     Status of unit Issued     Erin 99 to transfuse     Unit number [id number]     Blood component type WANDERLR [facility]     Unit division 00     Status of unit [address] to transfuse    PROTHROMBIN TIME + INR    Collection Time: 11/07/21  2:13 PM   Result Value Ref Range    Prothrombin time 20.5 (H) 11.9 - 14.7 sec    INR 1.8 (H) 0.9 - 1.1     MAGNESIUM    Collection Time: 11/07/21  2:13 PM   Result Value Ref Range    Magnesium 1.5 (L) 1.6 - 2.4 mg/dL   PTT    Collection Time: 11/07/21  2:13 PM   Result Value Ref Range    aPTT 74.0 (H) 21.2 - 34.1 sec    aPTT, therapeutic range   82 - 109 sec   RENAL FUNCTION PANEL    Collection Time: 11/07/21  6:15 PM   Result Value Ref Range    Sodium 136 136 - 145 mmol/L    Potassium 3.8 3.5 - 5.1 mmol/L    Chloride 104 97 - 108 mmol/L    CO2 17 (L) 21 - 32 mmol/L    Anion gap 15 5 - 15 mmol/L    Glucose 112 (H) 65 - 100 mg/dL    BUN 64 (H) 6 - 20 mg/dL    Creatinine 5.37 (H) 0.55 - 1.02 mg/dL    BUN/Creatinine ratio 12 12 - 20      GFR est AA 10 (L) >60 ml/min/1.73m2    GFR est non-AA 9 (L) >60 ml/min/1.73m2    Calcium 5.4 (LL) 8.5 - 10.1 mg/dL    Phosphorus 7.7 (H) 2.6 - 4.7 mg/dL    Albumin 1.0 (L) 3.5 - 5.0 g/dL   MAGNESIUM    Collection Time: 11/07/21  6:15 PM   Result Value Ref Range    Magnesium 1.6 1.6 - 2.4 mg/dL   RENAL FUNCTION PANEL    Collection Time: 11/08/21  4:15 AM   Result Value Ref Range    Sodium 140 136 - 145 mmol/L    Potassium 3.7 3.5 - 5.1 mmol/L    Chloride 111 (H) 97 - 108 mmol/L    CO2 13 (LL) 21 - 32 mmol/L    Anion gap 16 (H) 5 - 15 mmol/L    Glucose 55 (L) 65 - 100 mg/dL    BUN 44 (H) 6 - 20 mg/dL    Creatinine 3.83 (H) 0.55 - 1.02 mg/dL    BUN/Creatinine ratio 11 (L) 12 - 20      GFR est AA 15 (L) >60 ml/min/1.73m2    GFR est non-AA 13 (L) >60 ml/min/1.73m2    Calcium 5.0 (LL) 8.5 - 10.1 mg/dL    Phosphorus 5.8 (H) 2.6 - 4.7 mg/dL    Albumin 0.8 (L) 3.5 - 5.0 g/dL   MAGNESIUM    Collection Time: 11/08/21  4:15 AM   Result Value Ref Range    Magnesium 1.6 1.6 - 2.4 mg/dL   CBC WITH AUTOMATED DIFF    Collection Time: 11/08/21  4:15 AM   Result Value Ref Range    WBC 41.1 (H) 3.6 - 11.0 K/uL    RBC 3.09 (L) 3.80 - 5.20 M/uL    HGB 9.7 (L) 11.5 - 16.0 g/dL    HCT 28.1 (L) 35.0 - 47.0 %    MCV 90.9 80.0 - 99.0 FL    MCH 31.4 26.0 - 34.0 PG    MCHC 34.5 30.0 - 36.5 g/dL    RDW 20.9 (H) 11.5 - 14.5 %    PLATELET 21 (LL) 123 - 400 K/uL    NRBC 0.4 (H) 0.0  WBC    ABSOLUTE NRBC 0.15 (H) 0.00 - 0.01 K/uL    NEUTROPHILS 88 (H) 32 - 75 %    BAND NEUTROPHILS 2 0 - 6 %    LYMPHOCYTES 3 (L) 12 - 49 %    MONOCYTES 7 5 - 13 %    EOSINOPHILS 0 0 - 7 %    BASOPHILS 0 0 - 1 %    IMMATURE GRANULOCYTES 0 %    ABS. NEUTROPHILS 37.0 (H) 1.8 - 8.0 K/UL    ABS. LYMPHOCYTES 1.2 0.8 - 3.5 K/UL    ABS. MONOCYTES 2.9 (H) 0.0 - 1.0 K/UL    ABS. EOSINOPHILS 0.0 0.0 - 0.4 K/UL    ABS. BASOPHILS 0.0 0.0 - 0.1 K/UL    ABS. IMM. GRANS. 0.0 K/UL    RBC COMMENTS Normocytic, Normochromic      DF Manual     BLOOD GAS, ARTERIAL    Collection Time: 11/08/21  5:50 AM   Result Value Ref Range    pH 7.28 (L) 7.35 - 7.45      PCO2 26 (L) 35 - 45 mmHg    PO2 70 (L) 75 - 100 mmHg    O2 SAT 92 (L) >95 %    BICARBONATE 15 (L) 22 - 26 mmol/L    BASE DEFICIT 12.4 (H) 0 - 2 mmol/L    O2 METHOD VENT      FIO2 35.0 %    MODE SIMV      Tidal volume 550      PRESSURE SUPPORT 15.0      EPAP/CPAP/PEEP 5.0      SITE Left Brachial      RANDAL'S TEST PASS          Assessment and Plan:      1. Acute Kidney Injury:   -2/2 ATN/possibly HRS  -Creatinine peaked at  6.2.  -BL Cr was 0.7 on 10/22/21  -Nonoliguric--> oliguric now  -initiated on RRT for oliguric ATN/Volume overload  -on CRRT since 11/07 with UF 50 cc/hr  -will increase UF goal 100-150 cc/hr (if can tolerate)  -spoke with ICU nurse    2. Metabolic acidosis, HAG:  -CO2 13, Ph 7.2  -will increase HCO3 in dialysate and replacement fluids  -will give iv NaHCO3 100 meq push. -will send lactic acid level    3. Renal osteodystrophy:  -Ca corrected 7.5, phos 5.8  -getting iv Ca gluconate 2 gm x 1  -will send iPTH and Vit D levels. 4.  GI bleed/severe anemia:   -Stable hemoglobin post transfusions  -GI following, EGD done again on 11/3 and she had a clot at the anastomotic site. No plan to put NG tube because of that  -Monitor H&H and transfuse as needed    5. Hyperbilirubinemia/chronic liver disease/suspected cirrhosis  -Alcohol dependence  -Acute liver injury on chronic liver disease, probably alcohol-related. -Ammonia was 100.   Unable to give lactulose because of no NG tube  -liver enzymes are>400.    -MELD score is>40 on 11/1,suggesting very poor prognosis  -GI following the patient  -Possible transfer to [facility] transplant center    6.  Hypotension:   -Probably related to liver disease/? Sepsis  -Also with significant leukocytosis  -on 2 pressors           Signed By: Ap Velasquez MD     November 8, 2021

## 2021-11-08 NOTE — PROGRESS NOTES
Bedside and Verbal shift change report given to [address] [place] [address] (oncoming nurse) by Emelina Zelaya RN (offgoing nurse). Report included the following information SBAR, Kardex, Intake/Output, Recent Results and Dual Neuro Assessment, cardiac rhythm.

## 2021-11-08 NOTE — PROGRESS NOTES
Hospitalist Progress Note               Daily Progress Note: 11/8/2021      Subjective:     46F, morbid obesity s/p bilroth procedure and alcohol induced chronic liver disease presents with generalized weakness and acute on chronic liver failure    Her condition was complicated by septic shock s/t UTI with UGIB with ABLA S/p EGD and blood clot at anastomasis site, she developed HRS. Please see Dr. Rissa Storey note for intense details    ENCOUNTER NOTE      Spoke to [facility] transfer [facility], latif HRS and acute on chronic liver failure      Spoke to [facility] critical care, who reviewed it with transplant team     Too critical to transfer,     Advised they will keep her on list for 72 hours,      If we can cut down to only 1 vasopressor levophed and she is off of vasopressin and gemma, then they will consider transfer.      Incase its > 72 hours then we have to restart the transfer process     -------------------------------------------------------------------------------     At 2 pm     Had a family discussion , mother, brother and cousin (who is in helath care)     Agreed for DNR DNI     In the next 48-72 hours our target it to improve renal functions and try to wean the pressors if possible     Family will revisit in next 48 hours, and if her condition hasnt improve they will go for comfort care  And if improves , and is on 1 pressor she will be transferred to [address] [place] [address]. [facility] will consider her if she is only on 1 pressor      11/6/2021: her renal functions have remained the same at 5.6, she is still on 2 pressors.                     Tomorrow plans to have another discussion      11/8/2021:  Patient seen and evaluated at bedside, overnight events reviewed, currently remains intubated and sedated, remains on 2 pressors, currently is tolerating CRRT, patient evaluated with RN at bedside    Problem List:  Problem List as of 11/8/2021 Date Reviewed: 11/3/2021          Codes Class Noted - Resolved    Hyperbilirubinemia ICD-10-CM: E80.6  ICD-9-CM: 782.4  10/25/2021 - Present        Cirrhosis of liver ([facility] ICD-10-CM: K74.60  ICD-9-CM: 571.5  10/25/2021 - Present        Postop check ICD-10-CM: X85  ICD-9-CM: V67.00  10/26/2020 - Present        Bleeding from varicose vein ICD-10-CM: I83.899  ICD-9-CM: 454.8  10/20/2020 - Present        Obesity, morbid ([facility] ICD-10-CM: E66.01  ICD-9-CM: 278.01  10/13/2020 - Present        Venous stasis ulcer ([place] [address].) ICD-10-CM: I83.009, L97.909  ICD-9-CM: 454.0  10/13/2020 - Present              Medications reviewed  Current Facility-Administered Medications   Medication Dose Route Frequency    white petrolatum-mineral oiL (LACRILUBE S.O.P.) ointment   Both Eyes Q12H    midazolam in normal saline (VERSED) 1 mg/mL infusion  0-10 mg/hr IntraVENous TITRATE    sodium bicarbonate (8.4%) 30 mEq in bicarbonate dialysis soln No.9 4/2.5 (PRISMASOL) 5,000 mL infusion   Extracorporeal DIALYSIS CONTINUOUS    sodium bicarbonate (8.4%) 30 mEq in bicarbonate dialysis soln No.9 4/2.5 (PRISMASOL) 5,000 mL infusion   Extracorporeal DIALYSIS CONTINUOUS    albumin human 25% (BUMINATE) solution 50 g  50 g IntraVENous TID    potassium chloride 10 mEq in 100 ml IVPB  10 mEq IntraVENous Q1H    sodium bicarbonate 8.4 % (1 mEq/mL) injection        0.9% sodium chloride infusion 250 mL  250 mL IntraVENous PRN    0.9% sodium chloride infusion 250 mL  250 mL IntraVENous PRN    0.9% sodium chloride infusion 250 mL  250 mL IntraVENous PRN    PHENYLephrine (KALIN-SYNEPHRINE) 30 mg in 0.9% sodium chloride 250 mL infusion   mcg/min IntraVENous TITRATE    NOREPINephrine (LEVOPHED) 16,000 mcg in dextrose 5% 250 mL infusion  0.5-65 mcg/min IntraVENous TITRATE    vasopressin (VASOSTRICT) 20 Units in 0.9% sodium chloride 100 mL infusion  0.01-0.04 Units/min IntraVENous TITRATE    [Held by provider] sodium bicarbonate tablet 1,300 mg  1,300 mg Oral TID    propofoL (DIPRIVAN) 10 mg/mL injection  0-50 mcg/kg/min IntraVENous TITRATE    0.9% sodium chloride infusion  75 mL/hr IntraVENous PRN    EPINEPHrine (ADRENALIN) 0.1 mg/mL syringe    PRN    simethicone (MYLICON) 69XG/8.2EP oral drops    PRN    piperacillin-tazobactam (ZOSYN) 3.375 g in 0.9% sodium chloride (MBP/ADV) 100 mL MBP  3.375 g IntraVENous Q12H    dexmedeTOMidine (PRECEDEX) 400 mcg in 0.9% sodium chloride (MBP/ADV) 100 mL MBP  0.1-1.5 mcg/kg/hr IntraVENous TITRATE    octreotide (SANDOSTATIN) 500 mcg in 0.9% sodium chloride 500 mL infusion  25 mcg/hr IntraVENous CONTINUOUS    midodrine (PROAMATINE) tablet 10 mg  10 mg Oral TID    [Held by provider] hydrOXYzine pamoate (VISTARIL) capsule 25 mg  25 mg Oral BID    traZODone (DESYREL) tablet 50 mg  50 mg Oral QHS PRN    influenza vaccine 2021-22 (6 mos+)(PF) (FLUARIX/FLULAVAL/FLUZONE QUAD) injection 0.5 mL  1 Each IntraMUSCular PRIOR TO DISCHARGE    lactulose (CHRONULAC) 10 gram/15 mL solution 30 mL  20 g Oral BID    sodium chloride (NS) flush 5-40 mL  5-40 mL IntraVENous Q8H    sodium chloride (NS) flush 5-40 mL  5-40 mL IntraVENous PRN    acetaminophen (TYLENOL) tablet 650 mg  650 mg Oral Q6H PRN    Or    acetaminophen (TYLENOL) suppository 650 mg  650 mg Rectal Q6H PRN    ondansetron (ZOFRAN ODT) tablet 4 mg  4 mg Oral Q6H PRN    Or    ondansetron (ZOFRAN) injection 4 mg  4 mg IntraVENous Q6H PRN    [Held by provider] thiamine mononitrate (B-1) tablet 100 mg  100 mg Oral DAILY    [Held by provider] b complex-vitamin c-folic acid 5mg (FOLBEE PLUS) tablet 1 Tablet  1 Tablet Oral DAILY    [Held by provider] spironolactone (ALDACTONE) tablet 25 mg  25 mg Oral BID       Review of Systems:   A comprehensive review of systems was negative except for that written in the HPI.     Objective:   Physical Exam:     Visit Vitals  /60   Pulse 85   Temp 97.7 °F (36.5 °C)   Resp (!) 31   Ht 5' 6\" (1.676 m)   Wt (!) 189.3 kg (417 lb 5.3 oz)   SpO2 94%   BMI 67.36 kg/m²      O2 Device: Ventilator    Temp (24hrs), Av.5 °F (35.8 °C), Min:95 °F (35 °C), Max:98.6 °F (37 °C)    701 - 1900  In: 794.4 [I.V.:794.4]  Out: 2124    1901 - 700  In: 5579.8 [I.V.:4454.8]  Out: 6212 [Urine:147]    General:   Intubated and sedated. Sclerae icteric   Lungs:   Clear to auscultation bilaterally. Chest wall:  No tenderness or deformity. Heart:  Regular rate and rhythm, S1, S2 normal, no murmur, click, rub or gallop. Abdomen:   Soft, non-tender. Bowel sounds normal. No masses,  No organomegaly. Extremities: Extremities normal, atraumatic, no cyanosis or edema. Pulses: 2+ and symmetric all extremities. Skin: Skin color, texture, turgor normal. No rashes or lesions   Neurologic: CNII-XII intact.   No gross focal deficits         Data Review:       Recent Days:  Recent Labs     21  0415 21  0735 21  1220   WBC 41.1* 44.1* 44.6*   HGB 9.7* 10.3* 10.7*   HCT 28.1* 29.5* 30.0*   PLT 21* 21* 19*     Recent Labs     21  0415 21  1815 21  1413 21  0529    136  --  135*   K 3.7 3.8  --  3.8   * 104  --  101   CO2 13* 17*  --  19*   GLU 55* 112*  --  101*   BUN 44* 64*  --  74*   CREA 3.83* 5.37*  --  6.23*   CA 5.0* 5.4*  --  5.3*   MG 1.6 1.6 1.5*  --    PHOS 5.8* 7.7*  --  8.4*   ALB 0.8* 1.0*  --  1.0*   INR  --   --  1.8*  --      Recent Labs     21  0550 21  0630 21  0545   PH 7.28* 7.36 7.39   PCO2 26* 28* 26*   PO2 70* 64* 78   HCO3 15* 18* 18*   FIO2 35.0 30.0 30.0       24 Hour Results:  Recent Results (from the past 24 hour(s))   RENAL FUNCTION PANEL    Collection Time: 21  6:15 PM   Result Value Ref Range    Sodium 136 136 - 145 mmol/L    Potassium 3.8 3.5 - 5.1 mmol/L    Chloride 104 97 - 108 mmol/L    CO2 17 (L) 21 - 32 mmol/L    Anion gap 15 5 - 15 mmol/L    Glucose 112 (H) 65 - 100 mg/dL    BUN 64 (H) 6 - 20 mg/dL    Creatinine 5.37 (H) 0.55 - 1.02 mg/dL    BUN/Creatinine ratio 12 12 - 20      GFR est AA 10 (L) >60 ml/min/1.73m2    GFR est non-AA 9 (L) >60 ml/min/1.73m2    Calcium 5.4 (LL) 8.5 - 10.1 mg/dL    Phosphorus 7.7 (H) 2.6 - 4.7 mg/dL    Albumin 1.0 (L) 3.5 - 5.0 g/dL   MAGNESIUM    Collection Time: 11/07/21  6:15 PM   Result Value Ref Range    Magnesium 1.6 1.6 - 2.4 mg/dL   RENAL FUNCTION PANEL    Collection Time: 11/08/21  4:15 AM   Result Value Ref Range    Sodium 140 136 - 145 mmol/L    Potassium 3.7 3.5 - 5.1 mmol/L    Chloride 111 (H) 97 - 108 mmol/L    CO2 13 (LL) 21 - 32 mmol/L    Anion gap 16 (H) 5 - 15 mmol/L    Glucose 55 (L) 65 - 100 mg/dL    BUN 44 (H) 6 - 20 mg/dL    Creatinine 3.83 (H) 0.55 - 1.02 mg/dL    BUN/Creatinine ratio 11 (L) 12 - 20      GFR est AA 15 (L) >60 ml/min/1.73m2    GFR est non-AA 13 (L) >60 ml/min/1.73m2    Calcium 5.0 (LL) 8.5 - 10.1 mg/dL    Phosphorus 5.8 (H) 2.6 - 4.7 mg/dL    Albumin 0.8 (L) 3.5 - 5.0 g/dL   MAGNESIUM    Collection Time: 11/08/21  4:15 AM   Result Value Ref Range    Magnesium 1.6 1.6 - 2.4 mg/dL   CBC WITH AUTOMATED DIFF    Collection Time: 11/08/21  4:15 AM   Result Value Ref Range    WBC 41.1 (H) 3.6 - 11.0 K/uL    RBC 3.09 (L) 3.80 - 5.20 M/uL    HGB 9.7 (L) 11.5 - 16.0 g/dL    HCT 28.1 (L) 35.0 - 47.0 %    MCV 90.9 80.0 - 99.0 FL    MCH 31.4 26.0 - 34.0 PG    MCHC 34.5 30.0 - 36.5 g/dL    RDW 20.9 (H) 11.5 - 14.5 %    PLATELET 21 (LL) 245 - 400 K/uL    NRBC 0.4 (H) 0.0  WBC    ABSOLUTE NRBC 0.15 (H) 0.00 - 0.01 K/uL    NEUTROPHILS 88 (H) 32 - 75 %    BAND NEUTROPHILS 2 0 - 6 %    LYMPHOCYTES 3 (L) 12 - 49 %    MONOCYTES 7 5 - 13 %    EOSINOPHILS 0 0 - 7 %    BASOPHILS 0 0 - 1 %    IMMATURE GRANULOCYTES 0 %    ABS. NEUTROPHILS 37.0 (H) 1.8 - 8.0 K/UL    ABS. LYMPHOCYTES 1.2 0.8 - 3.5 K/UL    ABS. MONOCYTES 2.9 (H) 0.0 - 1.0 K/UL    ABS. EOSINOPHILS 0.0 0.0 - 0.4 K/UL    ABS. BASOPHILS 0.0 0.0 - 0.1 K/UL    ABS. IMM.  GRANS. 0.0 K/UL    RBC COMMENTS Normocytic, Normochromic      DF Manual     BLOOD GAS, ARTERIAL    Collection Time: 11/08/21  5:50 AM Result Value Ref Range    pH 7.28 (L) 7.35 - 7.45      PCO2 26 (L) 35 - 45 mmHg    PO2 70 (L) 75 - 100 mmHg    O2 SAT 92 (L) >95 %    BICARBONATE 15 (L) 22 - 26 mmol/L    BASE DEFICIT 12.4 (H) 0 - 2 mmol/L    O2 METHOD VENT      FIO2 35.0 %    MODE SIMV      Tidal volume 550      PRESSURE SUPPORT 15.0      EPAP/CPAP/PEEP 5.0      SITE Left Brachial      RANDAL'S TEST PASS         XR CHEST PORT   Final Result   Markedly low lung volumes with bibasilar atelectasis. Hydrostatic   edema, improved. XR CHEST PORT   Final Result   No radiographic evidence of dialysis catheter placement   complication. Cardiomediastinal enlargement with perihilar opacities, left   greater than right, probably asymmetric edema. XR CHEST PORT   Final Result   Pulmonary hypoinflation with asymmetric elevation of right   hemidiaphragm, stable or slightly improved. Stable appearance of endotracheal   tube and left central line. Stable enlargement of cardiopericardial silhouette. Central vascular congestion. Asymmetric edema, left perihilar region. No   pneumothorax. XR CHEST PORT   Final Result   Intubated. Cardiomediastinal enlargement. Pulmonary hypoinflation   compared with previous, possibly expiratory phase radiograph. XR CHEST PORT   Final Result      XR CHEST PORT   Final Result   Residual atelectasis at the lung bases      XR CHEST PORT   Final Result      XR CHEST PORT   Final Result      XR CHEST PORT   Final Result      XR CHEST PORT   Final Result   Left IJ central venous catheter terminating over the lower right atrium. No   discernible pneumothorax. XR CHEST PORT   Final Result      CT ABD PELV WO CONT   Final Result   1. No acute findings. 2. Severe diffuse hepatic steatosis. 3. Cholelithiasis.             Dose reduction: All CT scans at this facility are performed using radiation dose   reduction optimization techniques as appropriate to a performed exam, including   the following: Automated exposure control (AEC), adjustment of the MAA and/or   KUB according to the patient's size, or the patient's use of iterative   reconstruction techniques (ASIR). XR CHEST PORT   Final Result   Similar exam to prior without findings of definite acute cardiopulmonary   abnormality. IR INSERT NON TUNL CVC OVER 5 YRS    (Results Pending)   IR US GUIDED VASCULAR ACCESS    (Results Pending)        Assessment:  Acute upper gastrointestinal bleeding, due to laceration at Billroth II anastomosis site. Status post EGD with epinephrine injections. Patient with recurrent   bleeding on 10/31. Repeat EGD on 11/3 with adherent clot at anastomosis. Hypovolemic shock due to above     Septic shock due to UTI. On levophed of 62    Acute kidney injury, probably ATN due to hypotension. And hepato renal syndrome: CRRT    Multiple organ dysfunction syndrome with Bellaire II score of 23, indicating 40% estimated mortality    Complicated UTI due to E. coli. Pansensitive    Hepatic encephalopathy    Coagulopathy due to liver failure     Thrombocytopenia due to sepsis and consumption/cirrhosis, worsening    Acute blood loss anemia status post massive transfusion hemoglobin remains stable    End-stage alcoholic cirrhosis with hepatic failure; MELD-Na  score is 40 as of 11/1 with estimated 90-day mortality of 66%    Alcohol abuse; patient was still drinking about 9 beers daily just prior to admission    Nonanion gap metabolic acidosis, likely due to BRITTANY      Plan:    Acute upper GI bleed due to laceration at Billroth II anastomosis status post EGD with localized epinephrine injectionspatient currently remains hemodynamically stable, hemoglobin and hematocrit remained stable, repeat EGD showed adherent clot at anastomotic site  Maintain active type and screen  Continue to trend hemoglobin and hematocrit  Continue octreotide gtt.   Transfuse for acute hemoglobin drop  Gastroenterology consult appreciated, continue to follow recommendations    End-stage alcoholic liver cirrhosis with hepatic failure as well as hepatorenal syndrome with a meld score of 40chart reviewed in detail, agree with plan of attending physicians as well as gastroenterology, patient has a very high mortality rate given current presentation  Continue current management  Patient has been accepted to [address] [place] [address] however remains on wait list  Updated patient's  at bedside, the likelihood that patient would be taken to the OR for liver transplant is extremely low, patient's  voiced understanding  Gastroenterology consult appreciated, continue to follow recommendations    Acute kidney injurysecondary to problem #2, hepatorenal syndrome at this time, tolerating CRRT but is requiring 2 pressors at this time  Continue CRRT as per nephrology recommendations  Nephrology consult appreciated, continue to follow recommendations    Urinary tract infectionurine culture consistent with pansensitive E. coli, currently does not meet sepsis criteria  Continue Zosyn for antibiotic coverage  Continue to monitor    Shocklikely multifactorial including secondary to circulatory shock as well as septic shock  Management as documented above  Antibiotics as above  Attempt to wean off pressors    Hypocalcemiareplete calcium and recheck calcium    ProphylaxisSCDs  FENn.p.o., potassium and magnesium to be maintained with dialysis  DNR/DNI, surrogate decision-maker is patient's , gave detailed update at bedside, including the fact that patient remains on wait list, I also updated patient about patient's extremely poor overall prognosis as well as high mortality rate given overall meld score as well as the chances making a meaningful recovery remains extremely poor, patient's  voiced understanding, reiterated DNR/DNI however wants us to continue current treatment measures at this time and will revisit goals of care discussion in 24 to 48 hours    Critical care time spent 35 minutes involving direct patient care as well as reviewing patient's labs and coordination of care with nursing staff    Care Plan discussed with: Patient/Family/RN/Case Management/Specialists        Stevenson Bloch, MD

## 2021-11-09 LAB
BACTERIA SPEC CULT: NORMAL
BACTERIA SPEC CULT: NORMAL
BLD PROD TYP BPU: NORMAL
BLD PROD TYP BPU: NORMAL
BPU ID: NORMAL
BPU ID: NORMAL
SPECIAL REQUESTS,SREQ: NORMAL
SPECIAL REQUESTS,SREQ: NORMAL
STATUS OF UNIT,%ST: NORMAL
STATUS OF UNIT,%ST: NORMAL
TRANSFUSION STATUS PATIENT QL: NORMAL
TRANSFUSION STATUS PATIENT QL: NORMAL
UNIT DIVISION, %UDIV: 0
UNIT DIVISION, %UDIV: 0

## 2021-11-09 PROCEDURE — 74011250636 HC RX REV CODE- 250/636: Performed by: INTERNAL MEDICINE

## 2021-11-09 PROCEDURE — 74011000250 HC RX REV CODE- 250: Performed by: INTERNAL MEDICINE

## 2021-11-09 PROCEDURE — 74011000258 HC RX REV CODE- 258: Performed by: INTERNAL MEDICINE

## 2021-11-09 NOTE — PROGRESS NOTES
1900 = received patient gasping for breath on 100 % FIO2 on a ventilator. Family at the bedside. Questions by the family members answered. Still on continuous maximum drip of Abelardo, Levophed and vasopressin. VS monitored. 2030 = Radha Luna of the family at the bedside. Last rites and prayers done surrounded by family  65 = pt extubated per  and 2 son's request. VS monitored   2320 = BP 0     HR  0     RR 0.   Pronounced dead  0115 = [facility] from [place] picked up the body from the more

## 2021-11-09 NOTE — PROGRESS NOTES
Spiritual Care Assessment/Progress Note  [facility]      NAME: Jam Chance      MRN: [id number]  AGE: 51 y.o. SEX: female  Zoroastrian Affiliation: Zoroastrian   Language: English     11/9/2021     Total Time (in minutes): 65     Spiritual Assessment begun in [address] [place] [address] CCU through conversation with:         []Patient        [x] Family    [] Friend(s)        Reason for Consult: Death, Inpatient     Spiritual beliefs: (Please include comment if needed)     [x] Identifies with a savage tradition: Zoroastrian        [] Supported by a savage community:            [] Claims no spiritual orientation:           [] Seeking spiritual identity:                [] Adheres to an individual form of spirituality:           [] Not able to assess:                           Identified resources for coping:      [x] Prayer                               [] Music                  [] Guided Imagery     [x] Family/friends                 [] Pet visits     [] Devotional reading                         [] Unknown     [] Other:                                               Interventions offered during this visit: (See comments for more details)    Patient Interventions: Initial visit, Prayer (actual)     Family/Friend(s):  Affirmation of emotions/emotional suffering, Affirmation of savage, Bridging, Catharsis/review of pertinent events in supportive environment, Life review/legacy, Initial Assessment, Iconic (affirming the presence of God/Higher Power), Prayer (actual), Prayer (assurance of), End of life issues discussed     Plan of Care:     [] Support spiritual and/or cultural needs    [] Support AMD and/or advance care planning process      [] Support grieving process   [] Coordinate Rites and/or Rituals    [] Coordination with community clergy   [] No spiritual needs identified at this time   [] Detailed Plan of Care below (See Comments)  [] Make referral to Music Therapy  [] Make referral to Pet Therapy     [] Make referral to Addiction services  [] Make referral to [facility]  [] Make referral to Spiritual Care Partner  [] No future visits requested        [x] Contact Spiritual Care for further referrals     Comments:  Visited patient in the 2 CVU for in-patient death per nurse. Patient and  were in the room, and a few family members came into the room and many more were in the ICU waiting room.  shared about the patient's condition and he seemed sad about her not disclosing her illness. He and others seemed to process their emotions tearfully and communally.  listened with empathy while exploring their needs and providing caring presence and grief support. Affirmed their familial bond and support as well as acknowledged the grief of untimely loss of a family member. Offered and provided prayer per request and advised of  availability. Chaplains will follow up if further referrals are requested. Chaplain Laquita King M.Div.    can be reached by calling the  at [facility]  [phone]

## 2021-11-11 NOTE — DISCHARGE SUMMARY
46F, morbid obesity s/p bilroth procedure and alcohol induced chronic liver disease presents with generalized weakness and acute on chronic liver failure     Her condition was complicated by septic shock s/t UTI with UGIB with ABLA S/p EGD and blood clot at anastomasis site, she developed HRS.    Please see Dr. Yolanda Ambriz note for intense details     ENCOUNTER NOTE      Spoke to [facility] transfer [facility], latif HRS and acute on chronic liver failure      Spoke to [facility] critical care, who reviewed it with transplant team     Too critical to transfer,     Advised they will keep her on list for 72 hours,      If we can cut down to only 1 vasopressor levophed and she is off of vasopressin and gemma, then they will consider transfer.      Incase its > 72 hours then we have to restart the transfer process     -------------------------------------------------------------------------------     At 2 pm     Had a family discussion , mother, brother and cousin (who is in helath care)     Agreed for DNR DNI     In the next 48-72 hours our target it to improve renal functions and try to wean the pressors if possible     Family will revisit in next 48 hours, and if her condition hasnt improve they will go for comfort care  And if improves , and is on 1 pressor she will be transferred to [facility]. [facility] will consider her if she is only on 1 pressor        11/6/2021: her renal functions have remained the same at 5.6, she is still on 2 pressors. Tomorrow plans to have another discussion        11/8/2021:  Patient seen and evaluated at bedside, overnight events reviewed, currently remains intubated and sedated, remains on 2 pressors, currently is tolerating CRRT, patient evaluated with RN at bedside     Problem List:             Problem List as of 11/8/2021 Date Reviewed: 11/3/2021           Codes Class Noted - Resolved     Hyperbilirubinemia ICD-10-CM: E80.6  ICD-9-CM: 935. 4   10/25/2021 - Present           Cirrhosis of liver ([facility].) ICD-10-CM: K74.60  ICD-9-CM: 091. 5   10/25/2021 - Present           Postop check ICD-10-CM: Z63  ICD-9-CM: V67.00   10/26/2020 - Present           Bleeding from varicose vein ICD-10-CM: P85.851  ICD-9-CM: 549. 8   10/20/2020 - Present           Obesity, morbid ([facility].) ICD-10-CM: E66.01  ICD-9-CM: 278.01   10/13/2020 - Present           Venous stasis ulcer ([facility]) ICD-10-CM: I83.009, L97.909  ICD-9-CM: 454.0   10/13/2020 - Present                   Medications reviewed         Current Facility-Administered Medications   Medication Dose Route Frequency    white petrolatum-mineral oiL (LACRILUBE S.O.P.) ointment   Both Eyes Q12H    midazolam in normal saline (VERSED) 1 mg/mL infusion  0-10 mg/hr IntraVENous TITRATE    sodium bicarbonate (8.4%) 30 mEq in bicarbonate dialysis soln No.9 4/2.5 (PRISMASOL) 5,000 mL infusion   Extracorporeal DIALYSIS CONTINUOUS    sodium bicarbonate (8.4%) 30 mEq in bicarbonate dialysis soln No.9 4/2.5 (PRISMASOL) 5,000 mL infusion   Extracorporeal DIALYSIS CONTINUOUS    albumin human 25% (BUMINATE) solution 50 g  50 g IntraVENous TID    potassium chloride 10 mEq in 100 ml IVPB  10 mEq IntraVENous Q1H    sodium bicarbonate 8.4 % (1 mEq/mL) injection          0.9% sodium chloride infusion 250 mL  250 mL IntraVENous PRN    0.9% sodium chloride infusion 250 mL  250 mL IntraVENous PRN    0.9% sodium chloride infusion 250 mL  250 mL IntraVENous PRN    PHENYLephrine (KALIN-SYNEPHRINE) 30 mg in 0.9% sodium chloride 250 mL infusion   mcg/min IntraVENous TITRATE    NOREPINephrine (LEVOPHED) 16,000 mcg in dextrose 5% 250 mL infusion  0.5-65 mcg/min IntraVENous TITRATE    vasopressin (VASOSTRICT) 20 Units in 0.9% sodium chloride 100 mL infusion  0.01-0.04 Units/min IntraVENous TITRATE    [Held by provider] sodium bicarbonate tablet 1,300 mg  1,300 mg Oral TID    propofoL (DIPRIVAN) 10 mg/mL injection  0-50 mcg/kg/min IntraVENous TITRATE    0.9% sodium chloride infusion  75 mL/hr IntraVENous PRN    EPINEPHrine (ADRENALIN) 0.1 mg/mL syringe     PRN    simethicone (MYLICON) 43GJ/1.3GG oral drops     PRN    piperacillin-tazobactam (ZOSYN) 3.375 g in 0.9% sodium chloride (MBP/ADV) 100 mL MBP  3.375 g IntraVENous Q12H    dexmedeTOMidine (PRECEDEX) 400 mcg in 0.9% sodium chloride (MBP/ADV) 100 mL MBP  0.1-1.5 mcg/kg/hr IntraVENous TITRATE    octreotide (SANDOSTATIN) 500 mcg in 0.9% sodium chloride 500 mL infusion  25 mcg/hr IntraVENous CONTINUOUS    midodrine (PROAMATINE) tablet 10 mg  10 mg Oral TID    [Held by provider] hydrOXYzine pamoate (VISTARIL) capsule 25 mg  25 mg Oral BID    traZODone (DESYREL) tablet 50 mg  50 mg Oral QHS PRN    influenza vaccine  (6 mos+)(PF) (FLUARIX/FLULAVAL/FLUZONE QUAD) injection 0.5 mL  1 Each IntraMUSCular PRIOR TO DISCHARGE    lactulose (CHRONULAC) 10 gram/15 mL solution 30 mL  20 g Oral BID    sodium chloride (NS) flush 5-40 mL  5-40 mL IntraVENous Q8H    sodium chloride (NS) flush 5-40 mL  5-40 mL IntraVENous PRN    acetaminophen (TYLENOL) tablet 650 mg  650 mg Oral Q6H PRN     Or    acetaminophen (TYLENOL) suppository 650 mg  650 mg Rectal Q6H PRN    ondansetron (ZOFRAN ODT) tablet 4 mg  4 mg Oral Q6H PRN     Or    ondansetron (ZOFRAN) injection 4 mg  4 mg IntraVENous Q6H PRN    [Held by provider] thiamine mononitrate (B-1) tablet 100 mg  100 mg Oral DAILY    [Held by provider] b complex-vitamin c-folic acid 5mg (FOLBEE PLUS) tablet 1 Tablet  1 Tablet Oral DAILY    [Held by provider] spironolactone (ALDACTONE) tablet 25 mg  25 mg Oral BID         Review of Systems:   A comprehensive review of systems was negative except for that written in the HPI.     Objective:   Physical Exam:      Visit Vitals  /60   Pulse 85   Temp 97.7 °F (36.5 °C)   Resp (!) 31   Ht 5' 6\" (1.676 m)   Wt (!) 189.3 kg (417 lb 5.3 oz)   SpO2 94%   BMI 67.36 kg/m²      O2 Device: Ventilator     Temp (24hrs), Av.5 °F (35.8 °C), Min:95 °F (35 °C), Max:98.6 °F (37 °C)    11/08 0701 - 11/08 1900  In: 794.4 [I.V.:794.4]  Out: 9338    11/06 1901 - 11/08 0700  In: 5579.8 [I.V.:4454.8]  Out: 3810 [Urine:147]     General:   Intubated and sedated. Sclerae icteric   Lungs:   Clear to auscultation bilaterally. Chest wall:  No tenderness or deformity. Heart:  Regular rate and rhythm, S1, S2 normal, no murmur, click, rub or gallop. Abdomen:   Soft, non-tender. Bowel sounds normal. No masses,  No organomegaly. Extremities: Extremities normal, atraumatic, no cyanosis or edema. Pulses: 2+ and symmetric all extremities. Skin: Skin color, texture, turgor normal. No rashes or lesions   Neurologic: CNII-XII intact.   No gross focal deficits            Data Review:       Recent Days:        Recent Labs     11/08/21  0415 11/07/21  0735 11/06/21  1220   WBC 41.1* 44.1* 44.6*   HGB 9.7* 10.3* 10.7*   HCT 28.1* 29.5* 30.0*   PLT 21* 21* 19*             Recent Labs     11/08/21  0415 11/07/21  1815 11/07/21  1413 11/07/21  0529    136  --  135*   K 3.7 3.8  --  3.8   * 104  --  101   CO2 13* 17*  --  19*   GLU 55* 112*  --  101*   BUN 44* 64*  --  74*   CREA 3.83* 5.37*  --  6.23*   CA 5.0* 5.4*  --  5.3*   MG 1.6 1.6 1.5*  --    PHOS 5.8* 7.7*  --  8.4*   ALB 0.8* 1.0*  --  1.0*   INR  --   --  1.8*  --             Recent Labs     11/08/21  0550 11/07/21  0630 11/06/21  0545   PH 7.28* 7.36 7.39   PCO2 26* 28* 26*   PO2 70* 64* 78   HCO3 15* 18* 18*   FIO2 35.0 30.0 30.0         24 Hour Results:  Recent Results         Recent Results (from the past 24 hour(s))   RENAL FUNCTION PANEL     Collection Time: 11/07/21  6:15 PM   Result Value Ref Range     Sodium 136 136 - 145 mmol/L     Potassium 3.8 3.5 - 5.1 mmol/L     Chloride 104 97 - 108 mmol/L     CO2 17 (L) 21 - 32 mmol/L     Anion gap 15 5 - 15 mmol/L     Glucose 112 (H) 65 - 100 mg/dL     BUN 64 (H) 6 - 20 mg/dL     Creatinine 5.37 (H) 0.55 - 1.02 mg/dL     BUN/Creatinine ratio 12 12 - 20       GFR est AA 10 (L) >60 ml/min/1.73m2     GFR est non-AA 9 (L) >60 ml/min/1.73m2     Calcium 5.4 (LL) 8.5 - 10.1 mg/dL     Phosphorus 7.7 (H) 2.6 - 4.7 mg/dL     Albumin 1.0 (L) 3.5 - 5.0 g/dL   MAGNESIUM     Collection Time: 11/07/21  6:15 PM   Result Value Ref Range     Magnesium 1.6 1.6 - 2.4 mg/dL   RENAL FUNCTION PANEL     Collection Time: 11/08/21  4:15 AM   Result Value Ref Range     Sodium 140 136 - 145 mmol/L     Potassium 3.7 3.5 - 5.1 mmol/L     Chloride 111 (H) 97 - 108 mmol/L     CO2 13 (LL) 21 - 32 mmol/L     Anion gap 16 (H) 5 - 15 mmol/L     Glucose 55 (L) 65 - 100 mg/dL     BUN 44 (H) 6 - 20 mg/dL     Creatinine 3.83 (H) 0.55 - 1.02 mg/dL     BUN/Creatinine ratio 11 (L) 12 - 20       GFR est AA 15 (L) >60 ml/min/1.73m2     GFR est non-AA 13 (L) >60 ml/min/1.73m2     Calcium 5.0 (LL) 8.5 - 10.1 mg/dL     Phosphorus 5.8 (H) 2.6 - 4.7 mg/dL     Albumin 0.8 (L) 3.5 - 5.0 g/dL   MAGNESIUM     Collection Time: 11/08/21  4:15 AM   Result Value Ref Range     Magnesium 1.6 1.6 - 2.4 mg/dL   CBC WITH AUTOMATED DIFF     Collection Time: 11/08/21  4:15 AM   Result Value Ref Range     WBC 41.1 (H) 3.6 - 11.0 K/uL     RBC 3.09 (L) 3.80 - 5.20 M/uL     HGB 9.7 (L) 11.5 - 16.0 g/dL     HCT 28.1 (L) 35.0 - 47.0 %     MCV 90.9 80.0 - 99.0 FL     MCH 31.4 26.0 - 34.0 PG     MCHC 34.5 30.0 - 36.5 g/dL     RDW 20.9 (H) 11.5 - 14.5 %     PLATELET 21 (LL) 224 - 400 K/uL     NRBC 0.4 (H) 0.0  WBC     ABSOLUTE NRBC 0.15 (H) 0.00 - 0.01 K/uL     NEUTROPHILS 88 (H) 32 - 75 %     BAND NEUTROPHILS 2 0 - 6 %     LYMPHOCYTES 3 (L) 12 - 49 %     MONOCYTES 7 5 - 13 %     EOSINOPHILS 0 0 - 7 %     BASOPHILS 0 0 - 1 %     IMMATURE GRANULOCYTES 0 %     ABS. NEUTROPHILS 37.0 (H) 1.8 - 8.0 K/UL     ABS. LYMPHOCYTES 1.2 0.8 - 3.5 K/UL     ABS. MONOCYTES 2.9 (H) 0.0 - 1.0 K/UL     ABS. EOSINOPHILS 0.0 0.0 - 0.4 K/UL     ABS. BASOPHILS 0.0 0.0 - 0.1 K/UL     ABS. IMM.  GRANS. 0.0 K/UL     RBC COMMENTS Normocytic, Normochromic       DF Manual     BLOOD GAS, ARTERIAL     Collection Time: 11/08/21  5:50 AM   Result Value Ref Range     pH 7.28 (L) 7.35 - 7.45       PCO2 26 (L) 35 - 45 mmHg     PO2 70 (L) 75 - 100 mmHg     O2 SAT 92 (L) >95 %     BICARBONATE 15 (L) 22 - 26 mmol/L     BASE DEFICIT 12.4 (H) 0 - 2 mmol/L     O2 METHOD VENT       FIO2 35.0 %     MODE SIMV       Tidal volume 550       PRESSURE SUPPORT 15.0       EPAP/CPAP/PEEP 5.0       SITE Left Brachial       RANDAL'S TEST PASS              XR CHEST PORT   Final Result   Markedly low lung volumes with bibasilar atelectasis. Hydrostatic   edema, improved.       XR CHEST PORT   Final Result   No radiographic evidence of dialysis catheter placement   complication. Cardiomediastinal enlargement with perihilar opacities, left   greater than right, probably asymmetric edema.        XR CHEST PORT   Final Result   Pulmonary hypoinflation with asymmetric elevation of right   hemidiaphragm, stable or slightly improved. Stable appearance of endotracheal   tube and left central line. Stable enlargement of cardiopericardial silhouette. Central vascular congestion. Asymmetric edema, left perihilar region. No   pneumothorax.       XR CHEST PORT   Final Result   Intubated. Cardiomediastinal enlargement. Pulmonary hypoinflation   compared with previous, possibly expiratory phase radiograph.       XR CHEST PORT   Final Result       XR CHEST PORT   Final Result   Residual atelectasis at the lung bases       XR CHEST PORT   Final Result       XR CHEST PORT   Final Result       XR CHEST PORT   Final Result       XR CHEST PORT   Final Result   Left IJ central venous catheter terminating over the lower right atrium. No   discernible pneumothorax.       XR CHEST PORT   Final Result       CT ABD PELV WO CONT   Final Result   1. No acute findings. 2. Severe diffuse hepatic steatosis. 3. Cholelithiasis.                Dose reduction: All CT scans at this facility are performed using radiation dose   reduction optimization techniques as appropriate to a performed exam, including   the following: Automated exposure control (AEC), adjustment of the MAA and/or   KUB according to the patient's size, or the patient's use of iterative   reconstruction techniques (ASIR).     XR CHEST PORT   Final Result   Similar exam to prior without findings of definite acute cardiopulmonary   abnormality.       IR INSERT NON TUNL CVC OVER 5 YRS    (Results Pending)   IR US GUIDED VASCULAR ACCESS    (Results Pending)         Assessment:  Acute upper gastrointestinal bleeding, due to laceration at Billroth II anastomosis site. Status post EGD with epinephrine injections. Patient with recurrent   bleeding on 10/31. Repeat EGD on 11/3 with adherent clot at anastomosis.       Hypovolemic shock due to above      Septic shock due to UTI. On levophed of 62     Acute kidney injury, probably ATN due to hypotension. And hepato renal syndrome: CRRT     Multiple organ dysfunction syndrome with Columbia II score of 23, indicating 40% estimated mortality     Complicated UTI due to E. coli.   Pansensitive     Hepatic encephalopathy     Coagulopathy due to liver failure      Thrombocytopenia due to sepsis and consumption/cirrhosis, worsening     Acute blood loss anemia status post massive transfusion hemoglobin remains stable     End-stage alcoholic cirrhosis with hepatic failure; MELD-Na  score is 40 as of 11/1 with estimated 90-day mortality of 66%     Alcohol abuse; patient was still drinking about 9 beers daily just prior to admission     Nonanion gap metabolic acidosis, likely due to BRITTANY        Plan:     Acute upper GI bleed due to laceration at Billroth II anastomosis status post EGD with localized epinephrine injectionspatient currently remains hemodynamically stable, hemoglobin and hematocrit remained stable, repeat EGD showed adherent clot at anastomotic site  Maintain active type and screen  Continue to trend hemoglobin and hematocrit  Continue octreotide gtt.  Transfuse for acute hemoglobin drop  Gastroenterology consult appreciated, continue to follow recommendations     End-stage alcoholic liver cirrhosis with hepatic failure as well as hepatorenal syndrome with a meld score of 40chart reviewed in detail, agree with plan of attending physicians as well as gastroenterology, patient has a very high mortality rate given current presentation  Continue current management  Patient has been accepted to [facility] however remains on wait list  Updated patient's  at bedside, the likelihood that patient would be taken to the OR for liver transplant is extremely low, patient's  voiced understanding  Gastroenterology consult appreciated, continue to follow recommendations     Acute kidney injurysecondary to problem #2, hepatorenal syndrome at this time, tolerating CRRT but is requiring 2 pressors at this time  Continue CRRT as per nephrology recommendations  Nephrology consult appreciated, continue to follow recommendations     Urinary tract infectionurine culture consistent with pansensitive E. coli, currently does not meet sepsis criteria  Continue Zosyn for antibiotic coverage  Continue to monitor     Shocklikely multifactorial including secondary to circulatory shock as well as septic shock  Management as documented above  Antibiotics as above  Attempt to wean off pressors     Hypocalcemiareplete calcium and recheck calcium     ProphylaxisSCDs  FENn.p.o., potassium and magnesium to be maintained with dialysis  DNR/DNI, surrogate decision-maker is patient's , gave detailed update at bedside, including the fact that patient remains on wait list, I also updated patient about patient's extremely poor overall prognosis as well as high mortality rate given overall meld score as well as the chances making a meaningful recovery remains extremely poor, patient's  voiced understanding, reiterated DNR/DNI however wants us to continue current treatment measures at this time and will revisit goals of care discussion in 24 to 48 hours      Patient subsequently  as per documentation in the chart

## 2022-02-08 NOTE — ANESTHESIA POSTPROCEDURE EVALUATION
Procedure(s):  ESOPHAGOGASTRODUODENOSCOPY (EGD).     MAC, total IV anesthesia    Anesthesia Post Evaluation      Multimodal analgesia: multimodal analgesia not used between 6 hours prior to anesthesia start to PACU discharge  Patient location during evaluation: bedside  Patient participation: complete - patient participated  Level of consciousness: awake  Pain score: 0  Pain management: adequate  Airway patency: patent  Anesthetic complications: no  Cardiovascular status: acceptable, blood pressure returned to baseline and hemodynamically stable  Respiratory status: acceptable, nonlabored ventilation, spontaneous ventilation, nasal cannula and unassisted  Hydration status: acceptable  Post anesthesia nausea and vomiting:  none  Final Post Anesthesia Temperature Assessment:  Normothermia (36.0-37.5 degrees C)      INITIAL Post-op Vital signs:   Vitals Value Taken Time   BP 56/32 11/08/21 2230   Temp 38.4 °C (101.1 °F) 11/08/21 2230   Pulse 82 11/08/21 2230   Resp 32 11/08/21 2230   SpO2 92 % 11/08/21 2230

## (undated) DEVICE — (D)STRIP SKN CLSR 0.5X4IN WHT --

## (undated) DEVICE — SYR 50ML SLIP TIP NSAF LF STRL --

## (undated) DEVICE — MINOR VASCULAR PROCEDURE: Brand: MEDLINE INDUSTRIES, INC.

## (undated) DEVICE — Z CONVERTED USE 2271386 BANDAGE COMPR W6INXL11YD WVN COTTON/ELASTIC CLP CLSR DBL

## (undated) DEVICE — SOL IRR NACL 0.9% 500ML POUR --

## (undated) DEVICE — BANDAGE,GAUZE,BULKEE II,4.5"X4.1YD,STRL: Brand: MEDLINE

## (undated) DEVICE — SYR 20ML LL STRL LF --

## (undated) DEVICE — CLIPPING DEVICE: Brand: RESOLUTION CLIP

## (undated) DEVICE — THE ENDO CARRY-ON PROCEDURE KIT CONTAINS ALL OF THE SUPPLIES AND INFECTION PREVENTION PRODUCTS NEEDED FOR ENDOSCOPIC PROCEDURES: Brand: ENDO CARRY-ON PROCEDURE KIT

## (undated) DEVICE — MOUTHPIECE ENDOSCP 20X27MM --

## (undated) DEVICE — TURNOVER KIT OR CUST CMB FLD GEN LF

## (undated) DEVICE — Device: Brand: JELCO

## (undated) DEVICE — GAUZE,SPONGE,4"X4",16PLY,STRL,LF,10/TRAY: Brand: MEDLINE

## (undated) DEVICE — CLIP RESOLUTION 360

## (undated) DEVICE — INTENDED FOR TISSUE SEPARATION, AND OTHER PROCEDURES THAT REQUIRE A SHARP SURGICAL BLADE TO PUNCTURE OR CUT.: Brand: BARD-PARKER SAFETY BLADES SIZE 11, STERILE

## (undated) DEVICE — INTENDED FOR TISSUE SEPARATION, AND OTHER PROCEDURES THAT REQUIRE A SHARP SURGICAL BLADE TO PUNCTURE OR CUT.: Brand: BARD-PARKER SAFETY BLADES SIZE 15, STERILE

## (undated) DEVICE — SUTURE ABSORBABLE MONOFILAMENT 4-0 RB1 27 IN UD MONOCRYL + MCP214H

## (undated) DEVICE — CANNULA NSL O2 AD 7 FT END-TIDAL CARBON DIOX VENTFLO

## (undated) DEVICE — NEEDLE SCLERO 23GA L240CM OD064MM ID032MM CLR INTERJECT

## (undated) DEVICE — ENDO KIT 1: Brand: MEDLINE INDUSTRIES, INC.

## (undated) DEVICE — SUT VCRL + 2-0 27IN SH UD --

## (undated) DEVICE — Device

## (undated) DEVICE — YANKAUER,BULB TIP,W/O VENT,RIGID,STERILE: Brand: MEDLINE

## (undated) DEVICE — TURNOVER KIT A GEN SURG

## (undated) DEVICE — PAD,PREPPING,CUFFED,24X48,7",NONSTERILE: Brand: MEDLINE

## (undated) DEVICE — SINGLE-USE POLYPECTOMY SNARE: Brand: CAPTIFLEX